# Patient Record
Sex: FEMALE | Race: BLACK OR AFRICAN AMERICAN | NOT HISPANIC OR LATINO | Employment: FULL TIME | URBAN - METROPOLITAN AREA
[De-identification: names, ages, dates, MRNs, and addresses within clinical notes are randomized per-mention and may not be internally consistent; named-entity substitution may affect disease eponyms.]

---

## 2017-01-12 RX ORDER — VALSARTAN 160 MG/1
TABLET ORAL
Qty: 30 TABLET | Refills: 2 | Status: SHIPPED | OUTPATIENT
Start: 2017-01-12 | End: 2017-04-18 | Stop reason: SDUPTHER

## 2017-01-24 RX ORDER — POTASSIUM CHLORIDE 750 MG/1
TABLET, EXTENDED RELEASE ORAL
Qty: 30 TABLET | Refills: 5 | Status: SHIPPED | OUTPATIENT
Start: 2017-01-24 | End: 2017-10-14 | Stop reason: SDUPTHER

## 2017-04-18 RX ORDER — VALSARTAN 160 MG/1
TABLET ORAL
Qty: 30 TABLET | Refills: 1 | Status: SHIPPED | OUTPATIENT
Start: 2017-04-18 | End: 2017-11-17 | Stop reason: SDUPTHER

## 2017-05-11 RX ORDER — DAPAGLIFLOZIN 5 MG/1
TABLET, FILM COATED ORAL
Qty: 30 TABLET | Refills: 0 | Status: SHIPPED | OUTPATIENT
Start: 2017-05-11 | End: 2017-06-12 | Stop reason: SDUPTHER

## 2017-05-18 ENCOUNTER — OFFICE VISIT (OUTPATIENT)
Dept: INTERNAL MEDICINE | Facility: CLINIC | Age: 67
End: 2017-05-18

## 2017-05-18 VITALS
HEART RATE: 92 BPM | WEIGHT: 198 LBS | BODY MASS INDEX: 30.11 KG/M2 | DIASTOLIC BLOOD PRESSURE: 78 MMHG | SYSTOLIC BLOOD PRESSURE: 128 MMHG | TEMPERATURE: 97.7 F | RESPIRATION RATE: 14 BRPM

## 2017-05-18 DIAGNOSIS — I10 ESSENTIAL HYPERTENSION: Primary | ICD-10-CM

## 2017-05-18 DIAGNOSIS — E11.9 TYPE 2 DIABETES MELLITUS WITHOUT COMPLICATION, WITHOUT LONG-TERM CURRENT USE OF INSULIN (HCC): Primary | ICD-10-CM

## 2017-05-18 DIAGNOSIS — Z11.59 NEED FOR HEPATITIS C SCREENING TEST: ICD-10-CM

## 2017-05-18 DIAGNOSIS — I10 ESSENTIAL HYPERTENSION: ICD-10-CM

## 2017-05-18 DIAGNOSIS — M54.50 CHRONIC MIDLINE LOW BACK PAIN WITHOUT SCIATICA: ICD-10-CM

## 2017-05-18 DIAGNOSIS — Z23 NEED FOR PNEUMOCOCCAL VACCINE: ICD-10-CM

## 2017-05-18 DIAGNOSIS — E11.9 TYPE 2 DIABETES MELLITUS WITHOUT COMPLICATION, WITHOUT LONG-TERM CURRENT USE OF INSULIN (HCC): ICD-10-CM

## 2017-05-18 DIAGNOSIS — M25.562 CHRONIC PAIN OF BOTH KNEES: ICD-10-CM

## 2017-05-18 DIAGNOSIS — M25.561 CHRONIC PAIN OF BOTH KNEES: ICD-10-CM

## 2017-05-18 DIAGNOSIS — G89.29 CHRONIC PAIN OF BOTH KNEES: ICD-10-CM

## 2017-05-18 DIAGNOSIS — G89.29 CHRONIC MIDLINE LOW BACK PAIN WITHOUT SCIATICA: ICD-10-CM

## 2017-05-18 DIAGNOSIS — Z13.220 LIPID SCREENING: ICD-10-CM

## 2017-05-18 LAB
A/C: NORMAL
ALBUMIN SERPL-MCNC: 4.3 G/DL (ref 3.2–4.8)
ALBUMIN/GLOB SERPL: 1.6 G/DL (ref 1.5–2.5)
ALP SERPL-CCNC: 98 U/L (ref 25–100)
ALT SERPL W P-5'-P-CCNC: 26 U/L (ref 7–40)
ANION GAP SERPL CALCULATED.3IONS-SCNC: 8 MMOL/L (ref 3–11)
ARTICHOKE IGE QN: 144 MG/DL (ref 0–130)
AST SERPL-CCNC: 18 U/L (ref 0–33)
BILIRUB SERPL-MCNC: 0.6 MG/DL (ref 0.3–1.2)
BUN BLD-MCNC: 16 MG/DL (ref 9–23)
BUN/CREAT SERPL: 22.9 (ref 7–25)
CALCIUM SPEC-SCNC: 10 MG/DL (ref 8.7–10.4)
CHLORIDE SERPL-SCNC: 109 MMOL/L (ref 99–109)
CHOLEST SERPL-MCNC: 202 MG/DL (ref 0–200)
CO2 SERPL-SCNC: 26 MMOL/L (ref 20–31)
CREAT BLD-MCNC: 0.7 MG/DL (ref 0.6–1.3)
DEPRECATED RDW RBC AUTO: 46.4 FL (ref 37–54)
ERYTHROCYTE [DISTWIDTH] IN BLOOD BY AUTOMATED COUNT: 14.2 % (ref 11.3–14.5)
EXPIRATION DATE: NORMAL
EXPIRATION DATE: NORMAL
GFR SERPL CREATININE-BSD FRML MDRD: 102 ML/MIN/1.73
GLOBULIN UR ELPH-MCNC: 2.7 GM/DL
GLUCOSE BLD-MCNC: 164 MG/DL (ref 70–100)
HBA1C MFR BLD: 8.1 %
HCT VFR BLD AUTO: 40.3 % (ref 34.5–44)
HCV AB SER DONR QL: NORMAL
HDLC SERPL-MCNC: 46 MG/DL (ref 40–60)
HGB BLD-MCNC: 12.6 G/DL (ref 11.5–15.5)
Lab: NORMAL
Lab: NORMAL
MCH RBC QN AUTO: 28.3 PG (ref 27–31)
MCHC RBC AUTO-ENTMCNC: 31.3 G/DL (ref 32–36)
MCV RBC AUTO: 90.4 FL (ref 80–99)
PLATELET # BLD AUTO: 267 10*3/MM3 (ref 150–450)
PMV BLD AUTO: 10.6 FL (ref 6–12)
POC CREATININE URINE: 50
POC MICROALBUMIN URINE: 10
POTASSIUM BLD-SCNC: 4.3 MMOL/L (ref 3.5–5.5)
PROT SERPL-MCNC: 7 G/DL (ref 5.7–8.2)
RBC # BLD AUTO: 4.46 10*6/MM3 (ref 3.89–5.14)
SODIUM BLD-SCNC: 143 MMOL/L (ref 132–146)
T4 FREE SERPL-MCNC: 1.14 NG/DL (ref 0.89–1.76)
TRIGL SERPL-MCNC: 129 MG/DL (ref 0–150)
TSH SERPL DL<=0.05 MIU/L-ACNC: 1.72 MIU/ML (ref 0.35–5.35)
WBC NRBC COR # BLD: 4.96 10*3/MM3 (ref 3.5–10.8)

## 2017-05-18 PROCEDURE — 99214 OFFICE O/P EST MOD 30 MIN: CPT | Performed by: INTERNAL MEDICINE

## 2017-05-18 PROCEDURE — 83036 HEMOGLOBIN GLYCOSYLATED A1C: CPT | Performed by: INTERNAL MEDICINE

## 2017-05-18 PROCEDURE — 84439 ASSAY OF FREE THYROXINE: CPT | Performed by: INTERNAL MEDICINE

## 2017-05-18 PROCEDURE — 36415 COLL VENOUS BLD VENIPUNCTURE: CPT | Performed by: INTERNAL MEDICINE

## 2017-05-18 PROCEDURE — 86803 HEPATITIS C AB TEST: CPT | Performed by: INTERNAL MEDICINE

## 2017-05-18 PROCEDURE — 82044 UR ALBUMIN SEMIQUANTITATIVE: CPT | Performed by: INTERNAL MEDICINE

## 2017-05-18 PROCEDURE — 80053 COMPREHEN METABOLIC PANEL: CPT | Performed by: INTERNAL MEDICINE

## 2017-05-18 PROCEDURE — 85027 COMPLETE CBC AUTOMATED: CPT | Performed by: INTERNAL MEDICINE

## 2017-05-18 PROCEDURE — 84443 ASSAY THYROID STIM HORMONE: CPT | Performed by: INTERNAL MEDICINE

## 2017-05-18 PROCEDURE — 80061 LIPID PANEL: CPT | Performed by: INTERNAL MEDICINE

## 2017-05-18 RX ORDER — EZETIMIBE 10 MG/1
10 TABLET ORAL DAILY
Qty: 30 TABLET | Refills: 5 | Status: SHIPPED | OUTPATIENT
Start: 2017-05-18 | End: 2018-03-05 | Stop reason: SDUPTHER

## 2017-05-18 RX ORDER — TRAMADOL HYDROCHLORIDE 50 MG/1
50 TABLET ORAL EVERY 6 HOURS PRN
COMMUNITY
End: 2019-02-03 | Stop reason: HOSPADM

## 2017-05-18 RX ORDER — CELECOXIB 200 MG/1
200 CAPSULE ORAL 2 TIMES DAILY
Qty: 60 CAPSULE | Refills: 6 | Status: SHIPPED | OUTPATIENT
Start: 2017-05-18 | End: 2017-12-15 | Stop reason: SDUPTHER

## 2017-05-19 ENCOUNTER — TELEPHONE (OUTPATIENT)
Dept: INTERNAL MEDICINE | Facility: CLINIC | Age: 67
End: 2017-05-19

## 2017-05-24 DIAGNOSIS — G89.29 CHRONIC MIDLINE LOW BACK PAIN WITHOUT SCIATICA: Primary | ICD-10-CM

## 2017-05-24 DIAGNOSIS — M54.50 CHRONIC MIDLINE LOW BACK PAIN WITHOUT SCIATICA: Primary | ICD-10-CM

## 2017-06-12 RX ORDER — DAPAGLIFLOZIN 5 MG/1
TABLET, FILM COATED ORAL
Qty: 30 TABLET | Refills: 0 | Status: SHIPPED | OUTPATIENT
Start: 2017-06-12 | End: 2017-07-19 | Stop reason: SDUPTHER

## 2017-07-05 RX ORDER — INSULIN DETEMIR 100 [IU]/ML
INJECTION, SOLUTION SUBCUTANEOUS
Qty: 1 PEN | Refills: 4 | Status: SHIPPED | OUTPATIENT
Start: 2017-07-05 | End: 2018-05-14 | Stop reason: SDUPTHER

## 2017-07-05 RX ORDER — LEVOCETIRIZINE DIHYDROCHLORIDE 5 MG/1
TABLET, FILM COATED ORAL
Qty: 30 TABLET | Refills: 2 | Status: SHIPPED | OUTPATIENT
Start: 2017-07-05 | End: 2017-09-29 | Stop reason: SDUPTHER

## 2017-07-07 ENCOUNTER — TELEPHONE (OUTPATIENT)
Dept: INTERNAL MEDICINE | Facility: CLINIC | Age: 67
End: 2017-07-07

## 2017-07-07 NOTE — TELEPHONE ENCOUNTER
----- Message from Ann Adams sent at 7/7/2017 11:41 AM EDT -----  Dr. Blackwell increased insulin and it won't be filled until 8/6/17 but only has one more dose and needs filled ASAP to [facility].  Patient can be reached at [phone] if needed.

## 2017-07-07 NOTE — TELEPHONE ENCOUNTER
Spoke with Pt, she states she is taking 30 units daily. RX sent to pharmacy . Spoke with Pt and informed them of Rx. Pt verbalized understanding and much appr'c.

## 2017-07-19 RX ORDER — DAPAGLIFLOZIN 5 MG/1
TABLET, FILM COATED ORAL
Qty: 30 TABLET | Refills: 0 | Status: SHIPPED | OUTPATIENT
Start: 2017-07-19 | End: 2017-08-17 | Stop reason: SDUPTHER

## 2017-08-17 RX ORDER — DAPAGLIFLOZIN 5 MG/1
TABLET, FILM COATED ORAL
Qty: 30 TABLET | Refills: 4 | Status: SHIPPED | OUTPATIENT
Start: 2017-08-17 | End: 2018-01-15 | Stop reason: SDUPTHER

## 2017-08-24 ENCOUNTER — OFFICE VISIT (OUTPATIENT)
Dept: INTERNAL MEDICINE | Facility: CLINIC | Age: 67
End: 2017-08-24

## 2017-08-24 VITALS
HEART RATE: 80 BPM | WEIGHT: 200 LBS | RESPIRATION RATE: 20 BRPM | DIASTOLIC BLOOD PRESSURE: 80 MMHG | SYSTOLIC BLOOD PRESSURE: 122 MMHG | TEMPERATURE: 97.1 F | BODY MASS INDEX: 30.41 KG/M2

## 2017-08-24 DIAGNOSIS — E11.9 TYPE 2 DIABETES MELLITUS WITHOUT COMPLICATION, WITHOUT LONG-TERM CURRENT USE OF INSULIN (HCC): ICD-10-CM

## 2017-08-24 DIAGNOSIS — M25.561 CHRONIC PAIN OF BOTH KNEES: ICD-10-CM

## 2017-08-24 DIAGNOSIS — M25.562 CHRONIC PAIN OF BOTH KNEES: ICD-10-CM

## 2017-08-24 DIAGNOSIS — G89.29 CHRONIC PAIN OF BOTH KNEES: ICD-10-CM

## 2017-08-24 DIAGNOSIS — M54.2 NECK PAIN: Primary | ICD-10-CM

## 2017-08-24 LAB
EXPIRATION DATE: NORMAL
HBA1C MFR BLD: 7.7 %
Lab: NORMAL

## 2017-08-24 PROCEDURE — 83036 HEMOGLOBIN GLYCOSYLATED A1C: CPT | Performed by: INTERNAL MEDICINE

## 2017-08-24 PROCEDURE — 99214 OFFICE O/P EST MOD 30 MIN: CPT | Performed by: INTERNAL MEDICINE

## 2017-08-24 NOTE — PROGRESS NOTES
Subjective   Lupe Morillo is a 67 y.o. female.     History of Present Illness   1 neck pain-chronic and mild exacerbation.  Patient says that she's been having continued intermittent neck pain.  Pain is worse with flexion, extension, and looking downward.  She is also noticed that her symptoms tend to become worse especially at her work environment.  He may resource has suggested that she may need to have another chair that is ergonomically designed to help with some of her symptoms.  She also has been going to physical therapy and this has helped tremendously with her increased range of motion and decreased pain in her neck.    2 knee pain- chronic and stable.  Patient continues to have bilateral knee pain.  Patient's knee pain is made worse with prolong standing, flexion, or extension.  She denies any new trauma to the knee, no swelling, no fever, no chills, no other systemic symptoms.  She would also like to continue her physical therapy for both her knees and her shoulders.    3 diabetes- chronic and stable.  Patient denies any hypoglycemic, nausea, vomiting, diarrhea, headache, syncope, or any other systemic symptoms in regards to her diabetes.  She continues on an appropriate ADA diet and also continues on her medications:    Review of Systems   All other systems reviewed and are negative.      Objective   Physical Exam   Constitutional: She is oriented to person, place, and time. She appears well-developed and well-nourished.   HENT:   Head: Normocephalic.   Right Ear: External ear normal.   Left Ear: External ear normal.   Nose: Nose normal.   Mouth/Throat: Oropharynx is clear and moist.   Eyes: Conjunctivae and EOM are normal. Pupils are equal, round, and reactive to light.   Neck: Normal range of motion. Neck supple.   Cardiovascular: Normal rate, regular rhythm and normal heart sounds.    Pulmonary/Chest: Effort normal and breath sounds normal.   Musculoskeletal: Normal range of motion.   Neurological:  She is alert and oriented to person, place, and time.   Skin: Skin is warm.   Nursing note and vitals reviewed.      Assessment/Plan   Lupe was seen today for diabetes, shoulder pain and knee pain.    Diagnoses and all orders for this visit:    Neck pain-chronic and stable.    Chronic pain of both knees-chronic and stable.    Type 2 diabetes mellitus without complication, without long-term current use of insulin  -     POC Glycosylated Hemoglobin (Hb A1C)      For musculoskeletal issues patient will continue to follow-up with physical therapy and anticipate in home exercise program.  Continue on current diabetes medication.       sen

## 2017-09-29 RX ORDER — LEVOCETIRIZINE DIHYDROCHLORIDE 5 MG/1
TABLET, FILM COATED ORAL
Qty: 30 TABLET | Refills: 1 | Status: SHIPPED | OUTPATIENT
Start: 2017-09-29 | End: 2017-11-29 | Stop reason: SDUPTHER

## 2017-10-16 RX ORDER — POTASSIUM CHLORIDE 750 MG/1
TABLET, EXTENDED RELEASE ORAL
Qty: 30 TABLET | Refills: 4 | Status: SHIPPED | OUTPATIENT
Start: 2017-10-16 | End: 2018-03-15 | Stop reason: SDUPTHER

## 2017-11-13 ENCOUNTER — TRANSCRIBE ORDERS (OUTPATIENT)
Dept: ADMINISTRATIVE | Facility: HOSPITAL | Age: 67
End: 2017-11-13

## 2017-11-13 DIAGNOSIS — Z12.31 VISIT FOR SCREENING MAMMOGRAM: Primary | ICD-10-CM

## 2017-11-17 ENCOUNTER — FLU SHOT (OUTPATIENT)
Dept: INTERNAL MEDICINE | Facility: CLINIC | Age: 67
End: 2017-11-17

## 2017-11-17 DIAGNOSIS — Z23 NEED FOR INFLUENZA VACCINATION: Primary | ICD-10-CM

## 2017-11-17 PROCEDURE — 90670 PCV13 VACCINE IM: CPT | Performed by: INTERNAL MEDICINE

## 2017-11-17 PROCEDURE — 90471 IMMUNIZATION ADMIN: CPT | Performed by: INTERNAL MEDICINE

## 2017-11-17 PROCEDURE — 90472 IMMUNIZATION ADMIN EACH ADD: CPT | Performed by: INTERNAL MEDICINE

## 2017-11-17 PROCEDURE — 90662 IIV NO PRSV INCREASED AG IM: CPT | Performed by: INTERNAL MEDICINE

## 2017-11-17 RX ORDER — VALSARTAN 160 MG/1
160 TABLET ORAL DAILY
Qty: 90 TABLET | Refills: 3 | Status: SHIPPED | OUTPATIENT
Start: 2017-11-17 | End: 2018-08-15 | Stop reason: RX

## 2017-11-30 RX ORDER — LEVOCETIRIZINE DIHYDROCHLORIDE 5 MG/1
TABLET, FILM COATED ORAL
Qty: 30 TABLET | Refills: 0 | Status: SHIPPED | OUTPATIENT
Start: 2017-11-30 | End: 2018-01-04 | Stop reason: SDUPTHER

## 2017-12-06 RX ORDER — EZETIMIBE 10 MG/1
TABLET ORAL
Qty: 30 TABLET | Refills: 1 | Status: SHIPPED | OUTPATIENT
Start: 2017-12-06 | End: 2017-12-11 | Stop reason: SDUPTHER

## 2017-12-11 ENCOUNTER — OFFICE VISIT (OUTPATIENT)
Dept: INTERNAL MEDICINE | Facility: CLINIC | Age: 67
End: 2017-12-11

## 2017-12-11 VITALS
BODY MASS INDEX: 30.16 KG/M2 | WEIGHT: 198.38 LBS | RESPIRATION RATE: 18 BRPM | HEART RATE: 80 BPM | DIASTOLIC BLOOD PRESSURE: 70 MMHG | SYSTOLIC BLOOD PRESSURE: 118 MMHG | TEMPERATURE: 97.6 F

## 2017-12-11 DIAGNOSIS — Z79.4 TYPE 2 DIABETES MELLITUS WITHOUT COMPLICATION, WITH LONG-TERM CURRENT USE OF INSULIN (HCC): Primary | ICD-10-CM

## 2017-12-11 DIAGNOSIS — M54.50 CHRONIC MIDLINE LOW BACK PAIN WITHOUT SCIATICA: ICD-10-CM

## 2017-12-11 DIAGNOSIS — G56.00 CARPAL TUNNEL SYNDROME, UNSPECIFIED LATERALITY: ICD-10-CM

## 2017-12-11 DIAGNOSIS — G89.29 CHRONIC MIDLINE LOW BACK PAIN WITHOUT SCIATICA: ICD-10-CM

## 2017-12-11 DIAGNOSIS — E11.9 TYPE 2 DIABETES MELLITUS WITHOUT COMPLICATION, WITH LONG-TERM CURRENT USE OF INSULIN (HCC): Primary | ICD-10-CM

## 2017-12-11 LAB
ALBUMIN SERPL-MCNC: 4.5 G/DL (ref 3.2–4.8)
ALBUMIN/GLOB SERPL: 1.9 G/DL (ref 1.5–2.5)
ALP SERPL-CCNC: 93 U/L (ref 25–100)
ALT SERPL W P-5'-P-CCNC: 23 U/L (ref 7–40)
ANION GAP SERPL CALCULATED.3IONS-SCNC: 14 MMOL/L (ref 3–11)
AST SERPL-CCNC: 16 U/L (ref 0–33)
BILIRUB SERPL-MCNC: 0.6 MG/DL (ref 0.3–1.2)
BUN BLD-MCNC: 20 MG/DL (ref 9–23)
BUN/CREAT SERPL: 22.2 (ref 7–25)
CALCIUM SPEC-SCNC: 9.4 MG/DL (ref 8.7–10.4)
CHLORIDE SERPL-SCNC: 103 MMOL/L (ref 99–109)
CO2 SERPL-SCNC: 23 MMOL/L (ref 20–31)
CREAT BLD-MCNC: 0.9 MG/DL (ref 0.6–1.3)
EXPIRATION DATE: NORMAL
GFR SERPL CREATININE-BSD FRML MDRD: 76 ML/MIN/1.73
GLOBULIN UR ELPH-MCNC: 2.4 GM/DL
GLUCOSE BLD-MCNC: 165 MG/DL (ref 70–100)
HBA1C MFR BLD: 7.5 %
Lab: NORMAL
POTASSIUM BLD-SCNC: 4.3 MMOL/L (ref 3.5–5.5)
PROT SERPL-MCNC: 6.9 G/DL (ref 5.7–8.2)
SODIUM BLD-SCNC: 140 MMOL/L (ref 132–146)

## 2017-12-11 PROCEDURE — 36415 COLL VENOUS BLD VENIPUNCTURE: CPT | Performed by: INTERNAL MEDICINE

## 2017-12-11 PROCEDURE — 83036 HEMOGLOBIN GLYCOSYLATED A1C: CPT | Performed by: INTERNAL MEDICINE

## 2017-12-11 PROCEDURE — 99214 OFFICE O/P EST MOD 30 MIN: CPT | Performed by: INTERNAL MEDICINE

## 2017-12-11 PROCEDURE — 80053 COMPREHEN METABOLIC PANEL: CPT | Performed by: INTERNAL MEDICINE

## 2017-12-11 RX ORDER — PAROXETINE 7.5 MG/1
CAPSULE ORAL DAILY PRN
Status: ON HOLD | COMMUNITY
Start: 2017-12-06 | End: 2019-01-30

## 2017-12-11 RX ORDER — MONTELUKAST SODIUM 10 MG/1
10 TABLET ORAL NIGHTLY
COMMUNITY
Start: 2017-11-30

## 2017-12-11 NOTE — PROGRESS NOTES
Subjective   Lupe Morillo is a 67 y.o. female.     History of Present Illness      1 diabetes- chronic and she says that it is doing fair.  Patient reports no symptoms of any hypoglycemia, no nausea, no vomiting, no diarrhea, no headache, no fatigue, no other systemic symptoms.  Medications: Patient is currently taking and continues on the metformin 1000 mg by mouth once a day.    2 carpal tunnel symptoms-patient says that her carpal tunnel symptoms have returned especially on her right hand which is her dominant hand.  Patient says that the pain is made worse with blocks, extension, or dexterity movement of the hand.  She will follow-up with the hand surgeons in the next few weeks.    3 low back pain-chronic and localized to lower lumbar region.  Patient continues to have lower lumbar back pain but it has improved somewhat with physical therapy and continue with NSAIDs.  She has improved somewhat with her range of motion and ability to transfer.     Review of Systems   All other systems reviewed and are negative.      Objective   Physical Exam   Constitutional: She is oriented to person, place, and time. She appears well-developed and well-nourished.   HENT:   Head: Normocephalic and atraumatic.   Right Ear: External ear normal.   Left Ear: External ear normal.   Nose: Nose normal.   Mouth/Throat: Oropharynx is clear and moist.   Eyes: Conjunctivae and EOM are normal. Pupils are equal, round, and reactive to light.   Neck: Normal range of motion. Neck supple.   Cardiovascular: Normal rate, regular rhythm and normal heart sounds.    Pulmonary/Chest: Effort normal and breath sounds normal.   Musculoskeletal: Normal range of motion.   Neurological: She is alert and oriented to person, place, and time.   Skin: Skin is warm.   Nursing note and vitals reviewed.      Assessment/Plan   Lupe was seen today for diabetes.    Diagnoses and all orders for this visit:    Type 2 diabetes mellitus without complication, with  long-term current use of insulin  -     Comprehensive Metabolic Panel  -     POC Glycosylated Hemoglobin (Hb A1C)  Continue on current diabetes management and medications.    Carpal tunnel syndrome, unspecified laterality-patient will follow-up with hand surgery    Chronic midline low back pain without sciatica-continue on current medication and medical management.

## 2017-12-15 RX ORDER — CELECOXIB 200 MG/1
CAPSULE ORAL
Qty: 60 CAPSULE | Refills: 5 | Status: ON HOLD | OUTPATIENT
Start: 2017-12-15 | End: 2019-01-30

## 2017-12-26 ENCOUNTER — HOSPITAL ENCOUNTER (OUTPATIENT)
Dept: MAMMOGRAPHY | Facility: HOSPITAL | Age: 67
Discharge: HOME | End: 2017-12-26
Admitting: OBSTETRICS & GYNECOLOGY

## 2017-12-26 DIAGNOSIS — Z12.31 VISIT FOR SCREENING MAMMOGRAM: ICD-10-CM

## 2017-12-26 PROCEDURE — 77063 BREAST TOMOSYNTHESIS BI: CPT | Performed by: RADIOLOGY

## 2017-12-26 PROCEDURE — G0202 SCR MAMMO BI INCL CAD: HCPCS | Performed by: RADIOLOGY

## 2017-12-26 PROCEDURE — 77063 BREAST TOMOSYNTHESIS BI: CPT

## 2017-12-26 PROCEDURE — G0202 SCR MAMMO BI INCL CAD: HCPCS

## 2017-12-27 ENCOUNTER — APPOINTMENT (OUTPATIENT)
Dept: MAMMOGRAPHY | Facility: HOSPITAL | Age: 67
End: 2017-12-27

## 2018-01-04 RX ORDER — LEVOCETIRIZINE DIHYDROCHLORIDE 5 MG/1
TABLET, FILM COATED ORAL
Qty: 30 TABLET | Refills: 0 | Status: SHIPPED | OUTPATIENT
Start: 2018-01-04 | End: 2018-04-21 | Stop reason: SDUPTHER

## 2018-01-15 RX ORDER — DAPAGLIFLOZIN 5 MG/1
TABLET, FILM COATED ORAL
Qty: 30 TABLET | Refills: 3 | Status: SHIPPED | OUTPATIENT
Start: 2018-01-15 | End: 2018-05-15 | Stop reason: SDUPTHER

## 2018-01-21 ENCOUNTER — HOSPITAL ENCOUNTER (EMERGENCY)
Facility: HOSPITAL | Age: 68
Discharge: HOME | End: 2018-01-22
Attending: EMERGENCY MEDICINE | Admitting: EMERGENCY MEDICINE

## 2018-01-21 VITALS
OXYGEN SATURATION: 97 % | DIASTOLIC BLOOD PRESSURE: 68 MMHG | BODY MASS INDEX: 31.08 KG/M2 | HEART RATE: 118 BPM | HEIGHT: 67 IN | SYSTOLIC BLOOD PRESSURE: 160 MMHG | WEIGHT: 198 LBS | TEMPERATURE: 99.4 F | RESPIRATION RATE: 18 BRPM

## 2018-01-21 DIAGNOSIS — J02.9 VIRAL PHARYNGITIS: Primary | ICD-10-CM

## 2018-01-21 PROCEDURE — 99283 EMERGENCY DEPT VISIT LOW MDM: CPT

## 2018-01-22 LAB
FLUAV AG NPH QL: NEGATIVE
FLUBV AG NPH QL IA: NEGATIVE
S PYO AG THROAT QL: NEGATIVE

## 2018-01-22 PROCEDURE — 87804 INFLUENZA ASSAY W/OPTIC: CPT | Performed by: EMERGENCY MEDICINE

## 2018-01-22 PROCEDURE — 25010000002 DEXAMETHASONE PER 1 MG: Performed by: EMERGENCY MEDICINE

## 2018-01-22 PROCEDURE — 87880 STREP A ASSAY W/OPTIC: CPT | Performed by: EMERGENCY MEDICINE

## 2018-01-22 PROCEDURE — 87081 CULTURE SCREEN ONLY: CPT | Performed by: EMERGENCY MEDICINE

## 2018-01-22 RX ADMIN — DEXAMETHASONE SODIUM PHOSPHATE 10 MG: 10 INJECTION INTRAMUSCULAR; INTRAVENOUS at 02:10

## 2018-01-22 NOTE — DISCHARGE INSTRUCTIONS
Pharyngitis  Pharyngitis is redness, pain, and swelling (inflammation) of your pharynx.  What are the causes?  Pharyngitis is usually caused by infection. Most of the time, these infections are from viruses (viral) and are part of a cold. However, sometimes pharyngitis is caused by bacteria (bacterial). Pharyngitis can also be caused by allergies. Viral pharyngitis may be spread from person to person by coughing, sneezing, and personal items or utensils (cups, forks, spoons, toothbrushes). Bacterial pharyngitis may be spread from person to person by more intimate contact, such as kissing.  What are the signs or symptoms?  Symptoms of pharyngitis include:  · Sore throat.  · Tiredness (fatigue).  · Low-grade fever.  · Headache.  · Joint pain and muscle aches.  · Skin rashes.  · Swollen lymph nodes.  · Plaque-like film on throat or tonsils (often seen with bacterial pharyngitis).  How is this diagnosed?  Your health care provider will ask you questions about your illness and your symptoms. Your medical history, along with a physical exam, is often all that is needed to diagnose pharyngitis. Sometimes, a rapid strep test is done. Other lab tests may also be done, depending on the suspected cause.  How is this treated?  Viral pharyngitis will usually get better in 3-4 days without the use of medicine. Bacterial pharyngitis is treated with medicines that kill germs (antibiotics).  Follow these instructions at home:  · Drink enough water and fluids to keep your urine clear or pale yellow.  · Only take over-the-counter or prescription medicines as directed by your health care provider:  ¨ If you are prescribed antibiotics, make sure you finish them even if you start to feel better.  ¨ Do not take aspirin.  · Get lots of rest.  · Gargle with 8 oz of salt water (½ tsp of salt per 1 qt of water) as often as every 1-2 hours to soothe your throat.  · Throat lozenges (if you are not at risk for choking) or sprays may be used to  soothe your throat.  Contact a health care provider if:  · You have large, tender lumps in your neck.  · You have a rash.  · You cough up green, yellow-brown, or bloody spit.  Get help right away if:  · Your neck becomes stiff.  · You drool or are unable to swallow liquids.  · You vomit or are unable to keep medicines or liquids down.  · You have severe pain that does not go away with the use of recommended medicines.  · You have trouble breathing (not caused by a stuffy nose).  This information is not intended to replace advice given to you by your health care provider. Make sure you discuss any questions you have with your health care provider.  Document Released: 12/18/2006 Document Revised: 05/25/2017 Document Reviewed: 08/25/2014  [employer] Interactive Patient Education © 2017 [employer]      Sore Throat  A sore throat is pain, burning, irritation, or scratchiness in the throat. When you have a sore throat, you may feel pain or tenderness in your throat when you swallow or talk.  Many things can cause a sore throat, including:  · An infection.  · Seasonal allergies.  · Dryness in the air.  · Irritants, such as smoke or pollution.  · Gastroesophageal reflux disease (GERD).  · A tumor.  A sore throat is often the first sign of another sickness. It may happen with other symptoms, such as coughing, sneezing, fever, and swollen neck glands. Most sore throats go away without medical treatment.  Follow these instructions at home:  · Take over-the-counter medicines only as told by your health care provider.  · Drink enough fluids to keep your urine clear or pale yellow.  · Rest as needed.  · To help with pain, try:  ¨ Sipping warm liquids, such as broth, herbal tea, or warm water.  ¨ Eating or drinking cold or frozen liquids, such as frozen ice pops.  ¨ Gargling with a salt-water mixture 3-4 times a day or as needed. To make a salt-water mixture, completely dissolve ½-1 tsp of salt in 1 cup of warm water.  ¨ Sucking  on hard candy or throat lozenges.  ¨ Putting a cool-mist humidifier in your bedroom at night to moisten the air.  ¨ Sitting in the bathroom with the door closed for 5-10 minutes while you run hot water in the shower.  · Do not use any tobacco products, such as cigarettes, chewing tobacco, and e-cigarettes. If you need help quitting, ask your health care provider.  Contact a health care provider if:  · You have a fever for more than 2-3 days.  · You have symptoms that last (are persistent) for more than 2-3 days.  · Your throat does not get better within 7 days.  · You have a fever and your symptoms suddenly get worse.  Get help right away if:  · You have difficulty breathing.  · You cannot swallow fluids, soft foods, or your saliva.  · You have increased swelling in your throat or neck.  · You have persistent nausea and vomiting.  This information is not intended to replace advice given to you by your health care provider. Make sure you discuss any questions you have with your health care provider.  Document Released: 01/25/2006 Document Revised: 08/13/2017 Document Reviewed: 10/07/2016  [employer] Interactive Patient Education © 2017 [employer]  Please review the medications you are supposed to be taking according to prior physician recommendations. I have not changed your home medications during this visit. If your discharge instructions indicate that I have changed your home medications, this is not the case, and you should disregard. If you have any questions about the medication you should be taking at home, please call your physician.

## 2018-01-22 NOTE — ED PROVIDER NOTES
Subjective   History of Present Illness  This 67-year-old female presents the emergency department complaints of sore throat cough and congestion which began yesterday.  Patient complains pain with swallowing.  She denies other illness.  She's had no fever or chills.  She's had no vomiting or diarrhea.  She states that she is painful swallowing but is able to swallow.    Past medical history is significant for history of asthma, seasonal allergies, hyperlipidemia, hypertension and type 2 diabetes    Current medications as noted on the chart    Social history she does not smoke drink or utilize drugs she is  and accompanied by her   Review of Systems   Constitutional: Negative for chills and fever.   HENT: Positive for congestion, sneezing and sore throat.    Respiratory: Positive for cough. Negative for shortness of breath.    Cardiovascular: Negative for chest pain and palpitations.   Gastrointestinal: Negative for abdominal pain, diarrhea, nausea and vomiting.   Genitourinary: Negative for dysuria, frequency and urgency.   All other systems reviewed and are negative.      Past Medical History:   Diagnosis Date   • Acute bronchitis    • Arthritis    • Asthma    • History of chronic bronchitis    • History of colonoscopy 03/20/2012   • History of hypercholesterolemia    • Hypertension    • Polyp of sigmoid colon        Allergies   Allergen Reactions   • Statins      Leg cramps       Past Surgical History:   Procedure Laterality Date   • HYSTERECTOMY     • OOPHORECTOMY     • TUBAL ABDOMINAL LIGATION         Family History   Problem Relation Age of Onset   • Diabetes Mother    • Ovarian cancer Mother      LATE 40'S-EARLY 50'S   • Hyperlipidemia Father    • Hypertension Maternal Grandmother    • Cancer Other    • Breast cancer Maternal Aunt      AGE UNKNOWN       Social History     Social History   • Marital status:      Spouse name: N/A   • Number of children: N/A   • Years of education: N/A      Social History Main Topics   • Smoking status: Never Smoker   • Smokeless tobacco: Never Used   • Alcohol use No   • Drug use: None   • Sexual activity: Not Asked     Other Topics Concern   • None     Social History Narrative   • None           Objective   Physical Exam   Constitutional: She is oriented to person, place, and time. She appears well-developed and well-nourished. No distress.   HENT:   Head: Normocephalic and atraumatic.   Right Ear: External ear normal.   Left Ear: External ear normal.   Eyes: Pupils are equal, round, and reactive to light. No scleral icterus.   Neck: Neck supple.   Cardiovascular: Regular rhythm and normal heart sounds.    Pulmonary/Chest: Effort normal and breath sounds normal. No respiratory distress.   Abdominal: Soft. Bowel sounds are normal. She exhibits no distension. There is no tenderness.   Musculoskeletal: She exhibits no edema.   Lymphadenopathy:     She has no cervical adenopathy.   Neurological: She is alert and oriented to person, place, and time. No cranial nerve deficit. Coordination normal.   Skin: Skin is warm and dry. No rash noted. She is not diaphoretic.   Psychiatric: She has a normal mood and affect. Her behavior is normal.   Nursing note and vitals reviewed.    Patient's voice is slightly hoarse however there is no stridor.  She is controlling his secretions quite well.  Oropharynx shows moderate degree of erythema but no exudates.  There is postnasal drip noted as well.  There is no respiratory distress and chest is clear.    The patient's strep screen as well as flu swabs are negative.    Assessment viral pharyngitis    Plan I spoke with the patient about treatment options and she has elected to accept some single dose of steroid.  She is aware that this will cause her glucose is to be somewhat poorly controlled over a couple of days.  I have indicated that she should not be surprised she become somewhat hyperglycemic and she should not drastically  changed any of her medications as a result of such as she will gradually normalize.  She indicates understanding.    .Procedures         ED Course  ED Course                  MDM    Final diagnoses:   Viral pharyngitis            Carlos Madsen MD  01/22/18 [id number]

## 2018-01-24 LAB — BACTERIA SPEC AEROBE CULT: NORMAL

## 2018-02-07 ENCOUNTER — HOSPITAL ENCOUNTER (OUTPATIENT)
Dept: MAMMOGRAPHY | Facility: HOSPITAL | Age: 68
Discharge: HOME | End: 2018-02-07
Admitting: OBSTETRICS & GYNECOLOGY

## 2018-02-07 DIAGNOSIS — R92.8 ABNORMAL MAMMOGRAM: ICD-10-CM

## 2018-02-07 PROCEDURE — 77061 BREAST TOMOSYNTHESIS UNI: CPT | Performed by: RADIOLOGY

## 2018-02-07 PROCEDURE — 77065 DX MAMMO INCL CAD UNI: CPT | Performed by: RADIOLOGY

## 2018-02-07 PROCEDURE — 77065 DX MAMMO INCL CAD UNI: CPT

## 2018-02-07 PROCEDURE — G0279 TOMOSYNTHESIS, MAMMO: HCPCS

## 2018-03-05 RX ORDER — EZETIMIBE 10 MG/1
TABLET ORAL
Qty: 30 TABLET | Refills: 5 | Status: SHIPPED | OUTPATIENT
Start: 2018-03-05 | End: 2018-05-14 | Stop reason: SDUPTHER

## 2018-03-15 RX ORDER — POTASSIUM CHLORIDE 750 MG/1
TABLET, FILM COATED, EXTENDED RELEASE ORAL
Qty: 30 TABLET | Refills: 5 | Status: SHIPPED | OUTPATIENT
Start: 2018-03-15 | End: 2018-05-14 | Stop reason: SDUPTHER

## 2018-03-23 ENCOUNTER — OFFICE VISIT (OUTPATIENT)
Dept: INTERNAL MEDICINE | Facility: CLINIC | Age: 68
End: 2018-03-23

## 2018-03-23 VITALS
RESPIRATION RATE: 18 BRPM | WEIGHT: 201 LBS | TEMPERATURE: 97.1 F | BODY MASS INDEX: 31.48 KG/M2 | HEART RATE: 82 BPM | DIASTOLIC BLOOD PRESSURE: 78 MMHG | SYSTOLIC BLOOD PRESSURE: 112 MMHG

## 2018-03-23 DIAGNOSIS — Z79.4 TYPE 2 DIABETES MELLITUS WITHOUT COMPLICATION, WITH LONG-TERM CURRENT USE OF INSULIN (HCC): Primary | ICD-10-CM

## 2018-03-23 DIAGNOSIS — I10 ESSENTIAL HYPERTENSION: ICD-10-CM

## 2018-03-23 DIAGNOSIS — E11.9 TYPE 2 DIABETES MELLITUS WITHOUT COMPLICATION, WITH LONG-TERM CURRENT USE OF INSULIN (HCC): Primary | ICD-10-CM

## 2018-03-23 LAB
EXPIRATION DATE: NORMAL
HBA1C MFR BLD: 7.6 %
Lab: NORMAL

## 2018-03-23 PROCEDURE — 99214 OFFICE O/P EST MOD 30 MIN: CPT | Performed by: INTERNAL MEDICINE

## 2018-03-23 PROCEDURE — 83036 HEMOGLOBIN GLYCOSYLATED A1C: CPT | Performed by: INTERNAL MEDICINE

## 2018-03-23 NOTE — PROGRESS NOTES
Subjective   Lupe Morillo is a 67 y.o. female.     History of Present Illness     1 diabetes- chronic and controlled.  Patient says that she's been maintaining an appropriate ADA diet as well as taking her medications as scheduled.  Patient denies any polyuria, polydipsia, nausea, vomiting, anorexia, weight loss, or any other systemic symptoms.  Medications: Patient is currently taking and continues on the metformin 1000 g by mouth    2 HTN- chronic and controlled.  Patient's blood pressures been well-controlled she's had no other side effects with the medications.  Patient denies any dizziness, lightheadedness, shortness breath, chest pain, or any other systemic symptoms.  Medications: Patient is currently taking and continues on the losartan 160 mg by mouth once a day    Review of Systems   All other systems reviewed and are negative.      Objective   Physical Exam   Constitutional: She appears well-developed and well-nourished.   HENT:   Head: Normocephalic.   Right Ear: External ear normal.   Left Ear: External ear normal.   Nose: Nose normal.   Mouth/Throat: Oropharynx is clear and moist.   Eyes: Conjunctivae and EOM are normal. Pupils are equal, round, and reactive to light.   Neck: Normal range of motion. Neck supple.   Cardiovascular: Normal rate, regular rhythm, normal heart sounds and intact distal pulses.    Pulmonary/Chest: Effort normal and breath sounds normal.   Abdominal: Soft. Bowel sounds are normal.   Musculoskeletal: Normal range of motion.   Neurological: She is alert.   Skin: Skin is warm.   Nursing note and vitals reviewed.      Assessment/Plan   Lupe was seen today for follow-up, diabetes and hypertension.    Diagnoses and all orders for this visit:    Type 2 diabetes mellitus without complication, with long-term current use of insulin  -     POC Glycosylated Hemoglobin (Hb A1C)    Essential hypertension    Continue on current medications for diabetes as well as current diet.  Continue  on current medications for blood pressure/hypertension.

## 2018-04-23 RX ORDER — LEVOCETIRIZINE DIHYDROCHLORIDE 5 MG/1
TABLET, FILM COATED ORAL
Qty: 30 TABLET | Refills: 5 | Status: SHIPPED | OUTPATIENT
Start: 2018-04-23 | End: 2018-05-14 | Stop reason: SDUPTHER

## 2018-05-08 RX ORDER — INSULIN DETEMIR 100 [IU]/ML
INJECTION, SOLUTION SUBCUTANEOUS
Qty: 3 ML | Refills: 4 | Status: ON HOLD | OUTPATIENT
Start: 2018-05-08 | End: 2019-01-30 | Stop reason: SDUPTHER

## 2018-05-14 ENCOUNTER — TELEPHONE (OUTPATIENT)
Dept: INTERNAL MEDICINE | Facility: CLINIC | Age: 68
End: 2018-05-14

## 2018-05-14 RX ORDER — EZETIMIBE 10 MG/1
10 TABLET ORAL DAILY
Qty: 30 TABLET | Refills: 5 | Status: SHIPPED | OUTPATIENT
Start: 2018-05-14 | End: 2019-09-27 | Stop reason: SDUPTHER

## 2018-05-14 RX ORDER — POTASSIUM CHLORIDE 750 MG/1
10 TABLET, FILM COATED, EXTENDED RELEASE ORAL DAILY
Qty: 30 TABLET | Refills: 5 | Status: SHIPPED | OUTPATIENT
Start: 2018-05-14 | End: 2018-05-29 | Stop reason: SDUPTHER

## 2018-05-14 RX ORDER — LEVOCETIRIZINE DIHYDROCHLORIDE 5 MG/1
5 TABLET, FILM COATED ORAL DAILY
Qty: 30 TABLET | Refills: 5 | Status: SHIPPED | OUTPATIENT
Start: 2018-05-14 | End: 2018-07-27 | Stop reason: ALTCHOICE

## 2018-05-15 RX ORDER — DAPAGLIFLOZIN 5 MG/1
TABLET, FILM COATED ORAL
Qty: 90 TABLET | Refills: 2 | Status: SHIPPED | OUTPATIENT
Start: 2018-05-15 | End: 2019-03-31 | Stop reason: SDUPTHER

## 2018-05-23 ENCOUNTER — TELEPHONE (OUTPATIENT)
Dept: INTERNAL MEDICINE | Facility: CLINIC | Age: 68
End: 2018-05-23

## 2018-05-29 RX ORDER — POTASSIUM CHLORIDE 750 MG/1
10 TABLET, FILM COATED, EXTENDED RELEASE ORAL DAILY
Qty: 90 TABLET | Refills: 1 | Status: SHIPPED | OUTPATIENT
Start: 2018-05-29 | End: 2019-07-23

## 2018-05-29 NOTE — TELEPHONE ENCOUNTER
Pharmacy requested 90 day supply versus 30 day supply of potassium 10 MEQ tablets. Refill sent to [facility] pharmacy.

## 2018-06-22 ENCOUNTER — OFFICE VISIT (OUTPATIENT)
Dept: INTERNAL MEDICINE | Facility: CLINIC | Age: 68
End: 2018-06-22

## 2018-06-22 VITALS
HEIGHT: 67 IN | WEIGHT: 204.4 LBS | DIASTOLIC BLOOD PRESSURE: 72 MMHG | HEART RATE: 83 BPM | TEMPERATURE: 98.3 F | BODY MASS INDEX: 32.08 KG/M2 | RESPIRATION RATE: 19 BRPM | SYSTOLIC BLOOD PRESSURE: 115 MMHG

## 2018-06-22 DIAGNOSIS — Z79.4 TYPE 2 DIABETES MELLITUS WITHOUT COMPLICATION, WITH LONG-TERM CURRENT USE OF INSULIN (HCC): Primary | ICD-10-CM

## 2018-06-22 DIAGNOSIS — E11.9 TYPE 2 DIABETES MELLITUS WITHOUT COMPLICATION, WITH LONG-TERM CURRENT USE OF INSULIN (HCC): Primary | ICD-10-CM

## 2018-06-22 DIAGNOSIS — Z23 NEED FOR PROPHYLACTIC VACCINATION AGAINST STREPTOCOCCUS PNEUMONIAE (PNEUMOCOCCUS): ICD-10-CM

## 2018-06-22 DIAGNOSIS — J30.2 CHRONIC SEASONAL ALLERGIC RHINITIS, UNSPECIFIED TRIGGER: ICD-10-CM

## 2018-06-22 LAB
EXPIRATION DATE: NORMAL
HBA1C MFR BLD: 8.4 %
Lab: NORMAL

## 2018-06-22 PROCEDURE — 83036 HEMOGLOBIN GLYCOSYLATED A1C: CPT | Performed by: INTERNAL MEDICINE

## 2018-06-22 PROCEDURE — 90732 PPSV23 VACC 2 YRS+ SUBQ/IM: CPT | Performed by: INTERNAL MEDICINE

## 2018-06-22 PROCEDURE — 99214 OFFICE O/P EST MOD 30 MIN: CPT | Performed by: INTERNAL MEDICINE

## 2018-06-22 PROCEDURE — 90471 IMMUNIZATION ADMIN: CPT | Performed by: INTERNAL MEDICINE

## 2018-06-22 NOTE — PROGRESS NOTES
Subjective   Lupe Morillo is a 67 y.o. female.     History of Present Illness     1 seasonal allergies- chronic.  Patient has been having intermittent episodes of flares with her seasonal allergies.  Congestion, runny nose, sneezing, watery eyes.  She says that the Xyzal brand-name works better than the generic and she would like to continue on the brand-name.    2 Diabetes- chronic toe.  Patient's blood sugars been well-controlled and she has had no symptoms of any hypoglycemia, no nausea, no vomiting, no sick become a no other systemic symptoms.  Medications: Patient is currently taking and continues on the Levamir 30 units subcutaneous daily at bedtime and metformin 1000 g daily.  Patient also has been watching her diet very closely and abiding by an appropriate ADA diet.    Review of Systems   All other systems reviewed and are negative.      Objective   Physical Exam   Constitutional: She is oriented to person, place, and time. She appears well-developed and well-nourished.   HENT:   Head: Normocephalic.   Right Ear: External ear normal.   Left Ear: External ear normal.   Nose: Nose normal.   Mouth/Throat: Oropharynx is clear and moist.   Eyes: Conjunctivae and EOM are normal. Pupils are equal, round, and reactive to light.   Neck: Normal range of motion. Neck supple.   Cardiovascular: Normal rate, regular rhythm, normal heart sounds and intact distal pulses.    Pulmonary/Chest: Effort normal and breath sounds normal.   Musculoskeletal: Normal range of motion.   Neurological: She is alert and oriented to person, place, and time.   Skin: Skin is warm. Capillary refill takes less than 2 seconds.   Psychiatric: She has a normal mood and affect.   Nursing note and vitals reviewed.        Assessment/Plan   Lupe was seen today for diabetes and allergies.    Diagnoses and all orders for this visit:    Type 2 diabetes mellitus without complication, with long-term current use of insulin  -     POC Glycosylated  Hemoglobin (Hb A1C)    Chronic seasonal allergic rhinitis, unspecified trigger-continue on the Xyzal    Need for prophylactic vaccination against Streptococcus pneumoniae (pneumococcus)  -     Pneumococcal Polysaccharide Vaccine 23-Valent Greater Than or Equal To 1yo Subcutaneous / IM

## 2018-07-25 ENCOUNTER — APPOINTMENT (OUTPATIENT)
Dept: CT IMAGING | Facility: HOSPITAL | Age: 68
End: 2018-07-25

## 2018-07-25 ENCOUNTER — TELEPHONE (OUTPATIENT)
Dept: INTERNAL MEDICINE | Facility: CLINIC | Age: 68
End: 2018-07-25

## 2018-07-25 ENCOUNTER — HOSPITAL ENCOUNTER (EMERGENCY)
Facility: HOSPITAL | Age: 68
Discharge: HOME | End: 2018-07-25
Attending: EMERGENCY MEDICINE | Admitting: EMERGENCY MEDICINE

## 2018-07-25 VITALS
OXYGEN SATURATION: 98 % | HEIGHT: 67 IN | HEART RATE: 79 BPM | TEMPERATURE: 98.7 F | BODY MASS INDEX: 31.39 KG/M2 | WEIGHT: 200 LBS | SYSTOLIC BLOOD PRESSURE: 142 MMHG | DIASTOLIC BLOOD PRESSURE: 69 MMHG | RESPIRATION RATE: 16 BRPM

## 2018-07-25 DIAGNOSIS — G43.809 OTHER MIGRAINE WITHOUT STATUS MIGRAINOSUS, NOT INTRACTABLE: Primary | ICD-10-CM

## 2018-07-25 LAB — GLUCOSE BLDC GLUCOMTR-MCNC: 106 MG/DL (ref 70–130)

## 2018-07-25 PROCEDURE — 96361 HYDRATE IV INFUSION ADD-ON: CPT

## 2018-07-25 PROCEDURE — 99284 EMERGENCY DEPT VISIT MOD MDM: CPT

## 2018-07-25 PROCEDURE — 96375 TX/PRO/DX INJ NEW DRUG ADDON: CPT

## 2018-07-25 PROCEDURE — 25010000002 METOCLOPRAMIDE PER 10 MG: Performed by: PHYSICIAN ASSISTANT

## 2018-07-25 PROCEDURE — 82962 GLUCOSE BLOOD TEST: CPT

## 2018-07-25 PROCEDURE — 25010000002 KETOROLAC TROMETHAMINE PER 15 MG: Performed by: PHYSICIAN ASSISTANT

## 2018-07-25 PROCEDURE — 70450 CT HEAD/BRAIN W/O DYE: CPT

## 2018-07-25 PROCEDURE — 96374 THER/PROPH/DIAG INJ IV PUSH: CPT

## 2018-07-25 PROCEDURE — 25010000002 DIPHENHYDRAMINE PER 50 MG: Performed by: PHYSICIAN ASSISTANT

## 2018-07-25 RX ORDER — DIPHENHYDRAMINE HYDROCHLORIDE 50 MG/ML
25 INJECTION INTRAMUSCULAR; INTRAVENOUS ONCE
Status: COMPLETED | OUTPATIENT
Start: 2018-07-25 | End: 2018-07-25

## 2018-07-25 RX ORDER — KETOROLAC TROMETHAMINE 30 MG/ML
15 INJECTION, SOLUTION INTRAMUSCULAR; INTRAVENOUS ONCE
Status: COMPLETED | OUTPATIENT
Start: 2018-07-25 | End: 2018-07-25

## 2018-07-25 RX ORDER — METOCLOPRAMIDE HYDROCHLORIDE 5 MG/ML
10 INJECTION INTRAMUSCULAR; INTRAVENOUS ONCE
Status: COMPLETED | OUTPATIENT
Start: 2018-07-25 | End: 2018-07-25

## 2018-07-25 RX ADMIN — SODIUM CHLORIDE 1000 ML: 9 INJECTION, SOLUTION INTRAVENOUS at 15:08

## 2018-07-25 RX ADMIN — KETOROLAC TROMETHAMINE 15 MG: 30 INJECTION, SOLUTION INTRAMUSCULAR at 15:13

## 2018-07-25 RX ADMIN — DIPHENHYDRAMINE HYDROCHLORIDE 25 MG: 50 INJECTION INTRAMUSCULAR; INTRAVENOUS at 15:16

## 2018-07-25 RX ADMIN — METOCLOPRAMIDE 10 MG: 5 INJECTION, SOLUTION INTRAMUSCULAR; INTRAVENOUS at 15:11

## 2018-07-25 NOTE — TELEPHONE ENCOUNTER
Spoke with patient. sancho has been having severe headaches, started yesterday and this morning. Pain is mainly located in the forehead down into the eye. Does not typically get headaches per patient. Informed she needed to be seen/evaluated, patient stated her headaches are so severe she is just going to go onto [facility] ER.

## 2018-07-25 NOTE — TELEPHONE ENCOUNTER
----- Message from Ann Adams sent at 7/25/2018 10:39 AM EDT -----   called stating patient currently has a severe headache and not sure if related to medication or not.  Wants to know if Dr. Blackwell wants to see her today or should they proceed to ER?  Can be reached at [phone] if disconnected.  Call transferred to [place].

## 2018-07-27 ENCOUNTER — OFFICE VISIT (OUTPATIENT)
Dept: INTERNAL MEDICINE | Facility: CLINIC | Age: 68
End: 2018-07-27

## 2018-07-27 VITALS
TEMPERATURE: 97 F | SYSTOLIC BLOOD PRESSURE: 138 MMHG | BODY MASS INDEX: 32.11 KG/M2 | RESPIRATION RATE: 18 BRPM | HEART RATE: 80 BPM | DIASTOLIC BLOOD PRESSURE: 86 MMHG | WEIGHT: 205 LBS

## 2018-07-27 DIAGNOSIS — J30.2 CHRONIC SEASONAL ALLERGIC RHINITIS, UNSPECIFIED TRIGGER: ICD-10-CM

## 2018-07-27 DIAGNOSIS — G43.501 PERSISTENT MIGRAINE AURA WITHOUT CEREBRAL INFARCTION AND WITH STATUS MIGRAINOSUS, NOT INTRACTABLE: Primary | ICD-10-CM

## 2018-07-27 DIAGNOSIS — I10 ESSENTIAL HYPERTENSION: ICD-10-CM

## 2018-07-27 PROCEDURE — 99214 OFFICE O/P EST MOD 30 MIN: CPT | Performed by: INTERNAL MEDICINE

## 2018-07-27 RX ORDER — SUMATRIPTAN 50 MG/1
TABLET, FILM COATED ORAL
Qty: 6 TABLET | Refills: 3 | Status: SHIPPED | OUTPATIENT
Start: 2018-07-27 | End: 2019-03-04

## 2018-07-27 NOTE — PROGRESS NOTES
Subjective   Lupe Morillo is a 67 y.o. female.     History of Present Illness     ER follow up:  Migraine headache- Patient says that she had a migraine headache for the past 2 days. Patient says that it may have been due stress or fatigue because of secondary of lack of sleep.  Patient says that she normally gets photophobia, phonophobia associated with her migraines and minimal relief with NSAIDs.  Patient would like a medication that will help with her migraine headaches.    2 seasonal allergies- chronic and recurrent patient says that normally her allergies have been controlled but lately within the last summer she's been having frequent episodes of sneezing, coughing, watery eyes, congestion, allergy symptoms for the past 1 week.  She recently started taking a nasal spray to help with her allergy symptoms along with antihistamine and please tell.    3 HTN- chronic and controlled.  Patient had questions concerns in regards to the recent recall of the valsartan and and was wanting to know what the next best step is.    Review of Systems   All other systems reviewed and are negative.      Objective   Physical Exam   Constitutional: She appears well-developed and well-nourished.   HENT:   Head: Normocephalic.   Right Ear: External ear normal.   Left Ear: External ear normal.   Nose: Nose normal.   Mouth/Throat: Oropharynx is clear and moist.   Eyes: Pupils are equal, round, and reactive to light. Conjunctivae and EOM are normal.   Neck: Normal range of motion. Neck supple.   Cardiovascular: Normal rate, regular rhythm, normal heart sounds and intact distal pulses.    Pulmonary/Chest: Effort normal and breath sounds normal.   Neurological: She is alert.   Skin: Skin is warm.   Nursing note and vitals reviewed.        Assessment/Plan   Lupe was seen today for follow-up, migraine, hypertension and allergies.    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 May repeat dose one time in 2 hours if headache not relieved.    Side effects and precautions of the new medication(s) have been discussed with patient  I have answered patients questions thoroughly to their understanding.  If patient should experience any side effects from the medication, a follow up appointment should be made.    Chronic seasonal allergic rhinitis, unspecified trigger-continue on antihistamine medications.    Essential hypertension-continue on blood pressure medications.  I have instructed patient to go to the pharmacy and have the lot number checked on the valsartan medication to see if it is one of the actual allotments that are being recalled.  Patient agree with plan.

## 2018-07-31 RX ORDER — PEN NEEDLE, DIABETIC 32GX 5/32"
NEEDLE, DISPOSABLE MISCELLANEOUS
Qty: 10 EACH | Refills: 2 | Status: SHIPPED | OUTPATIENT
Start: 2018-07-31 | End: 2019-05-16 | Stop reason: SDUPTHER

## 2018-08-15 ENCOUNTER — TELEPHONE (OUTPATIENT)
Dept: INTERNAL MEDICINE | Facility: CLINIC | Age: 68
End: 2018-08-15

## 2018-08-15 RX ORDER — LOSARTAN POTASSIUM 100 MG/1
100 TABLET ORAL DAILY
Qty: 30 TABLET | Refills: 3 | Status: SHIPPED | OUTPATIENT
Start: 2018-08-15 | End: 2018-10-01 | Stop reason: SDUPTHER

## 2018-08-15 NOTE — TELEPHONE ENCOUNTER
Please tell patient that I have discontinued the valsartan    I have called her in a new medication called losartan 100 mg 1 tablet by mouth once a day-this will replace the valsartan    Please let me know how her blood pressures are doing

## 2018-08-15 NOTE — TELEPHONE ENCOUNTER
----- Message from Bhavna Hubbard MA sent at 8/15/2018 10:22 AM EDT -----  Contact: Pt  Pt calling to get another medication sent in to replace the Valsartan. Pt uses [facility] in [place]. Contact pt at [phone]

## 2018-08-16 NOTE — TELEPHONE ENCOUNTER
Patient informed, verb good understanding. States she has already picked up the new rx. Will monitor blood pressure.

## 2018-10-01 RX ORDER — LOSARTAN POTASSIUM 100 MG/1
100 TABLET ORAL DAILY
Qty: 90 TABLET | Refills: 1 | Status: SHIPPED | OUTPATIENT
Start: 2018-10-01 | End: 2019-08-01 | Stop reason: SDUPTHER

## 2018-10-29 NOTE — TELEPHONE ENCOUNTER
metFORMIN (GLUCOPHAGE) 1000 MG tablet  Take 1 tablet by mouth Daily. with food.   Normal, Disp-90 tablet, R-0    Sent to [facility]

## 2018-12-03 ENCOUNTER — OFFICE VISIT (OUTPATIENT)
Dept: INTERNAL MEDICINE | Facility: CLINIC | Age: 68
End: 2018-12-03

## 2018-12-03 VITALS
RESPIRATION RATE: 18 BRPM | HEART RATE: 82 BPM | TEMPERATURE: 98 F | BODY MASS INDEX: 31.23 KG/M2 | WEIGHT: 199.38 LBS | SYSTOLIC BLOOD PRESSURE: 142 MMHG | DIASTOLIC BLOOD PRESSURE: 78 MMHG

## 2018-12-03 DIAGNOSIS — E11.9 TYPE 2 DIABETES MELLITUS WITHOUT COMPLICATION, WITHOUT LONG-TERM CURRENT USE OF INSULIN (HCC): Primary | ICD-10-CM

## 2018-12-03 DIAGNOSIS — G89.29 CHRONIC MIDLINE LOW BACK PAIN WITHOUT SCIATICA: ICD-10-CM

## 2018-12-03 DIAGNOSIS — M54.50 CHRONIC MIDLINE LOW BACK PAIN WITHOUT SCIATICA: ICD-10-CM

## 2018-12-03 PROCEDURE — 90662 IIV NO PRSV INCREASED AG IM: CPT | Performed by: INTERNAL MEDICINE

## 2018-12-03 PROCEDURE — 90471 IMMUNIZATION ADMIN: CPT | Performed by: INTERNAL MEDICINE

## 2018-12-03 PROCEDURE — 99214 OFFICE O/P EST MOD 30 MIN: CPT | Performed by: INTERNAL MEDICINE

## 2018-12-03 RX ORDER — DIPHENHYDRAMINE HYDROCHLORIDE 25 MG/1
TABLET ORAL
COMMUNITY
End: 2019-02-03 | Stop reason: HOSPADM

## 2018-12-03 RX ORDER — BUDESONIDE AND FORMOTEROL FUMARATE DIHYDRATE 160; 4.5 UG/1; UG/1
2 AEROSOL RESPIRATORY (INHALATION)
COMMUNITY
Start: 2018-11-23 | End: 2022-11-04

## 2018-12-03 NOTE — PROGRESS NOTES
Subjective   Lupe Morillo is a 68 y.o. female.     History of Present Illness     1 Low back pain- chronic   Patient is scheduled for January 30th for back surgery.   She needs refill on the compound cream from [facility] .  This particular medication has been doing very well with controlling her lower back pain as well as Tylenol arthritis.    2 diabetes- chronic  and controlled.  Patient says that she has been doing the best that she can with her diet and exercise.  Patient denies any hyperglycemic, nausea, no vomiting, diarrhea, no headache, no fatigue, no other systemic signs.        Review of Systems   All other systems reviewed and are negative.      Objective   Physical Exam   Constitutional: She appears well-developed and well-nourished.   HENT:   Head: Normocephalic.   Right Ear: External ear normal.   Left Ear: External ear normal.   Nose: Nose normal.   Mouth/Throat: Oropharynx is clear and moist.   Eyes: Conjunctivae and EOM are normal. Pupils are equal, round, and reactive to light.   Neck: Normal range of motion. Neck supple.   Nursing note and vitals reviewed.        Assessment/Plan   Lupe was seen today for diabetes and back pain.    Diagnoses and all orders for this visit:    Type 2 diabetes mellitus without complication, without long-term current use of insulin (CMS/[facility])-continue with current medical management.    Chronic midline low back pain without sciatica-will refill the compound cream.    Patient will be undergoing preop assessment at the orthopedics department prior to surgery.    From a medical standpoint, patient is medically cleared for surgical procedure.

## 2018-12-20 ENCOUNTER — TELEPHONE (OUTPATIENT)
Dept: INTERNAL MEDICINE | Facility: CLINIC | Age: 68
End: 2018-12-20

## 2018-12-20 NOTE — TELEPHONE ENCOUNTER
----- Message from Ann Adams sent at 12/20/2018  9:17 AM EST -----  Patient called states Dr. Blackwell was supposed to be sending clearance information to Dr. Sims but his office has not received it yet. She needs this taken care of ASA. Patient can be reached at [phone] to let her know this has been taken care of. Dr. Blackwell last office note states Dr. Sims office was supposed to let us know what they needed from us.

## 2018-12-20 NOTE — TELEPHONE ENCOUNTER
Spoke with Angelina at Dr. Sims's office. She stated that they do not have a certain pre-op clearance paperwork that needs to be filled out. All they need from you is a full metabolic work up and the most recent office note that states that you believe she is clear for surgery. She doesn't need an EKG as they will do that at the the pre-admit appointment. When office note and copy of the labs are ready they can be faxed to [phone], Attention: Angelina.

## 2018-12-20 NOTE — TELEPHONE ENCOUNTER
I have yet to receive that information from Dr. Sims office can we called their office and see if they can forward me the necessary information

## 2018-12-21 NOTE — TELEPHONE ENCOUNTER
Previous office note stating that she was clear for surgery was faxed to the office number provided below. Success message was received.

## 2018-12-21 NOTE — TELEPHONE ENCOUNTER
Please fax previous note to orthopedics for medical clearance.  Patient will undergo further surgical clearance by the orthopedic Department.

## 2018-12-28 ENCOUNTER — TELEPHONE (OUTPATIENT)
Dept: INTERNAL MEDICINE | Facility: CLINIC | Age: 68
End: 2018-12-28

## 2019-01-03 ENCOUNTER — TRANSCRIBE ORDERS (OUTPATIENT)
Dept: ADMINISTRATIVE | Facility: HOSPITAL | Age: 69
End: 2019-01-03

## 2019-01-03 DIAGNOSIS — Z12.31 VISIT FOR SCREENING MAMMOGRAM: Primary | ICD-10-CM

## 2019-01-04 ENCOUNTER — HOSPITAL ENCOUNTER (OUTPATIENT)
Dept: MAMMOGRAPHY | Facility: HOSPITAL | Age: 69
Discharge: HOME | End: 2019-01-04
Admitting: OBSTETRICS & GYNECOLOGY

## 2019-01-04 DIAGNOSIS — Z12.31 VISIT FOR SCREENING MAMMOGRAM: ICD-10-CM

## 2019-01-04 PROCEDURE — 77063 BREAST TOMOSYNTHESIS BI: CPT | Performed by: RADIOLOGY

## 2019-01-04 PROCEDURE — 77067 SCR MAMMO BI INCL CAD: CPT | Performed by: RADIOLOGY

## 2019-01-04 PROCEDURE — 77067 SCR MAMMO BI INCL CAD: CPT

## 2019-01-04 PROCEDURE — 77063 BREAST TOMOSYNTHESIS BI: CPT

## 2019-01-08 NOTE — TELEPHONE ENCOUNTER
????  I have already faxed the note from 12/3/2018 indicating patient was cleared for procedure along with the form    If they do not have the form then they will have to re-fax me another one but my note clearly states that the patient is cleared for procedure (which was faxed to them)

## 2019-01-09 NOTE — TELEPHONE ENCOUNTER
Just have patient call the pharmacy during operating hours and they can verify if her losartan (based on Lot number) is one of the ones that have been recalled from a certain distributor

## 2019-01-09 NOTE — TELEPHONE ENCOUNTER
Spoke with patient's pharmacy and they said that the manufactures of Losartan sent out a letter to every patient who had filled this medication between the dates of October 2016-October 2018. They provided me with a number that the patient could call to see if the lot number of their medication was affected by the recall.     Called patient and informed her of this and provided the number. According to the patient's pharmacy her medication was not part of that recall. Number to call is ([phone]). Patient stated that she would call the number and just double check.

## 2019-01-09 NOTE — TELEPHONE ENCOUNTER
Spoke with Angelina at her Surgeons office and she verified that she does have the patient's pre-op paper work. Spoke to patient and let her know that it was taken care of from our end.     Patient also mentioned that she received a letter from her pharmacy stating that her Losartan had been recalled and needed to stop taking it so she has. She wanted to know if you would call in another medication from her of if you knew anything about this recall. Patient's pharmacy is not open at this time to verify. Please advise.

## 2019-01-23 ENCOUNTER — APPOINTMENT (OUTPATIENT)
Dept: PREADMISSION TESTING | Facility: HOSPITAL | Age: 69
End: 2019-01-23

## 2019-01-23 VITALS — BODY MASS INDEX: 29.93 KG/M2 | HEIGHT: 68 IN | WEIGHT: 197.5 LBS

## 2019-01-23 LAB
DEPRECATED RDW RBC AUTO: 45.4 FL (ref 37–54)
ERYTHROCYTE [DISTWIDTH] IN BLOOD BY AUTOMATED COUNT: 13.7 % (ref 11.3–14.5)
HBA1C MFR BLD: 7.9 % (ref 4.8–5.6)
HCT VFR BLD AUTO: 42.5 % (ref 34.5–44)
HGB BLD-MCNC: 13.4 G/DL (ref 11.5–15.5)
MCH RBC QN AUTO: 28.6 PG (ref 27–31)
MCHC RBC AUTO-ENTMCNC: 31.5 G/DL (ref 32–36)
MCV RBC AUTO: 90.6 FL (ref 80–99)
PLATELET # BLD AUTO: 285 10*3/MM3 (ref 150–450)
PMV BLD AUTO: 10.4 FL (ref 6–12)
POTASSIUM BLD-SCNC: 4.3 MMOL/L (ref 3.5–5.5)
RBC # BLD AUTO: 4.69 10*6/MM3 (ref 3.89–5.14)
WBC NRBC COR # BLD: 6.08 10*3/MM3 (ref 3.5–10.8)

## 2019-01-23 PROCEDURE — 84132 ASSAY OF SERUM POTASSIUM: CPT | Performed by: ORTHOPAEDIC SURGERY

## 2019-01-23 PROCEDURE — 93010 ELECTROCARDIOGRAM REPORT: CPT | Performed by: INTERNAL MEDICINE

## 2019-01-23 PROCEDURE — 83036 HEMOGLOBIN GLYCOSYLATED A1C: CPT | Performed by: ORTHOPAEDIC SURGERY

## 2019-01-23 PROCEDURE — 93005 ELECTROCARDIOGRAM TRACING: CPT

## 2019-01-23 PROCEDURE — 36415 COLL VENOUS BLD VENIPUNCTURE: CPT

## 2019-01-23 PROCEDURE — 85027 COMPLETE CBC AUTOMATED: CPT | Performed by: ORTHOPAEDIC SURGERY

## 2019-01-23 RX ORDER — LANOLIN ALCOHOL/MO/W.PET/CERES
1000 CREAM (GRAM) TOPICAL DAILY
COMMUNITY
End: 2019-02-03 | Stop reason: HOSPADM

## 2019-01-23 RX ORDER — BIOTIN 2500 MCG
CAPSULE ORAL DAILY
Status: ON HOLD | COMMUNITY
End: 2021-11-22

## 2019-01-23 RX ORDER — NORTRIPTYLINE HYDROCHLORIDE 25 MG/1
25 CAPSULE ORAL NIGHTLY PRN
Status: ON HOLD | COMMUNITY
End: 2019-01-30

## 2019-01-23 RX ORDER — KETOTIFEN FUMARATE 0.35 MG/ML
1 SOLUTION/ DROPS OPHTHALMIC AS NEEDED
Status: ON HOLD | COMMUNITY
End: 2019-01-30

## 2019-01-23 RX ORDER — ALLOPURINOL 300 MG/1
300 TABLET ORAL DAILY
Status: ON HOLD | COMMUNITY
End: 2019-01-30

## 2019-01-23 RX ORDER — ASCORBIC ACID 500 MG
500 TABLET ORAL DAILY
COMMUNITY
End: 2023-03-13

## 2019-01-23 NOTE — PAT
Patient to apply to surgical area (as instructed) the night before procedure and the AM of procedure.    Patient instructed to drink 20 ounces (or until full) of Gatorade or 20 ounces of G2 (if diabetic) and complete 3 hours before your surgery start time. (NO RED Gatorade or G2)    Patient verbalized understanding.

## 2019-01-30 ENCOUNTER — ANESTHESIA (OUTPATIENT)
Dept: PERIOP | Facility: HOSPITAL | Age: 69
End: 2019-01-30

## 2019-01-30 ENCOUNTER — ANESTHESIA EVENT (OUTPATIENT)
Dept: PERIOP | Facility: HOSPITAL | Age: 69
End: 2019-01-30

## 2019-01-30 ENCOUNTER — APPOINTMENT (OUTPATIENT)
Dept: GENERAL RADIOLOGY | Facility: HOSPITAL | Age: 69
End: 2019-01-30

## 2019-01-30 ENCOUNTER — HOSPITAL ENCOUNTER (INPATIENT)
Facility: HOSPITAL | Age: 69
LOS: 4 days | Discharge: HOME HEALTH | End: 2019-02-03
Attending: ORTHOPAEDIC SURGERY | Admitting: ORTHOPAEDIC SURGERY

## 2019-01-30 DIAGNOSIS — Z74.09 IMPAIRED MOBILITY AND ADLS: Primary | ICD-10-CM

## 2019-01-30 DIAGNOSIS — Z78.9 IMPAIRED MOBILITY AND ADLS: Primary | ICD-10-CM

## 2019-01-30 DIAGNOSIS — Z98.1 S/P LUMBAR FUSION: ICD-10-CM

## 2019-01-30 DIAGNOSIS — Z74.09 IMPAIRED FUNCTIONAL MOBILITY, BALANCE, GAIT, AND ENDURANCE: ICD-10-CM

## 2019-01-30 PROBLEM — M54.9 BACK PAIN: Status: ACTIVE | Noted: 2019-01-30

## 2019-01-30 PROBLEM — J45.909 ASTHMA: Status: ACTIVE | Noted: 2019-01-30

## 2019-01-30 PROBLEM — E78.5 HLD (HYPERLIPIDEMIA): Status: ACTIVE | Noted: 2019-01-30

## 2019-01-30 LAB
GLUCOSE BLDC GLUCOMTR-MCNC: 127 MG/DL (ref 70–130)
GLUCOSE BLDC GLUCOMTR-MCNC: 133 MG/DL (ref 70–130)
GLUCOSE BLDC GLUCOMTR-MCNC: 150 MG/DL (ref 70–130)
GLUCOSE BLDC GLUCOMTR-MCNC: 161 MG/DL (ref 70–130)
GLUCOSE BLDC GLUCOMTR-MCNC: 185 MG/DL (ref 70–130)
POTASSIUM BLDA-SCNC: 3.97 MMOL/L (ref 3.5–5.3)

## 2019-01-30 PROCEDURE — P9047 ALBUMIN (HUMAN), 25%, 50ML: HCPCS | Performed by: NURSE ANESTHETIST, CERTIFIED REGISTERED

## 2019-01-30 PROCEDURE — 25010000002 ONDANSETRON PER 1 MG: Performed by: ORTHOPAEDIC SURGERY

## 2019-01-30 PROCEDURE — 25010000002 HYDROMORPHONE PER 4 MG: Performed by: NURSE ANESTHETIST, CERTIFIED REGISTERED

## 2019-01-30 PROCEDURE — C1713 ANCHOR/SCREW BN/BN,TIS/BN: HCPCS | Performed by: ORTHOPAEDIC SURGERY

## 2019-01-30 PROCEDURE — 94640 AIRWAY INHALATION TREATMENT: CPT

## 2019-01-30 PROCEDURE — 25010000002 MORPHINE PER 10 MG: Performed by: ORTHOPAEDIC SURGERY

## 2019-01-30 PROCEDURE — 63710000001 INSULIN LISPRO (HUMAN) PER 5 UNITS

## 2019-01-30 PROCEDURE — 07DR3ZZ EXTRACTION OF ILIAC BONE MARROW, PERCUTANEOUS APPROACH: ICD-10-PCS | Performed by: ORTHOPAEDIC SURGERY

## 2019-01-30 PROCEDURE — 84132 ASSAY OF SERUM POTASSIUM: CPT | Performed by: ANESTHESIOLOGY

## 2019-01-30 PROCEDURE — 25010000002 NEOSTIGMINE 10 MG/10ML SOLUTION: Performed by: NURSE ANESTHETIST, CERTIFIED REGISTERED

## 2019-01-30 PROCEDURE — 25010000002 ONDANSETRON PER 1 MG: Performed by: NURSE ANESTHETIST, CERTIFIED REGISTERED

## 2019-01-30 PROCEDURE — 25810000003 SODIUM CHLORIDE 0.9 % WITH KCL 20 MEQ 20-0.9 MEQ/L-% SOLUTION: Performed by: ORTHOPAEDIC SURGERY

## 2019-01-30 PROCEDURE — 25010000003 CEFAZOLIN IN DEXTROSE 2-4 GM/100ML-% SOLUTION: Performed by: ORTHOPAEDIC SURGERY

## 2019-01-30 PROCEDURE — 25010000002 FENTANYL CITRATE (PF) 100 MCG/2ML SOLUTION: Performed by: NURSE ANESTHETIST, CERTIFIED REGISTERED

## 2019-01-30 PROCEDURE — 25010000002 PROPOFOL 10 MG/ML EMULSION: Performed by: NURSE ANESTHETIST, CERTIFIED REGISTERED

## 2019-01-30 PROCEDURE — 25010000002 DEXAMETHASONE PER 1 MG: Performed by: NURSE ANESTHETIST, CERTIFIED REGISTERED

## 2019-01-30 PROCEDURE — 4A11X4G MONITORING OF PERIPHERAL NERVOUS ELECTRICAL ACTIVITY, INTRAOPERATIVE, EXTERNAL APPROACH: ICD-10-PCS | Performed by: ORTHOPAEDIC SURGERY

## 2019-01-30 PROCEDURE — 25010000002 ALBUMIN HUMAN 25% PER 50 ML: Performed by: NURSE ANESTHETIST, CERTIFIED REGISTERED

## 2019-01-30 PROCEDURE — 0SG0071 FUSION OF LUMBAR VERTEBRAL JOINT WITH AUTOLOGOUS TISSUE SUBSTITUTE, POSTERIOR APPROACH, POSTERIOR COLUMN, OPEN APPROACH: ICD-10-PCS | Performed by: ORTHOPAEDIC SURGERY

## 2019-01-30 PROCEDURE — 0SG00AJ FUSION OF LUMBAR VERTEBRAL JOINT WITH INTERBODY FUSION DEVICE, POSTERIOR APPROACH, ANTERIOR COLUMN, OPEN APPROACH: ICD-10-PCS | Performed by: ORTHOPAEDIC SURGERY

## 2019-01-30 PROCEDURE — 0ST20ZZ RESECTION OF LUMBAR VERTEBRAL DISC, OPEN APPROACH: ICD-10-PCS | Performed by: ORTHOPAEDIC SURGERY

## 2019-01-30 PROCEDURE — 63710000001 INSULIN DETEMIR PER 5 UNITS: Performed by: NURSE PRACTITIONER

## 2019-01-30 PROCEDURE — 94799 UNLISTED PULMONARY SVC/PX: CPT

## 2019-01-30 PROCEDURE — 63710000001 INSULIN LISPRO (HUMAN) PER 5 UNITS: Performed by: NURSE PRACTITIONER

## 2019-01-30 PROCEDURE — 82962 GLUCOSE BLOOD TEST: CPT

## 2019-01-30 PROCEDURE — 76000 FLUOROSCOPY <1 HR PHYS/QHP: CPT

## 2019-01-30 DEVICE — SPACR OPAL 10X12X28MM: Type: IMPLANTABLE DEVICE | Site: SPINE LUMBAR | Status: FUNCTIONAL

## 2019-01-30 DEVICE — ROD PREBNT SPINE EXPEDIUM TI 5.5X45MM: Type: IMPLANTABLE DEVICE | Site: SPINE LUMBAR | Status: FUNCTIONAL

## 2019-01-30 DEVICE — SCRW VIPER INNR ST: Type: IMPLANTABLE DEVICE | Site: SPINE LUMBAR | Status: FUNCTIONAL

## 2019-01-30 DEVICE — ALLOGRFT BONE VIVIGEN CELLUAR MATRX FORMABLE 5CC: Type: IMPLANTABLE DEVICE | Site: SPINE LUMBAR | Status: FUNCTIONAL

## 2019-01-30 DEVICE — SCRW CORT VIPER PLS5.5 TI FIX 6X45MM: Type: IMPLANTABLE DEVICE | Site: SPINE LUMBAR | Status: FUNCTIONAL

## 2019-01-30 RX ORDER — GLYCOPYRROLATE 0.2 MG/ML
INJECTION INTRAMUSCULAR; INTRAVENOUS AS NEEDED
Status: DISCONTINUED | OUTPATIENT
Start: 2019-01-30 | End: 2019-01-30 | Stop reason: SURG

## 2019-01-30 RX ORDER — PROMETHAZINE HYDROCHLORIDE 25 MG/ML
6.25 INJECTION, SOLUTION INTRAMUSCULAR; INTRAVENOUS ONCE AS NEEDED
Status: DISCONTINUED | OUTPATIENT
Start: 2019-01-30 | End: 2019-01-30

## 2019-01-30 RX ORDER — OXYCODONE HCL 10 MG/1
10 TABLET, FILM COATED, EXTENDED RELEASE ORAL ONCE
Status: COMPLETED | OUTPATIENT
Start: 2019-01-30 | End: 2019-01-30

## 2019-01-30 RX ORDER — ALBUTEROL SULFATE 2.5 MG/3ML
2.5 SOLUTION RESPIRATORY (INHALATION) EVERY 6 HOURS PRN
Status: DISCONTINUED | OUTPATIENT
Start: 2019-01-30 | End: 2019-02-03 | Stop reason: HOSPADM

## 2019-01-30 RX ORDER — ALBUMIN (HUMAN) 12.5 G/50ML
SOLUTION INTRAVENOUS CONTINUOUS PRN
Status: DISCONTINUED | OUTPATIENT
Start: 2019-01-30 | End: 2019-01-30 | Stop reason: SURG

## 2019-01-30 RX ORDER — LABETALOL HYDROCHLORIDE 5 MG/ML
10 INJECTION, SOLUTION INTRAVENOUS EVERY 4 HOURS PRN
Status: DISCONTINUED | OUTPATIENT
Start: 2019-01-30 | End: 2019-02-03 | Stop reason: HOSPADM

## 2019-01-30 RX ORDER — LOSARTAN POTASSIUM 25 MG/1
100 TABLET ORAL DAILY
Status: DISCONTINUED | OUTPATIENT
Start: 2019-01-30 | End: 2019-02-03 | Stop reason: HOSPADM

## 2019-01-30 RX ORDER — NALOXONE HCL 0.4 MG/ML
0.4 VIAL (ML) INJECTION
Status: DISCONTINUED | OUTPATIENT
Start: 2019-01-30 | End: 2019-02-03 | Stop reason: HOSPADM

## 2019-01-30 RX ORDER — BISACODYL 5 MG/1
5 TABLET, DELAYED RELEASE ORAL DAILY PRN
Status: DISCONTINUED | OUTPATIENT
Start: 2019-01-30 | End: 2019-02-03 | Stop reason: HOSPADM

## 2019-01-30 RX ORDER — PROMETHAZINE HYDROCHLORIDE 25 MG/1
25 TABLET ORAL ONCE AS NEEDED
Status: DISCONTINUED | OUTPATIENT
Start: 2019-01-30 | End: 2019-01-30

## 2019-01-30 RX ORDER — NICOTINE POLACRILEX 4 MG
15 LOZENGE BUCCAL
Status: DISCONTINUED | OUTPATIENT
Start: 2019-01-30 | End: 2019-01-30

## 2019-01-30 RX ORDER — HYDROMORPHONE HYDROCHLORIDE 1 MG/ML
0.5 INJECTION, SOLUTION INTRAMUSCULAR; INTRAVENOUS; SUBCUTANEOUS
Status: DISCONTINUED | OUTPATIENT
Start: 2019-01-30 | End: 2019-01-30

## 2019-01-30 RX ORDER — CEFAZOLIN SODIUM 2 G/100ML
2 INJECTION, SOLUTION INTRAVENOUS EVERY 8 HOURS
Status: COMPLETED | OUTPATIENT
Start: 2019-01-30 | End: 2019-01-30

## 2019-01-30 RX ORDER — OXYBUTYNIN CHLORIDE 5 MG/1
5 TABLET, EXTENDED RELEASE ORAL DAILY
Status: DISCONTINUED | OUTPATIENT
Start: 2019-01-30 | End: 2019-02-03 | Stop reason: HOSPADM

## 2019-01-30 RX ORDER — NEOSTIGMINE METHYLSULFATE 1 MG/ML
INJECTION, SOLUTION INTRAVENOUS AS NEEDED
Status: DISCONTINUED | OUTPATIENT
Start: 2019-01-30 | End: 2019-01-30 | Stop reason: SURG

## 2019-01-30 RX ORDER — BISACODYL 10 MG
10 SUPPOSITORY, RECTAL RECTAL DAILY PRN
Status: DISCONTINUED | OUTPATIENT
Start: 2019-01-30 | End: 2019-02-03 | Stop reason: HOSPADM

## 2019-01-30 RX ORDER — MORPHINE SULFATE 4 MG/ML
1 INJECTION, SOLUTION INTRAMUSCULAR; INTRAVENOUS EVERY 4 HOURS PRN
Status: DISCONTINUED | OUTPATIENT
Start: 2019-01-30 | End: 2019-02-03 | Stop reason: HOSPADM

## 2019-01-30 RX ORDER — OXYCODONE AND ACETAMINOPHEN 10; 325 MG/1; MG/1
1 TABLET ORAL EVERY 4 HOURS PRN
Status: DISCONTINUED | OUTPATIENT
Start: 2019-01-30 | End: 2019-02-03 | Stop reason: HOSPADM

## 2019-01-30 RX ORDER — ATRACURIUM BESYLATE 10 MG/ML
INJECTION, SOLUTION INTRAVENOUS AS NEEDED
Status: DISCONTINUED | OUTPATIENT
Start: 2019-01-30 | End: 2019-01-30 | Stop reason: SURG

## 2019-01-30 RX ORDER — SODIUM CHLORIDE 0.9 % (FLUSH) 0.9 %
3-10 SYRINGE (ML) INJECTION AS NEEDED
Status: DISCONTINUED | OUTPATIENT
Start: 2019-01-30 | End: 2019-02-03 | Stop reason: HOSPADM

## 2019-01-30 RX ORDER — ONDANSETRON 2 MG/ML
INJECTION INTRAMUSCULAR; INTRAVENOUS AS NEEDED
Status: DISCONTINUED | OUTPATIENT
Start: 2019-01-30 | End: 2019-01-30 | Stop reason: SURG

## 2019-01-30 RX ORDER — DEXTROSE MONOHYDRATE 25 G/50ML
25 INJECTION, SOLUTION INTRAVENOUS
Status: DISCONTINUED | OUTPATIENT
Start: 2019-01-30 | End: 2019-01-30

## 2019-01-30 RX ORDER — MEPERIDINE HYDROCHLORIDE 25 MG/ML
12.5 INJECTION INTRAMUSCULAR; INTRAVENOUS; SUBCUTANEOUS
Status: DISCONTINUED | OUTPATIENT
Start: 2019-01-30 | End: 2019-01-30

## 2019-01-30 RX ORDER — SODIUM CHLORIDE 0.9 % (FLUSH) 0.9 %
3-10 SYRINGE (ML) INJECTION AS NEEDED
Status: CANCELLED | OUTPATIENT
Start: 2019-01-30

## 2019-01-30 RX ORDER — FAMOTIDINE 20 MG/1
20 TABLET, FILM COATED ORAL ONCE
Status: COMPLETED | OUTPATIENT
Start: 2019-01-30 | End: 2019-01-30

## 2019-01-30 RX ORDER — SODIUM CHLORIDE, SODIUM LACTATE, POTASSIUM CHLORIDE, CALCIUM CHLORIDE 600; 310; 30; 20 MG/100ML; MG/100ML; MG/100ML; MG/100ML
9 INJECTION, SOLUTION INTRAVENOUS CONTINUOUS
Status: DISCONTINUED | OUTPATIENT
Start: 2019-01-30 | End: 2019-02-03 | Stop reason: HOSPADM

## 2019-01-30 RX ORDER — SODIUM CHLORIDE 0.9 % (FLUSH) 0.9 %
3 SYRINGE (ML) INJECTION EVERY 12 HOURS SCHEDULED
Status: CANCELLED | OUTPATIENT
Start: 2019-01-30

## 2019-01-30 RX ORDER — BUPIVACAINE HYDROCHLORIDE AND EPINEPHRINE 2.5; 5 MG/ML; UG/ML
INJECTION, SOLUTION INFILTRATION; PERINEURAL AS NEEDED
Status: DISCONTINUED | OUTPATIENT
Start: 2019-01-30 | End: 2019-01-30 | Stop reason: HOSPADM

## 2019-01-30 RX ORDER — FAMOTIDINE 20 MG/1
20 TABLET, FILM COATED ORAL EVERY 12 HOURS SCHEDULED
Status: DISCONTINUED | OUTPATIENT
Start: 2019-01-30 | End: 2019-02-03 | Stop reason: HOSPADM

## 2019-01-30 RX ORDER — ONDANSETRON 2 MG/ML
4 INJECTION INTRAMUSCULAR; INTRAVENOUS EVERY 6 HOURS PRN
Status: DISCONTINUED | OUTPATIENT
Start: 2019-01-30 | End: 2019-02-03 | Stop reason: HOSPADM

## 2019-01-30 RX ORDER — FENTANYL CITRATE 50 UG/ML
50 INJECTION, SOLUTION INTRAMUSCULAR; INTRAVENOUS
Status: DISCONTINUED | OUTPATIENT
Start: 2019-01-30 | End: 2019-01-30

## 2019-01-30 RX ORDER — DEXAMETHASONE SODIUM PHOSPHATE 4 MG/ML
INJECTION, SOLUTION INTRA-ARTICULAR; INTRALESIONAL; INTRAMUSCULAR; INTRAVENOUS; SOFT TISSUE AS NEEDED
Status: DISCONTINUED | OUTPATIENT
Start: 2019-01-30 | End: 2019-01-30 | Stop reason: SURG

## 2019-01-30 RX ORDER — ATRACURIUM BESYLATE 10 MG/ML
INJECTION, SOLUTION INTRAVENOUS AS NEEDED
Status: DISCONTINUED | OUTPATIENT
Start: 2019-01-30 | End: 2019-01-30

## 2019-01-30 RX ORDER — PROMETHAZINE HYDROCHLORIDE 25 MG/1
25 SUPPOSITORY RECTAL ONCE AS NEEDED
Status: DISCONTINUED | OUTPATIENT
Start: 2019-01-30 | End: 2019-01-30

## 2019-01-30 RX ORDER — MAGNESIUM HYDROXIDE 1200 MG/15ML
LIQUID ORAL AS NEEDED
Status: DISCONTINUED | OUTPATIENT
Start: 2019-01-30 | End: 2019-01-30 | Stop reason: HOSPADM

## 2019-01-30 RX ORDER — SODIUM CHLORIDE 0.9 % (FLUSH) 0.9 %
3 SYRINGE (ML) INJECTION EVERY 12 HOURS SCHEDULED
Status: DISCONTINUED | OUTPATIENT
Start: 2019-01-30 | End: 2019-02-03 | Stop reason: HOSPADM

## 2019-01-30 RX ORDER — ONDANSETRON 2 MG/ML
4 INJECTION INTRAMUSCULAR; INTRAVENOUS ONCE AS NEEDED
Status: DISCONTINUED | OUTPATIENT
Start: 2019-01-30 | End: 2019-01-30

## 2019-01-30 RX ORDER — PROCHLORPERAZINE 25 MG
25 SUPPOSITORY, RECTAL RECTAL EVERY 12 HOURS PRN
Status: DISCONTINUED | OUTPATIENT
Start: 2019-01-30 | End: 2019-02-03 | Stop reason: HOSPADM

## 2019-01-30 RX ORDER — HYDROCODONE BITARTRATE AND ACETAMINOPHEN 7.5; 325 MG/1; MG/1
1 TABLET ORAL EVERY 4 HOURS PRN
Status: DISCONTINUED | OUTPATIENT
Start: 2019-01-30 | End: 2019-01-30

## 2019-01-30 RX ORDER — LIDOCAINE HYDROCHLORIDE 10 MG/ML
INJECTION, SOLUTION EPIDURAL; INFILTRATION; INTRACAUDAL; PERINEURAL AS NEEDED
Status: DISCONTINUED | OUTPATIENT
Start: 2019-01-30 | End: 2019-01-30 | Stop reason: SURG

## 2019-01-30 RX ORDER — FAMOTIDINE 10 MG/ML
20 INJECTION, SOLUTION INTRAVENOUS ONCE
Status: CANCELLED | OUTPATIENT
Start: 2019-01-30 | End: 2019-01-30

## 2019-01-30 RX ORDER — DEXTROSE MONOHYDRATE 25 G/50ML
25 INJECTION, SOLUTION INTRAVENOUS
Status: DISCONTINUED | OUTPATIENT
Start: 2019-01-30 | End: 2019-02-03 | Stop reason: HOSPADM

## 2019-01-30 RX ORDER — ACETAMINOPHEN 325 MG/1
650 TABLET ORAL EVERY 4 HOURS PRN
Status: DISCONTINUED | OUTPATIENT
Start: 2019-01-30 | End: 2019-02-03 | Stop reason: HOSPADM

## 2019-01-30 RX ORDER — ACETAMINOPHEN 500 MG
1000 TABLET ORAL ONCE
Status: COMPLETED | OUTPATIENT
Start: 2019-01-30 | End: 2019-01-30

## 2019-01-30 RX ORDER — MONTELUKAST SODIUM 10 MG/1
10 TABLET ORAL NIGHTLY
Status: DISCONTINUED | OUTPATIENT
Start: 2019-01-30 | End: 2019-02-03 | Stop reason: HOSPADM

## 2019-01-30 RX ORDER — PROPOFOL 10 MG/ML
VIAL (ML) INTRAVENOUS AS NEEDED
Status: DISCONTINUED | OUTPATIENT
Start: 2019-01-30 | End: 2019-01-30 | Stop reason: SURG

## 2019-01-30 RX ORDER — SODIUM CHLORIDE AND POTASSIUM CHLORIDE 150; 900 MG/100ML; MG/100ML
100 INJECTION, SOLUTION INTRAVENOUS CONTINUOUS
Status: DISCONTINUED | OUTPATIENT
Start: 2019-01-30 | End: 2019-02-02

## 2019-01-30 RX ORDER — DOCUSATE SODIUM 100 MG/1
100 CAPSULE, LIQUID FILLED ORAL 2 TIMES DAILY
Status: DISCONTINUED | OUTPATIENT
Start: 2019-01-30 | End: 2019-02-03 | Stop reason: HOSPADM

## 2019-01-30 RX ORDER — NICOTINE POLACRILEX 4 MG
15 LOZENGE BUCCAL
Status: DISCONTINUED | OUTPATIENT
Start: 2019-01-30 | End: 2019-02-03 | Stop reason: HOSPADM

## 2019-01-30 RX ORDER — PROCHLORPERAZINE MALEATE 5 MG/1
5 TABLET ORAL EVERY 6 HOURS PRN
Status: DISCONTINUED | OUTPATIENT
Start: 2019-01-30 | End: 2019-02-03 | Stop reason: HOSPADM

## 2019-01-30 RX ORDER — LIDOCAINE HYDROCHLORIDE 10 MG/ML
0.5 INJECTION, SOLUTION EPIDURAL; INFILTRATION; INTRACAUDAL; PERINEURAL ONCE AS NEEDED
Status: COMPLETED | OUTPATIENT
Start: 2019-01-30 | End: 2019-01-30

## 2019-01-30 RX ORDER — CEFAZOLIN SODIUM 2 G/100ML
2 INJECTION, SOLUTION INTRAVENOUS ONCE
Status: COMPLETED | OUTPATIENT
Start: 2019-01-30 | End: 2019-01-30

## 2019-01-30 RX ORDER — PREGABALIN 75 MG/1
75 CAPSULE ORAL ONCE
Status: COMPLETED | OUTPATIENT
Start: 2019-01-30 | End: 2019-01-30

## 2019-01-30 RX ORDER — ZOLPIDEM TARTRATE 5 MG/1
5 TABLET ORAL NIGHTLY PRN
Status: DISCONTINUED | OUTPATIENT
Start: 2019-01-30 | End: 2019-02-03 | Stop reason: HOSPADM

## 2019-01-30 RX ORDER — OXYCODONE HYDROCHLORIDE AND ACETAMINOPHEN 5; 325 MG/1; MG/1
1 TABLET ORAL EVERY 4 HOURS PRN
Status: DISCONTINUED | OUTPATIENT
Start: 2019-01-30 | End: 2019-02-03 | Stop reason: HOSPADM

## 2019-01-30 RX ORDER — BUDESONIDE AND FORMOTEROL FUMARATE DIHYDRATE 160; 4.5 UG/1; UG/1
2 AEROSOL RESPIRATORY (INHALATION)
Status: DISCONTINUED | OUTPATIENT
Start: 2019-01-30 | End: 2019-02-03 | Stop reason: HOSPADM

## 2019-01-30 RX ADMIN — ONDANSETRON 4 MG: 2 INJECTION INTRAMUSCULAR; INTRAVENOUS at 15:51

## 2019-01-30 RX ADMIN — POLYETHYLENE GLYCOL 3350 17 G: 17 POWDER, FOR SOLUTION ORAL at 16:52

## 2019-01-30 RX ADMIN — INSULIN LISPRO 2 UNITS: 100 INJECTION, SOLUTION INTRAVENOUS; SUBCUTANEOUS at 20:24

## 2019-01-30 RX ADMIN — SODIUM CHLORIDE, PRESERVATIVE FREE 3 ML: 5 INJECTION INTRAVENOUS at 14:18

## 2019-01-30 RX ADMIN — ATRACURIUM BESYLATE 20 MG: 10 INJECTION, SOLUTION INTRAVENOUS at 08:40

## 2019-01-30 RX ADMIN — FENTANYL CITRATE 50 MCG: 50 INJECTION, SOLUTION INTRAMUSCULAR; INTRAVENOUS at 10:10

## 2019-01-30 RX ADMIN — GLYCOPYRROLATE 0.4 MG: 0.2 INJECTION, SOLUTION INTRAMUSCULAR; INTRAVENOUS at 11:15

## 2019-01-30 RX ADMIN — LIDOCAINE HYDROCHLORIDE 0.2 ML: 10 INJECTION, SOLUTION EPIDURAL; INFILTRATION; INTRACAUDAL; PERINEURAL at 07:10

## 2019-01-30 RX ADMIN — LIDOCAINE HYDROCHLORIDE 50 MG: 10 INJECTION, SOLUTION EPIDURAL; INFILTRATION; INTRACAUDAL; PERINEURAL at 08:14

## 2019-01-30 RX ADMIN — CEFAZOLIN SODIUM 2 G: 2 INJECTION, SOLUTION INTRAVENOUS at 16:53

## 2019-01-30 RX ADMIN — FENTANYL CITRATE 50 MCG: 50 INJECTION, SOLUTION INTRAMUSCULAR; INTRAVENOUS at 10:26

## 2019-01-30 RX ADMIN — NEOSTIGMINE METHYLSULFATE 3.5 MG: 1 INJECTION, SOLUTION INTRAVENOUS at 11:15

## 2019-01-30 RX ADMIN — OXYCODONE HYDROCHLORIDE 10 MG: 10 TABLET, FILM COATED, EXTENDED RELEASE ORAL at 07:35

## 2019-01-30 RX ADMIN — CEFAZOLIN SODIUM 2 G: 2 INJECTION, SOLUTION INTRAVENOUS at 08:08

## 2019-01-30 RX ADMIN — HYDROMORPHONE HYDROCHLORIDE 0.5 MG: 1 INJECTION, SOLUTION INTRAMUSCULAR; INTRAVENOUS; SUBCUTANEOUS at 12:03

## 2019-01-30 RX ADMIN — DOCUSATE SODIUM 100 MG: 100 CAPSULE, LIQUID FILLED ORAL at 19:58

## 2019-01-30 RX ADMIN — OXYCODONE HYDROCHLORIDE AND ACETAMINOPHEN 1 TABLET: 10; 325 TABLET ORAL at 17:34

## 2019-01-30 RX ADMIN — OXYCODONE HYDROCHLORIDE AND ACETAMINOPHEN 1 TABLET: 10; 325 TABLET ORAL at 23:25

## 2019-01-30 RX ADMIN — BUDESONIDE AND FORMOTEROL FUMARATE DIHYDRATE 2 PUFF: 160; 4.5 AEROSOL RESPIRATORY (INHALATION) at 20:11

## 2019-01-30 RX ADMIN — FAMOTIDINE 20 MG: 20 TABLET, FILM COATED ORAL at 07:36

## 2019-01-30 RX ADMIN — CEFAZOLIN SODIUM 2 G: 2 INJECTION, SOLUTION INTRAVENOUS at 23:00

## 2019-01-30 RX ADMIN — ONDANSETRON 4 MG: 2 INJECTION INTRAMUSCULAR; INTRAVENOUS at 11:15

## 2019-01-30 RX ADMIN — ATRACURIUM BESYLATE 50 MG: 10 INJECTION, SOLUTION INTRAVENOUS at 08:12

## 2019-01-30 RX ADMIN — PREGABALIN 75 MG: 75 CAPSULE ORAL at 07:35

## 2019-01-30 RX ADMIN — LOSARTAN POTASSIUM 100 MG: 25 TABLET, FILM COATED ORAL at 16:52

## 2019-01-30 RX ADMIN — ALBUMIN HUMAN: 0.25 SOLUTION INTRAVENOUS at 11:00

## 2019-01-30 RX ADMIN — SODIUM CHLORIDE, POTASSIUM CHLORIDE, SODIUM LACTATE AND CALCIUM CHLORIDE: 600; 310; 30; 20 INJECTION, SOLUTION INTRAVENOUS at 07:30

## 2019-01-30 RX ADMIN — SODIUM CHLORIDE, PRESERVATIVE FREE 3 ML: 5 INJECTION INTRAVENOUS at 19:58

## 2019-01-30 RX ADMIN — POTASSIUM CHLORIDE AND SODIUM CHLORIDE 100 ML/HR: 900; 150 INJECTION, SOLUTION INTRAVENOUS at 14:03

## 2019-01-30 RX ADMIN — MONTELUKAST SODIUM 10 MG: 10 TABLET, COATED ORAL at 19:58

## 2019-01-30 RX ADMIN — SODIUM CHLORIDE, POTASSIUM CHLORIDE, SODIUM LACTATE AND CALCIUM CHLORIDE: 600; 310; 30; 20 INJECTION, SOLUTION INTRAVENOUS at 11:15

## 2019-01-30 RX ADMIN — INSULIN DETEMIR 30 UNITS: 100 INJECTION, SOLUTION SUBCUTANEOUS at 19:57

## 2019-01-30 RX ADMIN — ACETAMINOPHEN 1000 MG: 500 TABLET ORAL at 07:35

## 2019-01-30 RX ADMIN — PROPOFOL 200 MG: 10 INJECTION, EMULSION INTRAVENOUS at 08:14

## 2019-01-30 RX ADMIN — SODIUM CHLORIDE, POTASSIUM CHLORIDE, SODIUM LACTATE AND CALCIUM CHLORIDE 9 ML/HR: 600; 310; 30; 20 INJECTION, SOLUTION INTRAVENOUS at 07:10

## 2019-01-30 RX ADMIN — OXYBUTYNIN CHLORIDE 5 MG: 5 TABLET, EXTENDED RELEASE ORAL at 16:52

## 2019-01-30 RX ADMIN — MORPHINE SULFATE 1 MG: 4 INJECTION INTRAVENOUS at 14:14

## 2019-01-30 RX ADMIN — DEXAMETHASONE SODIUM PHOSPHATE 8 MG: 4 INJECTION, SOLUTION INTRAMUSCULAR; INTRAVENOUS at 08:21

## 2019-01-30 RX ADMIN — FENTANYL CITRATE 100 MCG: 50 INJECTION, SOLUTION INTRAMUSCULAR; INTRAVENOUS at 08:20

## 2019-01-30 RX ADMIN — MUPIROCIN 10 APPLICATION: 20 OINTMENT TOPICAL at 07:41

## 2019-01-30 RX ADMIN — FAMOTIDINE 20 MG: 20 TABLET, FILM COATED ORAL at 19:58

## 2019-01-30 RX ADMIN — FENTANYL CITRATE 50 MCG: 50 INJECTION, SOLUTION INTRAMUSCULAR; INTRAVENOUS at 08:14

## 2019-01-30 NOTE — ANESTHESIA PREPROCEDURE EVALUATION
Anesthesia Evaluation     Patient summary reviewed and Nursing notes reviewed                Airway   Mallampati: II  TM distance: >3 FB  Neck ROM: full  No difficulty expected  Dental - normal exam     Pulmonary - normal exam   (+) asthma, sleep apnea on CPAP,   Cardiovascular - normal exam    (+) hypertension, hyperlipidemia,     ROS comment: Negative stress/echo 3 yrs ago    Neuro/Psych- negative ROS  GI/Hepatic/Renal/Endo    (+)  GERD,  diabetes mellitus using insulin,     Musculoskeletal     Abdominal  - normal exam    Bowel sounds: normal.   Substance History - negative use     OB/GYN negative ob/gyn ROS         Other   (+) arthritis                   Anesthesia Plan    ASA 3     general     intravenous induction   Anesthetic plan, all risks, benefits, and alternatives have been provided, discussed and informed consent has been obtained with: patient.    Plan discussed with CRNA.

## 2019-01-30 NOTE — ANESTHESIA POSTPROCEDURE EVALUATION
Patient: Lupe Morillo    Procedure Summary     Date:  01/30/19 Room / Location:   [facility] [address] [place] [address] [facility]    Anesthesia Start:  0808 Anesthesia Stop:      Procedure:  LUMBAR LAMINECTOMY POSTERIOR WITH FUSION INSTRUMENTATION L4-5 (N/A Spine Lumbar) Diagnosis:      Surgeon:  Naveed Sims MD Provider:  Tato Quintana MD    Anesthesia Type:  general ASA Status:  3          Anesthesia Type: general  Last vitals  BP   132/53 (01/30/19 1150)   Temp   97 °F (36.1 °C) (01/30/19 1150)   Pulse   92 (01/30/19 1150)   Resp   16 (01/30/19 1150)     SpO2   98 % (01/30/19 1150)     Post Anesthesia Care and Evaluation    Patient location during evaluation: PACU  Patient participation: complete - patient participated  Level of consciousness: awake and alert  Pain score: 0  Pain management: adequate  Airway patency: patent  Anesthetic complications: No anesthetic complications  PONV Status: none  Cardiovascular status: hemodynamically stable and acceptable  Respiratory status: nonlabored ventilation, acceptable and nasal cannula  Hydration status: acceptable

## 2019-01-30 NOTE — ANESTHESIA PROCEDURE NOTES
Airway  Urgency: elective    Airway not difficult    General Information and Staff    Patient location during procedure: OR  CRNA: Piter Kapoor CRNA    Indications and Patient Condition  Indications for airway management: airway protection    Preoxygenated: yes  MILS not maintained throughout  Mask difficulty assessment: 1 - vent by mask    Final Airway Details  Final airway type: endotracheal airway      Successful airway: ETT  Cuffed: yes   Successful intubation technique: direct laryngoscopy  Facilitating devices/methods: Bougie and anterior pressure/BURP  Endotracheal tube insertion site: oral  Blade: Logan  Blade size: 3  ETT size (mm): 7.0  Cormack-Lehane Classification: grade IIb - view of arytenoids or posterior of glottis only  Placement verified by: chest auscultation and capnometry   Measured from: lips  ETT to lips (cm): 20  Number of attempts at approach: 2    Additional Comments  Negative epigastric sounds, Breath sound equal bilaterally with symmetric chest rise and fall. Anterior glottis, grade 2b on first DL. eschmann x1 easy atraumatic. Teeth, lips, gums per preop

## 2019-01-31 LAB
ANION GAP SERPL CALCULATED.3IONS-SCNC: 6 MMOL/L (ref 3–11)
BUN BLD-MCNC: 14 MG/DL (ref 9–23)
BUN/CREAT SERPL: 17.7 (ref 7–25)
CALCIUM SPEC-SCNC: 8.8 MG/DL (ref 8.7–10.4)
CHLORIDE SERPL-SCNC: 106 MMOL/L (ref 99–109)
CO2 SERPL-SCNC: 23 MMOL/L (ref 20–31)
CREAT BLD-MCNC: 0.79 MG/DL (ref 0.6–1.3)
DEPRECATED RDW RBC AUTO: 47.9 FL (ref 37–54)
ERYTHROCYTE [DISTWIDTH] IN BLOOD BY AUTOMATED COUNT: 14.3 % (ref 11.3–14.5)
GFR SERPL CREATININE-BSD FRML MDRD: 88 ML/MIN/1.73
GLUCOSE BLD-MCNC: 110 MG/DL (ref 70–100)
GLUCOSE BLDC GLUCOMTR-MCNC: 178 MG/DL (ref 70–130)
GLUCOSE BLDC GLUCOMTR-MCNC: 200 MG/DL (ref 70–130)
GLUCOSE BLDC GLUCOMTR-MCNC: 226 MG/DL (ref 70–130)
GLUCOSE BLDC GLUCOMTR-MCNC: 90 MG/DL (ref 70–130)
HCT VFR BLD AUTO: 33.2 % (ref 34.5–44)
HGB BLD-MCNC: 10.4 G/DL (ref 11.5–15.5)
MCH RBC QN AUTO: 28.7 PG (ref 27–31)
MCHC RBC AUTO-ENTMCNC: 31.3 G/DL (ref 32–36)
MCV RBC AUTO: 91.7 FL (ref 80–99)
PLATELET # BLD AUTO: 246 10*3/MM3 (ref 150–450)
PMV BLD AUTO: 10.3 FL (ref 6–12)
POTASSIUM BLD-SCNC: 3.8 MMOL/L (ref 3.5–5.5)
RBC # BLD AUTO: 3.62 10*6/MM3 (ref 3.89–5.14)
SODIUM BLD-SCNC: 135 MMOL/L (ref 132–146)
WBC NRBC COR # BLD: 10.25 10*3/MM3 (ref 3.5–10.8)

## 2019-01-31 PROCEDURE — 63710000001 INSULIN DETEMIR PER 5 UNITS: Performed by: NURSE PRACTITIONER

## 2019-01-31 PROCEDURE — 94640 AIRWAY INHALATION TREATMENT: CPT

## 2019-01-31 PROCEDURE — 94799 UNLISTED PULMONARY SVC/PX: CPT

## 2019-01-31 PROCEDURE — 94660 CPAP INITIATION&MGMT: CPT

## 2019-01-31 PROCEDURE — 82962 GLUCOSE BLOOD TEST: CPT

## 2019-01-31 PROCEDURE — 97161 PT EVAL LOW COMPLEX 20 MIN: CPT

## 2019-01-31 PROCEDURE — 85027 COMPLETE CBC AUTOMATED: CPT | Performed by: NURSE PRACTITIONER

## 2019-01-31 PROCEDURE — 80048 BASIC METABOLIC PNL TOTAL CA: CPT | Performed by: NURSE PRACTITIONER

## 2019-01-31 PROCEDURE — 97166 OT EVAL MOD COMPLEX 45 MIN: CPT

## 2019-01-31 PROCEDURE — 97116 GAIT TRAINING THERAPY: CPT

## 2019-01-31 RX ADMIN — OXYCODONE HYDROCHLORIDE AND ACETAMINOPHEN 1 TABLET: 10; 325 TABLET ORAL at 15:42

## 2019-01-31 RX ADMIN — INSULIN DETEMIR 30 UNITS: 100 INJECTION, SOLUTION SUBCUTANEOUS at 20:06

## 2019-01-31 RX ADMIN — POLYETHYLENE GLYCOL 3350 17 G: 17 POWDER, FOR SOLUTION ORAL at 08:54

## 2019-01-31 RX ADMIN — ALBUTEROL SULFATE 2.5 MG: 2.5 SOLUTION RESPIRATORY (INHALATION) at 08:37

## 2019-01-31 RX ADMIN — BUDESONIDE AND FORMOTEROL FUMARATE DIHYDRATE 2 PUFF: 160; 4.5 AEROSOL RESPIRATORY (INHALATION) at 08:38

## 2019-01-31 RX ADMIN — OXYCODONE HYDROCHLORIDE AND ACETAMINOPHEN 1 TABLET: 10; 325 TABLET ORAL at 08:52

## 2019-01-31 RX ADMIN — SODIUM CHLORIDE, PRESERVATIVE FREE 3 ML: 5 INJECTION INTRAVENOUS at 08:54

## 2019-01-31 RX ADMIN — FAMOTIDINE 20 MG: 20 TABLET, FILM COATED ORAL at 20:06

## 2019-01-31 RX ADMIN — DOCUSATE SODIUM 100 MG: 100 CAPSULE, LIQUID FILLED ORAL at 20:06

## 2019-01-31 RX ADMIN — LOSARTAN POTASSIUM 100 MG: 25 TABLET, FILM COATED ORAL at 08:51

## 2019-01-31 RX ADMIN — OXYCODONE AND ACETAMINOPHEN 1 TABLET: 5; 325 TABLET ORAL at 04:22

## 2019-01-31 RX ADMIN — INSULIN LISPRO 4 UNITS: 100 INJECTION, SOLUTION INTRAVENOUS; SUBCUTANEOUS at 17:23

## 2019-01-31 RX ADMIN — DOCUSATE SODIUM 100 MG: 100 CAPSULE, LIQUID FILLED ORAL at 08:52

## 2019-01-31 RX ADMIN — SODIUM CHLORIDE, PRESERVATIVE FREE 3 ML: 5 INJECTION INTRAVENOUS at 20:07

## 2019-01-31 RX ADMIN — BUDESONIDE AND FORMOTEROL FUMARATE DIHYDRATE 2 PUFF: 160; 4.5 AEROSOL RESPIRATORY (INHALATION) at 20:26

## 2019-01-31 RX ADMIN — OXYBUTYNIN CHLORIDE 5 MG: 5 TABLET, EXTENDED RELEASE ORAL at 08:52

## 2019-01-31 RX ADMIN — MONTELUKAST SODIUM 10 MG: 10 TABLET, COATED ORAL at 20:06

## 2019-01-31 RX ADMIN — INSULIN LISPRO 2 UNITS: 100 INJECTION, SOLUTION INTRAVENOUS; SUBCUTANEOUS at 20:12

## 2019-01-31 RX ADMIN — FAMOTIDINE 20 MG: 20 TABLET, FILM COATED ORAL at 08:52

## 2019-01-31 RX ADMIN — ZOLPIDEM TARTRATE 5 MG: 5 TABLET ORAL at 21:33

## 2019-01-31 RX ADMIN — OXYCODONE HYDROCHLORIDE AND ACETAMINOPHEN 1 TABLET: 10; 325 TABLET ORAL at 21:33

## 2019-02-01 PROBLEM — G89.18 ACUTE POSTOPERATIVE PAIN: Status: ACTIVE | Noted: 2019-02-01

## 2019-02-01 PROBLEM — D62 ACUTE BLOOD LOSS ANEMIA: Status: ACTIVE | Noted: 2019-02-01

## 2019-02-01 LAB
GLUCOSE BLDC GLUCOMTR-MCNC: 121 MG/DL (ref 70–130)
GLUCOSE BLDC GLUCOMTR-MCNC: 127 MG/DL (ref 70–130)
GLUCOSE BLDC GLUCOMTR-MCNC: 143 MG/DL (ref 70–130)
GLUCOSE BLDC GLUCOMTR-MCNC: 166 MG/DL (ref 70–130)
HCT VFR BLD AUTO: 33.6 % (ref 34.5–44)
HGB BLD-MCNC: 10.7 G/DL (ref 11.5–15.5)

## 2019-02-01 PROCEDURE — 85014 HEMATOCRIT: CPT | Performed by: NURSE PRACTITIONER

## 2019-02-01 PROCEDURE — 97110 THERAPEUTIC EXERCISES: CPT

## 2019-02-01 PROCEDURE — 97535 SELF CARE MNGMENT TRAINING: CPT

## 2019-02-01 PROCEDURE — 97116 GAIT TRAINING THERAPY: CPT

## 2019-02-01 PROCEDURE — 63710000001 INSULIN DETEMIR PER 5 UNITS: Performed by: NURSE PRACTITIONER

## 2019-02-01 PROCEDURE — 94799 UNLISTED PULMONARY SVC/PX: CPT

## 2019-02-01 PROCEDURE — 85018 HEMOGLOBIN: CPT | Performed by: NURSE PRACTITIONER

## 2019-02-01 PROCEDURE — 82962 GLUCOSE BLOOD TEST: CPT

## 2019-02-01 PROCEDURE — 94660 CPAP INITIATION&MGMT: CPT

## 2019-02-01 RX ORDER — SIMETHICONE 80 MG
80 TABLET,CHEWABLE ORAL 4 TIMES DAILY PRN
Status: DISCONTINUED | OUTPATIENT
Start: 2019-02-01 | End: 2019-02-03 | Stop reason: HOSPADM

## 2019-02-01 RX ORDER — METAXALONE 800 MG/1
800 TABLET ORAL 3 TIMES DAILY PRN
Status: DISCONTINUED | OUTPATIENT
Start: 2019-02-01 | End: 2019-02-03 | Stop reason: HOSPADM

## 2019-02-01 RX ADMIN — BUDESONIDE AND FORMOTEROL FUMARATE DIHYDRATE 2 PUFF: 160; 4.5 AEROSOL RESPIRATORY (INHALATION) at 09:08

## 2019-02-01 RX ADMIN — INSULIN LISPRO 2 UNITS: 100 INJECTION, SOLUTION INTRAVENOUS; SUBCUTANEOUS at 16:56

## 2019-02-01 RX ADMIN — BUDESONIDE AND FORMOTEROL FUMARATE DIHYDRATE 2 PUFF: 160; 4.5 AEROSOL RESPIRATORY (INHALATION) at 20:39

## 2019-02-01 RX ADMIN — INSULIN DETEMIR 30 UNITS: 100 INJECTION, SOLUTION SUBCUTANEOUS at 20:35

## 2019-02-01 RX ADMIN — SIMETHICONE 80 MG: 80 TABLET, CHEWABLE ORAL at 18:54

## 2019-02-01 RX ADMIN — OXYCODONE HYDROCHLORIDE AND ACETAMINOPHEN 1 TABLET: 10; 325 TABLET ORAL at 02:29

## 2019-02-01 RX ADMIN — SODIUM CHLORIDE, PRESERVATIVE FREE 3 ML: 5 INJECTION INTRAVENOUS at 19:49

## 2019-02-01 RX ADMIN — OXYCODONE HYDROCHLORIDE AND ACETAMINOPHEN 1 TABLET: 10; 325 TABLET ORAL at 19:48

## 2019-02-01 RX ADMIN — BISACODYL 5 MG: 5 TABLET, COATED ORAL at 16:22

## 2019-02-01 RX ADMIN — OXYCODONE HYDROCHLORIDE AND ACETAMINOPHEN 1 TABLET: 10; 325 TABLET ORAL at 07:22

## 2019-02-01 RX ADMIN — FAMOTIDINE 20 MG: 20 TABLET, FILM COATED ORAL at 08:47

## 2019-02-01 RX ADMIN — MONTELUKAST SODIUM 10 MG: 10 TABLET, COATED ORAL at 19:47

## 2019-02-01 RX ADMIN — LOSARTAN POTASSIUM 100 MG: 25 TABLET, FILM COATED ORAL at 08:47

## 2019-02-01 RX ADMIN — POLYETHYLENE GLYCOL 3350 17 G: 17 POWDER, FOR SOLUTION ORAL at 08:47

## 2019-02-01 RX ADMIN — DOCUSATE SODIUM 100 MG: 100 CAPSULE, LIQUID FILLED ORAL at 19:47

## 2019-02-01 RX ADMIN — SODIUM CHLORIDE, PRESERVATIVE FREE 3 ML: 5 INJECTION INTRAVENOUS at 08:47

## 2019-02-01 RX ADMIN — FAMOTIDINE 20 MG: 20 TABLET, FILM COATED ORAL at 19:47

## 2019-02-01 RX ADMIN — BISACODYL 10 MG: 10 SUPPOSITORY RECTAL at 16:56

## 2019-02-01 RX ADMIN — SODIUM CHLORIDE 1000 ML: 9 INJECTION, SOLUTION INTRAVENOUS at 10:55

## 2019-02-01 RX ADMIN — OXYCODONE HYDROCHLORIDE AND ACETAMINOPHEN 1 TABLET: 10; 325 TABLET ORAL at 12:10

## 2019-02-01 RX ADMIN — OXYBUTYNIN CHLORIDE 5 MG: 5 TABLET, EXTENDED RELEASE ORAL at 08:47

## 2019-02-01 RX ADMIN — DOCUSATE SODIUM 100 MG: 100 CAPSULE, LIQUID FILLED ORAL at 08:47

## 2019-02-01 RX ADMIN — METAXALONE 800 MG: 800 TABLET ORAL at 16:22

## 2019-02-01 RX ADMIN — MAGNESIUM HYDROXIDE 10 ML: 2400 SUSPENSION ORAL at 18:55

## 2019-02-01 NOTE — PLAN OF CARE
Problem: Patient Care Overview  Goal: Plan of Care Review  Outcome: Ongoing (interventions implemented as appropriate)   02/01/19 8773   Coping/Psychosocial   Plan of Care Reviewed With patient   Plan of Care Review   Progress no change   OTHER   Outcome Summary Pt remains A&O. VSS throughout shift. Pt has c/o severe pain during shift, usually after ambulation. Hemovac remains intact, and has had no output during shift. Dressing to back remains c/d/i. Tried to encourage patient to ambulate during shift, but pt is hesitant d/t pain. Pt has only ambulated short distances to [facility], and continues to void well. Will continue to monitor.        Problem: Laminectomy/Foraminotomy/Discectomy (Adult)  Goal: Signs and Symptoms of Listed Potential Problems Will be Absent, Minimized or Managed (Laminectomy/Foraminotomy/Discectomy)  Outcome: Ongoing (interventions implemented as appropriate)      Problem: Fall Risk (Adult)  Goal: Identify Related Risk Factors and Signs and Symptoms  Outcome: Ongoing (interventions implemented as appropriate)    Goal: Absence of Fall  Outcome: Ongoing (interventions implemented as appropriate)

## 2019-02-01 NOTE — THERAPY TREATMENT NOTE
Acute Care - Occupational Therapy Treatment Note  [facility]     Patient Name: Lupe Morillo  : 1950  MRN: [id number]  Today's Date: 2019  Onset of Illness/Injury or Date of Surgery: 19  Date of Referral to OT: 19  Referring Physician: MD Levi    Admit Date: 2019       ICD-10-CM ICD-9-CM   1. Impaired mobility and ADLs Z74.09 799.89   2. Impaired functional mobility, balance, gait, and endurance Z74.09 V49.89     Patient Active Problem List   Diagnosis   • Sleep apnea   • Hypertension   • Heel spur   • Low back pain   • Allergic rhinitis   • Type 2 diabetes mellitus (CMS/HCC)   • Muscle cramping   • Back pain   • S/P lumbar fusion   • HLD (hyperlipidemia)   • Asthma   • Acute blood loss anemia, mild, asymptomatic   • Acute postoperative pain     Past Medical History:   Diagnosis Date   • Acute bronchitis    • Arthritis    • Asthma    • Back pain    • Cataract    • Diabetes mellitus (CMS/HCC)     type 2 dm, 10 years, check blood sugar once or twice a week   • GERD (gastroesophageal reflux disease)    • History of chronic bronchitis    • History of diverticulitis    • History of hypercholesterolemia    • Hyperlipidemia    • Hypertension    • Sleep apnea with use of continuous positive airway pressure (CPAP)    • Wears glasses      Past Surgical History:   Procedure Laterality Date   • COLON RESECTION      4-5 years ago   • COLONOSCOPY      2018   • HYSTERECTOMY     • KNEE ARTHROSCOPY Right    • LUMBAR DISCECTOMY FUSION INSTRUMENTATION N/A 2019    Procedure: LUMBAR LAMINECTOMY POSTERIOR WITH FUSION INSTRUMENTATION L4-5;  Surgeon: Naveed Sims MD;  Location: [facility];  Service: Orthopedic Spine   • OOPHORECTOMY Bilateral    • TUBAL ABDOMINAL LIGATION         Therapy Treatment    Rehabilitation Treatment Summary     Row Name 19 1110             Treatment Time/Intention    Discipline  occupational therapist  -TB      Document Type  therapy note (daily note)  -TB       Subjective Information  complains of;pain;weakness;fatigue  -TB      Mode of Treatment  individual therapy  -TB      Patient/Family Observations  No family present; pt supine, IV, hemovac, SCDs; exit alarms  -TB      Patient Effort  adequate  -TB      Existing Precautions/Restrictions  fall;spinal hemovac  -TB      Treatment Considerations/Comments  Acute dizziness  -TB      Patient Response to Treatment  Pt declined OOB, up with nursing students prior and c/o dizziness  -TB      Recorded by [TB] Mary Anne Castaneda, OT 02/01/19 1147      Row Name 02/01/19 1110             Vital Signs    Pre Systolic BP Rehab  -- RN cleared OT, BP stable per tele  -TB      Pre Patient Position  Supine  -TB      Intra Patient Position  Supine  -TB      Post Patient Position  Supine  -TB      Recorded by [TB] Mary Anne Castaneda, OT 02/01/19 1147      Row Name 02/01/19 1110             Cognitive Assessment/Intervention- PT/OT    Affect/Mental Status (Cognitive)  WFL  -TB      Orientation Status (Cognition)  oriented x 4  -TB      Follows Commands (Cognition)  WFL  -TB      Safety Deficit (Cognitive)  mild deficit;safety precautions awareness;safety precautions follow-through/compliance  -TB      Personal Safety Interventions  fall prevention program maintained  -TB      Recorded by [TB] Mary Anne Castaneda OT 02/01/19 1147      Row Name 02/01/19 1110             Safety Issues, Functional Mobility    Safety Issues Affecting Function (Mobility)  safety precaution awareness;safety precautions follow-through/compliance  -TB      Impairments Affecting Function (Mobility)  pain;strength;balance  -TB      Recorded by [TB] Mary Anne Castaneda OT 02/01/19 1147      Row Name 02/01/19 1110             Bed Mobility Assessment/Treatment    Comment (Bed Mobility)  Education reinforced for spinal precautions and logroll sequencing  -TB      Recorded by [TB] Mary Anne Castaneda OT 02/01/19 1147      Row Name 02/01/19 1110              Functional Mobility    Functional Mobility- Comment  Defer to PT  -TB      Recorded by [TB] Mary Anne Castaneda, OT 02/01/19 1147      Row Name 02/01/19 1110             Transfer Assessment/Treatment    Transfer Assessment/Treatment  shower transfer  -TB      Comment (Transfers)  Pt declined  -TB      Recorded by [TB] Mary Anne Castaneda, OT 02/01/19 1147      Row Name 02/01/19 1110             Shower Transfer    Type (Shower Transfer)  -- Education/demonstration for safe walk-in shower transfer seq  -TB      Recorded by [TB] Mary Anne Castaneda, OT 02/01/19 1147      Row Name 02/01/19 1110             ADL Assessment/Intervention    97628 - OT Self Care/Mgmt Minutes  15  -TB      BADL Assessment/Intervention  bathing;lower body dressing;toileting  -TB      Recorded by [TB] Mary Anne Castaneda, OT 02/01/19 1147      Row Name 02/01/19 1110             Bathing Assessment/Intervention    Assistive Devices (Bathing)  long-handled sponge  -TB      Comment (Bathing)  Education reinforced for spinal precautions and use of AE to maintain precautions/maximize independence  -TB      Recorded by [TB] Mary Anne Castaneda, OT 02/01/19 1147      Row Name 02/01/19 1110             Lower Body Dressing Assessment/Training    Assistive Devices (Lower Body Dressing)  long-handled shoe horn;reacher;sock-aid  -TB      Comment (Lower Body Dressing)  Education reinforced for spinal precautions and use of AE to maintain precautions/maximize independence  -TB      Recorded by [TB] Mary Anne Castaneda, OT 02/01/19 1147      Row Name 02/01/19 1110             Toileting Assessment/Training    Assistive Devices (Toileting)  toilet paper aid  -TB      Comment (Toileting)  Education reinforced for spinal precautions and use of AE to maintain precautions/maximize independence  -TB      Recorded by [TB] Mary Anne Castaneda, OT 02/01/19 1147      Row Name 02/01/19 1110             BADL Safety/Performance    Impairments,  BADL Safety/Performance  balance;pain;strength  -TB      Recorded by [TB] Mary Anne Castaneda, OT 02/01/19 1147      Row Name 02/01/19 1110             Motor Skills Assessment/Interventions    Additional Documentation  Therapeutic Exercise (Group)  -TB      Recorded by [TB] Mary Anne Castaneda, OT 02/01/19 1147      Row Name 02/01/19 1110             Therapeutic Exercise    Therapeutic Exercise  supine, upper extremities  -TB      Additional Documentation  Therapeutic Exercise (Row)  -TB      88452 - OT Therapeutic Exercise Minutes  8  -TB      Recorded by [TB] Mary Anne Castaneda, OT 02/01/19 1147      Row Name 02/01/19 1110             Upper Extremity Supine Therapeutic Exercise    Performed, Supine Upper Extremity (Therapeutic Exercise)  shoulder flexion/extension;shoulder horizontal abduction/adduction;elbow flexion/extension  -TB      Exercise Type, Supine Upper Extremity (Therapeutic Exercise)  AROM (active range of motion)  -TB      Restrictions, Supine Upper Extremity (Therapeutic Exercise)  pain  -TB      Sets/Reps Detail, Supine Upper Extremity (Therapeutic Exercise)  10  -TB      Comment, Supine Upper Extremity (Therapeutic Exercise)  Education reinforced for surgical precautions with mobility and self-care, use of AE, recommended DME and home safety/fall prevention  -TB2      Recorded by [TB] Mary Anne Castaneda, OT 02/01/19 1147  [TB2] Mary Anne Castaneda, OT 02/01/19 1150      Row Name 02/01/19 1110             Pain Scale: Numbers Pre/Post-Treatment    Pain Scale: Numbers, Pretreatment  6/10  -TB      Pain Scale: Numbers, Post-Treatment  6/10  -TB      Pain Location - Orientation  lower  -TB      Pain Location  back  -TB      Pre/Post Treatment Pain Comment  following hallway ambulation   -TB      Pain Intervention(s)  Repositioned  -TB      Recorded by [TB] Mary Anne Castaneda, OT 02/01/19 1147      Row Name                Wound 01/30/19 0900 Other (See comments) back incision     Wound - Properties Group Date first assessed: 01/30/19 [KS] Time first assessed: 0900 [KS] Side: Other (See comments) [KS] Location: back [KS] Type: incision [KS] Recorded by:  [KS] Larissa Rivera RN 01/30/19 0900    Row Name 02/01/19 1110             Coping    Observed Emotional State  calm;cooperative  -TB      Verbalized Emotional State  acceptance  -TB      Recorded by [TB] Mary Anne Castaneda, OT 02/01/19 1147      Row Name 02/01/19 1110             Plan of Care Review    Plan of Care Reviewed With  patient  -TB      Recorded by [TB] Mary Anne Castaneda, OT 02/01/19 1148      Row Name 02/01/19 1110             Outcome Summary/Treatment Plan (OT)    Daily Summary of Progress (OT)  progress toward functional goals as expected  -TB      Anticipated Equipment Needs at Discharge (OT)  bedside commode  -TB      Anticipated Discharge Disposition (OT)  home with assist;home with home health  -TB      Patient/Family Concerns, Anticipated Discharge Disposition (OT)  d/c postponed until tomorrow d/t acute dizziness  -TB      Recorded by [TB] Mary Anne Castaneda, OT 02/01/19 1148        User Key  (r) = Recorded By, (t) = Taken By, (c) = Cosigned By    Initials Name Effective Dates Discipline    TB Mary Anne Castaneda, OT 06/08/18 -  OT    KS Larissa Rivera RN 06/16/16 -  Nurse        Wound 01/30/19 0900 Other (See comments) back incision (Active)   Dressing Appearance dry;intact;no drainage 2/1/2019  8:30 AM   Closure THANG 2/1/2019  8:30 AM   Drainage Amount none 2/1/2019  8:30 AM       Occupational Therapy Education     Title: PT OT SLP Therapies (In Progress)     Topic: Occupational Therapy (In Progress)     Point: ADL training (Done)     Description: Instruct learner(s) on proper safety adaptation and remediation techniques during self care or transfers.   Instruct in proper use of assistive devices.    Learning Progress Summary           Patient Acceptance, E,D, VU,NR by TB at 2/1/2019 11:50 AM     Comment:  Education reinforced for surgical precautions with mobility and self-care, use of AE, recommended DME and home safety/fall prevention    Acceptance, E, VU,NR by HK at 1/31/2019  9:02 AM    Comment:  Pt and spouse educated on ADL retraining with use of AE for completion of LBB, LBD, and toileting. Educated on safety precautions and appropraite body mechanics to maintain spinal precautions.   Significant Other Acceptance, E, VU,NR by HK at 1/31/2019  9:02 AM    Comment:  Pt and spouse educated on ADL retraining with use of AE for completion of LBB, LBD, and toileting. Educated on safety precautions and appropraite body mechanics to maintain spinal precautions.                   Point: Precautions (Done)     Description: Instruct learner(s) on prescribed precautions during self-care and functional transfers.    Learning Progress Summary           Patient Acceptance, E,D, VU,NR by  at 2/1/2019 11:50 AM    Comment:  Education reinforced for surgical precautions with mobility and self-care, use of AE, recommended DME and home safety/fall prevention    Acceptance, E, VU,NR by HK at 1/31/2019  9:02 AM    Comment:  Pt and spouse educated on ADL retraining with use of AE for completion of LBB, LBD, and toileting. Educated on safety precautions and appropraite body mechanics to maintain spinal precautions.   Significant Other Acceptance, E, VU,NR by HK at 1/31/2019  9:02 AM    Comment:  Pt and spouse educated on ADL retraining with use of AE for completion of LBB, LBD, and toileting. Educated on safety precautions and appropraite body mechanics to maintain spinal precautions.                   Point: Body mechanics (Done)     Description: Instruct learner(s) on proper positioning and spine alignment during self-care, functional mobility activities and/or exercises.    Learning Progress Summary           Patient Acceptance, E, VU,NR by HK at 1/31/2019  9:02 AM    Comment:  Pt and spouse educated on ADL retraining  with use of AE for completion of LBB, LBD, and toileting. Educated on safety precautions and appropraite body mechanics to maintain spinal precautions.   Significant Other Acceptance, E, VU,NR by  at 1/31/2019  9:02 AM    Comment:  Pt and spouse educated on ADL retraining with use of AE for completion of LBB, LBD, and toileting. Educated on safety precautions and appropraite body mechanics to maintain spinal precautions.                               User Key     Initials Effective Dates Name Provider Type Discipline     06/08/18 -  Mary Anne Castaneda, OT Occupational Therapist OT     03/07/18 -  Danna Davidson OT Occupational Therapist OT                OT Recommendation and Plan  Outcome Summary/Treatment Plan (OT)  Daily Summary of Progress (OT): progress toward functional goals as expected  Anticipated Equipment Needs at Discharge (OT): bedside commode  Anticipated Discharge Disposition (OT): home with assist, home with home health  Patient/Family Concerns, Anticipated Discharge Disposition (OT): d/c postponed until tomorrow d/t acute dizziness  Daily Summary of Progress (OT): progress toward functional goals as expected  Plan of Care Review  Plan of Care Reviewed With: patient  Plan of Care Reviewed With: patient  Outcome Summary: Pt up in hallway with nursing students prior to OT session; declined OOB acitivity d/t acute dizziness. OT reinforced education for surgical precautions, use of AE, home safety/fall prevention. HEP completed bed level. Pt requesting BSC, CM notified. OT to follow. D/C plan is home with spouse and HH to follow.   Outcome Measures     Row Name 02/01/19 1110 01/31/19 1101 01/31/19 0902       How much help from another person do you currently need...    Turning from your back to your side while in flat bed without using bedrails?  --  3  -MJ  --    Moving from lying on back to sitting on the side of a flat bed without bedrails?  --  3  -MJ  --    Moving to and from a bed to a  chair (including a wheelchair)?  --  3  -MJ  --    Standing up from a chair using your arms (e.g., wheelchair, bedside chair)?  --  3  -MJ  --    Climbing 3-5 steps with a railing?  --  3  -MJ  --    To walk in hospital room?  --  3  -MJ  --    AM-PAC 6 Clicks Score  --  18  -MJ  --       How much help from another is currently needed...    Putting on and taking off regular lower body clothing?  3  -TB  --  3  -HK    Bathing (including washing, rinsing, and drying)  3  -TB  --  3  -HK    Toileting (which includes using toilet bed pan or urinal)  2  -TB  --  2  -HK    Putting on and taking off regular upper body clothing  3  -TB  --  3  -HK    Taking care of personal grooming (such as brushing teeth)  3  -TB  --  3  -HK    Eating meals  3  -TB  --  3  -HK    Score  17  -TB  --  17  -HK       Functional Assessment    Outcome Measure Options  --  AM-PAC 6 Clicks Basic Mobility (PT)  -MJ  AM-PAC 6 Clicks Daily Activity (OT)  -HK      User Key  (r) = Recorded By, (t) = Taken By, (c) = Cosigned By    Initials Name Provider Type    TB aMry Anne Castaneda, OT Occupational Therapist    MJ Kwadwo Garcia, PT Physical Therapist    HK Danna Davidson, OT Occupational Therapist           Time Calculation:   Time Calculation- OT     Row Name 02/01/19 1152 02/01/19 1110          Time Calculation- OT    OT Start Time  1110  -TB  --     OT Received On  02/01/19  -TB  --     OT Goal Re-Cert Due Date  02/10/19  -TB  --        Timed Charges    30883 - OT Therapeutic Exercise Minutes  --  8  -TB     53573 - OT Self Care/Mgmt Minutes  --  15  -TB       User Key  (r) = Recorded By, (t) = Taken By, (c) = Cosigned By    Initials Name Provider Type    Mary Anne Reddy, OT Occupational Therapist           Therapy Suggested Charges     Code   Minutes Charges    53618 (CPT®) Hc Ot Neuromusc Re Education Ea 15 Min      22249 (CPT®) Hc Ot Ther Proc Ea 15 Min 8 1    08519 (CPT®) Hc Ot Therapeutic Act Ea 15 Min      29048 (CPT®) Hc Ot  Manual Therapy Ea 15 Min      76166 (CPT®) Hc Ot Iontophoresis Ea 15 Min      76331 (CPT®) Hc Ot Elec Stim Ea-Per 15 Min      28011 (CPT®) Hc Ot Ultrasound Ea 15 Min      59751 (CPT®) Hc Ot Self Care/Mgmt/Train Ea 15 Min 15 1    Total  23 2        Therapy Charges for Today     Code Description Service Date Service Provider Modifiers Qty    31026864432 HC OT THER PROC EA 15 MIN 2/1/2019 Mary Anne Castaneda OT GO 1    20307750030 HC OT SELF CARE/MGMT/TRAIN EA 15 MIN 2/1/2019 Mary Anne Castaneda OT GO 1               Mary Anne Castaneda OT  2/1/2019

## 2019-02-01 NOTE — PROGRESS NOTES
Continued Stay Note  [facility]     Patient Name: Lupe Morillo  MRN: [id number]  Today's Date: 2/1/2019    Admit Date: 1/30/2019    Discharge Plan     Row Name 02/01/19 1123       Plan    Plan  Home with 's assistance and [facility]    Patient/Family in Agreement with Plan  yes    Plan Comments  Ms. Morillo was referred to [facility] for home health.  She has had a rolling walker delivered to the hospital room yesterday.  A bedside commode was requested today and [employer] is delivering to the hospital room.   Ms. Morillo's  will be transporting her home by personal vehicle. at discharge.    CM will continue to follow.        Discharge Codes    No documentation.       Expected Discharge Date and Time     Expected Discharge Date Expected Discharge Time    Feb 1, 2019             Carlita Casillas

## 2019-02-01 NOTE — THERAPY TREATMENT NOTE
Acute Care - Physical Therapy Treatment Note  [facility]     Patient Name: Lupe Morillo  : 1950  MRN: [id number]  Today's Date: 2019  Onset of Illness/Injury or Date of Surgery: 19  Date of Referral to PT: 19  Referring Physician: MD Levi    Admit Date: 2019    Visit Dx:    ICD-10-CM ICD-9-CM   1. Impaired mobility and ADLs Z74.09 799.89   2. Impaired functional mobility, balance, gait, and endurance Z74.09 V49.89   3. S/P lumbar fusion Z98.1 V45.4     Patient Active Problem List   Diagnosis   • Sleep apnea   • Hypertension   • Heel spur   • Low back pain   • Allergic rhinitis   • Type 2 diabetes mellitus (CMS/HCC)   • Muscle cramping   • Back pain   • S/P lumbar fusion   • HLD (hyperlipidemia)   • Asthma   • Acute blood loss anemia, mild, asymptomatic   • Acute postoperative pain       Therapy Treatment    Rehabilitation Treatment Summary     Row Name 19 1346 19 1110          Treatment Time/Intention    Discipline  physical therapist  -MJ  occupational therapist  -TB     Document Type  therapy note (daily note)  -  therapy note (daily note)  -TB     Subjective Information  complains of;pain  -MJ  complains of;pain;weakness;fatigue  -TB     Mode of Treatment  physical therapy  -MJ  individual therapy  -TB     Patient/Family Observations  Pt sitting UIC  -MJ  No family present; pt supine, IV, hemovac, SCDs; exit alarms  -TB     Care Plan Review  patient/other agree to care plan  -  --     Patient Effort  good  -MJ  adequate  -TB     Existing Precautions/Restrictions  fall;spinal;other (see comments) hemovac  -  fall;spinal hemovac  -TB     Treatment Considerations/Comments  --  Acute dizziness  -TB     Patient Response to Treatment  --  Pt declined OOB, up with nursing students prior and c/o dizziness  -TB     Recorded by [MJ] Kwadwo Garcia, PT 19 1426 [TB] Mary Anne Castaneda, OT 19 1147     Row Name 19 1110             Vital Signs     Pre Systolic BP Rehab  -- RN cleared OT, BP stable per tele  -TB      Pre Patient Position  Supine  -TB      Intra Patient Position  Supine  -TB      Post Patient Position  Supine  -TB      Recorded by [TB] Mary Anne Castaneda, OT 02/01/19 1147      Row Name 02/01/19 1346 02/01/19 1110          Cognitive Assessment/Intervention- PT/OT    Affect/Mental Status (Cognitive)  WFL  -MJ  WFL  -TB     Orientation Status (Cognition)  oriented x 4  -MJ  oriented x 4  -TB     Follows Commands (Cognition)  WFL  -MJ  WFL  -TB     Safety Deficit (Cognitive)  --  mild deficit;safety precautions awareness;safety precautions follow-through/compliance  -TB     Personal Safety Interventions  --  fall prevention program maintained  -TB     Recorded by [MJ] Kwadwo Garcia, PT 02/01/19 1426 [TB] Mary Anne Castaneda, OT 02/01/19 1147     Row Name 02/01/19 1346 02/01/19 1110          Safety Issues, Functional Mobility    Safety Issues Affecting Function (Mobility)  --  safety precaution awareness;safety precautions follow-through/compliance  -TB     Impairments Affecting Function (Mobility)  pain;strength  -  pain;strength;balance  -TB     Recorded by [MJ] Kwadwo Garcia, PT 02/01/19 1426 [TB] Mary Anne Castaneda, OT 02/01/19 1147     Row Name 02/01/19 1346 02/01/19 1110          Bed Mobility Assessment/Treatment    Comment (Bed Mobility)  NT- pt [facility]  -  Education reinforced for spinal precautions and logroll sequencing  -TB     Recorded by [MJ] Kwadwo Garcia, PT 02/01/19 1426 [TB] Mary Anne Castaneda, OT 02/01/19 1147     Row Name 02/01/19 1110             Functional Mobility    Functional Mobility- Comment  Defer to PT  -TB      Recorded by [TB] Mary Anne Castaneda, OT 02/01/19 1147      Row Name 02/01/19 1346 02/01/19 1110          Transfer Assessment/Treatment    Transfer Assessment/Treatment  sit-stand transfer;stand-sit transfer  -  shower transfer  -TB     Comment (Transfers)  Verbal cues for correct hand placement    -MJ  Pt declined  -TB     Recorded by [MJ] Kwadwo Garcia, PT 02/01/19 1426 [TB] Mary Anne Castaneda, OT 02/01/19 1147     Row Name 02/01/19 1346             Sit-Stand Transfer    Sit-Stand [place]edar (Transfers)  minimum assist (75% patient effort);verbal cues  -MJ      Assistive Device (Sit-Stand Transfers)  walker, front-wheeled  -MJ      Recorded by [MJ] Kwadwo Garcia, PT 02/01/19 1426      Row Name 02/01/19 1346             Stand-Sit Transfer    Stand-Sit [place]edar (Transfers)  contact guard;verbal cues  -MJ      Assistive Device (Stand-Sit Transfers)  walker, front-wheeled  -MJ      Recorded by [MJ] Kwadwo Garcia, PT 02/01/19 1426      Row Name 02/01/19 1110             Shower Transfer    Type (Shower Transfer)  -- Education/demonstration for safe walk-in shower transfer seq  -TB      Recorded by [TB] Mary Anne Castaneda, OT 02/01/19 1147      Row Name 02/01/19 1346             Gait/Stairs Assessment/Training    05949 - Gait Training Minutes   14  -MJ      [place]edar Level (Gait)  contact guard;verbal cues  -MJ      Assistive Device (Gait)  walker, front-wheeled  -MJ      Distance in Feet (Gait)  110  -MJ      Pattern (Gait)  step-through  -MJ      Deviations/Abnormal Patterns (Gait)  bilateral deviations;antalgic;gladys decreased;stride length decreased  -MJ      Bilateral Gait Deviations  forward flexed posture;heel strike decreased;weight shift ability decreased  -MJ      Negotiation (Stairs)  stairs independence;handrail location;number of steps;ascending technique;descending technique  -MJ      [place]edar Level (Stairs)  contact guard;verbal cues  -MJ      Handrail Location (Stairs)  both sides  -MJ      Number of Steps (Stairs)  5  -MJ      Ascending Technique (Stairs)  step-to-step  -MJ      Descending Technique (Stairs)  step-to-step  -MJ      Comment (Gait/Stairs)  Pt demo step through gait pattern at slow pace. Verbal cues for upright posture and to increase LE weight bearing, mild  improvement. Chair follow for safety. Gait limited by pain. Pt performed stairs non-reciprocally. Cues to ascend with stronger leg and to descend with weaker leg  -MJ      Recorded by [MJ] Kwadwo Garcia, PT 02/01/19 1426      Row Name 02/01/19 1110             ADL Assessment/Intervention    19100 - OT Self Care/Mgmt Minutes  15  -TB      BADL Assessment/Intervention  bathing;lower body dressing;toileting  -TB      Recorded by [TB] Mary Anne Castaneda, OT 02/01/19 1147      Row Name 02/01/19 1110             Bathing Assessment/Intervention    Assistive Devices (Bathing)  long-handled sponge  -TB      Comment (Bathing)  Education reinforced for spinal precautions and use of AE to maintain precautions/maximize independence  -TB      Recorded by [TB] Mary Anne Castaneda, OT 02/01/19 1147      Row Name 02/01/19 1110             Lower Body Dressing Assessment/Training    Assistive Devices (Lower Body Dressing)  long-handled shoe horn;reacher;sock-aid  -TB      Comment (Lower Body Dressing)  Education reinforced for spinal precautions and use of AE to maintain precautions/maximize independence  -TB      Recorded by [TB] Mary Anne Castaneda, OT 02/01/19 1147      Row Name 02/01/19 1110             Toileting Assessment/Training    Assistive Devices (Toileting)  toilet paper aid  -TB      Comment (Toileting)  Education reinforced for spinal precautions and use of AE to maintain precautions/maximize independence  -TB      Recorded by [TB] Mary Anne Castaneda, OT 02/01/19 1147      Row Name 02/01/19 1110             BADL Safety/Performance    Impairments, BADL Safety/Performance  balance;pain;strength  -TB      Recorded by [TB] Mary Anne Castaneda, OT 02/01/19 1147      Row Name 02/01/19 1110             Motor Skills Assessment/Interventions    Additional Documentation  Therapeutic Exercise (Group)  -TB      Recorded by [TB] Mary Anne Castaneda, OT 02/01/19 1147      Row Name 02/01/19 1346 02/01/19 1110           Therapeutic Exercise    Therapeutic Exercise  --  supine, upper extremities  -TB     Additional Documentation  --  Therapeutic Exercise (Row)  -TB     49673 - PT Therapeutic Exercise Minutes  10  -MJ  --     69817 - OT Therapeutic Exercise Minutes  --  8  -TB     Recorded by [MJ] Kwadwo Garcia, PT 02/01/19 1426 [TB] Mary Anne Castaneda, OT 02/01/19 1147     Row Name 02/01/19 1110             Upper Extremity Supine Therapeutic Exercise    Performed, Supine Upper Extremity (Therapeutic Exercise)  shoulder flexion/extension;shoulder horizontal abduction/adduction;elbow flexion/extension  -TB      Exercise Type, Supine Upper Extremity (Therapeutic Exercise)  AROM (active range of motion)  -TB      Restrictions, Supine Upper Extremity (Therapeutic Exercise)  pain  -TB      Sets/Reps Detail, Supine Upper Extremity (Therapeutic Exercise)  10  -TB      Comment, Supine Upper Extremity (Therapeutic Exercise)  Education reinforced for surgical precautions with mobility and self-care, use of AE, recommended DME and home safety/fall prevention  -TB2      Recorded by [TB] Mary Anne Castaneda, OT 02/01/19 1147  [TB2] Mary Anne Castaneda, OT 02/01/19 1150      Row Name 02/01/19 1346             Therapeutic Exercise    Lower Extremity (Therapeutic Exercise)  gluteal sets;heel slides, bilateral;quad sets, bilateral  -MJ      Lower Extremity Range of Motion (Therapeutic Exercise)  hip internal/external rotation, bilateral;ankle dorsiflexion/plantar flexion, bilateral  -MJ      Core Strength (Therapeutic Exercise)  abdominal bracing arms overhead  -MJ      Exercise Type (Therapeutic Exercise)  AROM (active range of motion)  -MJ      Position (Therapeutic Exercise)  seated  -MJ      Sets/Reps (Therapeutic Exercise)  10x each  -MJ      Comment (Therapeutic Exercise)  Cues to stay awake and on task  -MJ      Recorded by [MJ] Kwadwo Garcia, PT 02/01/19 1426      Row Name 02/01/19 1346             Positioning and Restraints     Pre-Treatment Position  sitting in chair/recliner  -MJ      Post Treatment Position  chair  -MJ      In Chair  notified nsg;reclined;call light within reach;encouraged to call for assist;exit alarm on  -MJ      Recorded by [MJ] Kwadwo Garcia, PT 02/01/19 1426      Row Name 02/01/19 1346 02/01/19 1110          Pain Scale: Numbers Pre/Post-Treatment    Pain Scale: Numbers, Pretreatment  5/10  -MJ  6/10  -TB     Pain Scale: Numbers, Post-Treatment  5/10 8 during mobility- headache  -MJ  6/10  -TB     Pain Location - Orientation  lower  -MJ  lower  -TB     Pain Location  back  -MJ  back  -TB     Pre/Post Treatment Pain Comment  --  following hallway ambulation   -TB     Pain Intervention(s)  Repositioned;Ambulation/increased activity  -MJ  Repositioned  -TB     Recorded by [MJ] Kwadwo Garcia, PT 02/01/19 1426 [TB] Mary Anne Castaneda, OT 02/01/19 1147     Row Name                Wound 01/30/19 0900 Other (See comments) back incision    Wound - Properties Group Date first assessed: 01/30/19 [KS] Time first assessed: 0900 [KS] Side: Other (See comments) [KS] Location: back [KS] Type: incision [KS] Recorded by:  [KS] Larissa Rivera RN 01/30/19 0900    Row Name 02/01/19 1110             Coping    Observed Emotional State  calm;cooperative  -TB      Verbalized Emotional State  acceptance  -TB      Recorded by [TB] Mary Anne Castaneda, OT 02/01/19 1147      Row Name 02/01/19 1346 02/01/19 1110          Plan of Care Review    Plan of Care Reviewed With  patient  -MJ  patient  -TB     Recorded by [MJ] Kwadwo Garcia, PT 02/01/19 1426 [TB] Mary Anne Castaneda, OT 02/01/19 1148     Row Name 02/01/19 1110             Outcome Summary/Treatment Plan (OT)    Daily Summary of Progress (OT)  progress toward functional goals as expected  -TB      Anticipated Equipment Needs at Discharge (OT)  bedside commode  -TB      Anticipated Discharge Disposition (OT)  home with assist;home with home health  -TB      Patient/Family Concerns,  Anticipated Discharge Disposition (OT)  d/c postponed until tomorrow d/t acute dizziness  -TB      Recorded by [TB] Mary Anne Castaneda, OT 02/01/19 1148      Row Name 02/01/19 1346             Outcome Summary/Treatment Plan (PT)    Daily Summary of Progress (PT)  progress toward functional goals is good  -MJ      Recorded by [RUBÉN] Kwadwo Garcia, PT 02/01/19 1426        User Key  (r) = Recorded By, (t) = Taken By, (c) = Cosigned By    Initials Name Effective Dates Discipline    TB Mary Anne Castaneda, OT 06/08/18 -  OT    Larissa Barnes RN 06/16/16 -  Nurse    Kwadwo Granados, PT 04/03/18 -  PT          Wound 01/30/19 0900 Other (See comments) back incision (Active)   Dressing Appearance dry;intact;no drainage 2/1/2019  1:30 PM   Closure THANG 2/1/2019  8:30 AM   Drainage Amount none 2/1/2019  1:30 PM           Physical Therapy Education     Title: PT OT SLP Therapies (In Progress)     Topic: Physical Therapy (Done)     Point: Mobility training (Done)     Learning Progress Summary           Patient Acceptance, E,D, VU,NR by  at 2/1/2019  1:46 PM    Comment:  Reviewed back precautions, HEP, gait mechanics, stairs sequencing, benefits of mobility    Acceptance, E,D,H, VU,NR by  at 1/31/2019 11:01 AM    Comment:  Edu re: HEP, back precautions, log roll, gait mechanics, benefits of mobility. Issued HEP handout                   Point: Home exercise program (Done)     Learning Progress Summary           Patient Acceptance, E,D, VU,NR by  at 2/1/2019  1:46 PM    Comment:  Reviewed back precautions, HEP, gait mechanics, stairs sequencing, benefits of mobility    Acceptance, E,D,H, VU,NR by  at 1/31/2019 11:01 AM    Comment:  Edu re: HEP, back precautions, log roll, gait mechanics, benefits of mobility. Issued HEP handout                   Point: Body mechanics (Done)     Learning Progress Summary           Patient Acceptance, E,D, VU,NR by  at 2/1/2019  1:46 PM    Comment:  Reviewed back precautions, HEP,  gait mechanics, stairs sequencing, benefits of mobility    Acceptance, E,D,H, VU,NR by  at 1/31/2019 11:01 AM    Comment:  Edu re: HEP, back precautions, log roll, gait mechanics, benefits of mobility. Issued HEP handout                   Point: Precautions (Done)     Learning Progress Summary           Patient Acceptance, E,D, VU,NR by  at 2/1/2019  1:46 PM    Comment:  Reviewed back precautions, HEP, gait mechanics, stairs sequencing, benefits of mobility    Acceptance, E,D,H, VU,NR by  at 1/31/2019 11:01 AM    Comment:  Edu re: HEP, back precautions, log roll, gait mechanics, benefits of mobility. Issued HEP handout                               User Key     Initials Effective Dates Name Provider Type Discipline     04/03/18 -  Kwadwo Garcia, PT Physical Therapist PT                PT Recommendation and Plan  Anticipated Discharge Disposition (PT): home with assist  Planned Therapy Interventions (PT Eval): balance training, bed mobility training, gait training, home exercise program, patient/family education, stair training, strengthening, transfer training  Therapy Frequency (PT Clinical Impression): daily  Outcome Summary/Treatment Plan (PT)  Daily Summary of Progress (PT): progress toward functional goals is good  Anticipated Equipment Needs at Discharge (PT): front wheeled walker  Anticipated Discharge Disposition (PT): home with assist  Plan of Care Reviewed With: patient  Progress: improving  Outcome Summary: Pt increased gait distance to 110 feet with CGA and RW, limited by pain. Pt progressed to stair training x5 w/ 2 HR and progresseed ther ex this date. Pt anticipates discharge home tomorrow, attempt stairs w/ 1 HR and cane prior to discharge  Outcome Measures     Row Name 02/01/19 1346 02/01/19 1110 01/31/19 1101       How much help from another person do you currently need...    Turning from your back to your side while in flat bed without using bedrails?  3  -MJ  --  3  -MJ    Moving from  lying on back to sitting on the side of a flat bed without bedrails?  3  -MJ  --  3  -MJ    Moving to and from a bed to a chair (including a wheelchair)?  3  -MJ  --  3  -MJ    Standing up from a chair using your arms (e.g., wheelchair, bedside chair)?  3  -MJ  --  3  -MJ    Climbing 3-5 steps with a railing?  3  -MJ  --  3  -MJ    To walk in hospital room?  3  -MJ  --  3  -MJ    AM-PAC 6 Clicks Score  18  -MJ  --  18  -MJ       How much help from another is currently needed...    Putting on and taking off regular lower body clothing?  --  3  -TB  --    Bathing (including washing, rinsing, and drying)  --  3  -TB  --    Toileting (which includes using toilet bed pan or urinal)  --  2  -TB  --    Putting on and taking off regular upper body clothing  --  3  -TB  --    Taking care of personal grooming (such as brushing teeth)  --  3  -TB  --    Eating meals  --  3  -TB  --    Score  --  17  -TB  --       Functional Assessment    Outcome Measure Options  AM-PAC 6 Clicks Basic Mobility (PT)  -MJ  --  AM-PAC 6 Clicks Basic Mobility (PT)  -MJ    Row Name 01/31/19 0902             How much help from another is currently needed...    Putting on and taking off regular lower body clothing?  3  -HK      Bathing (including washing, rinsing, and drying)  3  -HK      Toileting (which includes using toilet bed pan or urinal)  2  -HK      Putting on and taking off regular upper body clothing  3  -HK      Taking care of personal grooming (such as brushing teeth)  3  -HK      Eating meals  3  -HK      Score  17  -HK         Functional Assessment    Outcome Measure Options  AM-PAC 6 Clicks Daily Activity (OT)  -HK        User Key  (r) = Recorded By, (t) = Taken By, (c) = Cosigned By    Initials Name Provider Type    TB Mary Anne Castaneda, OT Occupational Therapist    Kwadwo Granados, PT Physical Therapist    HK Danna Davidson, OT Occupational Therapist         Time Calculation:   PT Charges     Row Name 02/01/19 3958              Time Calculation    Start Time  1346  -MJ      PT Received On  02/01/19  -MJ      PT Goal Re-Cert Due Date  02/10/19  -MJ         Time Calculation- PT    Total Timed Code Minutes- PT  24 minute(s)  -MJ         Timed Charges    71080 - PT Therapeutic Exercise Minutes  10  -MJ      00813 - Gait Training Minutes   14  -MJ        User Key  (r) = Recorded By, (t) = Taken By, (c) = Cosigned By    Initials Name Provider Type    MJ Kwadwo Garcia, PT Physical Therapist        Therapy Suggested Charges     Code   Minutes Charges    19558 (CPT®) Hc Pt Neuromusc Re Education Ea 15 Min      38439 (CPT®) Hc Pt Ther Proc Ea 15 Min 10 1    90097 (CPT®) Hc Gait Training Ea 15 Min 14 1    42472 (CPT®) Hc Pt Therapeutic Act Ea 15 Min      59427 (CPT®) Hc Pt Manual Therapy Ea 15 Min      45002 (CPT®) Hc Pt Iontophoresis Ea 15 Min      39892 (CPT®) Hc Pt Elec Stim Ea-Per 15 Min      22793 (CPT®) Hc Pt Ultrasound Ea 15 Min      27750 (CPT®) Hc Pt Self Care/Mgmt/Train Ea 15 Min      77831 (CPT®) Hc Pt Prosthetic (S) Train Initial Encounter, Each 15 Min      22183 (CPT®) Hc Pt Orthotic(S)/Prosthetic(S) Encounter, Each 15 Min      37888 (CPT®) Hc Orthotic(S) Mgmt/Train Initial Encounter, Each 15min      Total  24 2        Therapy Charges for Today     Code Description Service Date Service Provider Modifiers Qty    71630989844 HC PT EVAL LOW COMPLEXITY 3 1/31/2019 Kwadwo Garcia, PT GP 1    78430367237 HC GAIT TRAINING EA 15 MIN 1/31/2019 Kwadwo Garcia, PT GP 1    67984695235 HC PT THER PROC EA 15 MIN 2/1/2019 Kwadwo Garcia, PT GP 1    61029436120 HC GAIT TRAINING EA 15 MIN 2/1/2019 Kwadwo Garcia, PT GP 1    15684579836 HC PT THER SUPP EA 15 MIN 2/1/2019 Kwadwo Garcia, PT GP 2          PT G-Codes  Outcome Measure Options: AM-PAC 6 Clicks Basic Mobility (PT)  AM-PAC 6 Clicks Score: 18  Score: 17    Kwadwo Garcia PT  2/1/2019

## 2019-02-01 NOTE — PLAN OF CARE
Problem: Patient Care Overview  Goal: Plan of Care Review  Outcome: Ongoing (interventions implemented as appropriate)   02/01/19 1110   Coping/Psychosocial   Plan of Care Reviewed With patient   OTHER   Outcome Summary Pt up in hallway with nursing students prior to OT session; declined OOB acitivity d/t acute dizziness. OT reinforced education for surgical precautions, use of AE, home safety/fall prevention. HEP completed bed level. Pt requesting BSC, CM notified. OT to follow. D/C plan is home with spouse and HH to follow.

## 2019-02-01 NOTE — PLAN OF CARE
Problem: Patient Care Overview  Goal: Plan of Care Review  Outcome: Ongoing (interventions implemented as appropriate)   02/01/19 1346   Coping/Psychosocial   Plan of Care Reviewed With patient   Plan of Care Review   Progress improving   OTHER   Outcome Summary Pt increased gait distance to 110 feet with CGA and RW, limited by pain. Pt progressed to stair training x5 w/ 2 HR and progresseed ther ex this date. Pt anticipates discharge home tomorrow, attempt stairs w/ 1 HR and cane prior to discharge       Problem: Laminectomy/Foraminotomy/Discectomy (Adult)  Goal: Signs and Symptoms of Listed Potential Problems Will be Absent, Minimized or Managed (Laminectomy/Foraminotomy/Discectomy)  Outcome: Ongoing (interventions implemented as appropriate)   02/01/19 1346   Goal/Outcome Evaluation   Problems Assessed (Laminectomy/Laminotomy/Discectomy) functional decline/self-care deficit;pain   Problems Present (Laminectomy/otomy) functional decline/self-care deficit;pain

## 2019-02-01 NOTE — PROGRESS NOTES
"Ortho Spine Progress Note     LOS: 2 days   Patient Care Team:  Michael Blackwell MD as PCP - General    Subjective: Complains of incisional back pain.  Otherwise no unusual complaints.    Objective:   Vital Signs:  /61 (BP Location: Right arm)   Pulse 90   Temp 97.6 °F (36.4 °C) (Tympanic)   Resp 18   Ht 172.7 cm (67.99\")   Wt 89.5 kg (197 lb 5 oz)   SpO2 96%   Breastfeeding? No   BMI 30.01 kg/m²     Labs:  Lab Results (last 24 hours)     Procedure Component Value Units Date/Time    POC Glucose Once [[id number]]  (Normal) Collected:  02/01/19 0708    Specimen:  Blood Updated:  02/01/19 0714     Glucose 121 mg/dL     POC Glucose Once [[id number]]  (Abnormal) Collected:  01/31/19 2012    Specimen:  Blood Updated:  01/31/19 2014     Glucose 178 mg/dL     POC Glucose Once [[id number]]  (Abnormal) Collected:  01/31/19 1640    Specimen:  Blood Updated:  01/31/19 1706     Glucose 226 mg/dL     POC Glucose Once [[id number]]  (Abnormal) Collected:  01/31/19 1124    Specimen:  Blood Updated:  01/31/19 1135     Glucose 200 mg/dL           Awake, alert, oriented.  Motor intact in lower extremities.    Assessment/Plan: She is starting to walk in hallways but needs 1 more day of physical therapy.  She requests physical therapy at home.  Anticipate home Saturday.  I have discussed follow-up care and restrictions with her.  I have left a prescription for Percocet 7.5, #50, for home use.  I will see her in follow-up in 3-4 weeks' time.  I will order home physical therapy.    Naveed Sims MD  02/01/19  10:57 AM      "

## 2019-02-02 ENCOUNTER — APPOINTMENT (OUTPATIENT)
Dept: CT IMAGING | Facility: HOSPITAL | Age: 69
End: 2019-02-02

## 2019-02-02 LAB
ANION GAP SERPL CALCULATED.3IONS-SCNC: 6 MMOL/L (ref 3–11)
BASOPHILS # BLD AUTO: 0.01 10*3/MM3 (ref 0–0.2)
BASOPHILS NFR BLD AUTO: 0.1 % (ref 0–1)
BUN BLD-MCNC: 12 MG/DL (ref 9–23)
BUN/CREAT SERPL: 18.2 (ref 7–25)
CALCIUM SPEC-SCNC: 9.1 MG/DL (ref 8.7–10.4)
CHLORIDE SERPL-SCNC: 101 MMOL/L (ref 99–109)
CO2 SERPL-SCNC: 27 MMOL/L (ref 20–31)
CREAT BLD-MCNC: 0.66 MG/DL (ref 0.6–1.3)
D-LACTATE SERPL-SCNC: 0.7 MMOL/L (ref 0.5–2)
DEPRECATED RDW RBC AUTO: 47.1 FL (ref 37–54)
EOSINOPHIL # BLD AUTO: 0.08 10*3/MM3 (ref 0–0.3)
EOSINOPHIL NFR BLD AUTO: 0.7 % (ref 0–3)
ERYTHROCYTE [DISTWIDTH] IN BLOOD BY AUTOMATED COUNT: 14.3 % (ref 11.3–14.5)
GFR SERPL CREATININE-BSD FRML MDRD: 108 ML/MIN/1.73
GLUCOSE BLD-MCNC: 160 MG/DL (ref 70–100)
GLUCOSE BLDC GLUCOMTR-MCNC: 115 MG/DL (ref 70–130)
GLUCOSE BLDC GLUCOMTR-MCNC: 119 MG/DL (ref 70–130)
GLUCOSE BLDC GLUCOMTR-MCNC: 122 MG/DL (ref 70–130)
GLUCOSE BLDC GLUCOMTR-MCNC: 131 MG/DL (ref 70–130)
HCT VFR BLD AUTO: 32 % (ref 34.5–44)
HGB BLD-MCNC: 10.1 G/DL (ref 11.5–15.5)
IMM GRANULOCYTES # BLD AUTO: 0.03 10*3/MM3 (ref 0–0.03)
IMM GRANULOCYTES NFR BLD AUTO: 0.3 % (ref 0–0.6)
LYMPHOCYTES # BLD AUTO: 1.53 10*3/MM3 (ref 0.6–4.8)
LYMPHOCYTES NFR BLD AUTO: 14.2 % (ref 24–44)
MCH RBC QN AUTO: 28.9 PG (ref 27–31)
MCHC RBC AUTO-ENTMCNC: 31.6 G/DL (ref 32–36)
MCV RBC AUTO: 91.4 FL (ref 80–99)
MONOCYTES # BLD AUTO: 0.8 10*3/MM3 (ref 0–1)
MONOCYTES NFR BLD AUTO: 7.4 % (ref 0–12)
NEUTROPHILS # BLD AUTO: 8.32 10*3/MM3 (ref 1.5–8.3)
NEUTROPHILS NFR BLD AUTO: 77.6 % (ref 41–71)
PLATELET # BLD AUTO: 255 10*3/MM3 (ref 150–450)
PMV BLD AUTO: 10.2 FL (ref 6–12)
POTASSIUM BLD-SCNC: 3.7 MMOL/L (ref 3.5–5.5)
RBC # BLD AUTO: 3.5 10*6/MM3 (ref 3.89–5.14)
SODIUM BLD-SCNC: 134 MMOL/L (ref 132–146)
WBC NRBC COR # BLD: 10.74 10*3/MM3 (ref 3.5–10.8)

## 2019-02-02 PROCEDURE — 0 DIATRIZOATE MEGLUMINE & SODIUM PER 1 ML

## 2019-02-02 PROCEDURE — 25010000002 ONDANSETRON PER 1 MG: Performed by: NURSE PRACTITIONER

## 2019-02-02 PROCEDURE — 97116 GAIT TRAINING THERAPY: CPT

## 2019-02-02 PROCEDURE — 74160 CT ABDOMEN W/CONTRAST: CPT

## 2019-02-02 PROCEDURE — 83605 ASSAY OF LACTIC ACID: CPT | Performed by: INTERNAL MEDICINE

## 2019-02-02 PROCEDURE — 80048 BASIC METABOLIC PNL TOTAL CA: CPT | Performed by: INTERNAL MEDICINE

## 2019-02-02 PROCEDURE — 94660 CPAP INITIATION&MGMT: CPT

## 2019-02-02 PROCEDURE — 94799 UNLISTED PULMONARY SVC/PX: CPT

## 2019-02-02 PROCEDURE — 85025 COMPLETE CBC W/AUTO DIFF WBC: CPT | Performed by: INTERNAL MEDICINE

## 2019-02-02 PROCEDURE — 25010000002 IOPAMIDOL 61 % SOLUTION: Performed by: ORTHOPAEDIC SURGERY

## 2019-02-02 PROCEDURE — 97110 THERAPEUTIC EXERCISES: CPT

## 2019-02-02 PROCEDURE — 63710000001 INSULIN DETEMIR PER 5 UNITS: Performed by: NURSE PRACTITIONER

## 2019-02-02 PROCEDURE — 82962 GLUCOSE BLOOD TEST: CPT

## 2019-02-02 RX ORDER — MAGNESIUM CARB/ALUMINUM HYDROX 105-160MG
296 TABLET,CHEWABLE ORAL ONCE
Status: COMPLETED | OUTPATIENT
Start: 2019-02-02 | End: 2019-02-02

## 2019-02-02 RX ADMIN — BUDESONIDE AND FORMOTEROL FUMARATE DIHYDRATE 2 PUFF: 160; 4.5 AEROSOL RESPIRATORY (INHALATION) at 08:42

## 2019-02-02 RX ADMIN — OXYCODONE HYDROCHLORIDE AND ACETAMINOPHEN 1 TABLET: 10; 325 TABLET ORAL at 04:01

## 2019-02-02 RX ADMIN — ACETAMINOPHEN 650 MG: 325 TABLET ORAL at 10:34

## 2019-02-02 RX ADMIN — FAMOTIDINE 20 MG: 20 TABLET, FILM COATED ORAL at 08:08

## 2019-02-02 RX ADMIN — DIATRIZOATE MEGLUMINE AND DIATRIZOATE SODIUM 120 ML: 660; 100 LIQUID ORAL; RECTAL at 10:00

## 2019-02-02 RX ADMIN — Medication 296 ML: at 15:40

## 2019-02-02 RX ADMIN — SODIUM CHLORIDE, PRESERVATIVE FREE 3 ML: 5 INJECTION INTRAVENOUS at 19:24

## 2019-02-02 RX ADMIN — DOCUSATE SODIUM 100 MG: 100 CAPSULE, LIQUID FILLED ORAL at 19:23

## 2019-02-02 RX ADMIN — SIMETHICONE 80 MG: 80 TABLET, CHEWABLE ORAL at 19:29

## 2019-02-02 RX ADMIN — OXYCODONE HYDROCHLORIDE AND ACETAMINOPHEN 1 TABLET: 10; 325 TABLET ORAL at 08:56

## 2019-02-02 RX ADMIN — POLYETHYLENE GLYCOL 3350 17 G: 17 POWDER, FOR SOLUTION ORAL at 08:08

## 2019-02-02 RX ADMIN — FAMOTIDINE 20 MG: 20 TABLET, FILM COATED ORAL at 19:23

## 2019-02-02 RX ADMIN — DOCUSATE SODIUM 100 MG: 100 CAPSULE, LIQUID FILLED ORAL at 08:08

## 2019-02-02 RX ADMIN — OXYBUTYNIN CHLORIDE 5 MG: 5 TABLET, EXTENDED RELEASE ORAL at 08:08

## 2019-02-02 RX ADMIN — MONTELUKAST SODIUM 10 MG: 10 TABLET, COATED ORAL at 19:23

## 2019-02-02 RX ADMIN — INSULIN DETEMIR 30 UNITS: 100 INJECTION, SOLUTION SUBCUTANEOUS at 20:38

## 2019-02-02 RX ADMIN — ONDANSETRON 4 MG: 2 INJECTION INTRAMUSCULAR; INTRAVENOUS at 17:02

## 2019-02-02 RX ADMIN — METAXALONE 800 MG: 800 TABLET ORAL at 07:55

## 2019-02-02 RX ADMIN — SODIUM CHLORIDE, PRESERVATIVE FREE 3 ML: 5 INJECTION INTRAVENOUS at 08:08

## 2019-02-02 RX ADMIN — LOSARTAN POTASSIUM 100 MG: 25 TABLET, FILM COATED ORAL at 08:08

## 2019-02-02 RX ADMIN — BUDESONIDE AND FORMOTEROL FUMARATE DIHYDRATE 2 PUFF: 160; 4.5 AEROSOL RESPIRATORY (INHALATION) at 19:57

## 2019-02-02 RX ADMIN — Medication: at 11:29

## 2019-02-02 RX ADMIN — IOPAMIDOL 85 ML: 612 INJECTION, SOLUTION INTRAVENOUS at 12:30

## 2019-02-02 NOTE — PLAN OF CARE
Problem: Patient Care Overview  Goal: Plan of Care Review  PT is A&Ox4, mood/affect appropriate. Pain to the lower back has been well controlled with oral analgesic. Border dressing to the incision site CDI. PT ambulates to the bathroom with stand by assist. Gait steady and stable.   02/02/19 0252   Coping/Psychosocial   Plan of Care Reviewed With patient   Plan of Care Review   Progress improving    Voids spontaneously without diffiulty. VS WNL. Will cont to mx. Call light in reach.

## 2019-02-02 NOTE — THERAPY TREATMENT NOTE
Acute Care - Physical Therapy Treatment Note  [facility]     Patient Name: Lupe Morillo  : 1950  MRN: [id number]  Today's Date: 2019  Onset of Illness/Injury or Date of Surgery: 19  Date of Referral to PT: 19  Referring Physician: MD Levi    Admit Date: 2019    Visit Dx:    ICD-10-CM ICD-9-CM   1. Impaired mobility and ADLs Z74.09 799.89   2. Impaired functional mobility, balance, gait, and endurance Z74.09 V49.89   3. S/P lumbar fusion Z98.1 V45.4     Patient Active Problem List   Diagnosis   • Sleep apnea   • Hypertension   • Heel spur   • Low back pain   • Allergic rhinitis   • Type 2 diabetes mellitus (CMS/HCC)   • Muscle cramping   • Back pain   • S/P lumbar fusion   • HLD (hyperlipidemia)   • Asthma   • Acute blood loss anemia, mild, asymptomatic   • Acute postoperative pain       Therapy Treatment    Rehabilitation Treatment Summary     Row Name 19 1350             Treatment Time/Intention    Discipline  physical therapy assistant  -AS      Document Type  therapy note (daily note)  -AS      Subjective Information  complains of;pain;weakness patient with extended abdomen and having bowel issues.  -AS      Mode of Treatment  physical therapy  -AS      Patient/Family Observations  no family at bedside, patient sitting up in chair and agreed to therapy.  -AS      Patient Effort  good  -AS      Existing Precautions/Restrictions  fall;spinal  -AS      Recorded by [AS] Fabby Hayes, MIKE 19 1436      Row Name 19 7439             Cognitive Assessment/Intervention- PT/OT    Affect/Mental Status (Cognitive)  WFL  -AS      Orientation Status (Cognition)  oriented x 4  -AS      Follows Commands (Cognition)  follows one step commands;over 90% accuracy  -AS      Safety Deficit (Cognitive)  mild deficit  -AS      Personal Safety Interventions  fall prevention program maintained;gait belt;nonskid shoes/slippers when out of bed;other (see comments) exit alarm   -AS      Recorded by [AS] Fabby Hayes, PTA 02/02/19 1436      Row Name 02/02/19 1350             Bed Mobility Assessment/Treatment    Comment (Bed Mobility)  not tested, patient sitting UIC  -AS      Recorded by [AS] Fabby Hayes, [facility] 02/02/19 1436      Row Name 02/02/19 1350             Sit-Stand Transfer    Sit-Stand [place] (Transfers)  verbal cues;contact guard  -AS      Assistive Device (Sit-Stand Transfers)  walker, front-wheeled  -AS      Recorded by [AS] Fabby Hayes, [facility] 02/02/19 1436      Row Name 02/02/19 1350             Stand-Sit Transfer    Stand-Sit [place] (Transfers)  verbal cues;contact guard  -AS      Assistive Device (Stand-Sit Transfers)  walker, front-wheeled  -AS      Recorded by [AS] Fabby Hayes, [facility] 02/02/19 1436      Row Name 02/02/19 1350             Gait/Stairs Assessment/Training    70205 - Gait Training Minutes   15  -AS      Gait/Stairs Assessment/Training  gait/ambulation assistive device  -AS      [place] Level (Gait)  verbal cues;contact guard;2 person assist  -AS      Assistive Device (Gait)  walker, front-wheeled  -AS      Distance in Feet (Gait)  250  -AS      Pattern (Gait)  step-through  -AS      Deviations/Abnormal Patterns (Gait)  base of support, narrow;gladys decreased;gait speed decreased;stride length decreased  -AS      Comment (Gait/Stairs)  verbal cues for improved posture and staying inside walker  -AS      Recorded by [AS] Fabby Hayes, [facility] 02/02/19 1436      Row Name 02/02/19 1350             Motor Skills Assessment/Interventions    Additional Documentation  Therapeutic Exercise (Group)  -AS      Recorded by [AS] Fabby Hayes, PTA 02/02/19 1436      Row Name 02/02/19 1350             Therapeutic Exercise    40215 - PT Therapeutic Exercise Minutes  8  -AS      Recorded by [AS] Fabby Hayes, [facility] 02/02/19 1436      Row Name 02/02/19 1350             Therapeutic Exercise    Lower Extremity (Therapeutic  Exercise)  gluteal sets;heel slides, bilateral;quad sets, bilateral  -AS      Lower Extremity Range of Motion (Therapeutic Exercise)  hip abduction/adduction, bilateral;hip internal/external rotation, bilateral;ankle dorsiflexion/plantar flexion, bilateral  -AS      Exercise Type (Therapeutic Exercise)  AROM (active range of motion)  -AS      Position (Therapeutic Exercise)  other (see comments);seated reclined  -AS      Sets/Reps (Therapeutic Exercise)  1/10  -AS      Recorded by [AS] Fabby Hayes PTA 02/02/19 1436      Row Name 02/02/19 1350             Positioning and Restraints    Pre-Treatment Position  sitting in chair/recliner  -AS      Post Treatment Position  chair  -AS      In Chair  reclined;call light within reach;encouraged to call for assist;exit alarm on  -AS      Recorded by [AS] Fabby Hayes PTA 02/02/19 1436      Row Name 02/02/19 1350             Pain Scale: Numbers Pre/Post-Treatment    Pain Scale: Numbers, Pretreatment  5/10  -AS      Pain Scale: Numbers, Post-Treatment  5/10  -AS      Pain Location - Orientation  lower  -AS      Pain Location  back  -AS      Pain Intervention(s)  Repositioned;Ambulation/increased activity  -AS      Recorded by [AS] Fabby Hayes PTA 02/02/19 1436      Row Name                Wound 01/30/19 0900 Other (See comments) back incision    Wound - Properties Group Date first assessed: 01/30/19 [KS] Time first assessed: 0900 [KS] Side: Other (See comments) [KS] Location: back [KS] Type: incision [KS] Recorded by:  [KS] Larissa Rivera RN 01/30/19 0900      User Key  (r) = Recorded By, (t) = Taken By, (c) = Cosigned By    Initials Name Effective Dates Discipline    AS Fabby Hayes PTA 06/22/15 -  PT    KS Larissa Rivera RN 06/16/16 -  Nurse          Wound 01/30/19 0900 Other (See comments) back incision (Active)   Dressing Appearance dry;intact;no drainage 2/2/2019  8:00 AM   Closure Adhesive bandage 2/2/2019  6:02 AM   Base dry 2/1/2019   4:30 PM   Periwound Temperature warm 2/1/2019  4:30 PM   Drainage Amount none 2/2/2019  8:00 AM   Dressing Care, Wound border dressing 2/2/2019  6:02 AM           Physical Therapy Education     Title: PT OT SLP Therapies (In Progress)     Topic: Physical Therapy (In Progress)     Point: Mobility training (In Progress)     Learning Progress Summary           Patient Acceptance, E, NR by AS at 2/2/2019  2:36 PM    Acceptance, E,D, VU,NR by  at 2/1/2019  1:46 PM    Comment:  Reviewed back precautions, HEP, gait mechanics, stairs sequencing, benefits of mobility    Acceptance, E,D,H, VU,NR by  at 1/31/2019 11:01 AM    Comment:  Edu re: HEP, back precautions, log roll, gait mechanics, benefits of mobility. Issued HEP handout                   Point: Home exercise program (In Progress)     Learning Progress Summary           Patient Acceptance, E, NR by AS at 2/2/2019  2:36 PM    Acceptance, E,D, VU,NR by  at 2/1/2019  1:46 PM    Comment:  Reviewed back precautions, HEP, gait mechanics, stairs sequencing, benefits of mobility    Acceptance, E,D,H, VU,NR by RUBÉN at 1/31/2019 11:01 AM    Comment:  Edu re: HEP, back precautions, log roll, gait mechanics, benefits of mobility. Issued HEP handout                   Point: Body mechanics (In Progress)     Learning Progress Summary           Patient Acceptance, E, NR by AS at 2/2/2019  2:36 PM    Acceptance, E,D, VU,NR by  at 2/1/2019  1:46 PM    Comment:  Reviewed back precautions, HEP, gait mechanics, stairs sequencing, benefits of mobility    Acceptance, E,D,H, VU,NR by  at 1/31/2019 11:01 AM    Comment:  Edu re: HEP, back precautions, log roll, gait mechanics, benefits of mobility. Issued HEP handout                   Point: Precautions (In Progress)     Learning Progress Summary           Patient Acceptance, E, NR by AS at 2/2/2019  2:36 PM    Acceptance, E,D, VU,NR by  at 2/1/2019  1:46 PM    Comment:  Reviewed back precautions, HEP, gait mechanics, stairs  sequencing, benefits of mobility    Acceptance, E,D,H, MELODIE,NR by  at 1/31/2019 11:01 AM    Comment:  Edu re: HEP, back precautions, log roll, gait mechanics, benefits of mobility. Issued HEP handout                               User Key     Initials Effective Dates Name Provider Type Discipline    AS 06/22/15 -  Fabby Hayes, PTA Physical Therapy Assistant PT     04/03/18 -  Kwadwo Garcia, DARIELA Physical Therapist PT                PT Recommendation and Plan     Plan of Care Reviewed With: patient  Progress: improving  Outcome Summary: patient ambulated 250 feet with use of rolling walker for support, patient with complaints of adbominal and low back pain during gait. HEP completed in reclined position.  Outcome Measures     Row Name 02/02/19 1324 02/01/19 1346 02/01/19 1110       How much help from another person do you currently need...    Turning from your back to your side while in flat bed without using bedrails?  3  -AS  3  -MJ  --    Moving from lying on back to sitting on the side of a flat bed without bedrails?  3  -AS  3  -MJ  --    Moving to and from a bed to a chair (including a wheelchair)?  3  -AS  3  -MJ  --    Standing up from a chair using your arms (e.g., wheelchair, bedside chair)?  3  -AS  3  -MJ  --    Climbing 3-5 steps with a railing?  3  -AS  3  -MJ  --    To walk in hospital room?  3  -AS  3  -MJ  --    AM-PAC 6 Clicks Score  18  -AS  18  -MJ  --       How much help from another is currently needed...    Putting on and taking off regular lower body clothing?  --  --  3  -TB    Bathing (including washing, rinsing, and drying)  --  --  3  -TB    Toileting (which includes using toilet bed pan or urinal)  --  --  2  -TB    Putting on and taking off regular upper body clothing  --  --  3  -TB    Taking care of personal grooming (such as brushing teeth)  --  --  3  -TB    Eating meals  --  --  3  -TB    Score  --  --  17  -TB       Functional Assessment    Outcome Measure Options  AM-PAC 6  Clicks Basic Mobility (PT)  -AS  AM-PAC 6 Clicks Basic Mobility (PT)  -MJ  --    Row Name 01/31/19 1101 01/31/19 0902          How much help from another person do you currently need...    Turning from your back to your side while in flat bed without using bedrails?  3  -MJ  --     Moving from lying on back to sitting on the side of a flat bed without bedrails?  3  -MJ  --     Moving to and from a bed to a chair (including a wheelchair)?  3  -MJ  --     Standing up from a chair using your arms (e.g., wheelchair, bedside chair)?  3  -MJ  --     Climbing 3-5 steps with a railing?  3  -MJ  --     To walk in hospital room?  3  -MJ  --     AM-PAC 6 Clicks Score  18  -MJ  --        How much help from another is currently needed...    Putting on and taking off regular lower body clothing?  --  3  -HK     Bathing (including washing, rinsing, and drying)  --  3  -HK     Toileting (which includes using toilet bed pan or urinal)  --  2  -HK     Putting on and taking off regular upper body clothing  --  3  -HK     Taking care of personal grooming (such as brushing teeth)  --  3  -HK     Eating meals  --  3  -HK     Score  --  17  -HK        Functional Assessment    Outcome Measure Options  AM-PAC 6 Clicks Basic Mobility (PT)  -MJ  AM-PAC 6 Clicks Daily Activity (OT)  -HK       User Key  (r) = Recorded By, (t) = Taken By, (c) = Cosigned By    Initials Name Provider Type     Mary Anne Castaneda, OT Occupational Therapist    AS Fabby Hayes, PTA Physical Therapy Assistant    MJ Kwadwo Garcia, PT Physical Therapist    HK Danna Davidson, OT Occupational Therapist         Time Calculation:   PT Charges     Row Name 02/02/19 1350             Time Calculation    Start Time  1350  -AS      PT Received On  02/02/19  -AS      PT Goal Re-Cert Due Date  02/10/19  -AS         Timed Charges    66188 - PT Therapeutic Exercise Minutes  8  -AS      82155 - Gait Training Minutes   15  -AS        User Key  (r) = Recorded By, (t) = Taken  By, (c) = Cosigned By    Initials Name Provider Type    AS Fabby Hayes PTA Physical Therapy Assistant        Therapy Suggested Charges     Code   Minutes Charges    44023 (CPT®) Hc Pt Neuromusc Re Education Ea 15 Min      07123 (CPT®) Hc Pt Ther Proc Ea 15 Min 8 1    99028 (CPT®) Hc Gait Training Ea 15 Min 15 1    64656 (CPT®) Hc Pt Therapeutic Act Ea 15 Min      34802 (CPT®) Hc Pt Manual Therapy Ea 15 Min      44833 (CPT®) Hc Pt Iontophoresis Ea 15 Min      31347 (CPT®) Hc Pt Elec Stim Ea-Per 15 Min      89163 (CPT®) Hc Pt Ultrasound Ea 15 Min      39796 (CPT®) Hc Pt Self Care/Mgmt/Train Ea 15 Min      14508 (CPT®) Hc Pt Prosthetic (S) Train Initial Encounter, Each 15 Min      44713 (CPT®) Hc Pt Orthotic(S)/Prosthetic(S) Encounter, Each 15 Min      49929 (CPT®) Hc Orthotic(S) Mgmt/Train Initial Encounter, Each 15min      Total  23 2        Therapy Charges for Today     Code Description Service Date Service Provider Modifiers Qty    31144354063 HC GAIT TRAINING EA 15 MIN 2/2/2019 Fabby Hayes, PTA GP 1    98858524542 HC PT THER PROC EA 15 MIN 2/2/2019 Fabby Hayes, PTA GP 1    13166615501 HC PT THER SUPP EA 15 MIN 2/2/2019 Fabby Hayes, MIKE GP 2          PT G-Codes  Outcome Measure Options: AM-PAC 6 Clicks Basic Mobility (PT)  AM-PAC 6 Clicks Score: 18  Score: 17    Fabby Hayes PTA  2/2/2019

## 2019-02-02 NOTE — PLAN OF CARE
Problem: Patient Care Overview  Goal: Plan of Care Review  Outcome: Ongoing (interventions implemented as appropriate)   02/02/19 6612   Coping/Psychosocial   Plan of Care Reviewed With patient   Plan of Care Review   Progress improving   OTHER   Outcome Summary patient ambulated 250 feet with use of rolling walker for support, patient with complaints of adbominal and low back pain during gait. HEP completed in reclined position.

## 2019-02-02 NOTE — PLAN OF CARE
Problem: Patient Care Overview  Goal: Plan of Care Review  Outcome: Ongoing (interventions implemented as appropriate)   02/02/19 1800   Coping/Psychosocial   Plan of Care Reviewed With patient   Plan of Care Review   Progress no change   OTHER   Outcome Summary Patient has had complaints of abdominal distention and discomfort, CT of abdomen ordered, patient has since recieved a tap water enema and a bottle of mag citrate. Waiting for results. Patient had nausea and vomiting after drinking mag citrate, then stated that helped relieve some pressure off stomach. Patient up with assist x1 with gait belt and walker. Dressing to back c/d/i.       Problem: Laminectomy/Foraminotomy/Discectomy (Adult)  Goal: Signs and Symptoms of Listed Potential Problems Will be Absent, Minimized or Managed (Laminectomy/Foraminotomy/Discectomy)  Outcome: Ongoing (interventions implemented as appropriate)      Problem: Fall Risk (Adult)  Goal: Identify Related Risk Factors and Signs and Symptoms  Outcome: Ongoing (interventions implemented as appropriate)    Goal: Absence of Fall  Outcome: Ongoing (interventions implemented as appropriate)

## 2019-02-02 NOTE — PROGRESS NOTES
"IM progress note      Lupe Morillo  [id number]  1950     LOS: 3 days     Attending: Naveed iSms MD    Primary Care Provider: Michael Blackwell MD      Chief Complaint/Reason for visit:  No chief complaint on file.      Subjective   Back pain improved. Progressed with therapy. But had abdominal distention, and despite Miralax, MOM, Dulcolax, enema, she still has abdominal complaints, bloating, had small BMs x3.    Objective     Vital Signs  Visit Vitals  /66 (BP Location: Right arm, Patient Position: Lying)   Pulse 87   Temp 98.4 °F (36.9 °C) (Oral)   Resp 16   Ht 172.7 cm (67.99\")   Wt 89.5 kg (197 lb 5 oz)   SpO2 95%   Breastfeeding? No   BMI 30.01 kg/m²     Temp (24hrs), Av.4 °F (36.9 °C), Min:97.4 °F (36.3 °C), Max:99 °F (37.2 °C)         Physical Exam:     General Appearance:    Alert, cooperative, in no acute distress   Head:    Normocephalic, without obvious abnormality, atraumatic    Lungs:     Normal effort, symmetric chest rise, no crepitus, clear to      auscultation bilaterally         Back: dressing/ HV(likely out today)            Heart:    Regular rhythm and normal rate, normal S1 and S2    Abdomen:    Distended. No focal tenderness or RT. No rigidity.    Extremities:   No clubbing, cyanosis or edema.  No deformities.    Pulses:   Pulses palpable and equal bilaterally   Skin:   No bleeding, bruising or rash   Neurologic:   No gross deficit. Flexion and dorsiflexion intact bilateral feet.       Results Review:     I reviewed the patient's new clinical results.   Results from last 7 days   Lab Units 19  0949 19  1101 19  0543   WBC 10*3/mm3 10.74  --  10.25   HEMOGLOBIN g/dL 10.1* 10.7* 10.4*   HEMATOCRIT % 32.0* 33.6* 33.2*   PLATELETS 10*3/mm3 255  --  246   Results for LUPE MORILLO (MRN [id number]) as of 2019 11:54   Ref. Range 2019 09:49   Lactate Latest Ref Range: 0.5 - 2.0 mmol/L 0.7       Results from last 7 days   Lab Units 19  0949 " 01/31/19  0543   SODIUM mmol/L 134 135   POTASSIUM mmol/L 3.7 3.8   CHLORIDE mmol/L 101 106   CO2 mmol/L 27.0 23.0   BUN mg/dL 12 14   CREATININE mg/dL 0.66 0.79   CALCIUM mg/dL 9.1 8.8   GLUCOSE mg/dL 160* 110*   Results for SANDRA DREW (MRN [id number]) as of 2/2/2019 11:54   Ref. Range 2/1/2019 16:41 2/1/2019 20:21 2/2/2019 07:36 2/2/2019 09:49   Glucose Latest Ref Range: 70 - 100 mg/dL 166 (H) 127 115 160 (H)          All medications reviewed.     budesonide-formoterol 2 puff Inhalation BID - RT   docusate sodium 100 mg Oral BID   famotidine 20 mg Intravenous Q12H   Or      famotidine 20 mg Oral Q12H   insulin detemir 30 Units Subcutaneous Nightly   insulin lispro 0-9 Units Subcutaneous 4x Daily With Meals & Nightly   [COMPLETED] iopamidol 85 mL Intravenous Once in imaging   losartan 100 mg Oral Daily   montelukast 10 mg Oral Nightly   oxybutynin XL 5 mg Oral Daily   polyethylene glycol 17 g Oral Daily   sodium chloride 3 mL Intravenous Q12H       acetaminophen 650 mg Q4H PRN   albuterol 2.5 mg Q6H PRN   bisacodyl 5 mg Daily PRN   bisacodyl 10 mg Daily PRN   dextrose 25 g Q15 Min PRN   dextrose 15 g Q15 Min PRN   glucagon (human recombinant) 1 mg PRN   HYDROmorphone 2 mg Q4H PRN   And     naloxone 0.4 mg Q5 Min PRN   labetalol 10 mg Q4H PRN   magnesium hydroxide 10 mL Daily PRN   metaxalone 800 mg TID PRN   Morphine 1 mg Q4H PRN   And     naloxone 0.4 mg Q5 Min PRN   ondansetron 4 mg Q6H PRN   ondansetron 4 mg Q6H PRN   oxyCODONE-acetaminophen 1 tablet Q4H PRN   oxyCODONE-acetaminophen 1 tablet Q4H PRN   prochlorperazine 5 mg Q6H PRN   Or     prochlorperazine 5 mg Q6H PRN   Or     prochlorperazine 25 mg Q12H PRN   simethicone 80 mg 4x Daily PRN   sodium chloride 500 mL TID PRN   sodium chloride 3-10 mL PRN   zolpidem 5 mg Nightly PRN       Assessment/Plan       S/P lumbar fusion    Sleep apnea    Hypertension    Type 2 diabetes mellitus (CMS/HCC)    Back pain    HLD (hyperlipidemia)    Asthma    Acute  blood loss anemia, mild, asymptomatic    Acute postoperative pain    Abdominal distention.       Hx of colon resection partial , due to diverticular disease.    Constipation.    Plan    1. PT/OT- ambulate  2. Pain control-prns   3. IS-encouraged  4. DVT proph-Mech.  5. Bowel regimen  6. Monitor post-op labs  7. DC planning for home. Would try for tomorrow if abdominal complaints are improved.       Along with bowel regimen, will check CT of the A/P along with CBC, BMP, Lactate , above.     HTN, HLD  - Continue home cozaar  - Monitor BP   - Holding parameters for BP meds  - Labetalol PRN for SBP>170     DM  - hgb A1c on 1/23/19 7.9  - Continue home Levemir  - Hold OHA while inpt  - Accuchecks ACHS with moderate dose SSI        Asthma, ZACH  - CPAP at night  - Continue home inhalers and singulair  - Monitor O2 sats    Discussed with pt, RN, and with .    Chantal Jade MD  02/02/19  11:52 AM'

## 2019-02-02 NOTE — PROGRESS NOTES
"Ortho Spine Progress Note     LOS: 3 days   Patient Care Team:  Michael Blackwell MD as PCP - General    Subjective Pt up in chair. Stomach is a little \"bloated\" which is an long term problem, but been worse since surgery.    Objective     Vital Signs:  /66 (BP Location: Right arm, Patient Position: Lying)   Pulse 87   Temp 98.4 °F (36.9 °C) (Oral)   Resp 16   Ht 172.7 cm (67.99\")   Wt 89.5 kg (197 lb 5 oz)   SpO2 95%   Breastfeeding? No   BMI 30.01 kg/m²          Labs:  Lab Results (last 24 hours)    Procedure Component Value Units Date/Time   CBC & Differential [[id number]] Collected:  02/02/19 0949   Specimen:  Blood Updated:  02/02/19 1007   Narrative:      The following orders were created for panel order CBC & Differential.  Procedure                               Abnormality         Status                     ---------                               -----------         ------                     CBC Auto Differential[[id number]]        Abnormal            Final result                 Please view results for these tests on the individual orders.   CBC Auto Differential [[id number]]  (Abnormal) Collected:  02/02/19 0949   Specimen:  Blood Updated:  02/02/19 1007    WBC 10.74 10*3/mm3     RBC 3.50 10*6/mm3     Hemoglobin 10.1 g/dL     Hematocrit 32.0 %     MCV 91.4 fL     MCH 28.9 pg     MCHC 31.6 g/dL     RDW 14.3 %     RDW-SD 47.1 fl     MPV 10.2 fL     Platelets 255 10*3/mm3     Neutrophil % 77.6 %     Lymphocyte % 14.2 %     Monocyte % 7.4 %     Eosinophil % 0.7 %     Basophil % 0.1 %     Immature Grans % 0.3 %     Neutrophils, Absolute 8.32 10*3/mm3     Lymphocytes, Absolute 1.53 10*3/mm3     Monocytes, Absolute 0.80 10*3/mm3     Eosinophils, Absolute 0.08 10*3/mm3     Basophils, Absolute 0.01 10*3/mm3     Immature Grans, Absolute 0.03 10*3/mm3    Basic Metabolic Panel [[id number]] Collected:  02/02/19 0949   Specimen:  Blood Updated:  02/02/19 1002   POC Glucose Once [[id number]]  (Normal) Collected:  " 02/02/19 0736   Specimen:  Blood Updated:  02/02/19 0738    Glucose 115 mg/dL    POC Glucose Once [[id number]]  (Normal) Collected:  02/01/19 2021   Specimen:  Blood Updated:  02/01/19 2024    Glucose 127 mg/dL    POC Glucose Once [[id number]]  (Abnormal) Collected:  02/01/19 1641   Specimen:  Blood Updated:  02/01/19 1703    Glucose 166 mg/dL    POC Glucose Once [[id number]]  (Abnormal) Collected:  02/01/19 1147   Specimen:  Blood Updated:  02/01/19 1154    Glucose 143 mg/dL    Hemoglobin & Hematocrit, Blood [[id number]]  (Abnormal) Collected:  02/01/19 1101   Specimen:  Blood Updated:  02/01/19 1110    Hemoglobin 10.7 g/dL     Hematocrit 33.6 %         Physical Exam:  Neurovascular intact.  Calves soft, non-tender.     Assessment/Plan   Doing well from Ortho Spine stand point. Post-op Ileus, discussed with Dr. HUSTON. He is going to schedule CT Abd with PO contrast today. Will keep in hospital today, hopefully be able to go home tomorrow. Discussed with patient, needs to continue to walk every chance she gets.    Gallito Fajardo MD  02/02/19  10:14 AM up in chair

## 2019-02-02 NOTE — PLAN OF CARE
Problem: Patient Care Overview  Goal: Plan of Care Review  Outcome: Ongoing (interventions implemented as appropriate)   02/01/19 1830   Coping/Psychosocial   Plan of Care Reviewed With patient   Plan of Care Review   Progress improving   OTHER   Outcome Summary Pt increased mobility, lots of stiffness, percocet helps with pain but does not last long, Skelaxin ordered and very effective with pain and stiffness. Pt started complaining of abdominal pain and distention this evening, suppository given, as well as oral dulcolax, Dr HUSTON notified and order for MOM and simethicone given, pt ambulated, did have a small BM after supp and having some relief, cont to monitor. Also 1L of NS given for cont dizziness which has been effective, VSS. Hemovac taken out and coverderm applied to incision, steristrips dry and intact. Home with HHPT on Saturday, possibly, if medically ready.        Problem: Laminectomy/Foraminotomy/Discectomy (Adult)  Goal: Signs and Symptoms of Listed Potential Problems Will be Absent, Minimized or Managed (Laminectomy/Foraminotomy/Discectomy)  Outcome: Ongoing (interventions implemented as appropriate)   02/01/19 2038   Goal/Outcome Evaluation   Problems Assessed (Laminectomy/Laminotomy/Discectomy) all   Problems Present (Laminectomy/otomy) functional decline/self-care deficit;pain       Problem: Fall Risk (Adult)  Goal: Identify Related Risk Factors and Signs and Symptoms  Outcome: Ongoing (interventions implemented as appropriate)   02/01/19 2038   Fall Risk (Adult)   Related Risk Factors (Fall Risk) gait/mobility problems;fatigue/slow reaction;environment unfamiliar;sleep pattern alteration   Signs and Symptoms (Fall Risk) presence of risk factors

## 2019-02-03 VITALS
BODY MASS INDEX: 29.9 KG/M2 | DIASTOLIC BLOOD PRESSURE: 60 MMHG | OXYGEN SATURATION: 98 % | WEIGHT: 197.31 LBS | RESPIRATION RATE: 18 BRPM | SYSTOLIC BLOOD PRESSURE: 118 MMHG | TEMPERATURE: 98.4 F | HEART RATE: 95 BPM | HEIGHT: 68 IN

## 2019-02-03 LAB
GLUCOSE BLDC GLUCOMTR-MCNC: 131 MG/DL (ref 70–130)
GLUCOSE BLDC GLUCOMTR-MCNC: 138 MG/DL (ref 70–130)
GLUCOSE BLDC GLUCOMTR-MCNC: 77 MG/DL (ref 70–130)
GLUCOSE BLDC GLUCOMTR-MCNC: 90 MG/DL (ref 70–130)

## 2019-02-03 PROCEDURE — 94799 UNLISTED PULMONARY SVC/PX: CPT

## 2019-02-03 PROCEDURE — 97110 THERAPEUTIC EXERCISES: CPT

## 2019-02-03 PROCEDURE — 82962 GLUCOSE BLOOD TEST: CPT

## 2019-02-03 PROCEDURE — 97116 GAIT TRAINING THERAPY: CPT

## 2019-02-03 PROCEDURE — 94660 CPAP INITIATION&MGMT: CPT

## 2019-02-03 RX ORDER — OXYCODONE AND ACETAMINOPHEN 7.5; 325 MG/1; MG/1
1 TABLET ORAL EVERY 4 HOURS PRN
Start: 2019-02-03 | End: 2019-07-23

## 2019-02-03 RX ORDER — PSEUDOEPHEDRINE HCL 30 MG
100 TABLET ORAL 2 TIMES DAILY
Start: 2019-02-03 | End: 2019-03-04

## 2019-02-03 RX ADMIN — OXYCODONE AND ACETAMINOPHEN 1 TABLET: 5; 325 TABLET ORAL at 03:27

## 2019-02-03 RX ADMIN — FAMOTIDINE 20 MG: 20 TABLET, FILM COATED ORAL at 08:37

## 2019-02-03 RX ADMIN — SODIUM CHLORIDE, PRESERVATIVE FREE 3 ML: 5 INJECTION INTRAVENOUS at 08:37

## 2019-02-03 RX ADMIN — POLYETHYLENE GLYCOL 3350 17 G: 17 POWDER, FOR SOLUTION ORAL at 08:37

## 2019-02-03 RX ADMIN — OXYBUTYNIN CHLORIDE 5 MG: 5 TABLET, EXTENDED RELEASE ORAL at 08:37

## 2019-02-03 RX ADMIN — BUDESONIDE AND FORMOTEROL FUMARATE DIHYDRATE 2 PUFF: 160; 4.5 AEROSOL RESPIRATORY (INHALATION) at 08:40

## 2019-02-03 RX ADMIN — DOCUSATE SODIUM 100 MG: 100 CAPSULE, LIQUID FILLED ORAL at 08:37

## 2019-02-03 RX ADMIN — BISACODYL 10 MG: 10 SUPPOSITORY RECTAL at 08:36

## 2019-02-03 RX ADMIN — BISACODYL 5 MG: 5 TABLET, COATED ORAL at 08:37

## 2019-02-03 RX ADMIN — MAGNESIUM HYDROXIDE 20 ML: 2400 SUSPENSION ORAL at 08:36

## 2019-02-03 RX ADMIN — LOSARTAN POTASSIUM 100 MG: 25 TABLET, FILM COATED ORAL at 08:36

## 2019-02-03 NOTE — THERAPY TREATMENT NOTE
Acute Care - Physical Therapy Treatment Note  [facility]     Patient Name: Lupe Morillo  : 1950  MRN: [id number]  Today's Date: 2/3/2019  Onset of Illness/Injury or Date of Surgery: 19  Date of Referral to PT: 19  Referring Physician: MD Levi    Admit Date: 2019    Visit Dx:    ICD-10-CM ICD-9-CM   1. Impaired mobility and ADLs Z74.09 799.89   2. Impaired functional mobility, balance, gait, and endurance Z74.09 V49.89   3. S/P lumbar fusion Z98.1 V45.4     Patient Active Problem List   Diagnosis   • Sleep apnea   • Hypertension   • Heel spur   • Low back pain   • Allergic rhinitis   • Type 2 diabetes mellitus (CMS/HCC)   • Muscle cramping   • Back pain   • S/P lumbar fusion   • HLD (hyperlipidemia)   • Asthma   • Acute blood loss anemia, mild, asymptomatic   • Acute postoperative pain       Therapy Treatment    Rehabilitation Treatment Summary     Row Name 19 0750             Treatment Time/Intention    Discipline  physical therapy assistant  -AS      Document Type  therapy note (daily note)  -AS      Subjective Information  complains of;pain abdominal pain, has not had BM.  -AS      Mode of Treatment  physical therapy  -AS      Patient/Family Observations  no family at bedside, patient supine and agreed to therapy.  -AS      Patient Effort  good  -AS      Existing Precautions/Restrictions  fall;spinal  -AS      Recorded by [AS] Fabby Hayes PTA 19 4079      Row Name 19 0750             Cognitive Assessment/Intervention- PT/OT    Affect/Mental Status (Cognitive)  WFL  -AS      Orientation Status (Cognition)  oriented x 4  -AS      Follows Commands (Cognition)  WFL  -AS      Safety Deficit (Cognitive)  mild deficit  -AS      Personal Safety Interventions  fall prevention program maintained;gait belt;nonskid shoes/slippers when out of bed;other (see comments) exit alarm  -AS      Recorded by [AS] Fabby Hayes PTA 19 0811      Row Name  02/03/19 0750             Bed Mobility Assessment/Treatment    Sidelying-Sit [place] (Bed Mobility)  supervision  -AS      Assistive Device (Bed Mobility)  bed rails;head of bed elevated  -AS      Comment (Bed Mobility)  patient demonstrated safe technique  -AS      Recorded by [AS] Fabby Hayes, PTA 02/03/19 0835      Row Name 02/03/19 0750             Sit-Stand Transfer    Sit-Stand [place] (Transfers)  stand by assist  -AS      Assistive Device (Sit-Stand Transfers)  walker, front-wheeled  -AS      Recorded by [AS] Fabby Hayes, PTA 02/03/19 0835      Row Name 02/03/19 0750             Stand-Sit Transfer    Stand-Sit [place] (Transfers)  stand by assist  -AS      Assistive Device (Stand-Sit Transfers)  walker, front-wheeled  -AS      Recorded by [AS] Fabby Hayes, PTA 02/03/19 0835      Row Name 02/03/19 0750             Toilet Transfer    Type (Toilet Transfer)  stand-sit;sit-stand  -AS      [place] Level (Toilet Transfer)  verbal cues;contact guard  -AS      Assistive Device (Toilet Transfer)  commode;grab bars/safety frame;walker, front-wheeled  -AS      Recorded by [AS] Fabby Hayes, PTA 02/03/19 0835      Row Name 02/03/19 0750             Gait/Stairs Assessment/Training    37756 - Gait Training Minutes   15  -AS      Gait/Stairs Assessment/Training  gait/ambulation assistive device  -AS      [place] Level (Gait)  verbal cues;contact guard  -AS      Assistive Device (Gait)  walker, front-wheeled  -AS      Distance in Feet (Gait)  300  -AS      Pattern (Gait)  step-through  -AS      Deviations/Abnormal Patterns (Gait)  gladys decreased;gait speed decreased  -AS      Bilateral Gait Deviations  forward flexed posture  -AS      Comment (Gait/Stairs)  verbal cues to stay closer to walker and to improve posture  -AS      Recorded by [AS] Fabby Hayes, PTA 02/03/19 0835      Row Name 02/03/19 0750             Motor Skills Assessment/Interventions     Additional Documentation  Therapeutic Exercise (Group)  -AS      Recorded by [AS] Freddy Fabbymya Cardozo, PTA 02/03/19 0835      Row Name 02/03/19 0750             Therapeutic Exercise    12416 - PT Therapeutic Exercise Minutes  8  -AS      Recorded by [AS] Fabby Hayes, [facility] 02/03/19 0835      Row Name 02/03/19 0750             Therapeutic Exercise    Lower Extremity (Therapeutic Exercise)  gluteal sets;quad sets, bilateral  -AS      Lower Extremity Range of Motion (Therapeutic Exercise)  hip abduction/adduction, bilateral;hip internal/external rotation, bilateral;ankle dorsiflexion/plantar flexion, bilateral  -AS      Exercise Type (Therapeutic Exercise)  AROM (active range of motion)  -AS      Position (Therapeutic Exercise)  other (see comments) reclined  -AS      Sets/Reps (Therapeutic Exercise)  1/10  -AS      Recorded by [AS] Fabby Hayes, [facility] 02/03/19 0835      Row Name 02/03/19 0750             Positioning and Restraints    Pre-Treatment Position  in bed  -AS      Post Treatment Position  chair  -AS      In Chair  reclined;call light within reach;encouraged to call for assist;notified nsg;exit alarm on;waffle cushion  -AS      Recorded by [AS] Fabby Hayes, [facility] 02/03/19 0835      Row Name 02/03/19 0750             Pain Scale: Numbers Pre/Post-Treatment    Pain Scale: Numbers, Pretreatment  5/10  -AS      Pain Scale: Numbers, Post-Treatment  5/10  -AS      Pain Location  abdomen  -AS      Pain Intervention(s)  Medication (See MAR);Repositioned;Ambulation/increased activity  -AS      Recorded by [AS] Fabby Hayes, [facility] 02/03/19 0835      Row Name                Wound 01/30/19 0900 Other (See comments) back incision    Wound - Properties Group Date first assessed: 01/30/19 [KS] Time first assessed: 0900 [KS] Side: Other (See comments) [KS] Location: back [KS] Type: incision [KS] Recorded by:  [KS] Larissa Rivera RN 01/30/19 0900      User Key  (r) = Recorded By, (t) = Taken By, (c) =  Cosigned By    Initials Name Effective Dates Discipline    AS Markmimi Fabby Juliane, PTA 06/22/15 -  PT    KS Larissa Rivera RN 06/16/16 -  Nurse          Wound 01/30/19 0900 Other (See comments) back incision (Active)   Dressing Appearance dry;intact;no drainage 2/3/2019  5:56 AM   Closure Adhesive bandage 2/3/2019  5:56 AM   Drainage Amount none 2/3/2019  5:56 AM   Dressing Care, Wound border dressing 2/3/2019  5:56 AM           Physical Therapy Education     Title: PT OT SLP Therapies (In Progress)     Topic: Physical Therapy (In Progress)     Point: Mobility training (In Progress)     Learning Progress Summary           Patient Acceptance, E, NR by AS at 2/3/2019  8:35 AM    Acceptance, E, NR by AS at 2/2/2019  2:36 PM    Acceptance, E,D, VU,NR by  at 2/1/2019  1:46 PM    Comment:  Reviewed back precautions, HEP, gait mechanics, stairs sequencing, benefits of mobility    Acceptance, E,D,H, VU,NR by  at 1/31/2019 11:01 AM    Comment:  Edu re: HEP, back precautions, log roll, gait mechanics, benefits of mobility. Issued HEP handout                   Point: Home exercise program (In Progress)     Learning Progress Summary           Patient Acceptance, E, NR by AS at 2/3/2019  8:35 AM    Acceptance, E, NR by AS at 2/2/2019  2:36 PM    Acceptance, E,D, VU,NR by MJ at 2/1/2019  1:46 PM    Comment:  Reviewed back precautions, HEP, gait mechanics, stairs sequencing, benefits of mobility    Acceptance, E,D,H, VU,NR by  at 1/31/2019 11:01 AM    Comment:  Edu re: HEP, back precautions, log roll, gait mechanics, benefits of mobility. Issued HEP handout                   Point: Body mechanics (In Progress)     Learning Progress Summary           Patient Acceptance, E, NR by AS at 2/3/2019  8:35 AM    Acceptance, E, NR by AS at 2/2/2019  2:36 PM    Acceptance, E,D, VU,NR by  at 2/1/2019  1:46 PM    Comment:  Reviewed back precautions, HEP, gait mechanics, stairs sequencing, benefits of mobility    Acceptance, E,D,H,  VU,NR by  at 1/31/2019 11:01 AM    Comment:  Edu re: HEP, back precautions, log roll, gait mechanics, benefits of mobility. Issued HEP handout                   Point: Precautions (In Progress)     Learning Progress Summary           Patient Acceptance, E, NR by AS at 2/3/2019  8:35 AM    Acceptance, E, NR by AS at 2/2/2019  2:36 PM    Acceptance, E,D, VU,NR by  at 2/1/2019  1:46 PM    Comment:  Reviewed back precautions, HEP, gait mechanics, stairs sequencing, benefits of mobility    Acceptance, E,D,H, VU,NR by  at 1/31/2019 11:01 AM    Comment:  Edu re: HEP, back precautions, log roll, gait mechanics, benefits of mobility. Issued HEP handout                               User Key     Initials Effective Dates Name Provider Type Discipline    AS 06/22/15 -  Fabby Hayes, PTA Physical Therapy Assistant PT     04/03/18 -  Kwadwo Garcia, PT Physical Therapist PT                PT Recommendation and Plan     Plan of Care Reviewed With: patient  Progress: improving  Outcome Summary: patient ambulated 300 feet with rolling walker for support, continues to be limited due to abdominal distention and disomfort. HEP completed reclined in chair.  Outcome Measures     Row Name 02/03/19 0750 02/02/19 1324 02/01/19 1346       How much help from another person do you currently need...    Turning from your back to your side while in flat bed without using bedrails?  4  -AS  3  -AS  3  -MJ    Moving from lying on back to sitting on the side of a flat bed without bedrails?  4  -AS  3  -AS  3  -MJ    Moving to and from a bed to a chair (including a wheelchair)?  3  -AS  3  -AS  3  -MJ    Standing up from a chair using your arms (e.g., wheelchair, bedside chair)?  3  -AS  3  -AS  3  -MJ    Climbing 3-5 steps with a railing?  3  -AS  3  -AS  3  -MJ    To walk in hospital room?  3  -AS  3  -AS  3  -MJ    AM-PAC 6 Clicks Score  20  -AS  18  -AS  18  -MJ       Functional Assessment    Outcome Measure Options  AM-PAC 6 Clicks  Basic Mobility (PT)  -AS  AM-PAC 6 Clicks Basic Mobility (PT)  -AS  AM-PAC 6 Clicks Basic Mobility (PT)  -    Row Name 02/01/19 1110 01/31/19 1101 01/31/19 0902       How much help from another person do you currently need...    Turning from your back to your side while in flat bed without using bedrails?  --  3  -MJ  --    Moving from lying on back to sitting on the side of a flat bed without bedrails?  --  3  -MJ  --    Moving to and from a bed to a chair (including a wheelchair)?  --  3  -MJ  --    Standing up from a chair using your arms (e.g., wheelchair, bedside chair)?  --  3  -MJ  --    Climbing 3-5 steps with a railing?  --  3  -MJ  --    To walk in hospital room?  --  3  -MJ  --    AM-PAC 6 Clicks Score  --  18  -MJ  --       How much help from another is currently needed...    Putting on and taking off regular lower body clothing?  3  -TB  --  3  -HK    Bathing (including washing, rinsing, and drying)  3  -TB  --  3  -HK    Toileting (which includes using toilet bed pan or urinal)  2  -TB  --  2  -HK    Putting on and taking off regular upper body clothing  3  -TB  --  3  -HK    Taking care of personal grooming (such as brushing teeth)  3  -TB  --  3  -HK    Eating meals  3  -TB  --  3  -HK    Score  17  -TB  --  17  -HK       Functional Assessment    Outcome Measure Options  --  AM-PAC 6 Clicks Basic Mobility (PT)  -  AM-[facility] 6 Clicks Daily Activity (OT)  -HK      User Key  (r) = Recorded By, (t) = Taken By, (c) = Cosigned By    Initials Name Provider Type    TB Mary Anne Castaneda, OT Occupational Therapist    AS Fabby Hayes, PTA Physical Therapy Assistant    MJ Kwadwo Garcia, PT Physical Therapist    HK Danna Davidson, OT Occupational Therapist         Time Calculation:   PT Charges     Row Name 02/03/19 0750             Time Calculation    Start Time  0750  -AS      PT Received On  02/03/19  -AS      PT Goal Re-Cert Due Date  02/10/19  -AS         Timed Charges    68519 - PT Therapeutic  Exercise Minutes  8  -AS      11734 - Gait Training Minutes   15  -AS        User Key  (r) = Recorded By, (t) = Taken By, (c) = Cosigned By    Initials Name Provider Type    AS Fabby Hayes PTA Physical Therapy Assistant        Therapy Suggested Charges     Code   Minutes Charges    85374 (CPT®) Hc Pt Neuromusc Re Education Ea 15 Min      93749 (CPT®) Hc Pt Ther Proc Ea 15 Min 8 1    06645 (CPT®) Hc Gait Training Ea 15 Min 15 1    88333 (CPT®) Hc Pt Therapeutic Act Ea 15 Min      28885 (CPT®) Hc Pt Manual Therapy Ea 15 Min      59747 (CPT®) Hc Pt Iontophoresis Ea 15 Min      29373 (CPT®) Hc Pt Elec Stim Ea-Per 15 Min      50773 (CPT®) Hc Pt Ultrasound Ea 15 Min      94692 (CPT®) Hc Pt Self Care/Mgmt/Train Ea 15 Min      35427 (CPT®) Hc Pt Prosthetic (S) Train Initial Encounter, Each 15 Min      67943 (CPT®) Hc Pt Orthotic(S)/Prosthetic(S) Encounter, Each 15 Min      81441 (CPT®) Hc Orthotic(S) Mgmt/Train Initial Encounter, Each 15min      Total  23 2        Therapy Charges for Today     Code Description Service Date Service Provider Modifiers Qty    27554774712 HC GAIT TRAINING EA 15 MIN 2/2/2019 Fabby Hayes, PTA GP 1    63497268865 HC PT THER PROC EA 15 MIN 2/2/2019 Fabby Hayes, PTA GP 1    00236538583 HC PT THER SUPP EA 15 MIN 2/2/2019 Fabby Hayes, PTA GP 2    56487386145 HC PT THER PROC EA 15 MIN 2/3/2019 Fabby Hayes, PTA GP 1    18390795764 HC GAIT TRAINING EA 15 MIN 2/3/2019 Fabby Hayes, PTA GP 1          PT G-Codes  Outcome Measure Options: AM-PAC 6 Clicks Basic Mobility (PT)  AM-PAC 6 Clicks Score: 20  Score: 17    Fabby Hayes PTA  2/3/2019

## 2019-02-03 NOTE — PLAN OF CARE
Problem: Patient Care Overview  Goal: Plan of Care Review  PT is A&Ox4, mood/affect appropriate/pleasant, Ambulates to the bathroom with stand by assist. Voids spontaneously without difficulty. Border dressing to the lower back CDI. Bowel sounds positive/high pitched at all four qdts. VS WNL. Will cont to mx. Call light in reach.   02/03/19 0129   Coping/Psychosocial   Plan of Care Reviewed With patient   Plan of Care Review   Progress improving

## 2019-02-03 NOTE — PROGRESS NOTES
"Ortho Spine Progress Note     LOS: 4 days   Patient Care Team:  Michael Blackwell MD as PCP - General    Subjective Pt sitting up in bed eating breakfast. No C/O with back. Still discomfort in abd.    Objective     Vital Signs:  /64 (BP Location: Right arm, Patient Position: Lying)   Pulse 97   Temp 98 °F (36.7 °C) (Oral)   Resp 14   Ht 172.7 cm (67.99\")   Wt 89.5 kg (197 lb 5 oz)   SpO2 97%   Breastfeeding? No   BMI 30.01 kg/m²          Labs:  Lab Results (last 24 hours)     Procedure Component Value Units Date/Time    POC Glucose Once [[id number]]  (Abnormal) Collected:  02/02/19 2030    Specimen:  Blood Updated:  02/02/19 2032     Glucose 131 mg/dL     POC Glucose Once [[id number]]  (Normal) Collected:  02/02/19 1630    Specimen:  Blood Updated:  02/02/19 1632     Glucose 122 mg/dL     POC Glucose Once [[id number]]  (Normal) Collected:  02/02/19 1209    Specimen:  Blood Updated:  02/02/19 1210     Glucose 119 mg/dL     Lactic Acid, Plasma [[id number]]  (Normal) Collected:  02/02/19 0949    Specimen:  Blood Updated:  02/02/19 1100     Lactate 0.7 mmol/L      Comment: Falsely depressed results may occur on samples drawn from patients receiving N-Acetylcysteine (NAC) or Metamizole.       Basic Metabolic Panel [[id number]]  (Abnormal) Collected:  02/02/19 0949    Specimen:  Blood Updated:  02/02/19 1037     Glucose 160 mg/dL      BUN 12 mg/dL      Creatinine 0.66 mg/dL      Sodium 134 mmol/L      Potassium 3.7 mmol/L      Chloride 101 mmol/L      CO2 27.0 mmol/L      Calcium 9.1 mg/dL      eGFR  African Amer 108 mL/min/1.73      BUN/Creatinine Ratio 18.2     Anion Gap 6.0 mmol/L     Narrative:       [employer] Guidelines    Stage     Description        GFR  1         Normal or High     90+  2         Mild decrease      60-89  3         Moderate decrease  30-59  4         Severe decrease    15-29  5         Kidney failure     <15    The MDRD GFR formula is only valid for adults with stable renal " function between ages 18 and 70.    CBC & Differential [[id number]] Collected:  02/02/19 0949    Specimen:  Blood Updated:  02/02/19 1007    Narrative:       The following orders were created for panel order CBC & Differential.  Procedure                               Abnormality         Status                     ---------                               -----------         ------                     CBC Auto Differential[[id number]]        Abnormal            Final result                 Please view results for these tests on the individual orders.    CBC Auto Differential [[id number]]  (Abnormal) Collected:  02/02/19 0949    Specimen:  Blood Updated:  02/02/19 1007     WBC 10.74 10*3/mm3      RBC 3.50 10*6/mm3      Hemoglobin 10.1 g/dL      Hematocrit 32.0 %      MCV 91.4 fL      MCH 28.9 pg      MCHC 31.6 g/dL      RDW 14.3 %      RDW-SD 47.1 fl      MPV 10.2 fL      Platelets 255 10*3/mm3      Neutrophil % 77.6 %      Lymphocyte % 14.2 %      Monocyte % 7.4 %      Eosinophil % 0.7 %      Basophil % 0.1 %      Immature Grans % 0.3 %      Neutrophils, Absolute 8.32 10*3/mm3      Lymphocytes, Absolute 1.53 10*3/mm3      Monocytes, Absolute 0.80 10*3/mm3      Eosinophils, Absolute 0.08 10*3/mm3      Basophils, Absolute 0.01 10*3/mm3      Immature Grans, Absolute 0.03 10*3/mm3     POC Glucose Once [[id number]]  (Normal) Collected:  02/02/19 0736    Specimen:  Blood Updated:  02/02/19 0738     Glucose 115 mg/dL           Physical Exam:  Neurovascular intact.  Calves soft, non-tender.     Assessment/Plan   Ortho Stable. CT scan of abd showed large amount of stool, no other concerns. Defer decision about D/C to Dr HUSTON. If goes home, OK to start showering. F/U Dr. Sims 1month. Wound closed with vicryl. Percocet 7.5 script on chart.    Gallito Fajardo MD  02/03/19  7:34 AM

## 2019-02-03 NOTE — PLAN OF CARE
Problem: Patient Care Overview  Goal: Plan of Care Review  Outcome: Ongoing (interventions implemented as appropriate)   02/03/19 0875   Coping/Psychosocial   Plan of Care Reviewed With patient   Plan of Care Review   Progress improving   OTHER   Outcome Summary patient ambulated 300 feet with rolling walker for support, continues to be limited due to abdominal distention and disomfort. HEP completed reclined in chair.

## 2019-02-03 NOTE — DISCHARGE SUMMARY
Patient Name: Lupe Morillo  MRN: [id number]  : 1950  DOS: 2/3/2019    Attending: Naveed Sims MD    Primary Care Provider: Michael Blackwell MD    Date of Admission:.2019  6:43 AM    Date of Discharge:  2/3/2019    Discharge Diagnosis:   S/P lumbar fusion    Sleep apnea    Hypertension    Type 2 diabetes mellitus (CMS/HCC)    Back pain    HLD (hyperlipidemia)    Asthma    Acute blood loss anemia, mild, asymptomatic    Acute postoperative pain    Constipation , treated.     Hospital Course  Patient is a 68 y.o. female presented for lumbar fusion by Dr. Sims.     She underwent surgery under [facility]. She tolerated surgery well and was admitted for further medical management. Her back has been painful for a long time, but significantly worse over the past few months. She denies use of assistive device for ambulation or saddle anesthesia.    Patient was provided pain medications as needed for pain control.    Adjustments were made to pain medications to optimize postop pain management. Risks and benefits of opiate medications discussed with patient.    She was seen by PT and has progressed well over her stay.  She used an IS for atelectasis prophylaxis and mechanicals for DVT prophylaxis.  Home medications were resumed as appropriate, and labs were monitored and remained fairly stable.     She did have complaints of abd pain, bloating and distention. CT abd found constipation. She was given an aggressive bowel regimen and had a few BMs prior to discharge.    With the progress she has made, she is ready for DC home today.    Discussed with patient regarding plan and she shows understanding and agreement.    Patient will have HHPT following discharge.      Procedures Performed  PREOPERATIVE DIAGNOSES:   1.  Lumbar spinal stenosis L4-L5.  2.  L4-L5 spondylolisthesis.      POSTOPERATIVE DIAGNOSES:  1.  Lumbar spinal stenosis L4-L5.  2.  L4-L5 spondylolisthesis.      PROCEDURES PERFORMED:   1.  Decompressive  laminectomy L4-L5.  2.  Segmental instrumentation L4-L5 with DePuy transpedicular fixation.  3.  Lumbar interbody fusion L4-L5 with DePuy interbody fusion cage and bone graft.  4.  Posterolateral fusion L4-L5 with bone graft.  5.  Aspiration of right posterior iliac crest for bone marrow aspirate.  6.  Harvesting bone graft locally from spinous processes and lamina.      SURGEON: Naveed Sims MD      Pertinent Test Results:    I reviewed the patient's new clinical results.   Results from last 7 days   Lab Units 02/02/19  0949 02/01/19  1101 01/31/19  0543   WBC 10*3/mm3 10.74  --  10.25   HEMOGLOBIN g/dL 10.1* 10.7* 10.4*   HEMATOCRIT % 32.0* 33.6* 33.2*   PLATELETS 10*3/mm3 255  --  246     Results from last 7 days   Lab Units 02/02/19  0949 01/31/19  0543   SODIUM mmol/L 134 135   POTASSIUM mmol/L 3.7 3.8   CHLORIDE mmol/L 101 106   CO2 mmol/L 27.0 23.0   BUN mg/dL 12 14   CREATININE mg/dL 0.66 0.79   CALCIUM mg/dL 9.1 8.8   GLUCOSE mg/dL 160* 110*     Results for SANDRA DREW (MRN [id number]) as of 2/4/2019 10:06   Ref. Range 2/3/2019 07:45 2/3/2019 07:56 2/3/2019 11:22 2/3/2019 15:58   Glucose Latest Ref Range: 70 - 130 mg/dL 77 90 131 (H) 138 (H)     Study Result     EXAMINATION: CT ABDOMEN W/CONTRAST - 2/2/2019      INDICATION: Z74.09-Other reduced mobility; Z98.1-Arthrodesis status.     TECHNIQUE:  Axial CT data of the abdomen were acquired helically  following IV contrast administration. Multiplanar reformatted images  were generated and reviewed. The radiation dose reduction device was  turned on for each scan per the ALARA (As Low as Reasonably Achievable)  protocol     COMPARISONS:  None.     FINDINGS:  Liver is normal in appearance. There is no free fluid. The  gallbladder is unremarkable in CT appearance. The kidneys, adrenal  glands, pancreas and spleen are also unremarkable. There is marked  ascending colonic stool burden. There is gas in the mildly-dilated  transverse colon. There is no  "dilated small bowel in the image field of  view. Recent postoperative findings are noted in the lumbar spine. Basal  hypoventilatory change is noted.     IMPRESSION:  1. No evidence of ascites.  2. Large stool burden in the adjacent colon. Gas in the mildly dilated  transverse colon.     DICTATED:   2019  EDITED/ls :   2019        I reviewed the patient's new imaging including images and reports.      Physical therapy: patient ambulated 300 feet with rolling walker for support, continues to be limited due to abdominal distention and disomfort. HEP completed reclined in chair.      Discharge Assessment:    Vital Signs  Visit Vitals  /70 (BP Location: Right arm, Patient Position: Sitting)   Pulse 97   Temp 98 °F (36.7 °C) (Oral)   Resp 18   Ht 172.7 cm (67.99\")   Wt 89.5 kg (197 lb 5 oz)   SpO2 97%   Breastfeeding? No   BMI 30.01 kg/m²     Temp (24hrs), Av.3 °F (36.8 °C), Min:97.8 °F (36.6 °C), Max:98.8 °F (37.1 °C)      General Appearance:    Alert, cooperative, in no acute distress   Lungs:     Clear to auscultation,respirations regular, even and                   unlabored    Heart:    Regular rhythm and normal rate, normal S1 and S2    Abdomen:     Normal bowel sounds, distention improved. No focal tenderness or RT. No rigidity   Extremities:   Moves all extremities well, no edema, no cyanosis, no              redness   Pulses:   Pulses palpable and equal bilaterally   Skin:   No bleeding, bruising or rash. Back dressing CDI.    Neurologic:   Cranial nerves 2 - 12 grossly intact, sensation intact, DTR        present and equal bilaterally. Flexion and dorsiflexion intact bilateral feet.       Discharge Disposition: Home    Discharge Medications     Discharge Medications      New Medications      Instructions Start Date   docusate sodium 100 MG capsule   100 mg, Oral, 2 Times Daily, OTC      oxyCODONE-acetaminophen 7.5-325 MG per tablet  Commonly known as:  PERCOCET   1 tablet, Oral, Every 4 Hours " "PRN      polyethylene glycol pack packet  Commonly known as:  MIRALAX   17 g, Oral, Daily, OTC         Changes to Medications      Instructions Start Date   Biotin 2500 MCG capsule  What changed:  Another medication with the same name was removed. Continue taking this medication, and follow the directions you see here.   Oral, Daily      metFORMIN 1000 MG tablet  Commonly known as:  GLUCOPHAGE  What changed:    · when to take this  · additional instructions   1,000 mg, Oral, Daily, with food.         Continue These Medications      Instructions Start Date   B12-ACTIVE 1 MG chewable tablet  Generic drug:  Methylcobalamin   Oral      BD PEN NEEDLE ANGELITO U/F 32G X 4 MM misc  Generic drug:  Insulin Pen Needle   USE TO INJECT INSULIN ONCE DAILY      COMFORT ASSIST INSULIN SYRINGE 31G X 5/16\" 0.3 ML misc  Generic drug:  Insulin Syringe-Needle U-100   No dose, route, or frequency recorded.      Insulin Syringe-Needle U-100 31G X 15/64\" 0.3 ML misc   Use twice daily to inject insulin      ezetimibe 10 MG tablet  Commonly known as:  ZETIA   10 mg, Oral, Daily      FARXIGA 5 MG tablet tablet  Generic drug:  dapagliflozin   TAKE ONE TABLET BY MOUTH DAILY      glucose blood test strip   Use as instructed Test BID      insulin detemir 100 UNIT/ML injection  Commonly known as:  LEVEMIR FLEXTOUCH   30 Units, Subcutaneous, Daily      losartan 100 MG tablet  Commonly known as:  COZAAR   100 mg, Oral, Daily      MAGNESIUM PO   250 mg, Oral, Daily      montelukast 10 MG tablet  Commonly known as:  SINGULAIR   10 mg, Oral, Nightly      MUCINEX FAST-MAX CONGEST COLD PO   Oral      MULTIVITAMIN ADULTS PO   Oral, Daily      MYRBETRIQ 25 MG tablet sustained-release 24 hour 24 hr tablet  Generic drug:  Mirabegron ER   25 mg, Daily      NON FORMULARY   No dose, route, or frequency recorded.      potassium chloride 10 MEQ CR tablet  Commonly known as:  K-DUR   10 mEq, Oral, Daily      PROVENTIL  (90 Base) MCG/ACT inhaler  Generic drug:  " albuterol sulfate HFA   Proventil  (90 Base) MCG/ACT Inhalation Aerosol Solution; Patient Sig: Proventil  (90 Base) MCG/ACT Inhalation Aerosol Solution ; 0; 24-May-2013; Active      SUMAtriptan 50 MG tablet  Commonly known as:  IMITREX   Take one tablet at onset of headache. May repeat dose one time in 2 hours if headache not relieved.      SYMBICORT 160-4.5 MCG/ACT inhaler  Generic drug:  budesonide-formoterol   2 puffs, Inhalation, 2 Times Daily - RT      vitamin B-6 100 MG tablet  Commonly known as:  PYRIDOXINE   100 mg, Oral, Daily      vitamin C 500 MG tablet  Commonly known as:  ASCORBIC ACID   500 mg, Oral, Daily      Vitamin C 100 MG chewable tablet   Oral      VITAMIN D-1000 MAX ST 1000 units tablet  Generic drug:  Cholecalciferol   Oral      vitamin E 400 UNIT capsule   Oral         Stop These Medications    traMADol 50 MG tablet  Commonly known as:  ULTRAM     vitamin B-12 1000 MCG tablet  Commonly known as:  CYANOCOBALAMIN            Discharge Diet: Consistent carb diet    Activity at Discharge: ambulate    Follow-up Appointments  Dr. Sims per his orders    Discharge took over 30 min  Discussed with pt and RN.    Chantal Jade MD  02/03/19  8:01 AM

## 2019-02-04 ENCOUNTER — READMISSION MANAGEMENT (OUTPATIENT)
Dept: CALL CENTER | Facility: HOSPITAL | Age: 69
End: 2019-02-04

## 2019-02-05 ENCOUNTER — TELEPHONE (OUTPATIENT)
Dept: INTERNAL MEDICINE | Facility: CLINIC | Age: 69
End: 2019-02-05

## 2019-02-05 NOTE — TELEPHONE ENCOUNTER
----- Message from Aminata Swanson sent at 2/5/2019  3:36 PM EST -----  FENG WITH [facility] STATES SHE IS ADMITTING THE PATIENT TODAY FOR HOME HEALTH SERVICES AND  NEEDS DR. BENEDICT'S  OK THAT HE WILL SIGN THE ORDERS. SHE CAN BE REACHED [phone]

## 2019-02-05 NOTE — OUTREACH NOTE
Prep Survey      Responses   Facility patient discharged from?  [place]   Is patient eligible?  Yes   Discharge diagnosis  S/P lumbar fusion   Does the patient have one of the following disease processes/diagnoses(primary or secondary)?  General Surgery   Does the patient have Home health ordered?  Yes   What is the Home health agency?   [facility]   Is there a DME ordered?  Yes   What DME was ordered?  rolling walker   Prep survey completed?  Yes          Hanane Mills RN

## 2019-02-06 ENCOUNTER — READMISSION MANAGEMENT (OUTPATIENT)
Dept: CALL CENTER | Facility: HOSPITAL | Age: 69
End: 2019-02-06

## 2019-02-06 NOTE — OUTREACH NOTE
General Surgery Week 1 Survey      Responses   Facility patient discharged from?  [place]   Does the patient have one of the following disease processes/diagnoses(primary or secondary)?  General Surgery   Is there a successful TCM telephone encounter documented?  No   Week 1 attempt successful?  Yes   Call start time  1450   Call end time  1504   Discharge diagnosis  S/P lumbar fusion   Is patient permission given to speak with other caregiver?  No   Meds reviewed with patient/caregiver?  Yes   Is the patient having any side effects they believe may be caused by any medication additions or changes?  Yes   Side effects comments   constipation   Does the patient have all medications related to this admission filled (includes all antibiotics, pain medications, etc.)  Yes   Is the patient taking all medications as directed (includes completed medication regime)?  Yes   Medication comments  Patient using stool softener and miralax with small results. Patient to start an OTC stimulant laxative for the constipation.    Does the patient have a follow up appointment scheduled with their surgeon?  Yes   Has the patient kept scheduled appointments due by today?  Yes   Comments  Follow Up with Michael Blackwell MD, Monday Mar 4, 2019 9:30 AM    What is the Home health agency?   [facility]   Has home health visited the patient within 72 hours of discharge?  Yes   What DME was ordered?  rolling walker   Has all DME been delivered?  Yes   Psychosocial issues?  No   Did the patient receive a copy of their discharge instructions?  Yes   Nursing interventions  Reviewed instructions with patient   What is the patient's perception of their health status since discharge?  Improving   Nursing interventions  Nurse provided patient education   Is the patient /caregiver able to teach back basic post-op care?  Do not remove steri-strips, Keep incision areas clean,dry and protected, No tub bath, swimming, or hot tub until instructed by  MD, Take showers only when approved by MD-sponge bathmatt until then, Drive as instructed by MD in discharge instructions, Practice 'cough and deep breath', Continue use of incentive spirometry at least 1 week post discharge, Lifting as instructed by MD in discharge instructions   Is the patient/caregiver able to teach back signs and symptoms of incisional infection?  Increased redness, swelling or pain at the incisonal site, Increased drainage or bleeding, Incisional warmth, Pus or odor from incision, Fever   Is the patient/caregiver able to teach back steps to recovery at home?  Set small, achievable goals for return to baseline health, Rest and rebuild strength, gradually increase activity, Eat a well-balance diet   Is the patient/caregiver able to teach back the hierarchy of who to call/visit for symptoms/problems? PCP, Specialist, Home health nurse, Urgent Care, ED, 911  Yes   Week 1 call completed?  Yes          Bhumi Krishnamurthy RN

## 2019-02-06 NOTE — TELEPHONE ENCOUNTER
S/LISSY Enamorado and relayed message from Dr. Blackwell. Requested for her to fax orders over to be signed. Kelsea states she will do this.

## 2019-02-08 ENCOUNTER — TELEPHONE (OUTPATIENT)
Dept: INTERNAL MEDICINE | Facility: CLINIC | Age: 69
End: 2019-02-08

## 2019-02-08 NOTE — TELEPHONE ENCOUNTER
----- Message from Aminata Swanson sent at 2/8/2019  1:19 PM EST -----  PATIENT STATES SHE NEEDS A REFILL ON FREESTYLE LITE TEST STRIPS SENT TO [facility]. SHE IS TESTING BID. SHE CAN BE REACHED [phone].

## 2019-02-11 ENCOUNTER — TELEPHONE (OUTPATIENT)
Dept: INTERNAL MEDICINE | Facility: CLINIC | Age: 69
End: 2019-02-11

## 2019-02-11 NOTE — TELEPHONE ENCOUNTER
----- Message from Shauna Guy sent at 2/11/2019 10:46 AM EST -----  SALBADOR FROM  HOME HEALTH CALLING FOR SANDRA DREW. SHE DID A HOME CARLOS WITH HER ON Friday AND IS NEEDING APPROVAL FOR PLAN OF CARE 1 WEEK 1, 2 WEEK1, 2 WEEK 2. SALBADOR CAN BE REACHED [phone]

## 2019-02-12 ENCOUNTER — TELEPHONE (OUTPATIENT)
Dept: INTERNAL MEDICINE | Facility: CLINIC | Age: 69
End: 2019-02-12

## 2019-02-12 NOTE — TELEPHONE ENCOUNTER
----- Message from Aminata Swanson sent at 2/12/2019 12:04 PM EST -----  FENG WITH [facility] CALLED AND STATES PATIENT HAS A STOMACH VIRUS. SO SHE HAD PATIENT STOP ALL STOOL SOFTNERS AND TAKE OTC ZANTAC. SHE CAB BE REACHED [phone].

## 2019-02-13 ENCOUNTER — READMISSION MANAGEMENT (OUTPATIENT)
Dept: CALL CENTER | Facility: HOSPITAL | Age: 69
End: 2019-02-13

## 2019-02-13 NOTE — OUTREACH NOTE
General Surgery Week 2 Survey      Responses   Facility patient discharged from?  [place]   Does the patient have one of the following disease processes/diagnoses(primary or secondary)?  General Surgery   Week 2 attempt successful?  Yes   Call start time  1450   Call end time  1500   Discharge diagnosis  S/P lumbar fusion   Meds reviewed with patient/caregiver?  Yes   Is the patient having any side effects they believe may be caused by any medication additions or changes?  Yes   Side effects comments   now having cramping and loose stools, discussed probiotics and yogurt   Does the patient have all medications related to this admission filled (includes all antibiotics, pain medications, etc.)  Yes   Is the patient taking all medications as directed (includes completed medication regime)?  No   What is preventing the patient from taking all medications as directed?  Side effects   Does the patient have a follow up appointment scheduled with their surgeon?  Yes   What is the Home health agency?   [facility]   Psychosocial issues?  No   Did the patient receive a copy of their discharge instructions?  Yes   Nursing interventions  Educated on MyChart   What is the patient's perception of their health status since discharge?  New symptoms unrelated to diagnosis   Is the patient/caregiver able to teach back steps to recovery at home?  Make a list of questions for surgeon's appointment   Additional teach back comments  HH checking incision, encouraged on shower    Week 2 call completed?  Yes          Bhumi Howard RN

## 2019-02-20 ENCOUNTER — READMISSION MANAGEMENT (OUTPATIENT)
Dept: CALL CENTER | Facility: HOSPITAL | Age: 69
End: 2019-02-20

## 2019-02-20 NOTE — OUTREACH NOTE
General Surgery Week 3 Survey      Responses   Facility patient discharged from?  [place]   Does the patient have one of the following disease processes/diagnoses(primary or secondary)?  General Surgery   Week 3 attempt successful?  Yes   Call start time  1527   Call end time  1528   Meds reviewed with patient/caregiver?  Yes   Is the patient taking all medications as directed (includes completed medication regime)?  Yes   Has the patient kept scheduled appointments due by today?  Yes   What is the patient's perception of their health status since discharge?  Improving   Week 3 call completed?  Yes          Linda Dong, RN

## 2019-02-27 ENCOUNTER — READMISSION MANAGEMENT (OUTPATIENT)
Dept: CALL CENTER | Facility: HOSPITAL | Age: 69
End: 2019-02-27

## 2019-02-27 NOTE — OUTREACH NOTE
General Surgery Week 4 Survey      Responses   Facility patient discharged from?  [place]   Does the patient have one of the following disease processes/diagnoses(primary or secondary)?  General Surgery   Week 4 attempt successful?  Yes   Call start time  1328   Call end time  1331   Discharge diagnosis  S/P lumbar fusion   Is the patient taking all medications as directed (includes completed medication regime)?  Yes   Medication comments  States she needs meds off and on for pain and has some constipation while taking these meds   Has the patient kept scheduled appointments due by today?  Yes   What is the patient's perception of their health status since discharge?  Improving   Nursing interventions  Nurse provided patient education   Is the patient/caregiver able to teach back steps to recovery at home?  Set small, achievable goals for return to baseline health   Is the patient/caregiver able to teach back the hierarchy of who to call/visit for symptoms/problems? PCP, Specialist, Home health nurse, Urgent Care, ED, 911  Yes   Week 4 call completed?  Yes   Would the patient like one additional call?  No   Graduated  Yes   Did the patient feel the follow up calls were helpful during their recovery period?  Yes   Was the number of calls appropriate?  Yes          Yadi Washburn RN

## 2019-03-04 ENCOUNTER — OFFICE VISIT (OUTPATIENT)
Dept: INTERNAL MEDICINE | Facility: CLINIC | Age: 69
End: 2019-03-04

## 2019-03-04 VITALS
WEIGHT: 187.25 LBS | RESPIRATION RATE: 20 BRPM | DIASTOLIC BLOOD PRESSURE: 70 MMHG | HEART RATE: 80 BPM | SYSTOLIC BLOOD PRESSURE: 122 MMHG | BODY MASS INDEX: 28.48 KG/M2 | TEMPERATURE: 97 F

## 2019-03-04 DIAGNOSIS — E11.9 TYPE 2 DIABETES MELLITUS WITHOUT COMPLICATION, WITHOUT LONG-TERM CURRENT USE OF INSULIN (HCC): Primary | ICD-10-CM

## 2019-03-04 DIAGNOSIS — I10 ESSENTIAL HYPERTENSION: ICD-10-CM

## 2019-03-04 LAB
EXPIRATION DATE: NORMAL
HBA1C MFR BLD: 6.9 %
Lab: NORMAL

## 2019-03-04 PROCEDURE — 99214 OFFICE O/P EST MOD 30 MIN: CPT | Performed by: INTERNAL MEDICINE

## 2019-03-04 PROCEDURE — 83036 HEMOGLOBIN GLYCOSYLATED A1C: CPT | Performed by: INTERNAL MEDICINE

## 2019-03-04 NOTE — PROGRESS NOTES
Subjective   Lupe Morillo is a 68 y.o. female.     History of Present Illness     1 diabetes- chronic and controlled.  Patient says that she has lost approximately 15 pounds she feels better.  Patient is feeling confident that her hemoglobin A1c is better today as well as her sugars have also been better.  Patient denies any polyuria, polydipsia, polyphagia, dizziness, headache, fatigue, or any other systemic signs.    2 HTN- chronic pain control.  Patient says that her blood pressures been well controlled she said no side effects of medication.  Patient continues on losartan 100 mg by mouth once a day.    Review of Systems   All other systems reviewed and are negative.      Objective   Physical Exam   Constitutional: She appears well-developed and well-nourished.   HENT:   Head: Normocephalic.   Right Ear: External ear normal.   Left Ear: External ear normal.   Nose: Nose normal.   Mouth/Throat: Oropharynx is clear and moist.   Eyes: Conjunctivae and EOM are normal. Pupils are equal, round, and reactive to light.   Neck: Normal range of motion. Neck supple.   Cardiovascular: Normal rate, regular rhythm and normal heart sounds.   Pulmonary/Chest: Effort normal and breath sounds normal.   Musculoskeletal: Normal range of motion.   Neurological: She is alert.   Nursing note and vitals reviewed.        Assessment/Plan   Lupe was seen today for diabetes and hypertension.    Diagnoses and all orders for this visit:    Type 2 diabetes mellitus without complication, without long-term current use of insulin (CMS/[facility])  -     POC Glycosylated Hemoglobin (Hb A1C)  Continue on current medication dosage for insulin regards to diabetes.    Essential hypertension-continue on current blood pressure medications.

## 2019-03-07 ENCOUNTER — TELEPHONE (OUTPATIENT)
Dept: INTERNAL MEDICINE | Facility: CLINIC | Age: 69
End: 2019-03-07

## 2019-03-07 NOTE — TELEPHONE ENCOUNTER
----- Message from Alayna Ponce sent at 3/7/2019  9:00 AM EST -----  Contact: Patient  Patient called and wanted to know what her A1C levels were from lab drawn on 3/4. Please call and advise @[phone]. Thank you.

## 2019-04-01 RX ORDER — DAPAGLIFLOZIN 5 MG/1
TABLET, FILM COATED ORAL
Qty: 90 TABLET | Refills: 1 | Status: SHIPPED | OUTPATIENT
Start: 2019-04-01 | End: 2019-11-16 | Stop reason: SDUPTHER

## 2019-05-17 RX ORDER — PEN NEEDLE, DIABETIC 32GX 5/32"
NEEDLE, DISPOSABLE MISCELLANEOUS
Qty: 100 EACH | Refills: 1 | Status: SHIPPED | OUTPATIENT
Start: 2019-05-17 | End: 2020-01-09

## 2019-05-20 RX ORDER — POTASSIUM CHLORIDE 750 MG/1
TABLET, FILM COATED, EXTENDED RELEASE ORAL
Qty: 30 TABLET | Refills: 4 | Status: SHIPPED | OUTPATIENT
Start: 2019-05-20 | End: 2019-10-15 | Stop reason: SDUPTHER

## 2019-07-05 RX ORDER — INSULIN DETEMIR 100 [IU]/ML
INJECTION, SOLUTION SUBCUTANEOUS
Qty: 1 PEN | Refills: 7 | Status: SHIPPED | OUTPATIENT
Start: 2019-07-05 | End: 2020-08-28

## 2019-07-23 ENCOUNTER — OFFICE VISIT (OUTPATIENT)
Dept: INTERNAL MEDICINE | Facility: CLINIC | Age: 69
End: 2019-07-23

## 2019-07-23 VITALS
DIASTOLIC BLOOD PRESSURE: 82 MMHG | BODY MASS INDEX: 30.42 KG/M2 | TEMPERATURE: 96.2 F | RESPIRATION RATE: 16 BRPM | HEART RATE: 80 BPM | SYSTOLIC BLOOD PRESSURE: 138 MMHG | WEIGHT: 200 LBS

## 2019-07-23 DIAGNOSIS — M19.90 ARTHRITIS: ICD-10-CM

## 2019-07-23 DIAGNOSIS — E11.9 TYPE 2 DIABETES MELLITUS WITHOUT COMPLICATION, WITHOUT LONG-TERM CURRENT USE OF INSULIN (HCC): Primary | ICD-10-CM

## 2019-07-23 LAB
EXPIRATION DATE: NORMAL
HBA1C MFR BLD: 7.9 %
Lab: NORMAL

## 2019-07-23 PROCEDURE — 99213 OFFICE O/P EST LOW 20 MIN: CPT | Performed by: INTERNAL MEDICINE

## 2019-07-23 PROCEDURE — 83036 HEMOGLOBIN GLYCOSYLATED A1C: CPT | Performed by: INTERNAL MEDICINE

## 2019-07-23 RX ORDER — ALBUTEROL SULFATE 90 UG/1
AEROSOL, METERED RESPIRATORY (INHALATION)
COMMUNITY
End: 2020-03-02

## 2019-07-23 NOTE — PROGRESS NOTES
Subjective   Lupe Morillo is a 68 y.o. female.     History of Present Illness     The following portions of the patient's history were reviewed and updated as appropriate: allergies, current medications, past family history, past medical history, past social history, past surgical history and problem list.    1 diabetes- chronic and doing well.  Patient denies any hypoglycemia, hyperglycemia, no polyuria, no polydipsia, polyphagia, no other systemic symptoms.  She maintains an appropriate ADA diet and continues with her current medications.    2 arthritis make an localized to bilateral wrist, knees, and occasional hip.  Patient says NSAIDs have provided partial relief      Review of Systems   All other systems reviewed and are negative.      Objective   Physical Exam   Constitutional: She is oriented to person, place, and time. She appears well-developed and well-nourished.   HENT:   Head: Normocephalic.   Right Ear: External ear normal.   Left Ear: External ear normal.   Nose: Nose normal.   Mouth/Throat: Oropharynx is clear and moist.   Eyes: Conjunctivae and EOM are normal. Pupils are equal, round, and reactive to light.   Neck: Normal range of motion. Neck supple.   Cardiovascular: Normal rate, regular rhythm and normal heart sounds.   Pulmonary/Chest: Effort normal and breath sounds normal.   Musculoskeletal: Normal range of motion.   Neurological: She is alert and oriented to person, place, and time.   Nursing note and vitals reviewed.        Assessment/Plan   Lupe was seen today for diabetes.    Diagnoses and all orders for this visit:    Type 2 diabetes mellitus without complication, without long-term current use of insulin (CMS/[facility])  -     POC Glycosylated Hemoglobin (Hb A1C)    Arthritis continue with current medical management and NSAIDs.

## 2019-08-02 RX ORDER — LOSARTAN POTASSIUM 100 MG/1
TABLET ORAL
Qty: 90 TABLET | Refills: 0 | Status: SHIPPED | OUTPATIENT
Start: 2019-08-02 | End: 2020-02-24

## 2019-08-08 ENCOUNTER — TELEPHONE (OUTPATIENT)
Dept: INTERNAL MEDICINE | Facility: CLINIC | Age: 69
End: 2019-08-08

## 2019-08-08 NOTE — TELEPHONE ENCOUNTER
----- Message from Alayna Ponce sent at 8/8/2019 11:14 AM EDT -----  Contact: Patient  Patient called and stated that Dr. Blackwell was supposed to call in medication Celebrax to [facility] pharmacy in [place]. Please call and discuss at [phone]

## 2019-08-09 RX ORDER — CELECOXIB 200 MG/1
200 CAPSULE ORAL 2 TIMES DAILY
Qty: 60 CAPSULE | Refills: 6 | Status: SHIPPED | OUTPATIENT
Start: 2019-08-09 | End: 2020-03-17

## 2019-09-30 RX ORDER — EZETIMIBE 10 MG/1
TABLET ORAL
Qty: 30 TABLET | Refills: 4 | Status: SHIPPED | OUTPATIENT
Start: 2019-09-30 | End: 2020-06-24

## 2019-10-15 RX ORDER — POTASSIUM CHLORIDE 750 MG/1
TABLET, FILM COATED, EXTENDED RELEASE ORAL
Qty: 30 TABLET | Refills: 3 | Status: SHIPPED | OUTPATIENT
Start: 2019-10-15 | End: 2020-02-13

## 2019-11-15 ENCOUNTER — OFFICE VISIT (OUTPATIENT)
Dept: INTERNAL MEDICINE | Facility: CLINIC | Age: 69
End: 2019-11-15

## 2019-11-15 VITALS
RESPIRATION RATE: 20 BRPM | HEART RATE: 78 BPM | WEIGHT: 198.25 LBS | SYSTOLIC BLOOD PRESSURE: 140 MMHG | DIASTOLIC BLOOD PRESSURE: 82 MMHG | BODY MASS INDEX: 30.15 KG/M2 | TEMPERATURE: 97.1 F | OXYGEN SATURATION: 97 %

## 2019-11-15 DIAGNOSIS — J01.10 ACUTE NON-RECURRENT FRONTAL SINUSITIS: ICD-10-CM

## 2019-11-15 DIAGNOSIS — Z23 NEED FOR IMMUNIZATION AGAINST INFLUENZA: Primary | ICD-10-CM

## 2019-11-15 PROCEDURE — 99213 OFFICE O/P EST LOW 20 MIN: CPT | Performed by: INTERNAL MEDICINE

## 2019-11-15 PROCEDURE — 90653 IIV ADJUVANT VACCINE IM: CPT | Performed by: INTERNAL MEDICINE

## 2019-11-15 PROCEDURE — 90471 IMMUNIZATION ADMIN: CPT | Performed by: INTERNAL MEDICINE

## 2019-11-15 RX ORDER — DOXYCYCLINE 100 MG/1
TABLET ORAL
COMMUNITY
Start: 2019-11-13 | End: 2020-06-08

## 2019-11-15 NOTE — PROGRESS NOTES
Subjective   Lupe Morillo is a 69 y.o. female.     History of Present Illness     The following portions of the patient's history were reviewed and updated as appropriate: allergies, current medications, past family history, past medical history, past social history, past surgical history and problem list.    Sinus congestion  Duration 1 week  Sx Patient had these symptoms for the past 1 week.  Patient also had runny nose, congestion, sinus headache associated with it.    Review of Systems   All other systems reviewed and are negative.      Objective   Physical Exam   Constitutional: She appears well-developed and well-nourished.   HENT:   Head: Normocephalic.   Right Ear: External ear normal.   Left Ear: External ear normal.   Nose: Nose normal.   Mouth/Throat: Oropharynx is clear and moist.   Eyes: Conjunctivae and EOM are normal. Pupils are equal, round, and reactive to light.   Neck: Normal range of motion. Neck supple.   Cardiovascular: Normal rate, regular rhythm and normal heart sounds.   Pulmonary/Chest: Effort normal and breath sounds normal.   Abdominal: Soft. Bowel sounds are normal.   Musculoskeletal: Normal range of motion.   Neurological: She is alert.   Skin: Skin is warm.   Nursing note and vitals reviewed.        Assessment/Plan   Lupe was seen today for uri.    Diagnoses and all orders for this visit:    Need for immunization against influenza  -     Fluad Tri 65yr+    Acute non-recurrent frontal sinusitis  -     amoxicillin (AMOXIL) 875 MG tablet; Take 1 tablet by mouth 2 (Two) Times a Day.

## 2019-11-17 RX ORDER — AMOXICILLIN 875 MG/1
875 TABLET, COATED ORAL 2 TIMES DAILY
Qty: 20 TABLET | Refills: 0 | Status: SHIPPED | OUTPATIENT
Start: 2019-11-17 | End: 2019-12-05

## 2019-11-18 RX ORDER — DAPAGLIFLOZIN 5 MG/1
TABLET, FILM COATED ORAL
Qty: 90 TABLET | Refills: 0 | Status: SHIPPED | OUTPATIENT
Start: 2019-11-18 | End: 2020-05-05

## 2019-12-05 ENCOUNTER — OFFICE VISIT (OUTPATIENT)
Dept: INTERNAL MEDICINE | Facility: CLINIC | Age: 69
End: 2019-12-05

## 2019-12-05 VITALS
RESPIRATION RATE: 20 BRPM | BODY MASS INDEX: 30.28 KG/M2 | OXYGEN SATURATION: 96 % | WEIGHT: 199.13 LBS | SYSTOLIC BLOOD PRESSURE: 130 MMHG | DIASTOLIC BLOOD PRESSURE: 60 MMHG | TEMPERATURE: 97.6 F | HEART RATE: 86 BPM

## 2019-12-05 DIAGNOSIS — E11.9 TYPE 2 DIABETES MELLITUS WITHOUT COMPLICATION, WITH LONG-TERM CURRENT USE OF INSULIN (HCC): Primary | ICD-10-CM

## 2019-12-05 DIAGNOSIS — Z79.4 TYPE 2 DIABETES MELLITUS WITHOUT COMPLICATION, WITH LONG-TERM CURRENT USE OF INSULIN (HCC): Primary | ICD-10-CM

## 2019-12-05 PROBLEM — K21.9 GASTROESOPHAGEAL REFLUX DISEASE: Status: ACTIVE | Noted: 2019-12-05

## 2019-12-05 PROBLEM — M79.673 HEEL PAIN: Status: ACTIVE | Noted: 2019-12-05

## 2019-12-05 PROBLEM — M19.90 ARTHRITIS: Status: ACTIVE | Noted: 2018-04-20

## 2019-12-05 PROBLEM — K21.00 GASTROESOPHAGEAL REFLUX DISEASE WITH ESOPHAGITIS: Status: ACTIVE | Noted: 2019-12-05

## 2019-12-05 PROBLEM — J30.2 SEASONAL ALLERGIC RHINITIS: Status: ACTIVE | Noted: 2019-12-05

## 2019-12-05 PROBLEM — L68.0 HIRSUTISM: Status: ACTIVE | Noted: 2019-12-05

## 2019-12-05 PROBLEM — R07.89 ATYPICAL CHEST PAIN: Status: ACTIVE | Noted: 2019-12-05

## 2019-12-05 PROBLEM — E78.5 HYPERLIPIDEMIA: Status: ACTIVE | Noted: 2019-12-05

## 2019-12-05 PROBLEM — G62.9 PERIPHERAL NEUROPATHY: Status: ACTIVE | Noted: 2019-12-05

## 2019-12-05 PROBLEM — J20.9 ACUTE BRONCHITIS: Status: ACTIVE | Noted: 2019-12-05

## 2019-12-05 PROBLEM — R19.7 DIARRHEA: Status: ACTIVE | Noted: 2019-12-05

## 2019-12-05 PROBLEM — A05.9 FOOD POISONING: Status: ACTIVE | Noted: 2019-12-05

## 2019-12-05 LAB
EXPIRATION DATE: NORMAL
HBA1C MFR BLD: 9.1 %
Lab: NORMAL

## 2019-12-05 PROCEDURE — 83036 HEMOGLOBIN GLYCOSYLATED A1C: CPT | Performed by: INTERNAL MEDICINE

## 2019-12-05 PROCEDURE — 99214 OFFICE O/P EST MOD 30 MIN: CPT | Performed by: INTERNAL MEDICINE

## 2019-12-05 NOTE — PROGRESS NOTES
Subjective   Lupe Morillo is a 69 y.o. female.     History of Present Illness     The following portions of the patient's history were reviewed and updated as appropriate: allergies, current medications, past family history, past medical history, past social history, past surgical history and problem list.    1 Diabetes- chronic and uncontrolled.  She says that she has been trying to do better on her diet but it has been hard during the holidays.  Patient denies any polyuria, polydipsia, polyphagia, or any other systems she continues to maintain appropriate compliance with medications.        Review of Systems   All other systems reviewed and are negative.      Objective   Physical Exam   Constitutional: She is oriented to person, place, and time. She appears well-developed and well-nourished.   HENT:   Head: Normocephalic.   Right Ear: External ear normal.   Left Ear: External ear normal.   Nose: Nose normal.   Mouth/Throat: Oropharynx is clear and moist.   Eyes: Pupils are equal, round, and reactive to light. Conjunctivae and EOM are normal.   Neck: Normal range of motion. Neck supple.   Cardiovascular: Normal rate, regular rhythm and normal heart sounds.   Pulmonary/Chest: Effort normal and breath sounds normal.   Musculoskeletal: Normal range of motion.   Neurological: She is alert and oriented to person, place, and time.   Skin: Skin is warm.   Nursing note and vitals reviewed.        Assessment/Plan   Lupe was seen today for diabetes.    Diagnoses and all orders for this visit:    Type 2 diabetes mellitus without complication, with long-term current use of insulin (CMS/[facility])  -     POC Glycosylated Hemoglobin (Hb A1C)    Increase the Levemir to 35 units subcu nightly     Monitor for any side effects with dosage change.    Repeat hemoglobin A1c in 3 months

## 2020-01-09 RX ORDER — PEN NEEDLE, DIABETIC 32GX 5/32"
NEEDLE, DISPOSABLE MISCELLANEOUS
Qty: 100 EACH | Refills: 0 | Status: SHIPPED | OUTPATIENT
Start: 2020-01-09 | End: 2020-05-20

## 2020-02-13 RX ORDER — POTASSIUM CHLORIDE 750 MG/1
TABLET, FILM COATED, EXTENDED RELEASE ORAL
Qty: 30 TABLET | Refills: 2 | Status: SHIPPED | OUTPATIENT
Start: 2020-02-13 | End: 2020-05-13

## 2020-02-24 RX ORDER — LOSARTAN POTASSIUM 100 MG/1
TABLET ORAL
Qty: 90 TABLET | Refills: 1 | Status: SHIPPED | OUTPATIENT
Start: 2020-02-24 | End: 2021-01-20 | Stop reason: SDUPTHER

## 2020-03-02 ENCOUNTER — OFFICE VISIT (OUTPATIENT)
Dept: INTERNAL MEDICINE | Facility: CLINIC | Age: 70
End: 2020-03-02

## 2020-03-02 VITALS
SYSTOLIC BLOOD PRESSURE: 130 MMHG | TEMPERATURE: 97.8 F | RESPIRATION RATE: 20 BRPM | BODY MASS INDEX: 30.65 KG/M2 | DIASTOLIC BLOOD PRESSURE: 72 MMHG | WEIGHT: 201.5 LBS | OXYGEN SATURATION: 98 % | HEART RATE: 82 BPM

## 2020-03-02 DIAGNOSIS — I10 ESSENTIAL HYPERTENSION: Primary | ICD-10-CM

## 2020-03-02 DIAGNOSIS — E11.9 TYPE 2 DIABETES MELLITUS WITHOUT COMPLICATION, WITH LONG-TERM CURRENT USE OF INSULIN (HCC): ICD-10-CM

## 2020-03-02 DIAGNOSIS — Z79.4 TYPE 2 DIABETES MELLITUS WITHOUT COMPLICATION, WITH LONG-TERM CURRENT USE OF INSULIN (HCC): ICD-10-CM

## 2020-03-02 DIAGNOSIS — J45.20 MILD INTERMITTENT ASTHMA, UNSPECIFIED WHETHER COMPLICATED: ICD-10-CM

## 2020-03-02 PROCEDURE — 99214 OFFICE O/P EST MOD 30 MIN: CPT | Performed by: INTERNAL MEDICINE

## 2020-03-02 NOTE — PROGRESS NOTES
"Subjective   Lupe Morillo is a 69 y.o. female.     History of Present Illness     The following portions of the patient's history were reviewed and updated as appropriate: allergies, current medications, past family history, past medical history, past social history, past surgical history and problem list.    1 HTN- chronic and doing well.  Blood pressures been doing well and she had no side effects on medication.  Patient denies shortness breath, chest pain, nausea, vomiting, headache, fatigue, or any other systemic signs.  Medication: Patient is currently taken to continues on the losartan 100 mg by mouth once a day    2 Diabetes- chronic.  Blood sugars have been somewhat better but patient says that her sugars continue to be \"somewhat elevated \".  She says that she is trying to do better in regards to her diet and a course with physical activity.  Medications, patient is currently taken to continues on the metformin 1000 mg by mouth twice a day.    3 Asthma-can control.  Patient continues on current inhalers and has not had any recent exacerbations.  Patient denies any coughing, wheezing, dyspnea on exertion, nighttime cough, seasonal allergies, or any other systemic symptoms.      Review of Systems   All other systems reviewed and are negative.      Objective   Physical Exam   Constitutional: She is oriented to person, place, and time. She appears well-developed and well-nourished.   HENT:   Head: Normocephalic.   Right Ear: External ear normal.   Left Ear: External ear normal.   Nose: Nose normal.   Mouth/Throat: Oropharynx is clear and moist.   Eyes: Pupils are equal, round, and reactive to light. Conjunctivae and EOM are normal.   Neck: Normal range of motion. Neck supple.   Cardiovascular: Normal rate, regular rhythm, normal heart sounds and intact distal pulses.   Pulmonary/Chest: Effort normal and breath sounds normal.   Abdominal: Soft. Bowel sounds are normal.   Musculoskeletal: Normal range of " motion.   Neurological: She is alert and oriented to person, place, and time.   Skin: Skin is warm.   Psychiatric: She has a normal mood and affect. Her behavior is normal. Judgment and thought content normal.   Nursing note and vitals reviewed.        Assessment/Plan   Lupe was seen today for follow-up.    Diagnoses and all orders for this visit:    Essential hypertension-continue on current blood pressure medications.    Type 2 diabetes mellitus without complication, with long-term current use of insulin (CMS/[facility])  -     POC Glycosylated Hemoglobin (Hb A1C); Future    -Continue on current diabetes medications.    Mild intermittent asthma, unspecified whether complicated-continue on current inhaler therapy.

## 2020-03-04 ENCOUNTER — TRANSCRIBE ORDERS (OUTPATIENT)
Dept: ADMINISTRATIVE | Facility: HOSPITAL | Age: 70
End: 2020-03-04

## 2020-03-04 DIAGNOSIS — Z12.31 VISIT FOR SCREENING MAMMOGRAM: Primary | ICD-10-CM

## 2020-03-17 RX ORDER — CELECOXIB 200 MG/1
CAPSULE ORAL
Qty: 60 CAPSULE | Refills: 5 | Status: SHIPPED | OUTPATIENT
Start: 2020-03-17 | End: 2020-09-11

## 2020-04-10 ENCOUNTER — APPOINTMENT (OUTPATIENT)
Dept: MAMMOGRAPHY | Facility: HOSPITAL | Age: 70
End: 2020-04-10

## 2020-04-29 ENCOUNTER — TELEPHONE (OUTPATIENT)
Dept: INTERNAL MEDICINE | Facility: CLINIC | Age: 70
End: 2020-04-29

## 2020-05-01 NOTE — TELEPHONE ENCOUNTER
Spoke to patient she stated that she is getting a new supervisor and has to check with her for the dates. Patient said that she will call us back with that information later next week.

## 2020-05-05 RX ORDER — DAPAGLIFLOZIN 5 MG/1
TABLET, FILM COATED ORAL
Qty: 90 TABLET | Refills: 0 | Status: SHIPPED | OUTPATIENT
Start: 2020-05-05 | End: 2020-08-03

## 2020-05-13 RX ORDER — POTASSIUM CHLORIDE 750 MG/1
TABLET, EXTENDED RELEASE ORAL
Qty: 30 TABLET | Refills: 1 | Status: SHIPPED | OUTPATIENT
Start: 2020-05-13 | End: 2020-07-13

## 2020-05-14 NOTE — TELEPHONE ENCOUNTER
PATIENT IS UNSURE OF THE BEGINNING TIMELINE OR END BUT STATES SOME DEPARTMENTS ARE EXPECTED TO GO BACK ON MAY 18TH BUT PATIENT IS REQUESTING A CALL BACK FROM DR BENEDICT.    PLEASE CALL PATIENT -[phone]

## 2020-05-14 NOTE — TELEPHONE ENCOUNTER
Ok, I really need the details because it will need to be specific for her employment-in regards to a starting date and ending date i.e. range so that it can be documented for her own protection

## 2020-05-15 NOTE — TELEPHONE ENCOUNTER
Spoke to patient she stated that they had a zoom meeting today but is still unsure of the date range. Patient will call back with more details.

## 2020-05-18 ENCOUNTER — TELEPHONE (OUTPATIENT)
Dept: INTERNAL MEDICINE | Facility: CLINIC | Age: 70
End: 2020-05-18

## 2020-05-18 NOTE — TELEPHONE ENCOUNTER
----- Message from Michael Blackwell MD sent at 5/18/2020  7:28 AM EDT -----  Regarding: Request for accommodations form  Please ask patient specifically what accommodations that she needs?    What are the physical inabilities or concerns that she has with her workplace?    What diagnosis?

## 2020-05-18 NOTE — TELEPHONE ENCOUNTER
Pt states she is going to drop off the form, that is needed. She will include all info that is needed.  CY

## 2020-05-20 RX ORDER — PEN NEEDLE, DIABETIC 32GX 5/32"
NEEDLE, DISPOSABLE MISCELLANEOUS
Qty: 100 EACH | Refills: 0 | Status: SHIPPED | OUTPATIENT
Start: 2020-05-20 | End: 2020-12-29

## 2020-06-08 ENCOUNTER — OFFICE VISIT (OUTPATIENT)
Dept: INTERNAL MEDICINE | Facility: CLINIC | Age: 70
End: 2020-06-08

## 2020-06-08 VITALS
DIASTOLIC BLOOD PRESSURE: 82 MMHG | TEMPERATURE: 97.7 F | RESPIRATION RATE: 20 BRPM | SYSTOLIC BLOOD PRESSURE: 136 MMHG | WEIGHT: 195.38 LBS | OXYGEN SATURATION: 99 % | HEART RATE: 85 BPM | BODY MASS INDEX: 29.71 KG/M2

## 2020-06-08 DIAGNOSIS — Z79.4 TYPE 2 DIABETES MELLITUS WITHOUT COMPLICATION, WITH LONG-TERM CURRENT USE OF INSULIN (HCC): Primary | ICD-10-CM

## 2020-06-08 DIAGNOSIS — E11.9 TYPE 2 DIABETES MELLITUS WITHOUT COMPLICATION, WITH LONG-TERM CURRENT USE OF INSULIN (HCC): Primary | ICD-10-CM

## 2020-06-08 LAB
EXPIRATION DATE: NORMAL
HBA1C MFR BLD: 8 %
Lab: NORMAL

## 2020-06-08 PROCEDURE — 99213 OFFICE O/P EST LOW 20 MIN: CPT | Performed by: INTERNAL MEDICINE

## 2020-06-08 PROCEDURE — 83036 HEMOGLOBIN GLYCOSYLATED A1C: CPT | Performed by: INTERNAL MEDICINE

## 2020-06-08 NOTE — PROGRESS NOTES
Subjective   Lupe Morillo is a 69 y.o. female.     History of Present Illness     The following portions of the patient's history were reviewed and updated as appropriate: allergies, current medications, past family history, past medical history, past social history, past surgical history and problem list.    1 diabetes- chronic and doing well.  Patient denies any polyuria, polydipsia, polyphagia, or any other systemic symptoms.  She maintains compliance with her diet as well as her medications.    2 weight watchers meal-patient is also lost 5 pounds since starting her new diet and extremely motivated    3 right shoulder pain-acute with chronic.  Patient says over the past 1 to 2 weeks she has been having increasing pain in her right shoulder made worse with flexion, extension, rotational movement.  No recurrent injury present.      Review of Systems   All other systems reviewed and are negative.      Objective   Physical Exam   Constitutional: She is oriented to person, place, and time. She appears well-developed and well-nourished.   HENT:   Head: Normocephalic.   Nose: Nose normal.   Mouth/Throat: Oropharynx is clear and moist.   Eyes: Pupils are equal, round, and reactive to light. Conjunctivae and EOM are normal.   Neck: Normal range of motion. Neck supple.   Pulmonary/Chest: Effort normal and breath sounds normal.   Musculoskeletal: Normal range of motion.   Neurological: She is alert and oriented to person, place, and time.   Skin: Skin is warm.   Nursing note and vitals reviewed.        Assessment/Plan   Lupe was seen today for follow-up, diabetes and shoulder pain.    Diagnoses and all orders for this visit:    Type 2 diabetes mellitus without complication, with long-term current use of insulin (CMS/[facility])  -     POC Glycosylated Hemoglobin (Hb A1C)  Continue with current diabetes medication.    Anticipatory guidance:  Recommend physical therapy for right shoulder-patient says that she will consider  it if symptoms do not improve.

## 2020-06-10 ENCOUNTER — TELEPHONE (OUTPATIENT)
Dept: INTERNAL MEDICINE | Facility: CLINIC | Age: 70
End: 2020-06-10

## 2020-06-12 ENCOUNTER — TELEPHONE (OUTPATIENT)
Dept: INTERNAL MEDICINE | Facility: CLINIC | Age: 70
End: 2020-06-12

## 2020-06-12 NOTE — TELEPHONE ENCOUNTER
PATIENT DROPPED OFF [employer] FORMS THE OTHER DAY FOR DR. BENEDICT TO FILL OUT, SHE SAID SHE LEFT SAMPLE WRITING WITH THEM AND SHE WOULD LIKE HIM TO FILL THEM OUT IN HIS OWN WORDS SIMILAR TO HOW THE SAMPLE WRITING SAYS BUT NOT ACTUALLY THE SAME. SHE SAID HE KNOWS HOW TO FILL THEM OUT BUT SHE WANTED HIM TO KNOW THIS, SHE WILL LIKE TO  WITH READY AT THE OFFICE     ANY QUESTIONS CALL HER PH: [phone]

## 2020-06-22 NOTE — TELEPHONE ENCOUNTER
Patient has been notified that forms are ready. Patient wanted them faxed to [phone]. Forms have been faxed and a copy was mailed to patient.

## 2020-06-24 RX ORDER — EZETIMIBE 10 MG/1
TABLET ORAL
Qty: 30 TABLET | Refills: 3 | Status: SHIPPED | OUTPATIENT
Start: 2020-06-24 | End: 2020-12-29

## 2020-07-06 ENCOUNTER — TRANSCRIBE ORDERS (OUTPATIENT)
Dept: ADMINISTRATIVE | Facility: HOSPITAL | Age: 70
End: 2020-07-06

## 2020-07-06 DIAGNOSIS — Z12.31 VISIT FOR SCREENING MAMMOGRAM: Primary | ICD-10-CM

## 2020-07-13 RX ORDER — POTASSIUM CHLORIDE 750 MG/1
TABLET, EXTENDED RELEASE ORAL
Qty: 30 TABLET | Refills: 0 | Status: SHIPPED | OUTPATIENT
Start: 2020-07-13 | End: 2020-08-11

## 2020-08-03 RX ORDER — DAPAGLIFLOZIN 5 MG/1
TABLET, FILM COATED ORAL
Qty: 90 TABLET | Refills: 0 | Status: SHIPPED | OUTPATIENT
Start: 2020-08-03 | End: 2020-11-09

## 2020-08-11 RX ORDER — POTASSIUM CHLORIDE 750 MG/1
TABLET, EXTENDED RELEASE ORAL
Qty: 30 TABLET | Refills: 0 | Status: SHIPPED | OUTPATIENT
Start: 2020-08-11 | End: 2020-09-09

## 2020-08-28 RX ORDER — INSULIN DETEMIR 100 [IU]/ML
INJECTION, SOLUTION SUBCUTANEOUS
Qty: 15 PEN | Refills: 6 | Status: SHIPPED | OUTPATIENT
Start: 2020-08-28 | End: 2021-10-25

## 2020-08-31 ENCOUNTER — OFFICE VISIT (OUTPATIENT)
Dept: INTERNAL MEDICINE | Facility: CLINIC | Age: 70
End: 2020-08-31

## 2020-08-31 VITALS
BODY MASS INDEX: 29.73 KG/M2 | WEIGHT: 195.5 LBS | OXYGEN SATURATION: 99 % | RESPIRATION RATE: 20 BRPM | TEMPERATURE: 97.5 F | HEART RATE: 87 BPM | DIASTOLIC BLOOD PRESSURE: 80 MMHG | SYSTOLIC BLOOD PRESSURE: 140 MMHG

## 2020-08-31 DIAGNOSIS — I10 ESSENTIAL HYPERTENSION: Primary | ICD-10-CM

## 2020-08-31 DIAGNOSIS — Z79.4 TYPE 2 DIABETES MELLITUS WITHOUT COMPLICATION, WITH LONG-TERM CURRENT USE OF INSULIN (HCC): ICD-10-CM

## 2020-08-31 DIAGNOSIS — E11.9 TYPE 2 DIABETES MELLITUS WITHOUT COMPLICATION, WITH LONG-TERM CURRENT USE OF INSULIN (HCC): ICD-10-CM

## 2020-08-31 LAB
A/C: NORMAL
DEPRECATED RDW RBC AUTO: 42.4 FL (ref 37–54)
ERYTHROCYTE [DISTWIDTH] IN BLOOD BY AUTOMATED COUNT: 13.1 % (ref 12.3–15.4)
EXPIRATION DATE: NORMAL
EXPIRATION DATE: NORMAL
HBA1C MFR BLD: 8.6 %
HCT VFR BLD AUTO: 43.1 % (ref 34–46.6)
HGB BLD-MCNC: 14.2 G/DL (ref 12–15.9)
Lab: 4027
Lab: NORMAL
MCH RBC QN AUTO: 29.3 PG (ref 26.6–33)
MCHC RBC AUTO-ENTMCNC: 32.9 G/DL (ref 31.5–35.7)
MCV RBC AUTO: 89 FL (ref 79–97)
PLATELET # BLD AUTO: 246 10*3/MM3 (ref 140–450)
PMV BLD AUTO: 11.5 FL (ref 6–12)
POC CREATININE URINE: 50
POC MICROALBUMIN URINE: 30
RBC # BLD AUTO: 4.84 10*6/MM3 (ref 3.77–5.28)
WBC # BLD AUTO: 5.07 10*3/MM3 (ref 3.4–10.8)

## 2020-08-31 PROCEDURE — 80053 COMPREHEN METABOLIC PANEL: CPT | Performed by: INTERNAL MEDICINE

## 2020-08-31 PROCEDURE — 82044 UR ALBUMIN SEMIQUANTITATIVE: CPT | Performed by: INTERNAL MEDICINE

## 2020-08-31 PROCEDURE — 85027 COMPLETE CBC AUTOMATED: CPT | Performed by: INTERNAL MEDICINE

## 2020-08-31 PROCEDURE — 80061 LIPID PANEL: CPT | Performed by: INTERNAL MEDICINE

## 2020-08-31 PROCEDURE — 99213 OFFICE O/P EST LOW 20 MIN: CPT | Performed by: INTERNAL MEDICINE

## 2020-08-31 PROCEDURE — 83036 HEMOGLOBIN GLYCOSYLATED A1C: CPT | Performed by: INTERNAL MEDICINE

## 2020-08-31 PROCEDURE — 84443 ASSAY THYROID STIM HORMONE: CPT | Performed by: INTERNAL MEDICINE

## 2020-08-31 PROCEDURE — 84439 ASSAY OF FREE THYROXINE: CPT | Performed by: INTERNAL MEDICINE

## 2020-09-01 LAB
ALBUMIN SERPL-MCNC: 4.5 G/DL (ref 3.5–5.2)
ALBUMIN/GLOB SERPL: 1.6 G/DL
ALP SERPL-CCNC: 85 U/L (ref 39–117)
ALT SERPL W P-5'-P-CCNC: 17 U/L (ref 1–33)
ANION GAP SERPL CALCULATED.3IONS-SCNC: 10.3 MMOL/L (ref 5–15)
AST SERPL-CCNC: 15 U/L (ref 1–32)
BILIRUB SERPL-MCNC: 0.4 MG/DL (ref 0–1.2)
BUN SERPL-MCNC: 17 MG/DL (ref 8–23)
BUN/CREAT SERPL: 20.7 (ref 7–25)
CALCIUM SPEC-SCNC: 9.7 MG/DL (ref 8.6–10.5)
CHLORIDE SERPL-SCNC: 107 MMOL/L (ref 98–107)
CHOLEST SERPL-MCNC: 190 MG/DL (ref 0–200)
CO2 SERPL-SCNC: 22.7 MMOL/L (ref 22–29)
CREAT SERPL-MCNC: 0.82 MG/DL (ref 0.57–1)
GFR SERPL CREATININE-BSD FRML MDRD: 84 ML/MIN/1.73
GLOBULIN UR ELPH-MCNC: 2.9 GM/DL
GLUCOSE SERPL-MCNC: 177 MG/DL (ref 65–99)
HDLC SERPL-MCNC: 48 MG/DL (ref 40–60)
LDLC SERPL CALC-MCNC: 122 MG/DL (ref 0–100)
LDLC/HDLC SERPL: 2.54 {RATIO}
POTASSIUM SERPL-SCNC: 4.4 MMOL/L (ref 3.5–5.2)
PROT SERPL-MCNC: 7.4 G/DL (ref 6–8.5)
SODIUM SERPL-SCNC: 140 MMOL/L (ref 136–145)
T4 FREE SERPL-MCNC: 1.23 NG/DL (ref 0.93–1.7)
TRIGL SERPL-MCNC: 101 MG/DL (ref 0–150)
TSH SERPL DL<=0.05 MIU/L-ACNC: 1.34 UIU/ML (ref 0.27–4.2)
VLDLC SERPL-MCNC: 20.2 MG/DL (ref 5–40)

## 2020-09-04 ENCOUNTER — APPOINTMENT (OUTPATIENT)
Dept: PREADMISSION TESTING | Facility: HOSPITAL | Age: 70
End: 2020-09-04

## 2020-09-04 PROCEDURE — U0004 COV-19 TEST NON-CDC HGH THRU: HCPCS

## 2020-09-04 PROCEDURE — C9803 HOPD COVID-19 SPEC COLLECT: HCPCS

## 2020-09-05 LAB — SARS-COV-2 RNA NOSE QL NAA+PROBE: NOT DETECTED

## 2020-09-09 RX ORDER — POTASSIUM CHLORIDE 750 MG/1
TABLET, EXTENDED RELEASE ORAL
Qty: 30 TABLET | Refills: 0 | Status: SHIPPED | OUTPATIENT
Start: 2020-09-09 | End: 2020-10-06

## 2020-09-11 RX ORDER — CELECOXIB 200 MG/1
CAPSULE ORAL
Qty: 60 CAPSULE | Refills: 4 | Status: SHIPPED | OUTPATIENT
Start: 2020-09-11 | End: 2021-02-17

## 2020-09-23 ENCOUNTER — HOSPITAL ENCOUNTER (OUTPATIENT)
Dept: MAMMOGRAPHY | Facility: HOSPITAL | Age: 70
Discharge: HOME | End: 2020-09-23
Admitting: OBSTETRICS & GYNECOLOGY

## 2020-09-23 DIAGNOSIS — Z12.31 VISIT FOR SCREENING MAMMOGRAM: ICD-10-CM

## 2020-09-23 PROCEDURE — 77063 BREAST TOMOSYNTHESIS BI: CPT

## 2020-09-23 PROCEDURE — 77063 BREAST TOMOSYNTHESIS BI: CPT | Performed by: RADIOLOGY

## 2020-09-23 PROCEDURE — 77067 SCR MAMMO BI INCL CAD: CPT | Performed by: RADIOLOGY

## 2020-09-23 PROCEDURE — 77067 SCR MAMMO BI INCL CAD: CPT

## 2020-10-06 RX ORDER — POTASSIUM CHLORIDE 750 MG/1
TABLET, EXTENDED RELEASE ORAL
Qty: 90 TABLET | Refills: 2 | Status: SHIPPED | OUTPATIENT
Start: 2020-10-06 | End: 2021-07-23

## 2020-10-11 ENCOUNTER — APPOINTMENT (OUTPATIENT)
Dept: PREADMISSION TESTING | Facility: HOSPITAL | Age: 70
End: 2020-10-11

## 2020-10-11 LAB — SARS-COV-2 RNA RESP QL NAA+PROBE: NOT DETECTED

## 2020-10-11 PROCEDURE — C9803 HOPD COVID-19 SPEC COLLECT: HCPCS

## 2020-10-11 PROCEDURE — U0004 COV-19 TEST NON-CDC HGH THRU: HCPCS | Performed by: INTERNAL MEDICINE

## 2020-10-13 RX ORDER — LEVOCETIRIZINE DIHYDROCHLORIDE 5 MG/1
TABLET, FILM COATED ORAL
Qty: 90 TABLET | Refills: 2 | Status: SHIPPED | OUTPATIENT
Start: 2020-10-13 | End: 2021-10-20

## 2020-10-16 ENCOUNTER — APPOINTMENT (OUTPATIENT)
Dept: CT IMAGING | Facility: HOSPITAL | Age: 70
End: 2020-10-16

## 2020-10-16 ENCOUNTER — HOSPITAL ENCOUNTER (EMERGENCY)
Facility: HOSPITAL | Age: 70
Discharge: HOME | End: 2020-10-16
Attending: EMERGENCY MEDICINE | Admitting: EMERGENCY MEDICINE

## 2020-10-16 VITALS
BODY MASS INDEX: 29.4 KG/M2 | TEMPERATURE: 99 F | DIASTOLIC BLOOD PRESSURE: 96 MMHG | HEART RATE: 90 BPM | SYSTOLIC BLOOD PRESSURE: 144 MMHG | RESPIRATION RATE: 14 BRPM | WEIGHT: 194 LBS | HEIGHT: 68 IN | OXYGEN SATURATION: 98 %

## 2020-10-16 DIAGNOSIS — S09.90XA CLOSED HEAD INJURY, INITIAL ENCOUNTER: Primary | ICD-10-CM

## 2020-10-16 DIAGNOSIS — S00.81XA ABRASION OF FACE, INITIAL ENCOUNTER: ICD-10-CM

## 2020-10-16 PROCEDURE — 90471 IMMUNIZATION ADMIN: CPT | Performed by: EMERGENCY MEDICINE

## 2020-10-16 PROCEDURE — 70450 CT HEAD/BRAIN W/O DYE: CPT

## 2020-10-16 PROCEDURE — 25010000002 TDAP 5-2.5-18.5 LF-MCG/0.5 SUSPENSION: Performed by: EMERGENCY MEDICINE

## 2020-10-16 PROCEDURE — 99283 EMERGENCY DEPT VISIT LOW MDM: CPT

## 2020-10-16 PROCEDURE — 90715 TDAP VACCINE 7 YRS/> IM: CPT | Performed by: EMERGENCY MEDICINE

## 2020-10-16 RX ADMIN — TETANUS TOXOID, REDUCED DIPHTHERIA TOXOID AND ACELLULAR PERTUSSIS VACCINE, ADSORBED 0.5 ML: 5; 2.5; 8; 8; 2.5 SUSPENSION INTRAMUSCULAR at 18:49

## 2020-10-16 NOTE — ED PROVIDER NOTES
Subjective   Pt presents with head injury suffered just PTA.  She tripped over house shoes and fell, striking forehead on brick siding of her home.  No LOC.  Denies headache, nausea, vomiting, vision change, numbness, tingling, weakness or AMS.  She denies pain to the neck, back, arms or legs.  She recently had cataract surgery and is afraid she damaged something related to her eye but she denies photphobia, diplopia or vision change.      History provided by:  Patient      Review of Systems   Eyes: Negative for photophobia, pain, redness and visual disturbance.   Gastrointestinal: Negative for nausea and vomiting.   Musculoskeletal: Negative for neck pain.   Skin: Positive for wound.   Neurological: Negative for weakness, numbness and headaches.   All other systems reviewed and are negative.      Past Medical History:   Diagnosis Date   • Acute bronchitis    • Arthritis    • Asthma    • Back pain    • Cataract    • Diabetes mellitus (CMS/HCC)     type 2 dm, 10 years, check blood sugar once or twice a week   • GERD (gastroesophageal reflux disease)    • History of chronic bronchitis    • History of diverticulitis    • History of hypercholesterolemia    • Hyperlipidemia    • Hypertension    • Sleep apnea with use of continuous positive airway pressure (CPAP)    • Wears glasses        Allergies   Allergen Reactions   • Statins      Leg cramps       Past Surgical History:   Procedure Laterality Date   • COLON RESECTION      4-5 years ago   • COLONOSCOPY      nov 2018   • HYSTERECTOMY      AGE ~ 46   • KNEE ARTHROSCOPY Right    • LUMBAR DISCECTOMY FUSION INSTRUMENTATION N/A 1/30/2019    Procedure: LUMBAR LAMINECTOMY POSTERIOR WITH FUSION INSTRUMENTATION L4-5;  Surgeon: Naveed Sims MD;  Location: [facility];  Service: Orthopedic Spine   • OOPHORECTOMY Bilateral     AGE ~ 46   • TUBAL ABDOMINAL LIGATION         Family History   Problem Relation Age of Onset   • Diabetes Mother    • Ovarian cancer Mother         DX AGE  LATE 40'S-EARLY 50'S   • Hyperlipidemia Father    • Hypertension Maternal Grandmother    • Cancer Other    • Breast cancer Maternal Aunt         DX AGE UNKNOWN       Social History     Socioeconomic History   • Marital status:      Spouse name: Not on file   • Number of children: Not on file   • Years of education: Not on file   • Highest education level: Not on file   Tobacco Use   • Smoking status: Never Smoker   • Smokeless tobacco: Never Used   Substance and Sexual Activity   • Alcohol use: No   • Drug use: No   • Sexual activity: Defer           Objective   Physical Exam  Vitals signs and nursing note reviewed.   Constitutional:       General: She is not in acute distress.     Appearance: Normal appearance. She is not ill-appearing.   HENT:      Head: Normocephalic and atraumatic.      Comments: Small abrasion to upper lip.  Nose nontender, no bleeding or swelling or deformity.  Right frontal scalp contusion with linear abrasion.  No laceration.  Eyes:      General: No scleral icterus.        Right eye: No discharge.         Left eye: No discharge.      Extraocular Movements: Extraocular movements intact.      Conjunctiva/sclera: Conjunctivae normal.      Pupils: Pupils are equal, round, and reactive to light.   Neck:      Musculoskeletal: Normal range of motion and neck supple.   Cardiovascular:      Rate and Rhythm: Normal rate.   Pulmonary:      Effort: Pulmonary effort is normal. No respiratory distress.   Musculoskeletal:         General: No swelling or deformity.   Skin:     General: Skin is dry.      Findings: No rash.   Neurological:      General: No focal deficit present.      Mental Status: She is alert and oriented to person, place, and time. Mental status is at baseline.      Comments: Speech fluent and articulate.  No facial droop.  5/5 strength in arms and legs.  Normal .  Mentation normal.     Psychiatric:         Mood and Affect: Mood normal.         Behavior: Behavior normal.          Thought Content: Thought content normal.         Procedures           ED Course         Tetanus booster ordered.  CT ordered.      CT negative.  Patient stable on serial rechecks.  Discussed findings, concerns, plan of care, expected course, reasons to return and followup.  Provided the opportunity to ask questions.                                    MDM  Number of Diagnoses or Management Options     Amount and/or Complexity of Data Reviewed  Tests in the radiology section of CPT®: reviewed and ordered  Independent visualization of images, tracings, or specimens: yes        Final diagnoses:   Closed head injury, initial encounter   Abrasion of face, initial encounter            Musa Mueller MD  10/17/20 0014

## 2020-11-09 RX ORDER — DAPAGLIFLOZIN 5 MG/1
TABLET, FILM COATED ORAL
Qty: 90 TABLET | Refills: 3 | Status: SHIPPED | OUTPATIENT
Start: 2020-11-09 | End: 2022-03-14 | Stop reason: SDUPTHER

## 2020-11-16 ENCOUNTER — OFFICE VISIT (OUTPATIENT)
Dept: INTERNAL MEDICINE | Facility: CLINIC | Age: 70
End: 2020-11-16

## 2020-11-16 VITALS
OXYGEN SATURATION: 99 % | DIASTOLIC BLOOD PRESSURE: 70 MMHG | WEIGHT: 194.13 LBS | BODY MASS INDEX: 29.42 KG/M2 | HEIGHT: 68 IN | RESPIRATION RATE: 20 BRPM | HEART RATE: 88 BPM | TEMPERATURE: 98.2 F | SYSTOLIC BLOOD PRESSURE: 128 MMHG

## 2020-11-16 DIAGNOSIS — Z79.4 TYPE 2 DIABETES MELLITUS WITHOUT COMPLICATION, WITH LONG-TERM CURRENT USE OF INSULIN (HCC): ICD-10-CM

## 2020-11-16 DIAGNOSIS — G89.29 CHRONIC PAIN OF RIGHT KNEE: Primary | ICD-10-CM

## 2020-11-16 DIAGNOSIS — M25.561 CHRONIC PAIN OF RIGHT KNEE: Primary | ICD-10-CM

## 2020-11-16 DIAGNOSIS — I10 ESSENTIAL HYPERTENSION: ICD-10-CM

## 2020-11-16 DIAGNOSIS — E11.9 TYPE 2 DIABETES MELLITUS WITHOUT COMPLICATION, WITH LONG-TERM CURRENT USE OF INSULIN (HCC): ICD-10-CM

## 2020-11-16 PROCEDURE — 99214 OFFICE O/P EST MOD 30 MIN: CPT | Performed by: INTERNAL MEDICINE

## 2020-11-16 RX ORDER — ORAL SEMAGLUTIDE 3 MG/1
3 TABLET ORAL DAILY
Qty: 30 TABLET | Refills: 2 | Status: SHIPPED | OUTPATIENT
Start: 2020-11-16 | End: 2021-02-15

## 2020-11-16 NOTE — PROGRESS NOTES
"Avelino Morillo is a 70 y.o. female.     History of Present Illness     The following portions of the patient's history were reviewed and updated as appropriate: allergies, current medications, past family history, past medical history, past social history, past surgical history and problem list.    1 fall injury to right knee-patient says that she recently had fallen due to her right knee \"giving out \".  Patient did not sustain any fractures or any loss of consciousness.  She says that her right knee just gave out.    2 diabetes-chronic and stable.  Patient says that her sugars have still been somewhat elevated and her last A1c was around 8.7.  Patient denies any polyuria, polydipsia, polyphagia, or any other systemic signs.    3 HTN- chronic.  Patient denies any intolerance with her blood pressure medications and denies any shortness of breath, chest pain, nausea, vomiting, headache, fatigue, or any other systemic symptoms.    Review of Systems   All other systems reviewed and are negative.      Objective   Physical Exam  Vitals signs and nursing note reviewed.   Constitutional:       Appearance: Normal appearance.   HENT:      Head: Normocephalic and atraumatic.      Right Ear: Tympanic membrane normal.      Left Ear: Tympanic membrane normal.      Mouth/Throat:      Mouth: Mucous membranes are moist.   Neck:      Musculoskeletal: Normal range of motion and neck supple.   Cardiovascular:      Pulses: Normal pulses.   Pulmonary:      Effort: Pulmonary effort is normal.   Neurological:      Mental Status: She is alert.   Psychiatric:         Mood and Affect: Mood normal.         Thought Content: Thought content normal.         Judgment: Judgment normal.           Assessment/Plan   Diagnoses and all orders for this visit:    1. Chronic pain of right knee (Primary)-patient received a cortisone shot recently and will follow up with orthopedics for what appears to be a candidate for a knee " replacement.    2. Type 2 diabetes mellitus without complication, with long-term current use of insulin (CMS/[facility])  -     Semaglutide (Rybelsus) 3 MG tablet; Take 3 mg by mouth Daily.  Dispense: 30 tablet; Refill: 2    Side effects and precautions of the new medication(s) have been discussed with patient  I have answered patients questions thoroughly to their understanding.  If patient should experience any side effects from the medication, a follow up appointment should be made.      Continue with other diabetes medications.    3. Essential hypertension-blood pressure is controlled continue on current blood pressure medications.

## 2020-11-19 ENCOUNTER — TELEPHONE (OUTPATIENT)
Dept: INTERNAL MEDICINE | Facility: CLINIC | Age: 70
End: 2020-11-19

## 2020-11-19 NOTE — TELEPHONE ENCOUNTER
PATIENT CALLED AND STATED SHE THOUGHT SHE HAD MISSED A CALLBACK.    PLEASE CONTACT PATIENT.    CALLBACK:  [phone]

## 2020-11-19 NOTE — TELEPHONE ENCOUNTER
Caller: Lupe Morillo    Relationship: Self    Best call back number: [phone]     What medication are you requesting: ANTIBIOTIC    What are your current symptoms: YELLOW MUCUS WHEN SHE BLOWS HER NOSE    How long have you been experiencing symptoms: 1 DAY    Have you had these symptoms before:    [x] Yes  [] No    Have you been treated for these symptoms before:   [x] Yes  [] No    If a prescription is needed, what is your preferred pharmacy and phone number: [facility] [address] [facility] [phone] [facility] [phone]      Additional notes: PT ASKED IF SHE NEED TO COME IN TO BE SEEN.  PT STATED SHE HAD TWO COVID TEST AND THEY CAME BACK NEGATIVE.

## 2020-11-20 RX ORDER — AMOXICILLIN 875 MG/1
875 TABLET, COATED ORAL 2 TIMES DAILY
Qty: 20 TABLET | Refills: 0 | Status: SHIPPED | OUTPATIENT
Start: 2020-11-20 | End: 2021-01-20

## 2020-12-04 ENCOUNTER — TELEPHONE (OUTPATIENT)
Dept: INTERNAL MEDICINE | Facility: CLINIC | Age: 70
End: 2020-12-04

## 2020-12-18 ENCOUNTER — TELEPHONE (OUTPATIENT)
Dept: INTERNAL MEDICINE | Facility: CLINIC | Age: 70
End: 2020-12-18

## 2020-12-18 NOTE — TELEPHONE ENCOUNTER
Pt spouse called checking on the form for pts approval to work from home. Says every semester authorization is due for working from home she's a .

## 2020-12-28 NOTE — TELEPHONE ENCOUNTER
Last Office Visit: 11/16/2020  Next Office Visit: 2/8/2021    Labs completed in past 6 months? yes  Labs completed in past year? yes    Medication / Last Refill Date / Quantity / Refills  BD PEN NEEDLE ANGELITO: 5/20/2020 / 100 / 0  Ezetimibe: 6/24/2020 / 30 / 3  Glucose blood test strip: 2/8/2019 / 100 / 5    Pharmacy: [facility] [phone] [facility] [phone]

## 2020-12-29 RX ORDER — BLOOD-GLUCOSE METER
KIT MISCELLANEOUS
Qty: 200 EACH | Refills: 4 | Status: SHIPPED | OUTPATIENT
Start: 2020-12-29 | End: 2021-11-19

## 2020-12-29 RX ORDER — PEN NEEDLE, DIABETIC 32GX 5/32"
NEEDLE, DISPOSABLE MISCELLANEOUS
Qty: 100 EACH | Refills: 5 | Status: SHIPPED | OUTPATIENT
Start: 2020-12-29 | End: 2021-11-19

## 2020-12-29 RX ORDER — EZETIMIBE 10 MG/1
TABLET ORAL
Qty: 90 TABLET | Refills: 2 | Status: SHIPPED | OUTPATIENT
Start: 2020-12-29 | End: 2022-02-07 | Stop reason: SDUPTHER

## 2021-01-20 ENCOUNTER — OFFICE VISIT (OUTPATIENT)
Dept: INTERNAL MEDICINE | Facility: CLINIC | Age: 71
End: 2021-01-20

## 2021-01-20 VITALS
HEART RATE: 105 BPM | DIASTOLIC BLOOD PRESSURE: 70 MMHG | WEIGHT: 196.25 LBS | OXYGEN SATURATION: 99 % | SYSTOLIC BLOOD PRESSURE: 142 MMHG | BODY MASS INDEX: 29.84 KG/M2 | TEMPERATURE: 97.7 F | RESPIRATION RATE: 20 BRPM

## 2021-01-20 DIAGNOSIS — G89.29 CHRONIC PAIN OF RIGHT KNEE: Primary | ICD-10-CM

## 2021-01-20 DIAGNOSIS — M25.532 LEFT WRIST PAIN: ICD-10-CM

## 2021-01-20 DIAGNOSIS — H26.9 CATARACT OF BOTH EYES, UNSPECIFIED CATARACT TYPE: ICD-10-CM

## 2021-01-20 DIAGNOSIS — I10 ESSENTIAL HYPERTENSION: ICD-10-CM

## 2021-01-20 DIAGNOSIS — M25.561 CHRONIC PAIN OF RIGHT KNEE: Primary | ICD-10-CM

## 2021-01-20 PROCEDURE — 99214 OFFICE O/P EST MOD 30 MIN: CPT | Performed by: INTERNAL MEDICINE

## 2021-01-20 RX ORDER — LOSARTAN POTASSIUM 100 MG/1
100 TABLET ORAL DAILY
Qty: 90 TABLET | Refills: 1 | Status: SHIPPED | OUTPATIENT
Start: 2021-01-20 | End: 2021-10-25

## 2021-01-20 RX ORDER — MELOXICAM 15 MG/1
15 TABLET ORAL DAILY
Qty: 30 TABLET | Refills: 3 | Status: SHIPPED | OUTPATIENT
Start: 2021-01-20 | End: 2021-11-19

## 2021-02-14 DIAGNOSIS — Z79.4 TYPE 2 DIABETES MELLITUS WITHOUT COMPLICATION, WITH LONG-TERM CURRENT USE OF INSULIN (HCC): ICD-10-CM

## 2021-02-14 DIAGNOSIS — E11.9 TYPE 2 DIABETES MELLITUS WITHOUT COMPLICATION, WITH LONG-TERM CURRENT USE OF INSULIN (HCC): ICD-10-CM

## 2021-02-15 RX ORDER — ORAL SEMAGLUTIDE 3 MG/1
TABLET ORAL
Qty: 30 TABLET | Refills: 1 | Status: SHIPPED | OUTPATIENT
Start: 2021-02-15 | End: 2021-04-26

## 2021-02-17 RX ORDER — CELECOXIB 200 MG/1
CAPSULE ORAL
Qty: 60 CAPSULE | Refills: 3 | Status: SHIPPED | OUTPATIENT
Start: 2021-02-17 | End: 2021-06-26

## 2021-02-17 NOTE — TELEPHONE ENCOUNTER
Last Office Visit: 01/20/2021  Next Office Visit: 03/02/2021    Labs completed in past 6 months? yes  Labs completed in past year? yes    Last Refill Date: 09/11/2020  Quantity: 60  Refills: 4     Pharmacy: [facility] [address] [place] [address] [address] [place] [address] [phone]  - [phone] FX

## 2021-02-18 ENCOUNTER — IMMUNIZATION (OUTPATIENT)
Dept: VACCINE CLINIC | Facility: HOSPITAL | Age: 71
End: 2021-02-18

## 2021-02-18 PROCEDURE — 91301 HC SARSCO02 VAC 100MCG/0.5ML IM: CPT | Performed by: INTERNAL MEDICINE

## 2021-02-18 PROCEDURE — 0011A: CPT | Performed by: INTERNAL MEDICINE

## 2021-03-02 ENCOUNTER — OFFICE VISIT (OUTPATIENT)
Dept: INTERNAL MEDICINE | Facility: CLINIC | Age: 71
End: 2021-03-02

## 2021-03-02 VITALS
SYSTOLIC BLOOD PRESSURE: 122 MMHG | OXYGEN SATURATION: 99 % | HEART RATE: 95 BPM | RESPIRATION RATE: 16 BRPM | WEIGHT: 192 LBS | BODY MASS INDEX: 29.1 KG/M2 | HEIGHT: 68 IN | TEMPERATURE: 97.3 F | DIASTOLIC BLOOD PRESSURE: 74 MMHG

## 2021-03-02 DIAGNOSIS — G89.29 CHRONIC PAIN OF BOTH KNEES: ICD-10-CM

## 2021-03-02 DIAGNOSIS — M25.561 CHRONIC PAIN OF BOTH KNEES: ICD-10-CM

## 2021-03-02 DIAGNOSIS — M25.562 CHRONIC PAIN OF BOTH KNEES: ICD-10-CM

## 2021-03-02 DIAGNOSIS — Z79.4 TYPE 2 DIABETES MELLITUS WITHOUT COMPLICATION, WITH LONG-TERM CURRENT USE OF INSULIN (HCC): ICD-10-CM

## 2021-03-02 DIAGNOSIS — E11.9 TYPE 2 DIABETES MELLITUS WITHOUT COMPLICATION, WITH LONG-TERM CURRENT USE OF INSULIN (HCC): ICD-10-CM

## 2021-03-02 DIAGNOSIS — M25.532 LEFT WRIST PAIN: Primary | ICD-10-CM

## 2021-03-02 LAB
EXPIRATION DATE: NORMAL
HBA1C MFR BLD: 6.7 %
Lab: NORMAL

## 2021-03-02 PROCEDURE — 83036 HEMOGLOBIN GLYCOSYLATED A1C: CPT | Performed by: INTERNAL MEDICINE

## 2021-03-02 PROCEDURE — 99214 OFFICE O/P EST MOD 30 MIN: CPT | Performed by: INTERNAL MEDICINE

## 2021-03-02 RX ORDER — TRAMADOL HYDROCHLORIDE 50 MG/1
50 TABLET ORAL EVERY 6 HOURS PRN
Qty: 50 TABLET | Refills: 3 | Status: SHIPPED | OUTPATIENT
Start: 2021-03-02 | End: 2022-03-08

## 2021-03-02 NOTE — PROGRESS NOTES
Subjective   Lupe Morillo is a 70 y.o. female.     History of Present Illness     The following portions of the patient's history were reviewed and updated as appropriate: allergies, current medications, past family history, past medical history, past social history, past surgical history and problem list.    1 arthritis pain  Localized to the left wrist.  Patient has difficulty with twisting, or grabbing, or dexterity with her wrist.  She has tried Celebrex and meloxicam and neither these medications are helping and she is wanting something stronger.    2 right knee pain - chronic.  Patient also has been having difficulty with her right knee flexion, extension, prolonged standing and both the meloxicam and Celebrex have only provided partial relief.      Review of Systems   All other systems reviewed and are negative.      Objective   Physical Exam  Vitals signs and nursing note reviewed.   Constitutional:       Appearance: Normal appearance. She is normal weight.   HENT:      Head: Normocephalic and atraumatic.      Right Ear: Tympanic membrane normal.      Nose: Nose normal.      Mouth/Throat:      Mouth: Mucous membranes are moist.   Eyes:      Extraocular Movements: Extraocular movements intact.      Pupils: Pupils are equal, round, and reactive to light.   Neck:      Musculoskeletal: Normal range of motion and neck supple.   Cardiovascular:      Rate and Rhythm: Normal rate.      Pulses: Normal pulses.   Pulmonary:      Effort: Pulmonary effort is normal.   Musculoskeletal: Normal range of motion.   Neurological:      Mental Status: She is alert.   Psychiatric:         Mood and Affect: Mood normal.         Behavior: Behavior normal.         Thought Content: Thought content normal.         Judgment: Judgment normal.           Assessment/Plan   Diagnoses and all orders for this visit:    1. Left wrist pain (Primary)  -     traMADol (ULTRAM) 50 MG tablet; Take 1 tablet by mouth Every 6 (Six) Hours As  Needed for Moderate Pain .  Dispense: 50 tablet; Refill: 3    2. Chronic pain of both knees  -     traMADol (ULTRAM) 50 MG tablet; Take 1 tablet by mouth Every 6 (Six) Hours As Needed for Moderate Pain .  Dispense: 50 tablet; Refill: 3    3. Type 2 diabetes mellitus without complication, with long-term current use of insulin (CMS/[facility])  -     POC Glycosylated Hemoglobin (Hb A1C)

## 2021-03-17 ENCOUNTER — OFFICE VISIT (OUTPATIENT)
Dept: INTERNAL MEDICINE | Facility: CLINIC | Age: 71
End: 2021-03-17

## 2021-03-17 VITALS
OXYGEN SATURATION: 99 % | SYSTOLIC BLOOD PRESSURE: 120 MMHG | HEIGHT: 68 IN | TEMPERATURE: 98 F | DIASTOLIC BLOOD PRESSURE: 80 MMHG | WEIGHT: 191 LBS | BODY MASS INDEX: 28.95 KG/M2 | HEART RATE: 86 BPM | RESPIRATION RATE: 18 BRPM

## 2021-03-17 DIAGNOSIS — R51.9 NONINTRACTABLE HEADACHE, UNSPECIFIED CHRONICITY PATTERN, UNSPECIFIED HEADACHE TYPE: ICD-10-CM

## 2021-03-17 DIAGNOSIS — G56.02 CARPAL TUNNEL SYNDROME ON LEFT: Primary | ICD-10-CM

## 2021-03-17 PROBLEM — E78.5 HLD (HYPERLIPIDEMIA): Status: RESOLVED | Noted: 2019-01-30 | Resolved: 2021-03-17

## 2021-03-17 PROBLEM — A05.9 FOOD POISONING: Status: RESOLVED | Noted: 2019-12-05 | Resolved: 2021-03-17

## 2021-03-17 PROBLEM — E11.9 TYPE 2 DIABETES MELLITUS (HCC): Status: RESOLVED | Noted: 2019-12-05 | Resolved: 2021-03-17

## 2021-03-17 PROBLEM — J45.909 ASTHMA: Status: RESOLVED | Noted: 2019-01-30 | Resolved: 2021-03-17

## 2021-03-17 PROBLEM — K21.9 GASTROESOPHAGEAL REFLUX DISEASE: Status: RESOLVED | Noted: 2019-12-05 | Resolved: 2021-03-17

## 2021-03-17 PROBLEM — M54.9 BACK PAIN: Status: RESOLVED | Noted: 2019-01-30 | Resolved: 2021-03-17

## 2021-03-17 PROBLEM — J30.2 SEASONAL ALLERGIC RHINITIS: Status: RESOLVED | Noted: 2019-12-05 | Resolved: 2021-03-17

## 2021-03-17 PROBLEM — M79.673 HEEL PAIN: Status: RESOLVED | Noted: 2019-12-05 | Resolved: 2021-03-17

## 2021-03-17 PROCEDURE — 99213 OFFICE O/P EST LOW 20 MIN: CPT | Performed by: PHYSICIAN ASSISTANT

## 2021-03-17 NOTE — PROGRESS NOTES
Chief Complaint   Patient presents with   • Headache     1 week       Subjective       History of Present Illness     Lupe Morillo is a 70 y.o. female. She presents for follow up of L wrist pain, and new issue of headache. Pt reports L wrist pain for the last 2 months which is worsening. She denies any numbness or tingling. She denies any recent trauma or falls. She has tried Mobic and a wrist brace which she wears day and night without much relief. Has also tried ice and this gives partial temporary relief. Interested in next steps to relieve pain.     Pt also reports 1 week of frontal headache. This is mild but persistent, and not the worst headache of her life. She denies vision change, sinus pain or pressure, N/V. She has tried tylenol which helps some. Of note, she had a fall in Oct 2020 and fell straight on her face/ forehead. She was seen at [facility] ED and had CT head which was unremarkable outside of soft tissue swelling. She has not had more frequent headaches since that time, only this week which is more persistent than any normal HA she has.     The following portions of the patient's history were reviewed and updated as appropriate: allergies, current medications, past medical history, past social history and problem list.    Allergies   Allergen Reactions   • Statins Other (See Comments)     Leg cramps     Social History     Tobacco Use   • Smoking status: Never Smoker   • Smokeless tobacco: Never Used   Substance Use Topics   • Alcohol use: No         Current Outpatient Medications:   •  albuterol (PROVENTIL HFA) 108 (90 BASE) MCG/ACT inhaler, Proventil  (90 Base) MCG/ACT Inhalation Aerosol Solution; Patient Sig: Proventil  (90 Base) MCG/ACT Inhalation Aerosol Solution ; 0; 24-May-2013; Active, Disp: , Rfl:   •  Ascorbic Acid (VITAMIN C) 100 MG chewable tablet, Chew., Disp: , Rfl:   •  BD Pen Needle Joanne U/F 32G X 4 MM misc, USE TO INJECT INSULIN ONCE DAILY, Disp: 100 each, Rfl: 5  •   Biotin 2500 MCG capsule, Take  by mouth Daily., Disp: , Rfl:   •  celecoxib (CeleBREX) 200 MG capsule, TAKE ONE CAPSULE BY MOUTH TWICE A DAY, Disp: 60 capsule, Rfl: 3  •  Cholecalciferol (VITAMIN D-1000 MAX ST) 1000 UNITS tablet, Take  by mouth., Disp: , Rfl:   •  ezetimibe (ZETIA) 10 MG tablet, TAKE ONE TABLET BY MOUTH DAILY, Disp: 90 tablet, Rfl: 2  •  Farxiga 5 MG tablet tablet, TAKE ONE TABLET BY MOUTH DAILY, Disp: 90 tablet, Rfl: 3  •  FREESTYLE LITE test strip, USE ONE STRIP TO TEST TWICE A DAY AS INSTRUCTED, Disp: 200 each, Rfl: 4  •  LEVEMIR FLEXTOUCH 100 UNIT/ML injection, INJECT 30 UNITS UNDER THE SKIN DAILY, Disp: 15 pen, Rfl: 6  •  levocetirizine (XYZAL) 5 MG tablet, TAKE ONE TABLET BY MOUTH DAILY, Disp: 90 tablet, Rfl: 2  •  losartan (COZAAR) 100 MG tablet, Take 1 tablet by mouth Daily., Disp: 90 tablet, Rfl: 1  •  MAGNESIUM PO, Take 250 mg by mouth Daily., Disp: , Rfl:   •  meloxicam (Mobic) 15 MG tablet, Take 1 tablet by mouth Daily., Disp: 30 tablet, Rfl: 3  •  metFORMIN (GLUCOPHAGE) 1000 MG tablet, TAKE ONE TABLET BY MOUTH DAILY WITH FOOD, Disp: 90 tablet, Rfl: 1  •  Methylcobalamin (B12-ACTIVE) 1 MG chewable tablet, Chew., Disp: , Rfl:   •  montelukast (SINGULAIR) 10 MG tablet, Take 10 mg by mouth Every Night., Disp: , Rfl:   •  Multiple Vitamins-Minerals (MULTIVITAMIN ADULTS PO), Take  by mouth Daily., Disp: , Rfl:   •  MYRBETRIQ 25 MG tablet sustained-release 24 hour, 25 mg Daily., Disp: , Rfl:   •  Phenylephrine-DM-GG-APAP (MUCINEX FAST-MAX CONGEST COLD PO), Take  by mouth., Disp: , Rfl:   •  polyethylene glycol (MIRALAX) pack packet, Take 17 g by mouth Daily. OTC, Disp: , Rfl:   •  potassium chloride (K-DUR,KLOR-CON) 10 MEQ CR tablet, TAKE ONE TABLET BY MOUTH DAILY, Disp: 90 tablet, Rfl: 2  •  Rybelsus 3 MG tablet, TAKE ONE TABLET BY MOUTH DAILY, Disp: 30 tablet, Rfl: 1  •  SYMBICORT 160-4.5 MCG/ACT inhaler, Inhale 2 puffs 2 (Two) Times a Day., Disp: , Rfl:   •  traMADol (ULTRAM) 50 MG tablet,  Take 1 tablet by mouth Every 6 (Six) Hours As Needed for Moderate Pain ., Disp: 50 tablet, Rfl: 3  •  vitamin B-6 (PYRIDOXINE) 100 MG tablet, Take 100 mg by mouth Daily., Disp: , Rfl:   •  vitamin C (ASCORBIC ACID) 500 MG tablet, Take 500 mg by mouth Daily., Disp: , Rfl:   •  vitamin E 400 UNIT capsule, Take  by mouth., Disp: , Rfl:     Review of Systems   Constitutional: Negative for chills, fatigue and fever.   HENT: Negative for congestion, ear pain, sinus pressure, sore throat and trouble swallowing.    Eyes: Negative for pain and visual disturbance.   Respiratory: Negative for cough, shortness of breath and wheezing.    Cardiovascular: Negative for chest pain.   Gastrointestinal: Negative for abdominal pain, nausea and vomiting.   Genitourinary: Negative for dysuria.   Musculoskeletal: Positive for arthralgias. Negative for back pain.   Skin: Negative for rash.   Allergic/Immunologic: Negative for immunocompromised state.   Neurological: Positive for weakness (L hand) and headache. Negative for dizziness and syncope.       Objective   Vitals:    03/17/21 1001   BP: 120/80   Pulse: 86   Resp: 18   Temp: 98 °F (36.7 °C)   SpO2: 99%     Physical Exam  Constitutional:       Appearance: Normal appearance. She is well-developed.   HENT:      Head: Normocephalic and atraumatic.      Right Ear: Tympanic membrane, ear canal and external ear normal.      Left Ear: Tympanic membrane, ear canal and external ear normal.      Nose: Nose normal.      Mouth/Throat:      Mouth: Mucous membranes are moist.      Pharynx: Oropharynx is clear.   Eyes:      Extraocular Movements: Extraocular movements intact.      Conjunctiva/sclera: Conjunctivae normal.      Pupils: Pupils are equal, round, and reactive to light.   Neck:      Thyroid: No thyromegaly.   Cardiovascular:      Rate and Rhythm: Normal rate and regular rhythm.      Heart sounds: No murmur heard.     Pulmonary:      Effort: Pulmonary effort is normal.      Breath sounds:  Normal breath sounds. No wheezing or rales.   Musculoskeletal:      Left wrist: Tenderness present. No swelling, deformity or effusion. Normal range of motion.      Left hand: No swelling. Normal range of motion. Decreased strength.      Cervical back: Neck supple.   Lymphadenopathy:      Cervical: No cervical adenopathy.   Neurological:      Mental Status: She is alert.      Comments: +decreased hand  strength of L hand 4/5, as compared to R hand 5/5.    Psychiatric:         Behavior: Behavior normal.               Assessment/Plan   Diagnoses and all orders for this visit:    1. Carpal tunnel syndrome on left (Primary)  -     Ambulatory Referral to Hand Surgery    2. Nonintractable headache, unspecified chronicity pattern, unspecified headache type      Pt states she previously spoke to PCP about referral for her hand/ wrist pain if sx did not improve with Mobic + wrist brace. Will refer today.   Frontal headache possible sinus sx but denies congestion or pressure. Tylenol some relief. At this time, advised excedrin migraine. Could consider steroids but would defer unless necessary given underlying DM. Pt with good understanding and in agreement with plan.            Return for Next scheduled follow up.

## 2021-03-18 ENCOUNTER — IMMUNIZATION (OUTPATIENT)
Dept: VACCINE CLINIC | Facility: HOSPITAL | Age: 71
End: 2021-03-18

## 2021-03-18 PROCEDURE — 0012A: CPT | Performed by: INTERNAL MEDICINE

## 2021-03-18 PROCEDURE — 91301 HC SARSCO02 VAC 100MCG/0.5ML IM: CPT | Performed by: INTERNAL MEDICINE

## 2021-03-30 ENCOUNTER — TREATMENT (OUTPATIENT)
Dept: PHYSICAL THERAPY | Facility: CLINIC | Age: 71
End: 2021-03-30

## 2021-03-30 ENCOUNTER — TRANSCRIBE ORDERS (OUTPATIENT)
Dept: PHYSICAL THERAPY | Facility: CLINIC | Age: 71
End: 2021-03-30

## 2021-03-30 DIAGNOSIS — M65.4 DE QUERVAIN'S TENOSYNOVITIS, LEFT: Primary | ICD-10-CM

## 2021-03-30 DIAGNOSIS — M65.4 RADIAL STYLOID TENOSYNOVITIS (DE QUERVAIN): Primary | ICD-10-CM

## 2021-03-30 PROCEDURE — L3808 WHFO, RIGID W/O JOINTS: HCPCS | Performed by: PHYSICAL THERAPIST

## 2021-04-01 NOTE — PROGRESS NOTES
Lupe Morillo 1950   Diagnosis/ Surgery: L deQuervain's               Date Of Injury: 6 weeks     Date Of Surgery:NA     Hand Dominance: right   History of Present Condition: repetitive keyboard use. Had injection today.   Medical/Vocational History/ Medications: work from home involving typing.     Pain: 8/10    Edema: Mild at base of CMC J   Sensibility: WNL   Wound Status:NA   ROM/ Strength: (+) anne marie     Splinting:  · Patient was measure and fit with a custom fabricated forearm based thumb spica with IPJ free.    · Patient was instructed in wearing schedule, precautions and care of the splint during this visit.   · Patient was instructed in proper donning/doffing of splint.   Assessment:  · Patient was fitted and appropriate splint was fabricated this date.  · Patient reported that splint was comfortable and had no complications with the fit of the splint.  · Patient was instructed and patient verbalized understanding of precautions, wear and care of the splint.   · Patient demonstrated independent donning/doffing of splint during treatment today.  Goals:  · Patient was fitted properly with appropriate splint for diagnosis  · Patient was educated on precautions, wear schedule and care of splint  · Patient demonstrated independence with donning/doffing of the splint.  · Splint was provided to Protect Healing Structures, Restrict Mobility, Improve joint alignment.  Plan:  · No additional treatment is required for this patient at this time. The patient is therefore discharged from therapy.  · Patient advised to contact therapist with any additional questions or concerns regarding the fit and function of the splint.  · Patient will be seen for splint issues as needed   · Wear Instructions: Off for hygiene       PT SIGNATURE: Janice Arias, PT   DATE TREATMENT INITIATED: 4/1/2021    Physician Signature____________________________________ Date____________

## 2021-04-24 DIAGNOSIS — Z79.4 TYPE 2 DIABETES MELLITUS WITHOUT COMPLICATION, WITH LONG-TERM CURRENT USE OF INSULIN (HCC): ICD-10-CM

## 2021-04-24 DIAGNOSIS — E11.9 TYPE 2 DIABETES MELLITUS WITHOUT COMPLICATION, WITH LONG-TERM CURRENT USE OF INSULIN (HCC): ICD-10-CM

## 2021-04-26 RX ORDER — ORAL SEMAGLUTIDE 3 MG/1
TABLET ORAL
Qty: 30 TABLET | Refills: 3 | Status: SHIPPED | OUTPATIENT
Start: 2021-04-26 | End: 2021-08-23

## 2021-06-08 ENCOUNTER — OFFICE VISIT (OUTPATIENT)
Dept: INTERNAL MEDICINE | Facility: CLINIC | Age: 71
End: 2021-06-08

## 2021-06-08 VITALS
WEIGHT: 190.25 LBS | SYSTOLIC BLOOD PRESSURE: 120 MMHG | OXYGEN SATURATION: 99 % | DIASTOLIC BLOOD PRESSURE: 70 MMHG | TEMPERATURE: 96.9 F | HEART RATE: 85 BPM | RESPIRATION RATE: 20 BRPM | BODY MASS INDEX: 28.94 KG/M2

## 2021-06-08 DIAGNOSIS — I10 ESSENTIAL HYPERTENSION: ICD-10-CM

## 2021-06-08 DIAGNOSIS — Z79.4 TYPE 2 DIABETES MELLITUS WITHOUT COMPLICATION, WITH LONG-TERM CURRENT USE OF INSULIN (HCC): ICD-10-CM

## 2021-06-08 DIAGNOSIS — M25.561 CHRONIC PAIN OF RIGHT KNEE: Primary | ICD-10-CM

## 2021-06-08 DIAGNOSIS — E11.9 TYPE 2 DIABETES MELLITUS WITHOUT COMPLICATION, WITH LONG-TERM CURRENT USE OF INSULIN (HCC): ICD-10-CM

## 2021-06-08 DIAGNOSIS — G89.29 CHRONIC PAIN OF RIGHT KNEE: Primary | ICD-10-CM

## 2021-06-08 PROCEDURE — 99214 OFFICE O/P EST MOD 30 MIN: CPT | Performed by: INTERNAL MEDICINE

## 2021-06-08 RX ORDER — HYDROCODONE BITARTRATE AND ACETAMINOPHEN 7.5; 325 MG/1; MG/1
1 TABLET ORAL EVERY 6 HOURS PRN
Qty: 40 TABLET | Refills: 0 | Status: SHIPPED | OUTPATIENT
Start: 2021-06-08 | End: 2021-11-24 | Stop reason: HOSPADM

## 2021-06-08 NOTE — PROGRESS NOTES
Avelino Morillo is a 70 y.o. female.     History of Present Illness     The following portions of the patient's history were reviewed and updated as appropriate: allergies, current medications, past family history, past medical history, past social history, past surgical history and problem list.    1right knee pain-=chronic and worsenig.  Patient says that she is scheduled for a right knee replacement in August 2021 but right now she is having excruciating pain with walking, ambulating, prolonged standing, flexion, extension.    2 diabetes- chronic.  Patient has been doing very well with control of her diabetes, more compliant with her medications as well as her ADA diet.    3 HTN-chronic.  Blood pressures been well controlled and she said no side effects of medication.  Patient denies any shortness of breath, chest pain, nausea, vomit, headache, fatigue, or any other symptoms such.    Review of Systems   All other systems reviewed and are negative.      Objective   Physical Exam  Vitals and nursing note reviewed.   Constitutional:       Appearance: Normal appearance. She is normal weight.   HENT:      Head: Normocephalic.      Nose: Nose normal.      Mouth/Throat:      Mouth: Mucous membranes are moist.   Eyes:      Extraocular Movements: Extraocular movements intact.      Pupils: Pupils are equal, round, and reactive to light.   Pulmonary:      Effort: Pulmonary effort is normal.   Musculoskeletal:         General: Swelling present.   Skin:     General: Skin is warm.      Capillary Refill: Capillary refill takes less than 2 seconds.   Neurological:      General: No focal deficit present.      Mental Status: She is alert.   Psychiatric:         Mood and Affect: Mood normal.         Behavior: Behavior normal.         Thought Content: Thought content normal.         Judgment: Judgment normal.           Assessment/Plan   Diagnoses and all orders for this visit:    1. Chronic pain of right knee  (Primary) (new start)  -     HYDROcodone-acetaminophen (NORCO) 7.5-325 MG per tablet; Take 1 tablet by mouth Every 6 (Six) Hours As Needed for Moderate Pain .  Dispense: 40 tablet; Refill: 0    2. Type 2 diabetes mellitus without complication, with long-term current use of insulin (CMS/[facility]) continue on current diabetes medications.    3. Essential hypertension-continue on current blood pressure medication.

## 2021-06-26 RX ORDER — CELECOXIB 200 MG/1
CAPSULE ORAL
Qty: 60 CAPSULE | Refills: 2 | Status: ON HOLD | OUTPATIENT
Start: 2021-06-26 | End: 2021-11-23 | Stop reason: SDUPTHER

## 2021-07-23 RX ORDER — POTASSIUM CHLORIDE 750 MG/1
TABLET, EXTENDED RELEASE ORAL
Qty: 90 TABLET | Refills: 1 | Status: SHIPPED | OUTPATIENT
Start: 2021-07-23 | End: 2022-01-17

## 2021-07-26 ENCOUNTER — TELEPHONE (OUTPATIENT)
Dept: INTERNAL MEDICINE | Facility: CLINIC | Age: 71
End: 2021-07-26

## 2021-08-02 NOTE — TELEPHONE ENCOUNTER
Spoke to pt, she states all of her pre-operative exams, and labs will be taken care of by ortho. They confirmed pt is scheduled this Thursday 08/05/2021 w/ [facility], and then her pre-op is scheduled from 08/16/2021 w/ Ortho. Advised I will send message to PCP so that he's aware. Apologized for the confusion. Good verbal understanding.     CY

## 2021-08-02 NOTE — TELEPHONE ENCOUNTER
See if we can work her in for this Friday, 8/6/2021.  Also make sure that there is a preclearance form that may need to be signed

## 2021-08-05 ENCOUNTER — PRE-ADMISSION TESTING (OUTPATIENT)
Dept: PREADMISSION TESTING | Facility: HOSPITAL | Age: 71
End: 2021-08-05

## 2021-08-05 ENCOUNTER — HOSPITAL ENCOUNTER (OUTPATIENT)
Dept: GENERAL RADIOLOGY | Facility: HOSPITAL | Age: 71
Discharge: HOME | End: 2021-08-05

## 2021-08-05 VITALS — WEIGHT: 191.36 LBS | BODY MASS INDEX: 29 KG/M2 | HEIGHT: 68 IN

## 2021-08-05 LAB
ABO GROUP BLD: NORMAL
ALBUMIN SERPL-MCNC: 4.7 G/DL (ref 3.5–5.2)
ALBUMIN/GLOB SERPL: 1.7 G/DL
ALP SERPL-CCNC: 103 U/L (ref 39–117)
ALT SERPL W P-5'-P-CCNC: 17 U/L (ref 1–33)
ANION GAP SERPL CALCULATED.3IONS-SCNC: 12 MMOL/L (ref 5–15)
AST SERPL-CCNC: 16 U/L (ref 1–32)
BASOPHILS # BLD AUTO: 0.04 10*3/MM3 (ref 0–0.2)
BASOPHILS NFR BLD AUTO: 0.6 % (ref 0–1.5)
BILIRUB SERPL-MCNC: 0.6 MG/DL (ref 0–1.2)
BUN SERPL-MCNC: 15 MG/DL (ref 8–23)
BUN/CREAT SERPL: 17.6 (ref 7–25)
CALCIUM SPEC-SCNC: 9.9 MG/DL (ref 8.6–10.5)
CHLORIDE SERPL-SCNC: 104 MMOL/L (ref 98–107)
CO2 SERPL-SCNC: 25 MMOL/L (ref 22–29)
CREAT SERPL-MCNC: 0.85 MG/DL (ref 0.57–1)
CRP SERPL-MCNC: <0.3 MG/DL (ref 0–0.5)
DEPRECATED RDW RBC AUTO: 47.3 FL (ref 37–54)
EOSINOPHIL # BLD AUTO: 0.3 10*3/MM3 (ref 0–0.4)
EOSINOPHIL NFR BLD AUTO: 4.8 % (ref 0.3–6.2)
ERYTHROCYTE [DISTWIDTH] IN BLOOD BY AUTOMATED COUNT: 14 % (ref 12.3–15.4)
ERYTHROCYTE [SEDIMENTATION RATE] IN BLOOD: 30 MM/HR (ref 0–30)
GFR SERPL CREATININE-BSD FRML MDRD: 80 ML/MIN/1.73
GLOBULIN UR ELPH-MCNC: 2.7 GM/DL
GLUCOSE SERPL-MCNC: 119 MG/DL (ref 65–99)
HBA1C MFR BLD: 7.2 % (ref 4.8–5.6)
HCT VFR BLD AUTO: 44.7 % (ref 34–46.6)
HGB BLD-MCNC: 14.2 G/DL (ref 12–15.9)
IMM GRANULOCYTES # BLD AUTO: 0.02 10*3/MM3 (ref 0–0.05)
IMM GRANULOCYTES NFR BLD AUTO: 0.3 % (ref 0–0.5)
LYMPHOCYTES # BLD AUTO: 2.62 10*3/MM3 (ref 0.7–3.1)
LYMPHOCYTES NFR BLD AUTO: 42.2 % (ref 19.6–45.3)
MCH RBC QN AUTO: 29.1 PG (ref 26.6–33)
MCHC RBC AUTO-ENTMCNC: 31.8 G/DL (ref 31.5–35.7)
MCV RBC AUTO: 91.6 FL (ref 79–97)
MONOCYTES # BLD AUTO: 0.39 10*3/MM3 (ref 0.1–0.9)
MONOCYTES NFR BLD AUTO: 6.3 % (ref 5–12)
NEUTROPHILS NFR BLD AUTO: 2.84 10*3/MM3 (ref 1.7–7)
NEUTROPHILS NFR BLD AUTO: 45.8 % (ref 42.7–76)
NRBC BLD AUTO-RTO: 0 /100 WBC (ref 0–0.2)
PLATELET # BLD AUTO: 275 10*3/MM3 (ref 140–450)
PMV BLD AUTO: 10.2 FL (ref 6–12)
POTASSIUM SERPL-SCNC: 4.5 MMOL/L (ref 3.5–5.2)
PROT SERPL-MCNC: 7.4 G/DL (ref 6–8.5)
QT INTERVAL: 382 MS
QTC INTERVAL: 451 MS
RBC # BLD AUTO: 4.88 10*6/MM3 (ref 3.77–5.28)
RH BLD: POSITIVE
SODIUM SERPL-SCNC: 141 MMOL/L (ref 136–145)
WBC # BLD AUTO: 6.21 10*3/MM3 (ref 3.4–10.8)

## 2021-08-05 PROCEDURE — 83036 HEMOGLOBIN GLYCOSYLATED A1C: CPT

## 2021-08-05 PROCEDURE — 36415 COLL VENOUS BLD VENIPUNCTURE: CPT

## 2021-08-05 PROCEDURE — 71046 X-RAY EXAM CHEST 2 VIEWS: CPT

## 2021-08-05 PROCEDURE — 86901 BLOOD TYPING SEROLOGIC RH(D): CPT

## 2021-08-05 PROCEDURE — 80053 COMPREHEN METABOLIC PANEL: CPT

## 2021-08-05 PROCEDURE — 85652 RBC SED RATE AUTOMATED: CPT

## 2021-08-05 PROCEDURE — 86900 BLOOD TYPING SEROLOGIC ABO: CPT

## 2021-08-05 PROCEDURE — 93005 ELECTROCARDIOGRAM TRACING: CPT

## 2021-08-05 PROCEDURE — 86140 C-REACTIVE PROTEIN: CPT

## 2021-08-05 PROCEDURE — 93010 ELECTROCARDIOGRAM REPORT: CPT | Performed by: INTERNAL MEDICINE

## 2021-08-05 PROCEDURE — 85025 COMPLETE CBC W/AUTO DIFF WBC: CPT

## 2021-08-05 ASSESSMENT — KOOS JR
KOOS JR SCORE: 54.84
KOOS JR SCORE: 13

## 2021-08-05 NOTE — PAT
Per Anesthesia Request, patient instructed not to take their ACE/ARB medications on the AM of surgery.    Patient instructed to bring CPAP mask and tubing to the hospital for overnight stay.  Explained that it is not necessary to bring their CPAP machine to the hospital instead a CPAP machine will be provided for use by the hospital. If patient knows their CPAP settings, those settings will be implemented.  If not, the CPAP machine will be utilized on the auto setting using their mask and tubing.    Patient verbalized understanding.    Discussed with patient options for receiving total joint replacement education and assessed patient's ability and preference. Joint Replacement Guide given to patient during PAT visit since not received a copy within the last year. Encouraged patient/family to read guide thoroughly and notify PAT staff with any questions or concerns. Handout provided directing patient to links to watch online videos related to joint replacement surgery on the [facility] website. The handout gives detailed instructions for joining an online joint replacement class through Zoom or phone conference offered on Thursdays. Patient agreed to participate by watching videos online. Patient verbalized understanding of instructions and to complete the online learning tool survey. Encouraged to share information with family and/or . An overview of the joint replacement education was provided during the visit including general perioperative instructions that are routine for all surgical patients (PAT PASS, wipes, directions to pre-op, etc.).    Patient instructed to bring CPAP mask and tubing to the hospital for overnight stay.  Explained that it is not necessary to bring their CPAP machine to the hospital instead a CPAP machine will be provided for use by the hospital. If patient knows their CPAP settings, those settings will be implemented.  If not, the CPAP machine will be utilized on the auto setting  using their mask and tubing.    Patient verbalized understanding.    Patient instructed to drink 20 ounces (or until full) of Gatorade and it needs to be completed 1 hour (for Main OR patients) or 2 hours (scheduled  section patients) before given arrival time for procedure (NO RED Gatorade)    Patient verbalized understanding.    COVID TEST SCHEDULED 2021    Patient directed to Radiology Department for CXR after Pre Admission Testing Appointment.     Patient to apply Chlorhexadine wipes  to surgical area (as instructed) the night before procedure and the AM of procedure. Wipes provided.    Too early to draw type and screen in PAT.  Please obtain specimen in pre-op on the day of surgery.

## 2021-08-09 ENCOUNTER — TRANSCRIBE ORDERS (OUTPATIENT)
Dept: ADMINISTRATIVE | Facility: HOSPITAL | Age: 71
End: 2021-08-09

## 2021-08-09 DIAGNOSIS — Z13.820 OSTEOPOROSIS SCREENING: ICD-10-CM

## 2021-08-09 DIAGNOSIS — Z12.31 VISIT FOR SCREENING MAMMOGRAM: Primary | ICD-10-CM

## 2021-08-13 ENCOUNTER — APPOINTMENT (OUTPATIENT)
Dept: PREADMISSION TESTING | Facility: HOSPITAL | Age: 71
End: 2021-08-13

## 2021-08-20 ENCOUNTER — APPOINTMENT (OUTPATIENT)
Dept: PREADMISSION TESTING | Facility: HOSPITAL | Age: 71
End: 2021-08-20

## 2021-08-21 DIAGNOSIS — Z79.4 TYPE 2 DIABETES MELLITUS WITHOUT COMPLICATION, WITH LONG-TERM CURRENT USE OF INSULIN (HCC): ICD-10-CM

## 2021-08-21 DIAGNOSIS — E11.9 TYPE 2 DIABETES MELLITUS WITHOUT COMPLICATION, WITH LONG-TERM CURRENT USE OF INSULIN (HCC): ICD-10-CM

## 2021-08-23 RX ORDER — ORAL SEMAGLUTIDE 3 MG/1
TABLET ORAL
Qty: 30 TABLET | Refills: 3 | Status: SHIPPED | OUTPATIENT
Start: 2021-08-23 | End: 2021-12-22

## 2021-09-07 ENCOUNTER — TELEPHONE (OUTPATIENT)
Dept: INTERNAL MEDICINE | Facility: CLINIC | Age: 71
End: 2021-09-07

## 2021-09-07 RX ORDER — AZITHROMYCIN 250 MG/1
TABLET, FILM COATED ORAL
Qty: 6 TABLET | Refills: 0 | Status: SHIPPED | OUTPATIENT
Start: 2021-09-07 | End: 2021-11-19

## 2021-09-07 NOTE — TELEPHONE ENCOUNTER
PT HAS A SINUS INFECTION AND WANTS TO SEE IF YOU CAN CALL SOMETHING IN TO [facility]   [phone]

## 2021-09-07 NOTE — TELEPHONE ENCOUNTER
Spoke to patient she stated that she doesn't have any symptoms except the yellow greenish mucus/ Patient stated that she has been using the mucinex but is afraid its going to turn into bronchitis.

## 2021-10-08 ENCOUNTER — OFFICE VISIT (OUTPATIENT)
Dept: INTERNAL MEDICINE | Facility: CLINIC | Age: 71
End: 2021-10-08

## 2021-10-08 VITALS
OXYGEN SATURATION: 97 % | RESPIRATION RATE: 20 BRPM | SYSTOLIC BLOOD PRESSURE: 150 MMHG | HEART RATE: 87 BPM | TEMPERATURE: 97.5 F | BODY MASS INDEX: 30.01 KG/M2 | WEIGHT: 197.38 LBS | DIASTOLIC BLOOD PRESSURE: 77 MMHG

## 2021-10-08 DIAGNOSIS — M25.561 CHRONIC PAIN OF RIGHT KNEE: ICD-10-CM

## 2021-10-08 DIAGNOSIS — G89.29 CHRONIC PAIN OF RIGHT KNEE: ICD-10-CM

## 2021-10-08 DIAGNOSIS — I10 ESSENTIAL HYPERTENSION: Primary | ICD-10-CM

## 2021-10-08 DIAGNOSIS — E11.65 TYPE 2 DIABETES MELLITUS WITH HYPERGLYCEMIA, WITHOUT LONG-TERM CURRENT USE OF INSULIN (HCC): ICD-10-CM

## 2021-10-08 PROCEDURE — 99213 OFFICE O/P EST LOW 20 MIN: CPT | Performed by: INTERNAL MEDICINE

## 2021-10-08 NOTE — PROGRESS NOTES
Chief Complaint  Med Refill (4 month follow up)    Subjective    Lupe Morillo is a 71 y.o. female.     Lupe Morillo presents to [facility] for       History of Present Illness    The following portions of the patient's history were reviewed and updated as appropriate: allergies, current medications, past family history, past medical history, past social history, past surgical history and problem list.    1 diabetes-chronic and doing well.  Patient denies any polyuria, polydipsia, polyphagia, or any other sicknesses but continues on appropriate ADA diet    2 HTN- chronic     3 right knee pain - chronic     Review of Systems   All other systems reviewed and are negative.      Objective   Vital Signs:   /77   Pulse 87   Temp 97.5 °F (36.4 °C) (Temporal)   Resp 20   Wt 89.5 kg (197 lb 6 oz)   SpO2 97%   BMI 30.01 kg/m²     Body mass index is 30.01 kg/m².    Physical Exam  Vitals and nursing note reviewed.   Constitutional:       Appearance: Normal appearance. She is normal weight.   HENT:      Head: Normocephalic and atraumatic.      Nose: Nose normal.      Mouth/Throat:      Mouth: Mucous membranes are moist.   Eyes:      Pupils: Pupils are equal, round, and reactive to light.   Cardiovascular:      Rate and Rhythm: Normal rate.      Pulses: Normal pulses.      Heart sounds: Normal heart sounds.   Pulmonary:      Effort: Pulmonary effort is normal.      Breath sounds: Normal breath sounds.   Abdominal:      General: Bowel sounds are normal.      Palpations: Abdomen is soft.   Musculoskeletal:         General: Normal range of motion.      Cervical back: Normal range of motion and neck supple.   Skin:     General: Skin is warm.   Neurological:      Mental Status: She is alert.               Assessment and Plan  Diagnoses and all orders for this visit:      Diagnoses and all orders for this visit:    1. Essential hypertension (Primary) continue on  current blood pressure medications.    2. Type 2 diabetes mellitus with hyperglycemia, without long-term current use of insulin (HCC)-continue on current diabetes medications.    3. Chronic pain of right knee-patient will follow up with orthopedics for surgery scheduled in November

## 2021-10-18 ENCOUNTER — TELEPHONE (OUTPATIENT)
Dept: INTERNAL MEDICINE | Facility: CLINIC | Age: 71
End: 2021-10-18

## 2021-10-18 NOTE — TELEPHONE ENCOUNTER
Caller: IliaLupe    Relationship: Self    Best call back number: [phone]    What medication are you requesting: ANTIBIOTIC     What are your current symptoms: STUFFY NOSE, SCRATCHY THROAT, YELLOW MUCUS      How long have you been experiencing symptoms: 1 DAY    Have you had these symptoms before:    [x]Yes  [] No    Have you been treated for these symptoms before:   [x]Yes  [] No    If a prescription is needed, what is your preferred pharmacy and phone number: [facility] [address] [facility] [phone] [facility] [phone]      Additional notes: PATIENT THINKS SHE HAS A SINUS INFECTION AND NEEDS MEDICATION ASAP

## 2021-10-19 RX ORDER — AZITHROMYCIN 250 MG/1
TABLET, FILM COATED ORAL
Qty: 6 TABLET | Refills: 0 | Status: SHIPPED | OUTPATIENT
Start: 2021-10-19 | End: 2021-11-19

## 2021-10-19 NOTE — TELEPHONE ENCOUNTER
Please tell patient that a Z-Jmaie has been called in for her if she has no improvement after completing antibiotic she needs to make a follow-up appointment

## 2021-10-19 NOTE — TELEPHONE ENCOUNTER
Patient stated that she has tried using mucinex and delsym and getting no relief. Patient is requesting an antibiotic to be called in.

## 2021-10-20 RX ORDER — LEVOCETIRIZINE DIHYDROCHLORIDE 5 MG/1
TABLET, FILM COATED ORAL
Qty: 90 TABLET | Refills: 1 | Status: SHIPPED | OUTPATIENT
Start: 2021-10-20 | End: 2023-02-16 | Stop reason: SDUPTHER

## 2021-10-20 NOTE — TELEPHONE ENCOUNTER
Spoke to patient she stated that the z craig usually does not work for her she said that you called in something stronger and for longer days a year or two ago and that helped out much better but she was unsure what medication it was.

## 2021-10-21 RX ORDER — CEFDINIR 300 MG/1
300 CAPSULE ORAL 2 TIMES DAILY
Qty: 16 CAPSULE | Refills: 0 | Status: SHIPPED | OUTPATIENT
Start: 2021-10-21 | End: 2021-11-19

## 2021-10-25 DIAGNOSIS — I10 ESSENTIAL HYPERTENSION: ICD-10-CM

## 2021-10-25 RX ORDER — LOSARTAN POTASSIUM 100 MG/1
TABLET ORAL
Qty: 90 TABLET | Refills: 1 | Status: SHIPPED | OUTPATIENT
Start: 2021-10-25 | End: 2022-02-07

## 2021-10-25 RX ORDER — INSULIN DETEMIR 100 [IU]/ML
INJECTION, SOLUTION SUBCUTANEOUS
Qty: 15 PEN | Refills: 0 | Status: SHIPPED | OUTPATIENT
Start: 2021-10-25 | End: 2021-11-19

## 2021-11-02 ENCOUNTER — HOSPITAL ENCOUNTER (OUTPATIENT)
Dept: MAMMOGRAPHY | Facility: HOSPITAL | Age: 71
Discharge: HOME | End: 2021-11-02

## 2021-11-02 ENCOUNTER — HOSPITAL ENCOUNTER (OUTPATIENT)
Dept: BONE DENSITY | Facility: HOSPITAL | Age: 71
Discharge: HOME | End: 2021-11-02

## 2021-11-02 ENCOUNTER — APPOINTMENT (OUTPATIENT)
Dept: BONE DENSITY | Facility: HOSPITAL | Age: 71
End: 2021-11-02

## 2021-11-02 ENCOUNTER — TELEPHONE (OUTPATIENT)
Dept: INTERNAL MEDICINE | Facility: CLINIC | Age: 71
End: 2021-11-02

## 2021-11-02 DIAGNOSIS — T78.40XA ALLERGY, INITIAL ENCOUNTER: Primary | ICD-10-CM

## 2021-11-02 DIAGNOSIS — Z13.820 OSTEOPOROSIS SCREENING: ICD-10-CM

## 2021-11-02 DIAGNOSIS — Z12.31 VISIT FOR SCREENING MAMMOGRAM: ICD-10-CM

## 2021-11-02 PROCEDURE — 77063 BREAST TOMOSYNTHESIS BI: CPT

## 2021-11-02 PROCEDURE — 77067 SCR MAMMO BI INCL CAD: CPT | Performed by: RADIOLOGY

## 2021-11-02 PROCEDURE — 77067 SCR MAMMO BI INCL CAD: CPT

## 2021-11-02 PROCEDURE — 77063 BREAST TOMOSYNTHESIS BI: CPT | Performed by: RADIOLOGY

## 2021-11-02 PROCEDURE — 77080 DXA BONE DENSITY AXIAL: CPT

## 2021-11-02 NOTE — TELEPHONE ENCOUNTER
Pt wants  is retiring and wants to be referred to allergist at [facility].  Please contact pt when scheduled

## 2021-11-19 ENCOUNTER — HOSPITAL ENCOUNTER (OUTPATIENT)
Dept: GENERAL RADIOLOGY | Facility: HOSPITAL | Age: 71
Discharge: HOME | End: 2021-11-19

## 2021-11-19 ENCOUNTER — PRE-ADMISSION TESTING (OUTPATIENT)
Dept: PREADMISSION TESTING | Facility: HOSPITAL | Age: 71
End: 2021-11-19

## 2021-11-19 VITALS — WEIGHT: 196.65 LBS | BODY MASS INDEX: 29.8 KG/M2 | HEIGHT: 68 IN

## 2021-11-19 LAB
ABO GROUP BLD: NORMAL
ALBUMIN SERPL-MCNC: 4.9 G/DL (ref 3.5–5.2)
ALBUMIN/GLOB SERPL: 2.2 G/DL
ALP SERPL-CCNC: 107 U/L (ref 39–117)
ALT SERPL W P-5'-P-CCNC: 19 U/L (ref 1–33)
ANION GAP SERPL CALCULATED.3IONS-SCNC: 12 MMOL/L (ref 5–15)
AST SERPL-CCNC: 13 U/L (ref 1–32)
BASOPHILS # BLD AUTO: 0.05 10*3/MM3 (ref 0–0.2)
BASOPHILS NFR BLD AUTO: 0.7 % (ref 0–1.5)
BILIRUB SERPL-MCNC: 0.6 MG/DL (ref 0–1.2)
BLD GP AB SCN SERPL QL: NEGATIVE
BUN SERPL-MCNC: 21 MG/DL (ref 8–23)
BUN/CREAT SERPL: 25.9 (ref 7–25)
CALCIUM SPEC-SCNC: 9.6 MG/DL (ref 8.6–10.5)
CHLORIDE SERPL-SCNC: 104 MMOL/L (ref 98–107)
CO2 SERPL-SCNC: 23 MMOL/L (ref 22–29)
CREAT SERPL-MCNC: 0.81 MG/DL (ref 0.57–1)
CRP SERPL-MCNC: <0.3 MG/DL (ref 0–0.5)
DEPRECATED RDW RBC AUTO: 45.8 FL (ref 37–54)
EOSINOPHIL # BLD AUTO: 0.22 10*3/MM3 (ref 0–0.4)
EOSINOPHIL NFR BLD AUTO: 3 % (ref 0.3–6.2)
ERYTHROCYTE [DISTWIDTH] IN BLOOD BY AUTOMATED COUNT: 13.5 % (ref 12.3–15.4)
ERYTHROCYTE [SEDIMENTATION RATE] IN BLOOD: 21 MM/HR (ref 0–30)
GFR SERPL CREATININE-BSD FRML MDRD: 85 ML/MIN/1.73
GLOBULIN UR ELPH-MCNC: 2.2 GM/DL
GLUCOSE SERPL-MCNC: 119 MG/DL (ref 65–99)
HBA1C MFR BLD: 7.8 % (ref 4.8–5.6)
HCT VFR BLD AUTO: 41.8 % (ref 34–46.6)
HGB BLD-MCNC: 13.4 G/DL (ref 12–15.9)
IMM GRANULOCYTES # BLD AUTO: 0.02 10*3/MM3 (ref 0–0.05)
IMM GRANULOCYTES NFR BLD AUTO: 0.3 % (ref 0–0.5)
LYMPHOCYTES # BLD AUTO: 2.81 10*3/MM3 (ref 0.7–3.1)
LYMPHOCYTES NFR BLD AUTO: 38.2 % (ref 19.6–45.3)
MCH RBC QN AUTO: 29.5 PG (ref 26.6–33)
MCHC RBC AUTO-ENTMCNC: 32.1 G/DL (ref 31.5–35.7)
MCV RBC AUTO: 91.9 FL (ref 79–97)
MONOCYTES # BLD AUTO: 0.41 10*3/MM3 (ref 0.1–0.9)
MONOCYTES NFR BLD AUTO: 5.6 % (ref 5–12)
NEUTROPHILS NFR BLD AUTO: 3.84 10*3/MM3 (ref 1.7–7)
NEUTROPHILS NFR BLD AUTO: 52.2 % (ref 42.7–76)
NRBC BLD AUTO-RTO: 0 /100 WBC (ref 0–0.2)
PLATELET # BLD AUTO: 290 10*3/MM3 (ref 140–450)
PMV BLD AUTO: 10.1 FL (ref 6–12)
POTASSIUM SERPL-SCNC: 4.1 MMOL/L (ref 3.5–5.2)
PROT SERPL-MCNC: 7.1 G/DL (ref 6–8.5)
RBC # BLD AUTO: 4.55 10*6/MM3 (ref 3.77–5.28)
RH BLD: POSITIVE
SARS-COV-2 RNA PNL SPEC NAA+PROBE: NOT DETECTED
SODIUM SERPL-SCNC: 139 MMOL/L (ref 136–145)
T&S EXPIRATION DATE: NORMAL
WBC NRBC COR # BLD: 7.35 10*3/MM3 (ref 3.4–10.8)

## 2021-11-19 PROCEDURE — 86140 C-REACTIVE PROTEIN: CPT

## 2021-11-19 PROCEDURE — 86850 RBC ANTIBODY SCREEN: CPT

## 2021-11-19 PROCEDURE — 86900 BLOOD TYPING SEROLOGIC ABO: CPT

## 2021-11-19 PROCEDURE — 85652 RBC SED RATE AUTOMATED: CPT

## 2021-11-19 PROCEDURE — U0004 COV-19 TEST NON-CDC HGH THRU: HCPCS

## 2021-11-19 PROCEDURE — 71046 X-RAY EXAM CHEST 2 VIEWS: CPT

## 2021-11-19 PROCEDURE — 83036 HEMOGLOBIN GLYCOSYLATED A1C: CPT

## 2021-11-19 PROCEDURE — 36415 COLL VENOUS BLD VENIPUNCTURE: CPT

## 2021-11-19 PROCEDURE — 85025 COMPLETE CBC W/AUTO DIFF WBC: CPT

## 2021-11-19 PROCEDURE — 86901 BLOOD TYPING SEROLOGIC RH(D): CPT

## 2021-11-19 PROCEDURE — 80053 COMPREHEN METABOLIC PANEL: CPT

## 2021-11-19 PROCEDURE — C9803 HOPD COVID-19 SPEC COLLECT: HCPCS

## 2021-11-19 ASSESSMENT — KOOS JR
KOOS JR SCORE: 23
KOOS JR SCORE: 28.251

## 2021-11-20 ENCOUNTER — ANESTHESIA EVENT (OUTPATIENT)
Dept: PERIOP | Facility: HOSPITAL | Age: 71
End: 2021-11-20

## 2021-11-20 RX ORDER — FAMOTIDINE 10 MG/ML
20 INJECTION, SOLUTION INTRAVENOUS ONCE
Status: CANCELLED | OUTPATIENT
Start: 2021-11-20 | End: 2021-11-20

## 2021-11-22 ENCOUNTER — HOSPITAL ENCOUNTER (OUTPATIENT)
Facility: HOSPITAL | Age: 71
Discharge: HOME | End: 2021-11-24
Attending: ORTHOPAEDIC SURGERY | Admitting: ORTHOPAEDIC SURGERY

## 2021-11-22 ENCOUNTER — APPOINTMENT (OUTPATIENT)
Dept: GENERAL RADIOLOGY | Facility: HOSPITAL | Age: 71
End: 2021-11-22

## 2021-11-22 ENCOUNTER — ANESTHESIA (OUTPATIENT)
Dept: PERIOP | Facility: HOSPITAL | Age: 71
End: 2021-11-22

## 2021-11-22 ENCOUNTER — ANESTHESIA EVENT CONVERTED (OUTPATIENT)
Dept: ANESTHESIOLOGY | Facility: HOSPITAL | Age: 71
End: 2021-11-22

## 2021-11-22 DIAGNOSIS — G89.18 ACUTE POSTOPERATIVE PAIN: ICD-10-CM

## 2021-11-22 DIAGNOSIS — M17.11 ARTHRITIS OF RIGHT KNEE: ICD-10-CM

## 2021-11-22 DIAGNOSIS — M19.90 ARTHRITIS: ICD-10-CM

## 2021-11-22 DIAGNOSIS — Z96.651 S/P TOTAL KNEE ARTHROPLASTY, RIGHT: Primary | ICD-10-CM

## 2021-11-22 LAB
GLUCOSE BLDC GLUCOMTR-MCNC: 114 MG/DL (ref 70–130)
GLUCOSE BLDC GLUCOMTR-MCNC: 121 MG/DL (ref 70–130)
GLUCOSE BLDC GLUCOMTR-MCNC: 164 MG/DL (ref 70–130)
GLUCOSE BLDC GLUCOMTR-MCNC: 211 MG/DL (ref 70–130)
GLUCOSE BLDC GLUCOMTR-MCNC: 214 MG/DL (ref 70–130)

## 2021-11-22 PROCEDURE — C1755 CATHETER, INTRASPINAL: HCPCS | Performed by: ORTHOPAEDIC SURGERY

## 2021-11-22 PROCEDURE — 94640 AIRWAY INHALATION TREATMENT: CPT

## 2021-11-22 PROCEDURE — 25010000002 ONDANSETRON PER 1 MG: Performed by: ORTHOPAEDIC SURGERY

## 2021-11-22 PROCEDURE — 25010000002 PROPOFOL 10 MG/ML EMULSION: Performed by: NURSE ANESTHETIST, CERTIFIED REGISTERED

## 2021-11-22 PROCEDURE — 25010000002 DEXAMETHASONE PER 1 MG: Performed by: NURSE ANESTHETIST, CERTIFIED REGISTERED

## 2021-11-22 PROCEDURE — 94799 UNLISTED PULMONARY SVC/PX: CPT

## 2021-11-22 PROCEDURE — 0 CEFAZOLIN IN DEXTROSE 2-4 GM/100ML-% SOLUTION: Performed by: ORTHOPAEDIC SURGERY

## 2021-11-22 PROCEDURE — 97161 PT EVAL LOW COMPLEX 20 MIN: CPT

## 2021-11-22 PROCEDURE — 25010000002 ONDANSETRON PER 1 MG: Performed by: NURSE ANESTHETIST, CERTIFIED REGISTERED

## 2021-11-22 PROCEDURE — 97116 GAIT TRAINING THERAPY: CPT

## 2021-11-22 PROCEDURE — 25010000002 FENTANYL CITRATE (PF) 50 MCG/ML SOLUTION

## 2021-11-22 PROCEDURE — 25010000002 HYDROMORPHONE PER 4 MG: Performed by: ORTHOPAEDIC SURGERY

## 2021-11-22 PROCEDURE — C1889 IMPLANT/INSERT DEVICE, NOC: HCPCS | Performed by: ORTHOPAEDIC SURGERY

## 2021-11-22 PROCEDURE — 25010000002 ROPIVACAINE PER 1 MG: Performed by: ORTHOPAEDIC SURGERY

## 2021-11-22 PROCEDURE — C1776 JOINT DEVICE (IMPLANTABLE): HCPCS | Performed by: ORTHOPAEDIC SURGERY

## 2021-11-22 PROCEDURE — 63710000001 INSULIN DETEMIR PER 5 UNITS: Performed by: INTERNAL MEDICINE

## 2021-11-22 PROCEDURE — 25010000002 ROPIVACAINE PER 1 MG: Performed by: NURSE ANESTHETIST, CERTIFIED REGISTERED

## 2021-11-22 PROCEDURE — 25010000002 EPINEPHRINE PER 0.1 MG: Performed by: ORTHOPAEDIC SURGERY

## 2021-11-22 PROCEDURE — 27447 TOTAL KNEE ARTHROPLASTY: CPT

## 2021-11-22 PROCEDURE — C1713 ANCHOR/SCREW BN/BN,TIS/BN: HCPCS | Performed by: ORTHOPAEDIC SURGERY

## 2021-11-22 PROCEDURE — 63710000001 INSULIN LISPRO (HUMAN) PER 5 UNITS: Performed by: INTERNAL MEDICINE

## 2021-11-22 PROCEDURE — 82962 GLUCOSE BLOOD TEST: CPT

## 2021-11-22 PROCEDURE — 73560 X-RAY EXAM OF KNEE 1 OR 2: CPT

## 2021-11-22 DEVICE — CMT BONE PALACOS R HI/VISC 1X40: Type: IMPLANTABLE DEVICE | Site: KNEE | Status: FUNCTIONAL

## 2021-11-22 DEVICE — GENESIS II NON-POROUS TIBIAL                                    BASEPLATE SIZE 4 RIGHT
Type: IMPLANTABLE DEVICE | Site: KNEE | Status: FUNCTIONAL
Brand: GENESIS II

## 2021-11-22 DEVICE — GENESIS II RESURFACING PATELLAR                                    PROSTHESIS  32MM
Type: IMPLANTABLE DEVICE | Site: KNEE | Status: FUNCTIONAL
Brand: GENESIS II

## 2021-11-22 DEVICE — LEGION CRUCIATE RETAINING OXINIUM                                    FEMORAL SIZE 5 RIGHT
Type: IMPLANTABLE DEVICE | Site: KNEE | Status: FUNCTIONAL
Brand: LEGION

## 2021-11-22 DEVICE — IMPLANTABLE DEVICE: Type: IMPLANTABLE DEVICE | Site: KNEE | Status: FUNCTIONAL

## 2021-11-22 DEVICE — LEGION CRUCIATE RETAINING HIGH                                    FLEX HIGHLY CROSS LINKED                                    POLYETHYLENE SIZE 3-4 9MM
Type: IMPLANTABLE DEVICE | Site: KNEE | Status: FUNCTIONAL
Brand: LEGION

## 2021-11-22 DEVICE — DEV CONTRL TISS STRATAFIX SYMM PDS PLUS VIL CT-1 45CM: Type: IMPLANTABLE DEVICE | Site: KNEE | Status: FUNCTIONAL

## 2021-11-22 RX ORDER — ACETAMINOPHEN 500 MG
1000 TABLET ORAL EVERY 8 HOURS
Status: DISCONTINUED | OUTPATIENT
Start: 2021-11-22 | End: 2021-11-24 | Stop reason: HOSPADM

## 2021-11-22 RX ORDER — ONDANSETRON 4 MG/1
4 TABLET, FILM COATED ORAL EVERY 6 HOURS PRN
Status: DISCONTINUED | OUTPATIENT
Start: 2021-11-22 | End: 2021-11-24 | Stop reason: HOSPADM

## 2021-11-22 RX ORDER — NALOXONE HCL 0.4 MG/ML
0.1 VIAL (ML) INJECTION
Status: DISCONTINUED | OUTPATIENT
Start: 2021-11-22 | End: 2021-11-24 | Stop reason: HOSPADM

## 2021-11-22 RX ORDER — ONDANSETRON 2 MG/ML
INJECTION INTRAMUSCULAR; INTRAVENOUS AS NEEDED
Status: DISCONTINUED | OUTPATIENT
Start: 2021-11-22 | End: 2021-11-22 | Stop reason: SURG

## 2021-11-22 RX ORDER — OXYBUTYNIN CHLORIDE 5 MG/1
5 TABLET, EXTENDED RELEASE ORAL DAILY
Status: DISCONTINUED | OUTPATIENT
Start: 2021-11-22 | End: 2021-11-24 | Stop reason: HOSPADM

## 2021-11-22 RX ORDER — SODIUM CHLORIDE 0.9 % (FLUSH) 0.9 %
10 SYRINGE (ML) INJECTION AS NEEDED
Status: DISCONTINUED | OUTPATIENT
Start: 2021-11-22 | End: 2021-11-22 | Stop reason: HOSPADM

## 2021-11-22 RX ORDER — MIDAZOLAM HYDROCHLORIDE 1 MG/ML
0.5 INJECTION INTRAMUSCULAR; INTRAVENOUS
Status: DISCONTINUED | OUTPATIENT
Start: 2021-11-22 | End: 2021-11-22 | Stop reason: HOSPADM

## 2021-11-22 RX ORDER — SODIUM CHLORIDE 0.9 % (FLUSH) 0.9 %
10 SYRINGE (ML) INJECTION EVERY 12 HOURS SCHEDULED
Status: DISCONTINUED | OUTPATIENT
Start: 2021-11-22 | End: 2021-11-22 | Stop reason: HOSPADM

## 2021-11-22 RX ORDER — BUPIVACAINE HYDROCHLORIDE 2.5 MG/ML
INJECTION, SOLUTION EPIDURAL; INFILTRATION; INTRACAUDAL
Status: COMPLETED | OUTPATIENT
Start: 2021-11-22 | End: 2021-11-22

## 2021-11-22 RX ORDER — HYDROMORPHONE HYDROCHLORIDE 1 MG/ML
0.5 INJECTION, SOLUTION INTRAMUSCULAR; INTRAVENOUS; SUBCUTANEOUS
Status: DISCONTINUED | OUTPATIENT
Start: 2021-11-22 | End: 2021-11-24 | Stop reason: HOSPADM

## 2021-11-22 RX ORDER — BUPIVACAINE HYDROCHLORIDE 5 MG/ML
INJECTION, SOLUTION PERINEURAL
Status: COMPLETED | OUTPATIENT
Start: 2021-11-22 | End: 2021-11-22

## 2021-11-22 RX ORDER — FENTANYL CITRATE 50 UG/ML
INJECTION, SOLUTION INTRAMUSCULAR; INTRAVENOUS
Status: COMPLETED
Start: 2021-11-22 | End: 2021-11-22

## 2021-11-22 RX ORDER — SODIUM CHLORIDE 0.9 % (FLUSH) 0.9 %
3 SYRINGE (ML) INJECTION EVERY 12 HOURS SCHEDULED
Status: DISCONTINUED | OUTPATIENT
Start: 2021-11-22 | End: 2021-11-24 | Stop reason: HOSPADM

## 2021-11-22 RX ORDER — ONDANSETRON 2 MG/ML
4 INJECTION INTRAMUSCULAR; INTRAVENOUS EVERY 6 HOURS PRN
Status: DISCONTINUED | OUTPATIENT
Start: 2021-11-22 | End: 2021-11-24 | Stop reason: HOSPADM

## 2021-11-22 RX ORDER — ASPIRIN 325 MG
325 TABLET ORAL DAILY
Status: DISCONTINUED | OUTPATIENT
Start: 2021-11-23 | End: 2021-11-24 | Stop reason: HOSPADM

## 2021-11-22 RX ORDER — NICOTINE POLACRILEX 4 MG
15 LOZENGE BUCCAL
Status: DISCONTINUED | OUTPATIENT
Start: 2021-11-22 | End: 2021-11-24 | Stop reason: HOSPADM

## 2021-11-22 RX ORDER — POLYETHYLENE GLYCOL 3350 17 G/17G
17 POWDER, FOR SOLUTION ORAL DAILY
Status: DISCONTINUED | OUTPATIENT
Start: 2021-11-22 | End: 2021-11-24 | Stop reason: HOSPADM

## 2021-11-22 RX ORDER — DEXAMETHASONE SODIUM PHOSPHATE 4 MG/ML
INJECTION, SOLUTION INTRA-ARTICULAR; INTRALESIONAL; INTRAMUSCULAR; INTRAVENOUS; SOFT TISSUE AS NEEDED
Status: DISCONTINUED | OUTPATIENT
Start: 2021-11-22 | End: 2021-11-22 | Stop reason: SURG

## 2021-11-22 RX ORDER — LIDOCAINE HYDROCHLORIDE 10 MG/ML
0.5 INJECTION, SOLUTION EPIDURAL; INFILTRATION; INTRACAUDAL; PERINEURAL ONCE AS NEEDED
Status: DISCONTINUED | OUTPATIENT
Start: 2021-11-22 | End: 2021-11-22 | Stop reason: HOSPADM

## 2021-11-22 RX ORDER — FAMOTIDINE 20 MG/1
20 TABLET, FILM COATED ORAL ONCE
Status: COMPLETED | OUTPATIENT
Start: 2021-11-22 | End: 2021-11-22

## 2021-11-22 RX ORDER — HYDROMORPHONE HYDROCHLORIDE 1 MG/ML
0.5 INJECTION, SOLUTION INTRAMUSCULAR; INTRAVENOUS; SUBCUTANEOUS
Status: DISCONTINUED | OUTPATIENT
Start: 2021-11-22 | End: 2021-11-22 | Stop reason: HOSPADM

## 2021-11-22 RX ORDER — SODIUM CHLORIDE 9 MG/ML
150 INJECTION, SOLUTION INTRAVENOUS CONTINUOUS
Status: DISCONTINUED | OUTPATIENT
Start: 2021-11-22 | End: 2021-11-24 | Stop reason: HOSPADM

## 2021-11-22 RX ORDER — PROPOFOL 10 MG/ML
VIAL (ML) INTRAVENOUS AS NEEDED
Status: DISCONTINUED | OUTPATIENT
Start: 2021-11-22 | End: 2021-11-22 | Stop reason: SURG

## 2021-11-22 RX ORDER — SODIUM CHLORIDE, SODIUM LACTATE, POTASSIUM CHLORIDE, CALCIUM CHLORIDE 600; 310; 30; 20 MG/100ML; MG/100ML; MG/100ML; MG/100ML
9 INJECTION, SOLUTION INTRAVENOUS CONTINUOUS
Status: DISCONTINUED | OUTPATIENT
Start: 2021-11-22 | End: 2021-11-24 | Stop reason: HOSPADM

## 2021-11-22 RX ORDER — MONTELUKAST SODIUM 10 MG/1
10 TABLET ORAL NIGHTLY
Status: DISCONTINUED | OUTPATIENT
Start: 2021-11-22 | End: 2021-11-24 | Stop reason: HOSPADM

## 2021-11-22 RX ORDER — DEXTROSE MONOHYDRATE 25 G/50ML
25 INJECTION, SOLUTION INTRAVENOUS
Status: DISCONTINUED | OUTPATIENT
Start: 2021-11-22 | End: 2021-11-24 | Stop reason: HOSPADM

## 2021-11-22 RX ORDER — FENTANYL CITRATE 50 UG/ML
50 INJECTION, SOLUTION INTRAMUSCULAR; INTRAVENOUS
Status: DISCONTINUED | OUTPATIENT
Start: 2021-11-22 | End: 2021-11-22 | Stop reason: HOSPADM

## 2021-11-22 RX ORDER — SODIUM CHLORIDE 0.9 % (FLUSH) 0.9 %
3-10 SYRINGE (ML) INJECTION AS NEEDED
Status: DISCONTINUED | OUTPATIENT
Start: 2021-11-22 | End: 2021-11-24 | Stop reason: HOSPADM

## 2021-11-22 RX ORDER — BUDESONIDE AND FORMOTEROL FUMARATE DIHYDRATE 160; 4.5 UG/1; UG/1
2 AEROSOL RESPIRATORY (INHALATION)
Status: DISCONTINUED | OUTPATIENT
Start: 2021-11-22 | End: 2021-11-24 | Stop reason: HOSPADM

## 2021-11-22 RX ORDER — LABETALOL HYDROCHLORIDE 5 MG/ML
10 INJECTION, SOLUTION INTRAVENOUS EVERY 4 HOURS PRN
Status: DISCONTINUED | OUTPATIENT
Start: 2021-11-22 | End: 2021-11-24 | Stop reason: HOSPADM

## 2021-11-22 RX ORDER — CEFAZOLIN SODIUM 2 G/100ML
2 INJECTION, SOLUTION INTRAVENOUS ONCE
Status: COMPLETED | OUTPATIENT
Start: 2021-11-22 | End: 2021-11-22

## 2021-11-22 RX ORDER — LIDOCAINE HYDROCHLORIDE 10 MG/ML
INJECTION, SOLUTION EPIDURAL; INFILTRATION; INTRACAUDAL; PERINEURAL AS NEEDED
Status: DISCONTINUED | OUTPATIENT
Start: 2021-11-22 | End: 2021-11-22 | Stop reason: SURG

## 2021-11-22 RX ORDER — PREGABALIN 75 MG/1
75 CAPSULE ORAL ONCE
Status: COMPLETED | OUTPATIENT
Start: 2021-11-22 | End: 2021-11-22

## 2021-11-22 RX ORDER — PROMETHAZINE HYDROCHLORIDE 12.5 MG/1
12.5 TABLET ORAL EVERY 6 HOURS PRN
Status: DISCONTINUED | OUTPATIENT
Start: 2021-11-22 | End: 2021-11-24 | Stop reason: HOSPADM

## 2021-11-22 RX ORDER — ALBUTEROL SULFATE 2.5 MG/3ML
2.52 SOLUTION RESPIRATORY (INHALATION) EVERY 6 HOURS PRN
Status: DISCONTINUED | OUTPATIENT
Start: 2021-11-22 | End: 2021-11-24 | Stop reason: HOSPADM

## 2021-11-22 RX ORDER — HYDROCODONE BITARTRATE AND ACETAMINOPHEN 7.5; 325 MG/1; MG/1
1 TABLET ORAL EVERY 4 HOURS PRN
Status: DISCONTINUED | OUTPATIENT
Start: 2021-11-22 | End: 2021-11-24 | Stop reason: HOSPADM

## 2021-11-22 RX ORDER — CEFAZOLIN SODIUM 2 G/100ML
2 INJECTION, SOLUTION INTRAVENOUS EVERY 8 HOURS
Status: COMPLETED | OUTPATIENT
Start: 2021-11-22 | End: 2021-11-23

## 2021-11-22 RX ORDER — OXYCODONE HYDROCHLORIDE 5 MG/1
5 TABLET ORAL EVERY 4 HOURS PRN
Status: DISCONTINUED | OUTPATIENT
Start: 2021-11-22 | End: 2021-11-22

## 2021-11-22 RX ORDER — LOSARTAN POTASSIUM 50 MG/1
100 TABLET ORAL DAILY
Status: DISCONTINUED | OUTPATIENT
Start: 2021-11-22 | End: 2021-11-24 | Stop reason: HOSPADM

## 2021-11-22 RX ORDER — CETIRIZINE HYDROCHLORIDE 10 MG/1
10 TABLET ORAL DAILY
Refills: 1 | Status: DISCONTINUED | OUTPATIENT
Start: 2021-11-22 | End: 2021-11-24 | Stop reason: HOSPADM

## 2021-11-22 RX ORDER — MELOXICAM 7.5 MG/1
15 TABLET ORAL DAILY
Status: DISCONTINUED | OUTPATIENT
Start: 2021-11-22 | End: 2021-11-24 | Stop reason: HOSPADM

## 2021-11-22 RX ADMIN — LOSARTAN POTASSIUM 100 MG: 50 TABLET, FILM COATED ORAL at 12:04

## 2021-11-22 RX ADMIN — FAMOTIDINE 20 MG: 20 TABLET ORAL at 07:17

## 2021-11-22 RX ADMIN — DEXAMETHASONE SODIUM PHOSPHATE 8 MG: 4 INJECTION, SOLUTION INTRA-ARTICULAR; INTRALESIONAL; INTRAMUSCULAR; INTRAVENOUS; SOFT TISSUE at 08:03

## 2021-11-22 RX ADMIN — MONTELUKAST SODIUM 10 MG: 10 TABLET, COATED ORAL at 20:18

## 2021-11-22 RX ADMIN — FENTANYL CITRATE 50 MCG: 50 INJECTION, SOLUTION INTRAMUSCULAR; INTRAVENOUS at 10:03

## 2021-11-22 RX ADMIN — ONDANSETRON 4 MG: 2 INJECTION INTRAMUSCULAR; INTRAVENOUS at 15:12

## 2021-11-22 RX ADMIN — ACETAMINOPHEN 1000 MG: 500 TABLET, FILM COATED ORAL at 20:18

## 2021-11-22 RX ADMIN — BUPIVACAINE HYDROCHLORIDE 2 ML: 5 INJECTION, SOLUTION PERINEURAL at 07:55

## 2021-11-22 RX ADMIN — INSULIN DETEMIR 15 UNITS: 100 INJECTION, SOLUTION SUBCUTANEOUS at 13:56

## 2021-11-22 RX ADMIN — SODIUM CHLORIDE, PRESERVATIVE FREE 3 ML: 5 INJECTION INTRAVENOUS at 11:08

## 2021-11-22 RX ADMIN — CEFAZOLIN SODIUM 2 G: 2 INJECTION, SOLUTION INTRAVENOUS at 15:53

## 2021-11-22 RX ADMIN — PREGABALIN 75 MG: 75 CAPSULE ORAL at 07:17

## 2021-11-22 RX ADMIN — PROPOFOL 88 MCG/KG/MIN: 10 INJECTION, EMULSION INTRAVENOUS at 08:01

## 2021-11-22 RX ADMIN — POLYETHYLENE GLYCOL 3350 17 G: 17 POWDER, FOR SOLUTION ORAL at 11:09

## 2021-11-22 RX ADMIN — INSULIN LISPRO 5 UNITS: 100 INJECTION, SOLUTION INTRAVENOUS; SUBCUTANEOUS at 17:47

## 2021-11-22 RX ADMIN — PROPOFOL 50 MG: 10 INJECTION, EMULSION INTRAVENOUS at 07:52

## 2021-11-22 RX ADMIN — INSULIN LISPRO 5 UNITS: 100 INJECTION, SOLUTION INTRAVENOUS; SUBCUTANEOUS at 12:06

## 2021-11-22 RX ADMIN — INSULIN LISPRO 3 UNITS: 100 INJECTION, SOLUTION INTRAVENOUS; SUBCUTANEOUS at 17:47

## 2021-11-22 RX ADMIN — HYDROMORPHONE HYDROCHLORIDE 0.5 MG: 1 INJECTION, SOLUTION INTRAMUSCULAR; INTRAVENOUS; SUBCUTANEOUS at 14:35

## 2021-11-22 RX ADMIN — BUDESONIDE AND FORMOTEROL FUMARATE DIHYDRATE 2 PUFF: 160; 4.5 AEROSOL RESPIRATORY (INHALATION) at 20:51

## 2021-11-22 RX ADMIN — CETIRIZINE HYDROCHLORIDE 10 MG: 10 TABLET, FILM COATED ORAL at 11:09

## 2021-11-22 RX ADMIN — INSULIN LISPRO 2 UNITS: 100 INJECTION, SOLUTION INTRAVENOUS; SUBCUTANEOUS at 12:04

## 2021-11-22 RX ADMIN — OXYBUTYNIN CHLORIDE 5 MG: 5 TABLET, EXTENDED RELEASE ORAL at 11:09

## 2021-11-22 RX ADMIN — ROPIVACAINE HYDROCHLORIDE 10 ML/HR: 2 INJECTION, SOLUTION EPIDURAL; INFILTRATION at 09:55

## 2021-11-22 RX ADMIN — OXYCODONE 5 MG: 5 TABLET ORAL at 13:56

## 2021-11-22 RX ADMIN — SODIUM CHLORIDE 150 ML/HR: 9 INJECTION, SOLUTION INTRAVENOUS at 11:08

## 2021-11-22 RX ADMIN — MELOXICAM 15 MG: 7.5 TABLET ORAL at 11:08

## 2021-11-22 RX ADMIN — MUPIROCIN 1 APPLICATION: 20 OINTMENT TOPICAL at 07:17

## 2021-11-22 RX ADMIN — BUPIVACAINE HYDROCHLORIDE 30 ML: 2.5 INJECTION, SOLUTION EPIDURAL; INFILTRATION; INTRACAUDAL; PERINEURAL at 09:57

## 2021-11-22 RX ADMIN — SODIUM CHLORIDE 150 ML/HR: 9 INJECTION, SOLUTION INTRAVENOUS at 17:46

## 2021-11-22 RX ADMIN — CEFAZOLIN SODIUM 2 G: 2 INJECTION, SOLUTION INTRAVENOUS at 07:48

## 2021-11-22 RX ADMIN — ONDANSETRON 4 MG: 2 INJECTION INTRAMUSCULAR; INTRAVENOUS at 09:08

## 2021-11-22 RX ADMIN — LIDOCAINE HYDROCHLORIDE 50 MG: 10 INJECTION, SOLUTION EPIDURAL; INFILTRATION; INTRACAUDAL; PERINEURAL at 07:52

## 2021-11-22 RX ADMIN — HYDROCODONE BITARTRATE AND ACETAMINOPHEN 1 TABLET: 7.5; 325 TABLET ORAL at 15:53

## 2021-11-22 RX ADMIN — ACETAMINOPHEN 1000 MG: 500 TABLET, FILM COATED ORAL at 13:56

## 2021-11-22 RX ADMIN — HYDROCODONE BITARTRATE AND ACETAMINOPHEN 1 TABLET: 7.5; 325 TABLET ORAL at 20:19

## 2021-11-22 NOTE — ANESTHESIA POSTPROCEDURE EVALUATION
Patient: Lupe Morillo    Procedure Summary     Date: 11/22/21 [facility] / [facility]:  [facility]    Anesthesia Start: 0748 Anesthesia Stop: 0938    Procedure: TOTAL KNEE ARTHROPLASTY RIGHT (Right Knee) Diagnosis:     Surgeons: Ariel Leyva MD Provider: James Oates MD    Anesthesia Type: spinal ASA Status: 3          Anesthesia Type: spinal    Vitals  Vitals Value Taken Time   /52 11/22/21 0936   Temp 98 °F (36.7 °C) 11/22/21 0936   Pulse 92 11/22/21 0936   Resp 13 11/22/21 0936   SpO2 93 % 11/22/21 0937   Vitals shown include unvalidated device data.        Post Anesthesia Care and Evaluation    Patient location during evaluation: PACU  Patient participation: complete - patient participated  Level of consciousness: awake and alert  Pain score: 0  Pain management: adequate  Airway patency: patent  Anesthetic complications: No anesthetic complications  PONV Status: none  Cardiovascular status: hemodynamically stable and acceptable  Respiratory status: nonlabored ventilation, acceptable and nasal cannula  Hydration status: acceptable

## 2021-11-22 NOTE — ANESTHESIA PROCEDURE NOTES
RIGHT ADDUCTOR CANAL CATHETER      Patient reassessed immediately prior to procedure    Patient location during procedure: post-op  Reason for block: at surgeon's request and post-op pain management  Performed by  CRNA: Maurilio Plaza, CRNA  Assisted by: Geetha Rivera SRNA  Preanesthetic Checklist  Completed: patient identified, IV checked, site marked, risks and benefits discussed, surgical consent, monitors and equipment checked, pre-op evaluation and timeout performed  Prep:  Pt Position: supine  Sterile barriers:cap, gloves, mask and sterile barriers  Prep: ChloraPrep  Patient monitoring: blood pressure monitoring, continuous pulse oximetry and EKG  Procedure  Performed under: spinal  Guidance:ultrasound guided  Images:still images obtained, printed/placed on chart    Laterality:right  Block Type:adductor canal block  Injection Technique:catheter  Needle Type:Tuohy and echogenic  Needle Gauge:18 G  Resistance on Injection: none  Catheter Size:20 G (20g)  Cath Depth at skin: 12 cm    Medications Used: bupivacaine PF (MARCAINE) 0.25 % injection, 30 mL  Med administered at 11/22/2021 9:57 AM      Post Assessment  Injection Assessment: negative aspiration for heme, incremental injection and no paresthesia on injection  Patient Tolerance:comfortable throughout block  Complications:no  Additional Notes  Procedure:             The pt was placed in the Supine position.  The Insertion site was  prepped and Draped in sterile fashion.  The pt was anesthetized with  IV Sedation( see meds).  Skin and cutaneous tissue was infiltrated and anesthetized with 1% Lidocaine 3 mls via a 25g needle.  A BBraun 4 inch 18g echogenic needle was then  inserted approximately midline, mid-thigh and advanced In-plane with Ultrasound guidance.  Normal Saline PSF was utilized for hydrodissection of tissue.  The Vastus medialis and Sartorius muscle where visualized and the needle tip was placed in the adductor canal,  lateral to the femoral  artery.  LA injection spread was visualized, injection was incremental 1-5ml, injection pressure was normal or little, no intraneural injection, no vascular injection.  LA dose was injected thru the needle(see dose above).  A BBraun 20g wire stylet catheter was placed via the needle with ultrasound visualization and confirmation with NS fluid bolus. The catheter insertion site was sealed with exofin tissue adhesive. The labeled catheter was then coiled and secured to skin with benzoin,  steristrips and CHG transparent dressing.  Appropriate labels were applied.  Thank you.

## 2021-11-22 NOTE — ANESTHESIA PREPROCEDURE EVALUATION
Anesthesia Evaluation     Patient summary reviewed and Nursing notes reviewed   history of anesthetic complications: PONV               Airway   Mallampati: II  TM distance: >3 FB  Neck ROM: full  No difficulty expected  Dental - normal exam     Pulmonary - normal exam   (+) asthma,sleep apnea on CPAP,   Cardiovascular - normal exam    (+) hypertension, hyperlipidemia,       Neuro/Psych  (+) numbness,     GI/Hepatic/Renal/Endo    (+)  GERD,  diabetes mellitus,     Musculoskeletal     (+) back pain,   Abdominal  - normal exam    Bowel sounds: normal.   Substance History - negative use     OB/GYN negative ob/gyn ROS         Other   arthritis,                    Anesthesia Plan    ASA 3     spinal   (ADDUCTOR CANAL CATHETER FOR POSTOP PAIN)  intravenous induction     Anesthetic plan, all risks, benefits, and alternatives have been provided, discussed and informed consent has been obtained with: patient.    Plan discussed with CRNA.

## 2021-11-22 NOTE — ANESTHESIA PROCEDURE NOTES
Spinal Block      Patient reassessed immediately prior to procedure    Patient location during procedure: OR  Indication:at surgeon's request  Performed By  CRNA: Sil Brandon CRNA  Preanesthetic Checklist  Completed: patient identified, IV checked, site marked, risks and benefits discussed, surgical consent, monitors and equipment checked, pre-op evaluation and timeout performed  Spinal Block Prep:  Patient Position:sitting  Sterile Tech:cap, gloves, sterile barriers and mask  Prep:Chloraprep  Patient Monitoring:blood pressure monitoring, continuous pulse oximetry and EKG  Spinal Block Procedure  Approach:midline  Guidance:landmark technique and palpation technique  Location:L4-L5  Needle Type:Sprotte  Needle Gauge:25 G  Placement of Spinal needle event:cerebrospinal fluid aspirated  Paresthesia: no  Fluid Appearance:clear  Medications: bupivacaine (MARCAINE) 0.5 % injection, 2 mL  Med Administered at 11/22/2021 7:55 AM   Post Assessment  Patient Tolerance:patient tolerated the procedure well with no apparent complications  Complications no  Additional Notes  Procedure:  Pt assisted to sitting position, with legs in position of comfort over side of bed.  Pt. instructed in optimal spine presentation, the spine was prepped/ Draped and the skin at insertion site was anesthetized with 1% Lidocaine 2 ml.  The spinal needle was then advanced until CSF flow was obtained and LA was injected:

## 2021-11-23 LAB
ANION GAP SERPL CALCULATED.3IONS-SCNC: 12 MMOL/L (ref 5–15)
BASOPHILS # BLD AUTO: 0.04 10*3/MM3 (ref 0–0.2)
BASOPHILS NFR BLD AUTO: 0.4 % (ref 0–1.5)
BUN SERPL-MCNC: 15 MG/DL (ref 8–23)
BUN/CREAT SERPL: 19 (ref 7–25)
CALCIUM SPEC-SCNC: 8.7 MG/DL (ref 8.6–10.5)
CHLORIDE SERPL-SCNC: 109 MMOL/L (ref 98–107)
CO2 SERPL-SCNC: 20 MMOL/L (ref 22–29)
CREAT SERPL-MCNC: 0.79 MG/DL (ref 0.57–1)
DEPRECATED RDW RBC AUTO: 49.1 FL (ref 37–54)
EOSINOPHIL # BLD AUTO: 0.16 10*3/MM3 (ref 0–0.4)
EOSINOPHIL NFR BLD AUTO: 1.7 % (ref 0.3–6.2)
ERYTHROCYTE [DISTWIDTH] IN BLOOD BY AUTOMATED COUNT: 13.7 % (ref 12.3–15.4)
GFR SERPL CREATININE-BSD FRML MDRD: 87 ML/MIN/1.73
GLUCOSE BLDC GLUCOMTR-MCNC: 118 MG/DL (ref 70–130)
GLUCOSE BLDC GLUCOMTR-MCNC: 207 MG/DL (ref 70–130)
GLUCOSE BLDC GLUCOMTR-MCNC: 212 MG/DL (ref 70–130)
GLUCOSE BLDC GLUCOMTR-MCNC: 399 MG/DL (ref 70–130)
GLUCOSE SERPL-MCNC: 108 MG/DL (ref 65–99)
HCT VFR BLD AUTO: 36.7 % (ref 34–46.6)
HGB BLD-MCNC: 11.1 G/DL (ref 12–15.9)
IMM GRANULOCYTES # BLD AUTO: 0.03 10*3/MM3 (ref 0–0.05)
IMM GRANULOCYTES NFR BLD AUTO: 0.3 % (ref 0–0.5)
LYMPHOCYTES # BLD AUTO: 2.74 10*3/MM3 (ref 0.7–3.1)
LYMPHOCYTES NFR BLD AUTO: 29.7 % (ref 19.6–45.3)
MCH RBC QN AUTO: 29.2 PG (ref 26.6–33)
MCHC RBC AUTO-ENTMCNC: 30.2 G/DL (ref 31.5–35.7)
MCV RBC AUTO: 96.6 FL (ref 79–97)
MONOCYTES # BLD AUTO: 0.76 10*3/MM3 (ref 0.1–0.9)
MONOCYTES NFR BLD AUTO: 8.2 % (ref 5–12)
NEUTROPHILS NFR BLD AUTO: 5.51 10*3/MM3 (ref 1.7–7)
NEUTROPHILS NFR BLD AUTO: 59.7 % (ref 42.7–76)
NRBC BLD AUTO-RTO: 0 /100 WBC (ref 0–0.2)
PLATELET # BLD AUTO: 230 10*3/MM3 (ref 140–450)
PMV BLD AUTO: 10.4 FL (ref 6–12)
POTASSIUM SERPL-SCNC: 4 MMOL/L (ref 3.5–5.2)
RBC # BLD AUTO: 3.8 10*6/MM3 (ref 3.77–5.28)
SODIUM SERPL-SCNC: 141 MMOL/L (ref 136–145)
WBC NRBC COR # BLD: 9.24 10*3/MM3 (ref 3.4–10.8)

## 2021-11-23 PROCEDURE — 94799 UNLISTED PULMONARY SVC/PX: CPT

## 2021-11-23 PROCEDURE — 0 CEFAZOLIN IN DEXTROSE 2-4 GM/100ML-% SOLUTION: Performed by: ORTHOPAEDIC SURGERY

## 2021-11-23 PROCEDURE — 85025 COMPLETE CBC W/AUTO DIFF WBC: CPT | Performed by: ORTHOPAEDIC SURGERY

## 2021-11-23 PROCEDURE — 80048 BASIC METABOLIC PNL TOTAL CA: CPT | Performed by: INTERNAL MEDICINE

## 2021-11-23 PROCEDURE — 94640 AIRWAY INHALATION TREATMENT: CPT

## 2021-11-23 PROCEDURE — 63710000001 INSULIN DETEMIR PER 5 UNITS: Performed by: INTERNAL MEDICINE

## 2021-11-23 PROCEDURE — 94660 CPAP INITIATION&MGMT: CPT

## 2021-11-23 PROCEDURE — 82962 GLUCOSE BLOOD TEST: CPT

## 2021-11-23 PROCEDURE — 63710000001 INSULIN LISPRO (HUMAN) PER 5 UNITS: Performed by: INTERNAL MEDICINE

## 2021-11-23 PROCEDURE — 97110 THERAPEUTIC EXERCISES: CPT

## 2021-11-23 PROCEDURE — 97116 GAIT TRAINING THERAPY: CPT

## 2021-11-23 RX ORDER — ECHINACEA PURPUREA EXTRACT 125 MG
1 TABLET ORAL DAILY PRN
COMMUNITY

## 2021-11-23 RX ORDER — CELECOXIB 200 MG/1
200 CAPSULE ORAL 2 TIMES DAILY
Status: ON HOLD
Start: 2021-12-08 | End: 2022-12-15 | Stop reason: SDUPTHER

## 2021-11-23 RX ORDER — MELOXICAM 15 MG/1
15 TABLET ORAL DAILY
Qty: 15 TABLET | Refills: 0 | Status: SHIPPED | OUTPATIENT
Start: 2021-11-23 | End: 2021-12-08

## 2021-11-23 RX ORDER — ONDANSETRON 4 MG/1
4 TABLET, FILM COATED ORAL EVERY 8 HOURS PRN
Qty: 20 TABLET | Refills: 0 | Status: SHIPPED | OUTPATIENT
Start: 2021-11-23 | End: 2022-03-08

## 2021-11-23 RX ORDER — ACETAMINOPHEN 500 MG
500 TABLET ORAL EVERY 8 HOURS
Qty: 21 TABLET | Refills: 0
Start: 2021-11-23 | End: 2021-11-30

## 2021-11-23 RX ORDER — ASPIRIN 325 MG
325 TABLET, DELAYED RELEASE (ENTERIC COATED) ORAL DAILY
Qty: 30 TABLET | Refills: 0 | Status: SHIPPED | OUTPATIENT
Start: 2021-11-23 | End: 2021-12-23

## 2021-11-23 RX ORDER — DOCUSATE SODIUM 100 MG/1
100 CAPSULE, LIQUID FILLED ORAL 2 TIMES DAILY
Qty: 30 CAPSULE | Refills: 0 | Status: SHIPPED | OUTPATIENT
Start: 2021-11-23 | End: 2021-12-08

## 2021-11-23 RX ORDER — HYDROCODONE BITARTRATE AND ACETAMINOPHEN 7.5; 325 MG/1; MG/1
1 TABLET ORAL EVERY 4 HOURS PRN
Qty: 40 TABLET | Refills: 0 | Status: SHIPPED | OUTPATIENT
Start: 2021-11-23 | End: 2021-11-30

## 2021-11-23 RX ORDER — MOMETASONE FUROATE 50 UG/1
SPRAY, METERED NASAL DAILY
COMMUNITY
End: 2022-11-04

## 2021-11-23 RX ORDER — INSULIN DETEMIR 100 [IU]/ML
34 INJECTION, SOLUTION SUBCUTANEOUS DAILY
COMMUNITY
End: 2021-12-20

## 2021-11-23 RX ADMIN — INSULIN DETEMIR 15 UNITS: 100 INJECTION, SOLUTION SUBCUTANEOUS at 08:51

## 2021-11-23 RX ADMIN — HYDROCODONE BITARTRATE AND ACETAMINOPHEN 1 TABLET: 7.5; 325 TABLET ORAL at 08:50

## 2021-11-23 RX ADMIN — ACETAMINOPHEN 1000 MG: 500 TABLET, FILM COATED ORAL at 13:13

## 2021-11-23 RX ADMIN — ACETAMINOPHEN 1000 MG: 500 TABLET, FILM COATED ORAL at 05:07

## 2021-11-23 RX ADMIN — BUDESONIDE AND FORMOTEROL FUMARATE DIHYDRATE 2 PUFF: 160; 4.5 AEROSOL RESPIRATORY (INHALATION) at 19:59

## 2021-11-23 RX ADMIN — BUDESONIDE AND FORMOTEROL FUMARATE DIHYDRATE 2 PUFF: 160; 4.5 AEROSOL RESPIRATORY (INHALATION) at 08:08

## 2021-11-23 RX ADMIN — CEFAZOLIN SODIUM 2 G: 2 INJECTION, SOLUTION INTRAVENOUS at 00:21

## 2021-11-23 RX ADMIN — INSULIN DETEMIR 10 UNITS: 100 INJECTION, SOLUTION SUBCUTANEOUS at 11:34

## 2021-11-23 RX ADMIN — INSULIN LISPRO 5 UNITS: 100 INJECTION, SOLUTION INTRAVENOUS; SUBCUTANEOUS at 08:50

## 2021-11-23 RX ADMIN — ASPIRIN 325 MG ORAL TABLET 325 MG: 325 PILL ORAL at 08:50

## 2021-11-23 RX ADMIN — CETIRIZINE HYDROCHLORIDE 10 MG: 10 TABLET, FILM COATED ORAL at 08:50

## 2021-11-23 RX ADMIN — LOSARTAN POTASSIUM 100 MG: 50 TABLET, FILM COATED ORAL at 08:50

## 2021-11-23 RX ADMIN — INSULIN LISPRO 15 UNITS: 100 INJECTION, SOLUTION INTRAVENOUS; SUBCUTANEOUS at 11:35

## 2021-11-23 RX ADMIN — HYDROCODONE BITARTRATE AND ACETAMINOPHEN 1 TABLET: 7.5; 325 TABLET ORAL at 13:13

## 2021-11-23 RX ADMIN — INSULIN LISPRO 5 UNITS: 100 INJECTION, SOLUTION INTRAVENOUS; SUBCUTANEOUS at 18:28

## 2021-11-23 RX ADMIN — MELOXICAM 15 MG: 7.5 TABLET ORAL at 08:50

## 2021-11-23 RX ADMIN — SODIUM CHLORIDE, PRESERVATIVE FREE 3 ML: 5 INJECTION INTRAVENOUS at 08:50

## 2021-11-23 RX ADMIN — INSULIN LISPRO 3 UNITS: 100 INJECTION, SOLUTION INTRAVENOUS; SUBCUTANEOUS at 18:27

## 2021-11-23 RX ADMIN — HYDROCODONE BITARTRATE AND ACETAMINOPHEN 1 TABLET: 7.5; 325 TABLET ORAL at 20:08

## 2021-11-23 RX ADMIN — ALBUTEROL SULFATE 2.5 MG: 2.5 SOLUTION RESPIRATORY (INHALATION) at 13:34

## 2021-11-23 RX ADMIN — OXYBUTYNIN CHLORIDE 5 MG: 5 TABLET, EXTENDED RELEASE ORAL at 08:50

## 2021-11-23 RX ADMIN — POLYETHYLENE GLYCOL 3350 17 G: 17 POWDER, FOR SOLUTION ORAL at 08:50

## 2021-11-24 VITALS
TEMPERATURE: 98.1 F | BODY MASS INDEX: 29.8 KG/M2 | HEART RATE: 96 BPM | OXYGEN SATURATION: 94 % | SYSTOLIC BLOOD PRESSURE: 149 MMHG | RESPIRATION RATE: 16 BRPM | HEIGHT: 68 IN | DIASTOLIC BLOOD PRESSURE: 98 MMHG | WEIGHT: 196.65 LBS

## 2021-11-24 LAB
GLUCOSE BLDC GLUCOMTR-MCNC: 128 MG/DL (ref 70–130)
GLUCOSE BLDC GLUCOMTR-MCNC: 170 MG/DL (ref 70–130)
GLUCOSE BLDC GLUCOMTR-MCNC: 182 MG/DL (ref 70–130)

## 2021-11-24 PROCEDURE — 82962 GLUCOSE BLOOD TEST: CPT

## 2021-11-24 PROCEDURE — 97535 SELF CARE MNGMENT TRAINING: CPT | Performed by: OCCUPATIONAL THERAPIST

## 2021-11-24 PROCEDURE — 97165 OT EVAL LOW COMPLEX 30 MIN: CPT | Performed by: OCCUPATIONAL THERAPIST

## 2021-11-24 PROCEDURE — 63710000001 INSULIN DETEMIR PER 5 UNITS: Performed by: INTERNAL MEDICINE

## 2021-11-24 PROCEDURE — 94799 UNLISTED PULMONARY SVC/PX: CPT

## 2021-11-24 PROCEDURE — 97116 GAIT TRAINING THERAPY: CPT

## 2021-11-24 PROCEDURE — 63710000001 INSULIN LISPRO (HUMAN) PER 5 UNITS: Performed by: INTERNAL MEDICINE

## 2021-11-24 PROCEDURE — 97110 THERAPEUTIC EXERCISES: CPT

## 2021-11-24 RX ADMIN — OXYBUTYNIN CHLORIDE 5 MG: 5 TABLET, EXTENDED RELEASE ORAL at 09:21

## 2021-11-24 RX ADMIN — HYDROCODONE BITARTRATE AND ACETAMINOPHEN 1 TABLET: 7.5; 325 TABLET ORAL at 12:18

## 2021-11-24 RX ADMIN — INSULIN DETEMIR 15 UNITS: 100 INJECTION, SOLUTION SUBCUTANEOUS at 09:22

## 2021-11-24 RX ADMIN — ASPIRIN 325 MG ORAL TABLET 325 MG: 325 PILL ORAL at 09:21

## 2021-11-24 RX ADMIN — INSULIN LISPRO 5 UNITS: 100 INJECTION, SOLUTION INTRAVENOUS; SUBCUTANEOUS at 12:17

## 2021-11-24 RX ADMIN — ACETAMINOPHEN 1000 MG: 500 TABLET, FILM COATED ORAL at 06:23

## 2021-11-24 RX ADMIN — LOSARTAN POTASSIUM 100 MG: 50 TABLET, FILM COATED ORAL at 09:21

## 2021-11-24 RX ADMIN — POLYETHYLENE GLYCOL 3350 17 G: 17 POWDER, FOR SOLUTION ORAL at 09:21

## 2021-11-24 RX ADMIN — CETIRIZINE HYDROCHLORIDE 10 MG: 10 TABLET, FILM COATED ORAL at 09:21

## 2021-11-24 RX ADMIN — INSULIN LISPRO 5 UNITS: 100 INJECTION, SOLUTION INTRAVENOUS; SUBCUTANEOUS at 09:22

## 2021-11-24 RX ADMIN — INSULIN LISPRO 2 UNITS: 100 INJECTION, SOLUTION INTRAVENOUS; SUBCUTANEOUS at 12:16

## 2021-11-24 RX ADMIN — HYDROCODONE BITARTRATE AND ACETAMINOPHEN 1 TABLET: 7.5; 325 TABLET ORAL at 02:58

## 2021-11-24 RX ADMIN — BUDESONIDE AND FORMOTEROL FUMARATE DIHYDRATE 2 PUFF: 160; 4.5 AEROSOL RESPIRATORY (INHALATION) at 11:45

## 2021-11-24 RX ADMIN — MELOXICAM 15 MG: 7.5 TABLET ORAL at 09:21

## 2021-12-20 RX ORDER — INSULIN DETEMIR 100 [IU]/ML
INJECTION, SOLUTION SUBCUTANEOUS
Qty: 100 ML | Refills: 3 | Status: SHIPPED | OUTPATIENT
Start: 2021-12-20 | End: 2023-01-30

## 2021-12-22 DIAGNOSIS — E11.9 TYPE 2 DIABETES MELLITUS WITHOUT COMPLICATION, WITH LONG-TERM CURRENT USE OF INSULIN (HCC): ICD-10-CM

## 2021-12-22 DIAGNOSIS — Z79.4 TYPE 2 DIABETES MELLITUS WITHOUT COMPLICATION, WITH LONG-TERM CURRENT USE OF INSULIN (HCC): ICD-10-CM

## 2021-12-22 RX ORDER — ORAL SEMAGLUTIDE 3 MG/1
TABLET ORAL
Qty: 30 TABLET | Refills: 3 | Status: SHIPPED | OUTPATIENT
Start: 2021-12-22 | End: 2022-12-27

## 2022-01-17 RX ORDER — POTASSIUM CHLORIDE 750 MG/1
TABLET, EXTENDED RELEASE ORAL
Qty: 30 TABLET | Refills: 5 | Status: SHIPPED | OUTPATIENT
Start: 2022-01-17 | End: 2022-03-08

## 2022-02-07 ENCOUNTER — LAB (OUTPATIENT)
Dept: LAB | Facility: HOSPITAL | Age: 72
End: 2022-02-07

## 2022-02-07 ENCOUNTER — OFFICE VISIT (OUTPATIENT)
Dept: INTERNAL MEDICINE | Facility: CLINIC | Age: 72
End: 2022-02-07

## 2022-02-07 VITALS
BODY MASS INDEX: 28.99 KG/M2 | RESPIRATION RATE: 20 BRPM | OXYGEN SATURATION: 98 % | HEIGHT: 68 IN | WEIGHT: 191.25 LBS | DIASTOLIC BLOOD PRESSURE: 80 MMHG | HEART RATE: 96 BPM | TEMPERATURE: 97.5 F | SYSTOLIC BLOOD PRESSURE: 152 MMHG

## 2022-02-07 DIAGNOSIS — L68.0 FEMALE HIRSUTISM: ICD-10-CM

## 2022-02-07 DIAGNOSIS — Z96.651 STATUS POST RIGHT KNEE REPLACEMENT: ICD-10-CM

## 2022-02-07 DIAGNOSIS — I10 ESSENTIAL HYPERTENSION: ICD-10-CM

## 2022-02-07 DIAGNOSIS — E11.9 TYPE 2 DIABETES MELLITUS WITHOUT COMPLICATION, WITH LONG-TERM CURRENT USE OF INSULIN: ICD-10-CM

## 2022-02-07 DIAGNOSIS — E78.01 FAMILIAL HYPERCHOLESTEROLEMIA: ICD-10-CM

## 2022-02-07 DIAGNOSIS — Z00.00 WELL ADULT EXAM: Primary | ICD-10-CM

## 2022-02-07 DIAGNOSIS — Z79.4 TYPE 2 DIABETES MELLITUS WITHOUT COMPLICATION, WITH LONG-TERM CURRENT USE OF INSULIN: ICD-10-CM

## 2022-02-07 PROCEDURE — 84402 ASSAY OF FREE TESTOSTERONE: CPT

## 2022-02-07 PROCEDURE — 84403 ASSAY OF TOTAL TESTOSTERONE: CPT

## 2022-02-07 PROCEDURE — 36415 COLL VENOUS BLD VENIPUNCTURE: CPT | Performed by: INTERNAL MEDICINE

## 2022-02-07 PROCEDURE — 99397 PER PM REEVAL EST PAT 65+ YR: CPT | Performed by: INTERNAL MEDICINE

## 2022-02-07 PROCEDURE — 82626 DEHYDROEPIANDROSTERONE: CPT | Performed by: INTERNAL MEDICINE

## 2022-02-07 RX ORDER — EZETIMIBE 10 MG/1
10 TABLET ORAL DAILY
Qty: 90 TABLET | Refills: 2 | Status: SHIPPED | OUTPATIENT
Start: 2022-02-07 | End: 2022-02-07 | Stop reason: SDUPTHER

## 2022-02-07 RX ORDER — SPIRONOLACTONE 50 MG/1
50 TABLET, FILM COATED ORAL DAILY
Qty: 90 TABLET | Refills: 1 | Status: SHIPPED | OUTPATIENT
Start: 2022-02-07 | End: 2022-03-08 | Stop reason: SDUPTHER

## 2022-02-07 RX ORDER — EZETIMIBE 10 MG/1
10 TABLET ORAL DAILY
Qty: 90 TABLET | Refills: 2 | Status: SHIPPED | OUTPATIENT
Start: 2022-02-07

## 2022-02-07 RX ORDER — LOSARTAN POTASSIUM 100 MG/1
100 TABLET ORAL DAILY
Qty: 90 TABLET | Refills: 1 | Status: SHIPPED | OUTPATIENT
Start: 2022-02-07 | End: 2022-12-27

## 2022-02-07 NOTE — PROGRESS NOTES
"Chief Complaint  Annual Exam    Subjective    Lupe Morillo is a 71 y.o. female.     Lupe Morillo presents to [facility] for for 3 month follow-up, annual physical, and A1c check.       History of Present Illness    The following portions of the patient's history were reviewed and updated as appropriate: allergies, current medications, past family history, past medical history, past social history, past surgical history and problem list.      1. Diabetes  The patient notes that she has not been checking her blood glucose at home.     2. Hypertension:   She reports that  her blood pressure has been in the 150 mmHg systolic. Today her blood pressure reads 152/80 as well.     3. Pain management.   The patient reports that her pain is a 5 out of 10 on the VAS pain scale. She believes it is because it is cold outside, and anytime the weather changes it affects the arthritis in her knee, otherwise it is aching. She had right knee arthroplasty on 11/22/2021.     Complete Adult Physical      5. Immunizations  The patient has had COVID-19 and booster vaccines.       Diet  She states that her diet has been improving since her surgery. The patient reports that she did eat much, because less filled her up      Exercise  She has continued her physical therapy, which has helped, when she does not do her therapy she \"stiffens up\".      Social History         Preventative Screenings: She had a colonoscopy in 11/19/2018, which demonstrated patient had internal hemorrhoid and diverticulosis, repeat colonoscopy in 5 years from that date of 2018. Denies use of alcohol, drugs, and cigarettes. The patient had mammogram and DEXA scan in 2021. Patient had a pap smear completed in 10/2021.      Immunizations:  The patient has had COVID-19 and booster vaccines.         Review of Systems   All other systems reviewed and are negative.      Objective   Vital Signs:   /80 (BP " "Location: Right arm, Patient Position: Sitting, Cuff Size: Adult)   Pulse 96   Temp 97.5 °F (36.4 °C) (Temporal)   Resp 20   Ht 172.7 cm (68\")   Wt 86.8 kg (191 lb 4 oz)   SpO2 98%   BMI 29.08 kg/m²     Body mass index is 29.08 kg/m².    Physical Exam  Vitals reviewed.   HENT:      Head: Normocephalic and atraumatic.      Nose: Nose normal.      Mouth/Throat:      Mouth: Mucous membranes are moist.   Eyes:      Extraocular Movements: Extraocular movements intact.      Pupils: Pupils are equal, round, and reactive to light.   Cardiovascular:      Rate and Rhythm: Normal rate and regular rhythm.      Pulses: Normal pulses.      Heart sounds: Normal heart sounds. No murmur heard.  No friction rub. No gallop.    Pulmonary:      Effort: Pulmonary effort is normal.      Breath sounds: Normal breath sounds.   Abdominal:      General: Bowel sounds are normal.      Palpations: Abdomen is soft. There is no mass.      Tenderness: There is no abdominal tenderness. There is no rebound.      Hernia: No hernia is present.      Comments: No peritoneal   Musculoskeletal:      Cervical back: Normal range of motion.      Comments: Patient also showed me her foot, which she was complaining of the right toe. Right toe callus formation and possible ingrown toenail on the right toe.   No signs of infection or inflammation.     Skin:     General: Skin is warm.      Capillary Refill: Capillary refill takes less than 2 seconds.   Neurological:      Mental Status: She is alert and oriented to person, place, and time.      Cranial Nerves: Cranial nerves are intact.      Deep Tendon Reflexes: Reflexes are normal and symmetric.               Assessment and Plan  Diagnoses and all orders for this visit:    Adult exam:   1. Diabetes   - The patient will have hbA1c done in 3 months upon follow-up, emphasis on compliance with diet and lifestyle modification.     2. Hypertension  - I am going to add spironolactone 50 mg, 1 tablet by mouth " daily, and continue her losartan 100 mg. We will do a combo pill.   - Patient has been advised on benefits and risks and side effects. She will call the office if anything changes.     3. Right knee arthroplasty, status post 3.5 months  - She will continue to follow-up with orthopedist and continue her physical therapy as directed.     4. Hypercholesterolemia  - Patient is on Zetia 10 mg and will continue this medication along with lifestyle modifications.     5. Anticipatory guidance  - The patient has been advised to complete routine ophthalmology and dental visit.      6. Hyperlipidemia, Hirsutism  - Facial hair is noted on her physical exam today.  - We will obtain a lab evaluation with total and free testosterone and DHEA measurements to rule out any endocrinal endocrine logical sources of her hirsutism.  - I have also started her on spironolactone for its antiandrogenic effects for her facial hair as previously mentioned.     I have reviewed the notes, assessments, and/or procedures performed by Fabby Galan, I concur with her/his documentation of Lupe Morillo.

## 2022-02-10 LAB
TESTOST FREE SERPL-MCNC: 1.1 PG/ML (ref 0–4.2)
TESTOST SERPL-MCNC: <3 NG/DL (ref 3–67)

## 2022-02-10 RX ORDER — PEN NEEDLE, DIABETIC 32GX 5/32"
NEEDLE, DISPOSABLE MISCELLANEOUS
Qty: 100 EACH | Refills: 5 | Status: SHIPPED | OUTPATIENT
Start: 2022-02-10

## 2022-02-12 LAB — DHEA SERPL-MCNC: 75 NG/DL (ref 31–701)

## 2022-03-03 ENCOUNTER — TRANSCRIBE ORDERS (OUTPATIENT)
Dept: ADMINISTRATIVE | Facility: HOSPITAL | Age: 72
End: 2022-03-03

## 2022-03-03 DIAGNOSIS — Z11.59 ENCOUNTER FOR SCREENING FOR VIRAL DISEASE: Primary | ICD-10-CM

## 2022-03-04 ENCOUNTER — NURSE TRIAGE (OUTPATIENT)
Dept: CALL CENTER | Facility: HOSPITAL | Age: 72
End: 2022-03-04

## 2022-03-04 NOTE — TELEPHONE ENCOUNTER
Caller concerned about high blood pressure readings 2-3 days ago with night time headaches. States she saw a specialist yesterday and her BP was elevated in that office and she was told she will need to get her BP under control before she will be able to have a procedure. Attempted warm transfer to [facility], pt's call dropped while on hold to be transferred. Called pt back and left VM message for her to call [facility] back and we will attempt to connect her to [facility].   Pt called back, Warm transfer to [facility].  Reason for Disposition  • Systolic BP  >= 180 OR Diastolic >= 110    Additional Information  • Negative: Difficult to awaken or acting confused (e.g., disoriented, slurred speech)  • Negative: SEVERE difficulty breathing (e.g., struggling for each breath, speaks in single words)  • Negative: [1] Weakness of the face, arm or leg on one side of the body AND [2] new-onset  • Negative: [1] Numbness (i.e., loss of sensation) of the face, arm or leg on one side of the body AND [2] new-onset  • Negative: [1] Chest pain lasts > 5 minutes AND [2] history of heart disease  (i.e., heart attack, bypass surgery, angina, angioplasty, CHF)  • Negative: [1] Chest pain AND [2] took nitrogylcerin AND [3] pain was not relieved  • Negative: Sounds like a life-threatening emergency to the triager  • Negative: Symptom is main concern  (e.g., headache, chest pain)  • Negative: Low blood pressure is main concern  • Negative: [1] Systolic BP  >= 160 OR Diastolic >= 100 AND [2] cardiac or neurologic symptoms (e.g., chest pain, difficulty breathing, unsteady gait, blurred vision)  • Negative: [1] Pregnant 20 or more weeks (or postpartum < 6 weeks) AND [2] new hand or face swelling  • Negative: [1] Pregnant 20 or more weeks AND [2] Systolic BP  >= 140 OR Diastolic >= 90  • Negative: [1] Systolic BP  >= 200 OR Diastolic >= 120  AND [2] having NO cardiac or neurologic symptoms  • Negative:  "[1] Postpartum < 6 weeks AND [2] Systolic BP  >= 140 OR Diastolic >= 90  • Negative: [1] Systolic BP  >= 180 OR Diastolic >= 110 AND [2] missed most recent dose of blood pressure medication    Answer Assessment - Initial Assessment Questions  1. BLOOD PRESSURE: \"What is the blood pressure?\" \"Did you take at least two measurements 5 minutes apart?\"      171/?, 190/? Checked twice   2. ONSET: \"When did you take your blood pressure?\"      2-3 days ago   3. HOW: \"How did you obtain the blood pressure?\" (e.g., visiting nurse, automatic home BP monitor)      Automatic home BP monitor  4. HISTORY: \"Do you have a history of high blood pressure?\"      yes  5. MEDICATIONS: \"Are you taking any medications for blood pressure?\" \"Have you missed any doses recently?\"      Yes, no missed doses  6. OTHER SYMPTOMS: \"Do you have any symptoms?\" (e.g., headache, chest pain, blurred vision, difficulty breathing, weakness)      Headache at night,   7. PREGNANCY: \"Is there any chance you are pregnant?\" \"When was your last menstrual period?\"      no    Protocols used: BLOOD PRESSURE - HIGH-ADULT-AH      "

## 2022-03-07 ENCOUNTER — LAB (OUTPATIENT)
Dept: PREADMISSION TESTING | Facility: HOSPITAL | Age: 72
End: 2022-03-07

## 2022-03-07 DIAGNOSIS — Z11.59 ENCOUNTER FOR SCREENING FOR VIRAL DISEASE: ICD-10-CM

## 2022-03-07 LAB — SARS-COV-2 RNA PNL SPEC NAA+PROBE: NOT DETECTED

## 2022-03-07 PROCEDURE — U0004 COV-19 TEST NON-CDC HGH THRU: HCPCS

## 2022-03-07 PROCEDURE — C9803 HOPD COVID-19 SPEC COLLECT: HCPCS

## 2022-03-08 ENCOUNTER — OFFICE VISIT (OUTPATIENT)
Dept: INTERNAL MEDICINE | Facility: CLINIC | Age: 72
End: 2022-03-08

## 2022-03-08 ENCOUNTER — TELEPHONE (OUTPATIENT)
Dept: INTERNAL MEDICINE | Facility: CLINIC | Age: 72
End: 2022-03-08

## 2022-03-08 VITALS
HEIGHT: 68 IN | RESPIRATION RATE: 22 BRPM | HEART RATE: 89 BPM | OXYGEN SATURATION: 98 % | SYSTOLIC BLOOD PRESSURE: 162 MMHG | TEMPERATURE: 97.3 F | WEIGHT: 194.4 LBS | DIASTOLIC BLOOD PRESSURE: 90 MMHG | BODY MASS INDEX: 29.46 KG/M2

## 2022-03-08 DIAGNOSIS — I10 ESSENTIAL HYPERTENSION: Primary | ICD-10-CM

## 2022-03-08 DIAGNOSIS — M19.90 ARTHRITIS: ICD-10-CM

## 2022-03-08 PROCEDURE — 99214 OFFICE O/P EST MOD 30 MIN: CPT | Performed by: NURSE PRACTITIONER

## 2022-03-08 RX ORDER — NORTRIPTYLINE HYDROCHLORIDE 25 MG/1
25 CAPSULE ORAL NIGHTLY
Status: ON HOLD | COMMUNITY
End: 2022-12-14

## 2022-03-08 RX ORDER — HYDROCODONE BITARTRATE AND ACETAMINOPHEN 5; 325 MG/1; MG/1
1 TABLET ORAL EVERY 6 HOURS PRN
COMMUNITY
End: 2022-12-15 | Stop reason: HOSPADM

## 2022-03-08 RX ORDER — DOCUSATE SODIUM 100 MG/1
100 CAPSULE, LIQUID FILLED ORAL 2 TIMES DAILY
COMMUNITY

## 2022-03-08 RX ORDER — IBUPROFEN 200 MG
200 TABLET ORAL EVERY 6 HOURS PRN
COMMUNITY
End: 2022-12-15 | Stop reason: HOSPADM

## 2022-03-08 RX ORDER — SPIRONOLACTONE 50 MG/1
50 TABLET, FILM COATED ORAL 2 TIMES DAILY
Qty: 90 TABLET | Refills: 1 | Status: SHIPPED | OUTPATIENT
Start: 2022-03-08 | End: 2022-03-25 | Stop reason: SDUPTHER

## 2022-03-08 NOTE — PROGRESS NOTES
Patient Name: Lupe Morillo  : 1950   MRN: [id number]     Chief Complaint:    Chief Complaint   Patient presents with   • Hypertension     Follow up       History of Present Illness: Lupe Morillo is a 71 y.o. female presents to clinic for hypertension.      Hypertension:   She brought in bp monitor and it showed readings for her  blood pressure.  Her systolic blood pressure has been in the 160-190s.  Today her blood pressure read 152/80.  She has had a slight headache. The patient denies  chest pain, dyspnea, edema, syncope, blurred vision or palpitations. They state that they are taking her medication as prescribed. They are not having medication side effects.     Pain management.   The patient reports that her pain is a 6 out of 10 States her pain was worse today due to the weather being so cold; states she had to use the walker initially. She came to clinic with her cane. She did not do PT last week; scheduled to have a manipulation on right knee to help ROM.   Patient states she has left thigh pain and fingers which she thinks it is arthritis.  States it aches.She had right knee arthroplasty on 2021.             Subjective     Review of System: Review of Systems   Constitutional: Negative for fatigue and fever.   HENT: Negative for congestion and rhinorrhea.    Respiratory: Negative for cough, shortness of breath and wheezing.    Cardiovascular: Negative for chest pain and palpitations.   Gastrointestinal: Negative for abdominal pain, diarrhea, nausea and vomiting.   Genitourinary: Negative for dysuria.   Musculoskeletal: Positive for arthralgias and gait problem. Negative for myalgias.   Skin: Negative for rash.   Neurological: Positive for headaches (Intermittent mild).   Hematological: Negative for adenopathy.   Psychiatric/Behavioral: Negative for dysphoric mood.        Medications:     Current Outpatient Medications:   •  albuterol sulfate  (90 Base) MCG/ACT inhaler, Inhale  1 puff Every 6 (Six) Hours As Needed for Wheezing or Shortness of Air., Disp: , Rfl:   •  Ascorbic Acid (VITAMIN C) 100 MG chewable tablet, Chew 1 tablet Daily., Disp: , Rfl:   •  BD Pen Needle Joanne 2nd Gen 32G X 4 MM misc, USE PEN NEEDLES TO INJECT INSULIN ONCE DAILY, Disp: 100 each, Rfl: 5  •  celecoxib (CeleBREX) 200 MG capsule, Take 1 capsule by mouth 2 (Two) Times a Day. May resume when finished with Meloxicam script, Disp: , Rfl:   •  cholecalciferol (Cholecalciferol) 25 MCG (1000 UT) tablet, Take 1,000 Units by mouth Daily., Disp: , Rfl:   •  ezetimibe (ZETIA) 10 MG tablet, Take 1 tablet by mouth Daily., Disp: 90 tablet, Rfl: 2  •  Farxiga 5 MG tablet tablet, TAKE ONE TABLET BY MOUTH DAILY (Patient taking differently: Take 5 mg by mouth Daily.), Disp: 90 tablet, Rfl: 3  •  Levemir FlexTouch 100 UNIT/ML injection, INJECT 30 UNITS UNDER THE SKIN DAILY, Disp: 100 mL, Rfl: 3  •  levocetirizine (XYZAL) 5 MG tablet, TAKE ONE TABLET BY MOUTH DAILY (Patient taking differently: Take 5 mg by mouth Every Morning.), Disp: 90 tablet, Rfl: 1  •  losartan (Cozaar) 100 MG tablet, Take 1 tablet by mouth Daily., Disp: 90 tablet, Rfl: 1  •  MAGNESIUM PO, Take 250 mg by mouth Daily., Disp: , Rfl:   •  metFORMIN (GLUCOPHAGE) 1000 MG tablet, TAKE ONE TABLET BY MOUTH DAILY WITH FOOD (Patient taking differently: Take 1,000 mg by mouth Daily With Breakfast.), Disp: 90 tablet, Rfl: 2  •  Methylcobalamin (B12-ACTIVE) 1 MG chewable tablet, Chew 1 tablet Daily., Disp: , Rfl:   •  mometasone (NASONEX) 50 MCG/ACT nasal spray, into the nostril(s) as directed by provider Daily. Instill one spray in each nostril once daily, Disp: , Rfl:   •  montelukast (SINGULAIR) 10 MG tablet, Take 10 mg by mouth Every Night., Disp: , Rfl:   •  Multiple Vitamins-Minerals (MULTIVITAMIN ADULTS PO), Take 1 tablet by mouth Daily., Disp: , Rfl:   •  MYRBETRIQ 25 MG tablet sustained-release 24 hour, Take 25 mg by mouth Daily., Disp: , Rfl:   •   "Phenylephrine-DM-GG-APAP (MUCINEX FAST-MAX CONGEST COLD PO), Take  by mouth., Disp: , Rfl:   •  polyethylene glycol (MIRALAX) pack packet, Take 17 g by mouth Daily. OTC (Patient taking differently: Take 17 g by mouth Daily As Needed. OTC), Disp: , Rfl:   •  Rybelsus 3 MG tablet, TAKE ONE TABLET BY MOUTH DAILY, Disp: 30 tablet, Rfl: 3  •  sodium chloride 0.65 % nasal spray, 1 spray into the nostril(s) as directed by provider Daily As Needed. 1 spray in each nostril daily as needed, Disp: , Rfl:   •  spironolactone (Aldactone) 50 MG tablet, Take 1 tablet by mouth 2 (Two) Times a Day., Disp: 90 tablet, Rfl: 1  •  SYMBICORT 160-4.5 MCG/ACT inhaler, Inhale 2 puffs 2 (Two) Times a Day., Disp: , Rfl:   •  vitamin B-6 (PYRIDOXINE) 100 MG tablet, Take 100 mg by mouth Daily., Disp: , Rfl:   •  vitamin C (ASCORBIC ACID) 500 MG tablet, Take 500 mg by mouth Daily., Disp: , Rfl:   •  Boswellia-Glucosamine-Vit D (OSTEO BI-FLEX ONE PER DAY PO), Take  by mouth., Disp: , Rfl:   •  docusate sodium (COLACE) 100 MG capsule, Take 100 mg by mouth 2 (Two) Times a Day., Disp: , Rfl:   •  HYDROcodone-acetaminophen (NORCO) 5-325 MG per tablet, Take 1 tablet by mouth Every 6 (Six) Hours As Needed., Disp: , Rfl:   •  ibuprofen (ADVIL,MOTRIN) 200 MG tablet, Take 200 mg by mouth Every 6 (Six) Hours As Needed for Mild Pain ., Disp: , Rfl:   •  nortriptyline (PAMELOR) 25 MG capsule, Take 25 mg by mouth Every Night., Disp: , Rfl:     Allergies:   Allergies   Allergen Reactions   • Statins Other (See Comments)     Leg cramps       Objective     Physical Exam:   Vital Signs:   Vitals:    03/08/22 0958   BP: 162/90   BP Location: Right arm   Patient Position: Sitting   Cuff Size: Adult   Pulse: 89   Resp: 22   Temp: 97.3 °F (36.3 °C)   TempSrc: Infrared   SpO2: 98%   Weight: 88.2 kg (194 lb 6.4 oz)   Height: 172.7 cm (68\")   PainSc:   6     Body mass index is 29.56 kg/m². Patient's Body mass index is 29.56 kg/m². indicating that she is overweight (BMI " 25-29.9). Patient's (Body mass index is 29.56 kg/m².) indicates that they are overweight with health conditions that include hypertension and diabetes mellitus . Weight is unchanged. BMI is is above average; BMI management plan is completed. We discussed portion control. .       Physical Exam  Constitutional:       General: She is not in acute distress.     Appearance: She is not ill-appearing.   HENT:      Head: Normocephalic.   Cardiovascular:      Rate and Rhythm: Normal rate and regular rhythm.      Heart sounds: Normal heart sounds. No murmur heard.  Pulmonary:      Breath sounds: Normal breath sounds.   Abdominal:      General: Bowel sounds are normal.      Tenderness: There is no abdominal tenderness.   Musculoskeletal:         General: No swelling or tenderness. Normal range of motion.   Lymphadenopathy:      Cervical: No cervical adenopathy.   Skin:     General: Skin is warm and dry.   Neurological:      General: No focal deficit present.      Mental Status: She is oriented to person, place, and time.   Psychiatric:         Mood and Affect: Mood normal.         Assessment / Plan      Assessment/Plan:   Diagnoses and all orders for this visit:    1. Essential hypertension (Primary)  -     spironolactone (Aldactone) 50 MG tablet; Take 1 tablet by mouth 2 (Two) Times a Day.  Dispense: 90 tablet; Refill: 1  Patient will try 1.5 tabs daily first if bp remaining elevated she can take 1 take  Tablet  Twice a day. Monitor at home and record. Bring to next appointment. If BP is >150/90 then f/u sooner.     2. Arthritis  Continue current medications and plan of care        Follow Up:   Return for Next scheduled follow up.    DARI Cheema  [facility]

## 2022-03-09 ENCOUNTER — OFFICE VISIT (OUTPATIENT)
Dept: INTERNAL MEDICINE | Facility: CLINIC | Age: 72
End: 2022-03-09

## 2022-03-09 ENCOUNTER — TELEPHONE (OUTPATIENT)
Dept: INTERNAL MEDICINE | Facility: CLINIC | Age: 72
End: 2022-03-09

## 2022-03-09 VITALS
WEIGHT: 195.8 LBS | RESPIRATION RATE: 14 BRPM | BODY MASS INDEX: 29.67 KG/M2 | HEIGHT: 68 IN | SYSTOLIC BLOOD PRESSURE: 172 MMHG | TEMPERATURE: 97.1 F | HEART RATE: 89 BPM | OXYGEN SATURATION: 100 % | DIASTOLIC BLOOD PRESSURE: 100 MMHG

## 2022-03-09 DIAGNOSIS — G89.29 CHRONIC PAIN OF RIGHT KNEE: Primary | ICD-10-CM

## 2022-03-09 DIAGNOSIS — M25.561 CHRONIC PAIN OF RIGHT KNEE: Primary | ICD-10-CM

## 2022-03-09 DIAGNOSIS — I10 ESSENTIAL HYPERTENSION: ICD-10-CM

## 2022-03-09 DIAGNOSIS — J45.20 MILD INTERMITTENT ASTHMA, UNSPECIFIED WHETHER COMPLICATED: ICD-10-CM

## 2022-03-09 PROCEDURE — 99214 OFFICE O/P EST MOD 30 MIN: CPT | Performed by: INTERNAL MEDICINE

## 2022-03-09 RX ORDER — NIFEDIPINE 30 MG/1
30 TABLET, EXTENDED RELEASE ORAL DAILY
Qty: 30 TABLET | Refills: 4 | Status: SHIPPED | OUTPATIENT
Start: 2022-03-09 | End: 2022-03-25

## 2022-03-09 NOTE — TELEPHONE ENCOUNTER
Your plan covers this drug when you meet any of these conditions:  - You have been taking the requested drug for at least 3 months and you had a  reduction in A1c (hemoglobin A1c) since starting this therapy  - You are new to therapy and tried metformin and it did not work for you, or you  cannot use it  - You are new to therapy and require combination therapy and you have an A1c  (hemoglobin A1c) of 7.5 percent or greater  - You have diabetic nephropathy (kidney disease) with albuminuria greater than 300  mg per day and the request is for the drug, Lesliokana          Patient has an appointment today will let her know when she comes in.

## 2022-03-09 NOTE — PROGRESS NOTES
"Chief Complaint  Hypertension (1 month recheck. Pt states her bp was 160/70 this AM)    Subjective    Lupe Morillo is a 71 y.o. female.     Lupe Morillo presents to [facility] for follow-up with blood pressure which has been elevated within the past few days.      History of Present Illness    The following portions of the patient's history were reviewed and updated as appropriate: current medications, past family history, past medical history, past social history, past surgical history and problem list.     Elevated blood pressure.- The patient reports that her blood pressure has been elevated for last 2 weeks. The highest systolic reading has been 190 mm Hg and today it is 177 mm Hg. She states that her blood pressure has been elevated since she had the right knee \"replacement\" surgery. She confirms currently taking losartan 100 mg, by mouth once a day. She confirms that she started taking Aldactone/spironolactone 50 mg, once a day since her last visit. She confirms feeling lightheaded and having many headaches at night when her blood pressure is elevated. She confirms currently having pain in her right knee but denies any other stressful concerns. The patient reports that she has a scheduled knee manipulation to remove the scar tissue on 03/11/2022 at 08:15 AM. She was instructed to discuss her elevated blood pressure today.       Review of Systems   All other systems reviewed and are negative.     A review of systems was performed, and the pertinent positives are noted in the HPI.     Objective   Vital Signs:   /100   Pulse 89   Temp 97.1 °F (36.2 °C) (Infrared)   Resp 14   Ht 172.7 cm (68\")   Wt 88.8 kg (195 lb 12.8 oz)   SpO2 100%   BMI 29.77 kg/m²     Body mass index is 29.77 kg/m².    Physical Exam  Vitals and nursing note reviewed.   Constitutional:       Appearance: Normal appearance. She is normal weight.   HENT:      Head: " Normocephalic and atraumatic.      Right Ear: Tympanic membrane, ear canal and external ear normal.      Left Ear: Tympanic membrane, ear canal and external ear normal.      Nose: Nose normal.      Mouth/Throat:      Mouth: Mucous membranes are moist.   Eyes:      Extraocular Movements: Extraocular movements intact.      Pupils: Pupils are equal, round, and reactive to light.   Cardiovascular:      Rate and Rhythm: Normal rate and regular rhythm.      Pulses: Normal pulses.   Pulmonary:      Effort: Pulmonary effort is normal.      Breath sounds: Normal breath sounds.   Musculoskeletal:      Cervical back: Normal range of motion and neck supple.   Skin:     General: Skin is warm.      Capillary Refill: Capillary refill takes less than 2 seconds.   Neurological:      Mental Status: She is alert.   Psychiatric:         Mood and Affect: Mood normal.         Behavior: Behavior normal.         Thought Content: Thought content normal.         Judgment: Judgment normal.               Assessment and Plan  Diagnoses and all orders for this visit:    Chronic knee pain.        -  Patient was tentatively scheduled for procedure tomorrow with orthopedics for the knee. We are going to hold on this due to patient's blood pressure not being controlled which could be secondary to pain but could also mean change in management.         -  We are going to continue her on the losartan 100 mg, by mouth once a day and will continue on the spironolactone 50 mg, by mouth twice a day.         -  I am going to add Procardia XL 30 mg, 1 tablet by mouth once a day. I discussed precautions and risk factors with this medication with patient and patient will keep a blood pressure log sheet and record readings and submit this to me for review as of right now.         -  Once I review these these blood pressure readings and they are appropriate, they will move forward with orthopedic procedure, until then, this procedure has been put on hold and  patient is not medically cleared.      Follow Up   No follow-ups on file.  Patient was given instructions and counseling regarding her condition or for health maintenance advice. Please see specific information pulled into the AVS if appropriate.     Transcribed from ambient dictation for Michael Blackwell MD by Ronda Rao.  03/10/22   13:25 EST    Patient verbalized consent to the visit recording.  I have personally performed the services described in this document as transcribed by the above individual, and it is both accurate and complete.  Michael Blackwell MD  3/11/2022  21:07 EST

## 2022-03-09 NOTE — TELEPHONE ENCOUNTER
----- Message from Michael Blackwell MD sent at 3/9/2022  5:01 AM EST -----  Regarding: Blood pressure  Please see if patient can come in today for an appointment at 2:30 pm to address her blood pressure

## 2022-03-14 RX ORDER — DAPAGLIFLOZIN 5 MG/1
5 TABLET, FILM COATED ORAL DAILY
Qty: 90 TABLET | Refills: 3 | Status: SHIPPED | OUTPATIENT
Start: 2022-03-14

## 2022-03-15 ENCOUNTER — TELEPHONE (OUTPATIENT)
Dept: INTERNAL MEDICINE | Facility: CLINIC | Age: 72
End: 2022-03-15

## 2022-03-25 ENCOUNTER — OFFICE VISIT (OUTPATIENT)
Dept: INTERNAL MEDICINE | Facility: CLINIC | Age: 72
End: 2022-03-25

## 2022-03-25 VITALS
RESPIRATION RATE: 20 BRPM | HEART RATE: 90 BPM | TEMPERATURE: 97.1 F | DIASTOLIC BLOOD PRESSURE: 80 MMHG | SYSTOLIC BLOOD PRESSURE: 130 MMHG | WEIGHT: 188.38 LBS | OXYGEN SATURATION: 99 % | BODY MASS INDEX: 28.64 KG/M2

## 2022-03-25 DIAGNOSIS — M25.561 CHRONIC PAIN OF RIGHT KNEE: ICD-10-CM

## 2022-03-25 DIAGNOSIS — Z79.4 TYPE 2 DIABETES MELLITUS WITHOUT COMPLICATION, WITH LONG-TERM CURRENT USE OF INSULIN: ICD-10-CM

## 2022-03-25 DIAGNOSIS — G89.29 CHRONIC PAIN OF RIGHT KNEE: ICD-10-CM

## 2022-03-25 DIAGNOSIS — E11.9 TYPE 2 DIABETES MELLITUS WITHOUT COMPLICATION, WITH LONG-TERM CURRENT USE OF INSULIN: ICD-10-CM

## 2022-03-25 DIAGNOSIS — I10 ESSENTIAL HYPERTENSION: Primary | ICD-10-CM

## 2022-03-25 PROCEDURE — 99214 OFFICE O/P EST MOD 30 MIN: CPT | Performed by: INTERNAL MEDICINE

## 2022-03-25 RX ORDER — NIFEDIPINE 60 MG/1
60 TABLET, EXTENDED RELEASE ORAL DAILY
Qty: 90 TABLET | Refills: 2 | Status: SHIPPED | OUTPATIENT
Start: 2022-03-25

## 2022-03-25 RX ORDER — SPIRONOLACTONE 50 MG/1
50 TABLET, FILM COATED ORAL 2 TIMES DAILY
Qty: 180 TABLET | Refills: 2 | Status: SHIPPED | OUTPATIENT
Start: 2022-03-25 | End: 2022-09-13

## 2022-03-25 NOTE — TELEPHONE ENCOUNTER
Approved today  Your PA request has been approved. Additional information will be provided in the approval communication    Patient was in office today and notified that PA was Approved.

## 2022-04-18 ENCOUNTER — TELEPHONE (OUTPATIENT)
Dept: INTERNAL MEDICINE | Facility: CLINIC | Age: 72
End: 2022-04-18

## 2022-04-18 DIAGNOSIS — E11.9 TYPE 2 DIABETES MELLITUS WITHOUT COMPLICATION, WITH LONG-TERM CURRENT USE OF INSULIN: Primary | ICD-10-CM

## 2022-04-18 DIAGNOSIS — Z79.4 TYPE 2 DIABETES MELLITUS WITHOUT COMPLICATION, WITH LONG-TERM CURRENT USE OF INSULIN: Primary | ICD-10-CM

## 2022-04-18 RX ORDER — BLOOD SUGAR DIAGNOSTIC
STRIP MISCELLANEOUS
Qty: 100 EACH | Refills: 5 | Status: SHIPPED | OUTPATIENT
Start: 2022-04-18

## 2022-04-18 RX ORDER — LANCETS 28 GAUGE
EACH MISCELLANEOUS
Qty: 100 EACH | Refills: 5 | Status: SHIPPED | OUTPATIENT
Start: 2022-04-18

## 2022-04-18 NOTE — TELEPHONE ENCOUNTER
Patient came by and dropped off freestyle glucose test strips (50) and 28 gauge lancets (100) for a refill. Please advise

## 2022-04-29 ENCOUNTER — TELEPHONE (OUTPATIENT)
Dept: INTERNAL MEDICINE | Facility: CLINIC | Age: 72
End: 2022-04-29

## 2022-05-04 ENCOUNTER — TRANSCRIBE ORDERS (OUTPATIENT)
Dept: ADMINISTRATIVE | Facility: HOSPITAL | Age: 72
End: 2022-05-04

## 2022-05-04 DIAGNOSIS — Z11.59 ENCOUNTER FOR SCREENING FOR VIRAL DISEASE: Primary | ICD-10-CM

## 2022-05-17 ENCOUNTER — TELEPHONE (OUTPATIENT)
Dept: INTERNAL MEDICINE | Facility: CLINIC | Age: 72
End: 2022-05-17

## 2022-05-17 NOTE — TELEPHONE ENCOUNTER
Caller: Lupe Morillo Alisonsally    Relationship: Self    Best call back number:    [phone]     What medication are you requesting:     ANTIBIOTIC     What are your current symptoms:     COUGHING UP YELLOW MUCUS  SCRATCHY THROAT    How long have you been experiencing symptoms:     THIS MORNING, 5/17    Have you had these symptoms before:    [x] Yes  [] No    Have you been treated for these symptoms before:   [x] Yes  [] No    If a prescription is needed, what is your preferred pharmacy and phone number:      [place], [place]    TELEPHONE CONTACT:    [phone]    Additional notes:    PATIENT ASKED IF IT IS POSSIBLE FOR HER TO TAKE AN ANTIBIOTIC SINCE SHE WILL HAVE KNEE MANIPULATION 5/25/22    PLEASE CONTACT PATIENT WITH RECOMMENDATIONS AND NEXT STEPS    DR BENEDICT

## 2022-05-19 RX ORDER — AZITHROMYCIN 250 MG/1
TABLET, FILM COATED ORAL
Qty: 6 TABLET | Refills: 0 | Status: SHIPPED | OUTPATIENT
Start: 2022-05-19 | End: 2022-11-04

## 2022-05-19 NOTE — TELEPHONE ENCOUNTER
Tell patient that a Z-Jamie has been called in and this should not interfere with her upcoming orthopedics procedure.

## 2022-05-23 ENCOUNTER — LAB (OUTPATIENT)
Dept: PREADMISSION TESTING | Facility: HOSPITAL | Age: 72
End: 2022-05-23

## 2022-05-23 ENCOUNTER — TRANSCRIBE ORDERS (OUTPATIENT)
Dept: LAB | Facility: HOSPITAL | Age: 72
End: 2022-05-23

## 2022-05-23 ENCOUNTER — LAB (OUTPATIENT)
Dept: LAB | Facility: HOSPITAL | Age: 72
End: 2022-05-23

## 2022-05-23 DIAGNOSIS — R73.09 IMPAIRED GLUCOSE TOLERANCE TEST: ICD-10-CM

## 2022-05-23 DIAGNOSIS — Z01.818 OTHER SPECIFIED PRE-OPERATIVE EXAMINATION: ICD-10-CM

## 2022-05-23 DIAGNOSIS — Z11.59 ENCOUNTER FOR SCREENING FOR VIRAL DISEASE: ICD-10-CM

## 2022-05-23 DIAGNOSIS — Z87.898 PERSONAL HISTORY OF OTHER LYMPHATIC AND HEMATOPOIETIC NEOPLASM: ICD-10-CM

## 2022-05-23 DIAGNOSIS — Z87.898 PERSONAL HISTORY OF OTHER LYMPHATIC AND HEMATOPOIETIC NEOPLASM: Primary | ICD-10-CM

## 2022-05-23 LAB
ALBUMIN SERPL-MCNC: 4.8 G/DL (ref 3.5–5.2)
ALBUMIN/GLOB SERPL: 1.8 G/DL
ALP SERPL-CCNC: 106 U/L (ref 39–117)
ALT SERPL W P-5'-P-CCNC: 23 U/L (ref 1–33)
ANION GAP SERPL CALCULATED.3IONS-SCNC: 10 MMOL/L (ref 5–15)
AST SERPL-CCNC: 17 U/L (ref 1–32)
BILIRUB SERPL-MCNC: 0.4 MG/DL (ref 0–1.2)
BUN SERPL-MCNC: 17 MG/DL (ref 8–23)
BUN/CREAT SERPL: 18.9 (ref 7–25)
CALCIUM SPEC-SCNC: 9.7 MG/DL (ref 8.6–10.5)
CHLORIDE SERPL-SCNC: 106 MMOL/L (ref 98–107)
CO2 SERPL-SCNC: 24 MMOL/L (ref 22–29)
CREAT SERPL-MCNC: 0.9 MG/DL (ref 0.57–1)
DEPRECATED RDW RBC AUTO: 49 FL (ref 37–54)
EGFRCR SERPLBLD CKD-EPI 2021: 68.9 ML/MIN/1.73
ERYTHROCYTE [DISTWIDTH] IN BLOOD BY AUTOMATED COUNT: 14.1 % (ref 12.3–15.4)
GLOBULIN UR ELPH-MCNC: 2.6 GM/DL
GLUCOSE SERPL-MCNC: 142 MG/DL (ref 65–99)
HBA1C MFR BLD: 7.3 % (ref 4.8–5.6)
HCT VFR BLD AUTO: 40.7 % (ref 34–46.6)
HGB BLD-MCNC: 12.9 G/DL (ref 12–15.9)
MCH RBC QN AUTO: 30 PG (ref 26.6–33)
MCHC RBC AUTO-ENTMCNC: 31.7 G/DL (ref 31.5–35.7)
MCV RBC AUTO: 94.7 FL (ref 79–97)
PLATELET # BLD AUTO: 301 10*3/MM3 (ref 140–450)
PMV BLD AUTO: 10 FL (ref 6–12)
POTASSIUM SERPL-SCNC: 4.5 MMOL/L (ref 3.5–5.2)
PROT SERPL-MCNC: 7.4 G/DL (ref 6–8.5)
RBC # BLD AUTO: 4.3 10*6/MM3 (ref 3.77–5.28)
SARS-COV-2 RNA PNL SPEC NAA+PROBE: NOT DETECTED
SODIUM SERPL-SCNC: 140 MMOL/L (ref 136–145)
WBC NRBC COR # BLD: 5.49 10*3/MM3 (ref 3.4–10.8)

## 2022-05-23 PROCEDURE — 36415 COLL VENOUS BLD VENIPUNCTURE: CPT

## 2022-05-23 PROCEDURE — 83036 HEMOGLOBIN GLYCOSYLATED A1C: CPT

## 2022-05-23 PROCEDURE — 80053 COMPREHEN METABOLIC PANEL: CPT

## 2022-05-23 PROCEDURE — U0004 COV-19 TEST NON-CDC HGH THRU: HCPCS

## 2022-05-23 PROCEDURE — C9803 HOPD COVID-19 SPEC COLLECT: HCPCS

## 2022-05-23 PROCEDURE — 85027 COMPLETE CBC AUTOMATED: CPT

## 2022-06-27 ENCOUNTER — OFFICE VISIT (OUTPATIENT)
Dept: INTERNAL MEDICINE | Facility: CLINIC | Age: 72
End: 2022-06-27

## 2022-06-27 VITALS
HEART RATE: 80 BPM | SYSTOLIC BLOOD PRESSURE: 124 MMHG | BODY MASS INDEX: 28.89 KG/M2 | WEIGHT: 190 LBS | DIASTOLIC BLOOD PRESSURE: 64 MMHG | OXYGEN SATURATION: 95 % | RESPIRATION RATE: 20 BRPM | TEMPERATURE: 96.9 F

## 2022-06-27 DIAGNOSIS — R09.81 SINUS CONGESTION: Primary | ICD-10-CM

## 2022-06-27 DIAGNOSIS — I10 ESSENTIAL HYPERTENSION: ICD-10-CM

## 2022-06-27 DIAGNOSIS — J40 BRONCHITIS: ICD-10-CM

## 2022-06-27 DIAGNOSIS — G89.29 CHRONIC PAIN OF RIGHT KNEE: ICD-10-CM

## 2022-06-27 DIAGNOSIS — M25.561 CHRONIC PAIN OF RIGHT KNEE: ICD-10-CM

## 2022-06-27 PROCEDURE — 99213 OFFICE O/P EST LOW 20 MIN: CPT | Performed by: INTERNAL MEDICINE

## 2022-07-14 ENCOUNTER — TELEPHONE (OUTPATIENT)
Dept: INTERNAL MEDICINE | Facility: CLINIC | Age: 72
End: 2022-07-14

## 2022-07-14 NOTE — TELEPHONE ENCOUNTER
Caller: MelanieLupe meyer    Relationship: Self    Best call back number: [phone]    What medication are you requesting: SOMETHING FOR SLIGHT STOMACH PAIN    What are your current symptoms: GAS ISSUES AND STOMACH DOES NOT FEEL RIGHT    How long have you been experiencing symptoms: 3 DAYS    Have you had these symptoms before:    [] Yes  [x] No    Have you been treated for these symptoms before:   [] Yes  [x] No    If a prescription is needed, what is your preferred pharmacy and phone number: [facility] [address] [facility] [phone] [facility] [phone]      Additional notes:PATIENT ATE SOME FOOD FROM [employer] AND WOK UP WITH DIAHRREA AND VOMITING

## 2022-07-19 NOTE — TELEPHONE ENCOUNTER
There really is not any prescription medication for stomach cramping.  There is a medication called Bentyl but this is usually to chronic motility issues.    Recommend supportive care, drink plenty of fluids for hydration, Imodium or Kaopectate for diarrhea, advance diet as tolerated with emphasis for hydration, sometimes occasionally eating bananas sometimes helps decrease cramping of the stomach

## 2022-09-12 DIAGNOSIS — I10 ESSENTIAL HYPERTENSION: ICD-10-CM

## 2022-09-13 RX ORDER — SPIRONOLACTONE 50 MG/1
TABLET, FILM COATED ORAL
Qty: 90 TABLET | Refills: 1 | Status: SHIPPED | OUTPATIENT
Start: 2022-09-13 | End: 2023-01-05

## 2022-09-28 ENCOUNTER — TRANSCRIBE ORDERS (OUTPATIENT)
Dept: ADMINISTRATIVE | Facility: HOSPITAL | Age: 72
End: 2022-09-28

## 2022-09-28 DIAGNOSIS — Z12.31 VISIT FOR SCREENING MAMMOGRAM: Primary | ICD-10-CM

## 2022-09-30 ENCOUNTER — OFFICE VISIT (OUTPATIENT)
Dept: INTERNAL MEDICINE | Facility: CLINIC | Age: 72
End: 2022-09-30

## 2022-09-30 VITALS
OXYGEN SATURATION: 98 % | SYSTOLIC BLOOD PRESSURE: 162 MMHG | WEIGHT: 187 LBS | BODY MASS INDEX: 28.34 KG/M2 | TEMPERATURE: 97.7 F | HEIGHT: 68 IN | DIASTOLIC BLOOD PRESSURE: 74 MMHG | RESPIRATION RATE: 20 BRPM | HEART RATE: 102 BPM

## 2022-09-30 DIAGNOSIS — I10 ESSENTIAL HYPERTENSION: Primary | ICD-10-CM

## 2022-09-30 DIAGNOSIS — R25.2 LEG CRAMPING: ICD-10-CM

## 2022-09-30 DIAGNOSIS — Z23 NEED FOR PROPHYLACTIC VACCINATION AGAINST STREPTOCOCCUS PNEUMONIAE (PNEUMOCOCCUS): ICD-10-CM

## 2022-09-30 DIAGNOSIS — G89.29 CHRONIC PAIN OF RIGHT KNEE: ICD-10-CM

## 2022-09-30 DIAGNOSIS — M25.561 CHRONIC PAIN OF RIGHT KNEE: ICD-10-CM

## 2022-09-30 PROCEDURE — 99214 OFFICE O/P EST MOD 30 MIN: CPT | Performed by: INTERNAL MEDICINE

## 2022-09-30 PROCEDURE — 90677 PCV20 VACCINE IM: CPT | Performed by: INTERNAL MEDICINE

## 2022-09-30 PROCEDURE — 80053 COMPREHEN METABOLIC PANEL: CPT | Performed by: INTERNAL MEDICINE

## 2022-09-30 PROCEDURE — 90471 IMMUNIZATION ADMIN: CPT | Performed by: INTERNAL MEDICINE

## 2022-10-01 LAB
ALBUMIN SERPL-MCNC: 4.7 G/DL (ref 3.5–5.2)
ALBUMIN/GLOB SERPL: 1.9 G/DL
ALP SERPL-CCNC: 108 U/L (ref 39–117)
ALT SERPL W P-5'-P-CCNC: 16 U/L (ref 1–33)
ANION GAP SERPL CALCULATED.3IONS-SCNC: 10.8 MMOL/L (ref 5–15)
AST SERPL-CCNC: 15 U/L (ref 1–32)
BILIRUB SERPL-MCNC: 0.5 MG/DL (ref 0–1.2)
BUN SERPL-MCNC: 18 MG/DL (ref 8–23)
BUN/CREAT SERPL: 23.4 (ref 7–25)
CALCIUM SPEC-SCNC: 9.8 MG/DL (ref 8.6–10.5)
CHLORIDE SERPL-SCNC: 107 MMOL/L (ref 98–107)
CO2 SERPL-SCNC: 23.2 MMOL/L (ref 22–29)
CREAT SERPL-MCNC: 0.77 MG/DL (ref 0.57–1)
EGFRCR SERPLBLD CKD-EPI 2021: 82.6 ML/MIN/1.73
GLOBULIN UR ELPH-MCNC: 2.5 GM/DL
GLUCOSE SERPL-MCNC: 170 MG/DL (ref 65–99)
POTASSIUM SERPL-SCNC: 4.1 MMOL/L (ref 3.5–5.2)
PROT SERPL-MCNC: 7.2 G/DL (ref 6–8.5)
SODIUM SERPL-SCNC: 141 MMOL/L (ref 136–145)

## 2022-10-11 ENCOUNTER — TELEPHONE (OUTPATIENT)
Dept: INTERNAL MEDICINE | Facility: CLINIC | Age: 72
End: 2022-10-11

## 2022-10-11 DIAGNOSIS — E11.9 TYPE 2 DIABETES MELLITUS WITHOUT COMPLICATION, WITH LONG-TERM CURRENT USE OF INSULIN: Primary | ICD-10-CM

## 2022-10-11 DIAGNOSIS — Z79.4 TYPE 2 DIABETES MELLITUS WITHOUT COMPLICATION, WITH LONG-TERM CURRENT USE OF INSULIN: Primary | ICD-10-CM

## 2022-10-11 NOTE — TELEPHONE ENCOUNTER
Caller: Lupe Morillo    Relationship: Self    Best call back number: [phone]    What test was performed: A1C    When was the test performed: LAST WEEK    Where was the test performed: OUR OFFICE

## 2022-10-13 NOTE — TELEPHONE ENCOUNTER
The last hemoglobin A1c I see is performed 4 months ago and it was 7.3.    For some reason I do not see it (hemoglobin A1c) charted    I placed another hemoglobin A1c ordered if patient would like to come in as a walk-in but I am not sure what happened to that A1c result last week I do apologize for the inconvenience

## 2022-10-20 ENCOUNTER — LAB (OUTPATIENT)
Dept: INTERNAL MEDICINE | Facility: CLINIC | Age: 72
End: 2022-10-20

## 2022-10-20 DIAGNOSIS — Z79.4 TYPE 2 DIABETES MELLITUS WITHOUT COMPLICATION, WITH LONG-TERM CURRENT USE OF INSULIN: ICD-10-CM

## 2022-10-20 DIAGNOSIS — E11.9 TYPE 2 DIABETES MELLITUS WITHOUT COMPLICATION, WITH LONG-TERM CURRENT USE OF INSULIN: ICD-10-CM

## 2022-10-20 DIAGNOSIS — Z23 FLU VACCINE NEED: Primary | ICD-10-CM

## 2022-10-20 LAB
EXPIRATION DATE: ABNORMAL
HBA1C MFR BLD: 7.1 %
Lab: ABNORMAL

## 2022-10-20 PROCEDURE — 83036 HEMOGLOBIN GLYCOSYLATED A1C: CPT | Performed by: INTERNAL MEDICINE

## 2022-10-20 PROCEDURE — 90471 IMMUNIZATION ADMIN: CPT | Performed by: INTERNAL MEDICINE

## 2022-10-20 PROCEDURE — 90662 IIV NO PRSV INCREASED AG IM: CPT | Performed by: INTERNAL MEDICINE

## 2022-11-03 ENCOUNTER — HOSPITAL ENCOUNTER (OUTPATIENT)
Dept: MAMMOGRAPHY | Facility: HOSPITAL | Age: 72
Discharge: HOME | End: 2022-11-03
Admitting: PHYSICIAN ASSISTANT

## 2022-11-03 DIAGNOSIS — Z12.31 VISIT FOR SCREENING MAMMOGRAM: ICD-10-CM

## 2022-11-03 PROCEDURE — 77063 BREAST TOMOSYNTHESIS BI: CPT | Performed by: RADIOLOGY

## 2022-11-03 PROCEDURE — 77067 SCR MAMMO BI INCL CAD: CPT | Performed by: RADIOLOGY

## 2022-11-03 PROCEDURE — 77063 BREAST TOMOSYNTHESIS BI: CPT

## 2022-11-03 PROCEDURE — 77067 SCR MAMMO BI INCL CAD: CPT

## 2022-11-04 ENCOUNTER — TELEPHONE (OUTPATIENT)
Dept: INTERNAL MEDICINE | Facility: CLINIC | Age: 72
End: 2022-11-04

## 2022-11-04 DIAGNOSIS — R05.2 SUBACUTE COUGH: Primary | ICD-10-CM

## 2022-11-04 NOTE — TELEPHONE ENCOUNTER
Caller: Lupe Morillo    Relationship to patient: Self    Best call back number: [phone]     Date of positive COVID19 test: 11-4-22    COVID19 symptoms: SCRATCHY THROAT, RUNNY NOSE, HEADACHE    Additional information or concerns: PATIENT ASKED IF SHE CAN TAKE PAXLOVID AND IF SO SHE ASKED FOR THIS TO BE CALLED IN.     What is the patients preferred pharmacy:[id number] [address] [phone] [facility] [phone]

## 2022-11-05 RX ORDER — AMOXICILLIN AND CLAVULANATE POTASSIUM 500; 125 MG/1; MG/1
1 TABLET, FILM COATED ORAL 2 TIMES DAILY
Qty: 16 TABLET | Refills: 0 | Status: SHIPPED | OUTPATIENT
Start: 2022-11-05 | End: 2022-11-30

## 2022-11-05 NOTE — TELEPHONE ENCOUNTER
Spoke to patient and addressed her concerns.  She is already been started on Plaxlovid and doing somewhat better.  Continues to have a productive cough yellow-greenish sputum and is wondering if an antibiotic can be called in to prevent pneumonia    Recommendations: Augmentin for 8-day course

## 2022-11-30 ENCOUNTER — PRE-ADMISSION TESTING (OUTPATIENT)
Dept: PREADMISSION TESTING | Facility: HOSPITAL | Age: 72
End: 2022-11-30

## 2022-11-30 VITALS — HEIGHT: 68 IN | BODY MASS INDEX: 26.81 KG/M2 | WEIGHT: 176.92 LBS

## 2022-11-30 LAB
25(OH)D3 SERPL-MCNC: 99.5 NG/ML (ref 30–100)
ALBUMIN SERPL-MCNC: 4.8 G/DL (ref 3.5–5.2)
ALBUMIN/GLOB SERPL: 1.8 G/DL
ALP SERPL-CCNC: 103 U/L (ref 39–117)
ALT SERPL W P-5'-P-CCNC: 16 U/L (ref 1–33)
ANION GAP SERPL CALCULATED.3IONS-SCNC: 11 MMOL/L (ref 5–15)
APTT PPP: 31.9 SECONDS (ref 22–39)
AST SERPL-CCNC: 14 U/L (ref 1–32)
BASOPHILS # BLD AUTO: 0.04 10*3/MM3 (ref 0–0.2)
BASOPHILS NFR BLD AUTO: 0.7 % (ref 0–1.5)
BILIRUB SERPL-MCNC: 0.6 MG/DL (ref 0–1.2)
BUN SERPL-MCNC: 11 MG/DL (ref 8–23)
BUN/CREAT SERPL: 14.7 (ref 7–25)
CALCIUM SPEC-SCNC: 9.9 MG/DL (ref 8.6–10.5)
CHLORIDE SERPL-SCNC: 105 MMOL/L (ref 98–107)
CO2 SERPL-SCNC: 27 MMOL/L (ref 22–29)
CREAT SERPL-MCNC: 0.75 MG/DL (ref 0.57–1)
CRP SERPL-MCNC: <0.3 MG/DL (ref 0–0.5)
DEPRECATED RDW RBC AUTO: 46.8 FL (ref 37–54)
EGFRCR SERPLBLD CKD-EPI 2021: 85.2 ML/MIN/1.73
EOSINOPHIL # BLD AUTO: 0.1 10*3/MM3 (ref 0–0.4)
EOSINOPHIL NFR BLD AUTO: 1.9 % (ref 0.3–6.2)
ERYTHROCYTE [DISTWIDTH] IN BLOOD BY AUTOMATED COUNT: 13.6 % (ref 12.3–15.4)
ERYTHROCYTE [SEDIMENTATION RATE] IN BLOOD: 28 MM/HR (ref 0–30)
GLOBULIN UR ELPH-MCNC: 2.7 GM/DL
GLUCOSE SERPL-MCNC: 95 MG/DL (ref 65–99)
HBA1C MFR BLD: 6.9 % (ref 4.8–5.6)
HCT VFR BLD AUTO: 43.6 % (ref 34–46.6)
HGB BLD-MCNC: 13.8 G/DL (ref 12–15.9)
IMM GRANULOCYTES # BLD AUTO: 0.01 10*3/MM3 (ref 0–0.05)
IMM GRANULOCYTES NFR BLD AUTO: 0.2 % (ref 0–0.5)
INR PPP: 0.97 (ref 0.84–1.13)
LYMPHOCYTES # BLD AUTO: 2.64 10*3/MM3 (ref 0.7–3.1)
LYMPHOCYTES NFR BLD AUTO: 49.1 % (ref 19.6–45.3)
MCH RBC QN AUTO: 29.6 PG (ref 26.6–33)
MCHC RBC AUTO-ENTMCNC: 31.7 G/DL (ref 31.5–35.7)
MCV RBC AUTO: 93.6 FL (ref 79–97)
MONOCYTES # BLD AUTO: 0.28 10*3/MM3 (ref 0.1–0.9)
MONOCYTES NFR BLD AUTO: 5.2 % (ref 5–12)
NEUTROPHILS NFR BLD AUTO: 2.31 10*3/MM3 (ref 1.7–7)
NEUTROPHILS NFR BLD AUTO: 42.9 % (ref 42.7–76)
NRBC BLD AUTO-RTO: 0 /100 WBC (ref 0–0.2)
PLATELET # BLD AUTO: 286 10*3/MM3 (ref 140–450)
PMV BLD AUTO: 9.6 FL (ref 6–12)
POTASSIUM SERPL-SCNC: 4.1 MMOL/L (ref 3.5–5.2)
PROT SERPL-MCNC: 7.5 G/DL (ref 6–8.5)
PROTHROMBIN TIME: 12.8 SECONDS (ref 11.4–14.4)
QT INTERVAL: 392 MS
QTC INTERVAL: 452 MS
RBC # BLD AUTO: 4.66 10*6/MM3 (ref 3.77–5.28)
SODIUM SERPL-SCNC: 143 MMOL/L (ref 136–145)
WBC NRBC COR # BLD: 5.38 10*3/MM3 (ref 3.4–10.8)

## 2022-11-30 PROCEDURE — 93010 ELECTROCARDIOGRAM REPORT: CPT | Performed by: INTERNAL MEDICINE

## 2022-11-30 PROCEDURE — 83036 HEMOGLOBIN GLYCOSYLATED A1C: CPT

## 2022-11-30 PROCEDURE — 86140 C-REACTIVE PROTEIN: CPT

## 2022-11-30 PROCEDURE — 85025 COMPLETE CBC W/AUTO DIFF WBC: CPT

## 2022-11-30 PROCEDURE — 36415 COLL VENOUS BLD VENIPUNCTURE: CPT

## 2022-11-30 PROCEDURE — 85610 PROTHROMBIN TIME: CPT

## 2022-11-30 PROCEDURE — 87081 CULTURE SCREEN ONLY: CPT

## 2022-11-30 PROCEDURE — 85652 RBC SED RATE AUTOMATED: CPT

## 2022-11-30 PROCEDURE — 85730 THROMBOPLASTIN TIME PARTIAL: CPT

## 2022-11-30 PROCEDURE — 80053 COMPREHEN METABOLIC PANEL: CPT

## 2022-11-30 PROCEDURE — 82306 VITAMIN D 25 HYDROXY: CPT

## 2022-11-30 PROCEDURE — 93005 ELECTROCARDIOGRAM TRACING: CPT

## 2022-11-30 PROCEDURE — G0480 DRUG TEST DEF 1-7 CLASSES: HCPCS

## 2022-11-30 PROCEDURE — 82985 ASSAY OF GLYCATED PROTEIN: CPT

## 2022-11-30 RX ORDER — IPRATROPIUM BROMIDE AND ALBUTEROL SULFATE 2.5; .5 MG/3ML; MG/3ML
SOLUTION RESPIRATORY (INHALATION)
COMMUNITY
Start: 2022-11-21

## 2022-11-30 RX ORDER — AZITHROMYCIN 250 MG/1
TABLET, FILM COATED ORAL
Status: ON HOLD | COMMUNITY
Start: 2022-11-21 | End: 2022-12-14

## 2022-11-30 RX ORDER — BUDESONIDE AND FORMOTEROL FUMARATE DIHYDRATE 160; 4.5 UG/1; UG/1
AEROSOL RESPIRATORY (INHALATION)
COMMUNITY
Start: 2022-11-10

## 2022-12-01 ENCOUNTER — APPOINTMENT (OUTPATIENT)
Dept: PREADMISSION TESTING | Facility: HOSPITAL | Age: 72
End: 2022-12-01

## 2022-12-01 LAB — FRUCTOSAMINE SERPL-SCNC: 287 UMOL/L (ref 0–285)

## 2022-12-02 LAB — MRSA SPEC QL CULT: NORMAL

## 2022-12-09 LAB
COTININE SERPL-MCNC: <1 NG/ML
NICOTINE SERPL-MCNC: <1 NG/ML

## 2022-12-13 ENCOUNTER — ANESTHESIA EVENT (OUTPATIENT)
Dept: PERIOP | Facility: HOSPITAL | Age: 72
End: 2022-12-13

## 2022-12-13 RX ORDER — SODIUM CHLORIDE 9 MG/ML
40 INJECTION, SOLUTION INTRAVENOUS AS NEEDED
Status: CANCELLED | OUTPATIENT
Start: 2022-12-13

## 2022-12-13 RX ORDER — FAMOTIDINE 10 MG/ML
20 INJECTION, SOLUTION INTRAVENOUS ONCE
Status: CANCELLED | OUTPATIENT
Start: 2022-12-13 | End: 2022-12-13

## 2022-12-13 RX ORDER — SODIUM CHLORIDE 0.9 % (FLUSH) 0.9 %
10 SYRINGE (ML) INJECTION EVERY 12 HOURS SCHEDULED
Status: CANCELLED | OUTPATIENT
Start: 2022-12-13

## 2022-12-13 RX ORDER — SODIUM CHLORIDE 0.9 % (FLUSH) 0.9 %
10 SYRINGE (ML) INJECTION AS NEEDED
Status: CANCELLED | OUTPATIENT
Start: 2022-12-13

## 2022-12-14 ENCOUNTER — HOSPITAL ENCOUNTER (INPATIENT)
Facility: HOSPITAL | Age: 72
LOS: 1 days | Discharge: HOME HEALTH | End: 2022-12-15
Attending: ORTHOPAEDIC SURGERY | Admitting: ORTHOPAEDIC SURGERY

## 2022-12-14 ENCOUNTER — ANESTHESIA (OUTPATIENT)
Dept: PERIOP | Facility: HOSPITAL | Age: 72
End: 2022-12-14

## 2022-12-14 ENCOUNTER — ANESTHESIA EVENT CONVERTED (OUTPATIENT)
Dept: ANESTHESIOLOGY | Facility: HOSPITAL | Age: 72
End: 2022-12-14

## 2022-12-14 ENCOUNTER — APPOINTMENT (OUTPATIENT)
Dept: GENERAL RADIOLOGY | Facility: HOSPITAL | Age: 72
End: 2022-12-14

## 2022-12-14 DIAGNOSIS — Z96.651 STATUS POST TOTAL RIGHT KNEE REPLACEMENT: Primary | ICD-10-CM

## 2022-12-14 DIAGNOSIS — M25.561 RIGHT KNEE PAIN: ICD-10-CM

## 2022-12-14 LAB
GLUCOSE BLDC GLUCOMTR-MCNC: 132 MG/DL (ref 70–130)
GLUCOSE BLDC GLUCOMTR-MCNC: 143 MG/DL (ref 70–130)
GLUCOSE BLDC GLUCOMTR-MCNC: 160 MG/DL (ref 70–130)
GLUCOSE BLDC GLUCOMTR-MCNC: 214 MG/DL (ref 70–130)
POTASSIUM SERPL-SCNC: 3.9 MMOL/L (ref 3.5–5.2)

## 2022-12-14 PROCEDURE — C1776 JOINT DEVICE (IMPLANTABLE): HCPCS | Performed by: ORTHOPAEDIC SURGERY

## 2022-12-14 PROCEDURE — 87116 MYCOBACTERIA CULTURE: CPT | Performed by: ORTHOPAEDIC SURGERY

## 2022-12-14 PROCEDURE — 25010000002 CEFAZOLIN IN DEXTROSE 2-4 GM/100ML-% SOLUTION: Performed by: ORTHOPAEDIC SURGERY

## 2022-12-14 PROCEDURE — 87205 SMEAR GRAM STAIN: CPT | Performed by: ORTHOPAEDIC SURGERY

## 2022-12-14 PROCEDURE — 84132 ASSAY OF SERUM POTASSIUM: CPT | Performed by: ANESTHESIOLOGY

## 2022-12-14 PROCEDURE — 94640 AIRWAY INHALATION TREATMENT: CPT

## 2022-12-14 PROCEDURE — 25010000002 PROPOFOL 10 MG/ML EMULSION: Performed by: NURSE ANESTHETIST, CERTIFIED REGISTERED

## 2022-12-14 PROCEDURE — 27486 REVISE/REPLACE KNEE JOINT: CPT | Performed by: PHYSICIAN ASSISTANT

## 2022-12-14 PROCEDURE — 94664 DEMO&/EVAL PT USE INHALER: CPT

## 2022-12-14 PROCEDURE — C1713 ANCHOR/SCREW BN/BN,TIS/BN: HCPCS | Performed by: ORTHOPAEDIC SURGERY

## 2022-12-14 PROCEDURE — 25010000002 ONDANSETRON PER 1 MG: Performed by: NURSE ANESTHETIST, CERTIFIED REGISTERED

## 2022-12-14 PROCEDURE — 25010000002 DEXAMETHASONE PER 1 MG: Performed by: NURSE ANESTHETIST, CERTIFIED REGISTERED

## 2022-12-14 PROCEDURE — 25010000002 VANCOMYCIN 1 G RECONSTITUTED SOLUTION: Performed by: ORTHOPAEDIC SURGERY

## 2022-12-14 PROCEDURE — 63710000001 INSULIN DETEMIR PER 5 UNITS: Performed by: NURSE PRACTITIONER

## 2022-12-14 PROCEDURE — 87075 CULTR BACTERIA EXCEPT BLOOD: CPT | Performed by: ORTHOPAEDIC SURGERY

## 2022-12-14 PROCEDURE — 87102 FUNGUS ISOLATION CULTURE: CPT | Performed by: ORTHOPAEDIC SURGERY

## 2022-12-14 PROCEDURE — 73560 X-RAY EXAM OF KNEE 1 OR 2: CPT

## 2022-12-14 PROCEDURE — 87070 CULTURE OTHR SPECIMN AEROBIC: CPT | Performed by: ORTHOPAEDIC SURGERY

## 2022-12-14 PROCEDURE — 87206 SMEAR FLUORESCENT/ACID STAI: CPT | Performed by: ORTHOPAEDIC SURGERY

## 2022-12-14 PROCEDURE — 25010000002 CLONIDINE PER 1 MG: Performed by: ORTHOPAEDIC SURGERY

## 2022-12-14 PROCEDURE — 25010000002 KETOROLAC TROMETHAMINE PER 15 MG: Performed by: ORTHOPAEDIC SURGERY

## 2022-12-14 PROCEDURE — 82962 GLUCOSE BLOOD TEST: CPT

## 2022-12-14 PROCEDURE — 87176 TISSUE HOMOGENIZATION CULTR: CPT | Performed by: ORTHOPAEDIC SURGERY

## 2022-12-14 PROCEDURE — 0SRT0J9 REPLACEMENT OF RIGHT KNEE JOINT, FEMORAL SURFACE WITH SYNTHETIC SUBSTITUTE, CEMENTED, OPEN APPROACH: ICD-10-PCS | Performed by: ORTHOPAEDIC SURGERY

## 2022-12-14 PROCEDURE — 97162 PT EVAL MOD COMPLEX 30 MIN: CPT

## 2022-12-14 PROCEDURE — 0SPT0JZ REMOVAL OF SYNTHETIC SUBSTITUTE FROM RIGHT KNEE JOINT, FEMORAL SURFACE, OPEN APPROACH: ICD-10-PCS | Performed by: ORTHOPAEDIC SURGERY

## 2022-12-14 DEVICE — LEGION POSTERIOR STABILIZED HIGH                                    FLEX HIGHLY CROSS LINKED                                    POLYETHYLENE SIZE 3-4 9MM
Type: IMPLANTABLE DEVICE | Site: KNEE | Status: FUNCTIONAL
Brand: LEGION

## 2022-12-14 DEVICE — LEGION SCREW-ON DISTAL FEMORAL                                    WEDGE SIZE 5 5MM
Type: IMPLANTABLE DEVICE | Site: KNEE | Status: FUNCTIONAL
Brand: LEGION

## 2022-12-14 DEVICE — DEV CONTRL TISS STRATAFIX SPIRAL PDO BIDIR 1 36X36CM: Type: IMPLANTABLE DEVICE | Site: KNEE | Status: FUNCTIONAL

## 2022-12-14 DEVICE — CMT BONE SIMPLEX/P TMYCIN FDOS 10PK: Type: IMPLANTABLE DEVICE | Site: KNEE | Status: FUNCTIONAL

## 2022-12-14 DEVICE — LEGION CEMENTED STEM 16MM X 120MM STRAIGHT
Type: IMPLANTABLE DEVICE | Site: KNEE | Status: FUNCTIONAL
Brand: LEGION

## 2022-12-14 DEVICE — PLUG BONE RESTR/CMT W/HNDL UNIV 24MM MD: Type: IMPLANTABLE DEVICE | Site: KNEE | Status: FUNCTIONAL

## 2022-12-14 DEVICE — DEV CONTRL TISS STRATAFIX SYMM PDS PLUS VIL CT-1 45CM: Type: IMPLANTABLE DEVICE | Site: KNEE | Status: FUNCTIONAL

## 2022-12-14 DEVICE — LEGION OXIN CONSTRAINED FEMORAL SZ                                    5 RT
Type: IMPLANTABLE DEVICE | Site: KNEE | Status: FUNCTIONAL
Brand: LEGION

## 2022-12-14 RX ORDER — MIDAZOLAM HYDROCHLORIDE 1 MG/ML
0.5 INJECTION INTRAMUSCULAR; INTRAVENOUS
Status: DISCONTINUED | OUTPATIENT
Start: 2022-12-14 | End: 2022-12-14 | Stop reason: HOSPADM

## 2022-12-14 RX ORDER — FAMOTIDINE 20 MG/1
20 TABLET, FILM COATED ORAL ONCE
Status: COMPLETED | OUTPATIENT
Start: 2022-12-14 | End: 2022-12-14

## 2022-12-14 RX ORDER — DIPHENHYDRAMINE HCL 25 MG
25 CAPSULE ORAL EVERY 6 HOURS PRN
Status: DISCONTINUED | OUTPATIENT
Start: 2022-12-14 | End: 2022-12-15 | Stop reason: HOSPADM

## 2022-12-14 RX ORDER — TRAMADOL HYDROCHLORIDE 50 MG/1
50 TABLET ORAL EVERY 8 HOURS PRN
Status: DISCONTINUED | OUTPATIENT
Start: 2022-12-14 | End: 2022-12-15 | Stop reason: HOSPADM

## 2022-12-14 RX ORDER — TRANEXAMIC ACID 10 MG/ML
1000 INJECTION, SOLUTION INTRAVENOUS ONCE
Status: COMPLETED | OUTPATIENT
Start: 2022-12-14 | End: 2022-12-14

## 2022-12-14 RX ORDER — ACETAMINOPHEN 500 MG
1000 TABLET ORAL EVERY 8 HOURS
Status: DISCONTINUED | OUTPATIENT
Start: 2022-12-14 | End: 2022-12-15 | Stop reason: HOSPADM

## 2022-12-14 RX ORDER — SODIUM CHLORIDE, SODIUM LACTATE, POTASSIUM CHLORIDE, CALCIUM CHLORIDE 600; 310; 30; 20 MG/100ML; MG/100ML; MG/100ML; MG/100ML
100 INJECTION, SOLUTION INTRAVENOUS CONTINUOUS
Status: DISCONTINUED | OUTPATIENT
Start: 2022-12-14 | End: 2022-12-15 | Stop reason: HOSPADM

## 2022-12-14 RX ORDER — CEFAZOLIN SODIUM 2 G/100ML
2 INJECTION, SOLUTION INTRAVENOUS ONCE
Status: COMPLETED | OUTPATIENT
Start: 2022-12-14 | End: 2022-12-14

## 2022-12-14 RX ORDER — DIPHENHYDRAMINE HYDROCHLORIDE 50 MG/ML
25 INJECTION INTRAMUSCULAR; INTRAVENOUS EVERY 6 HOURS PRN
Status: DISCONTINUED | OUTPATIENT
Start: 2022-12-14 | End: 2022-12-15 | Stop reason: HOSPADM

## 2022-12-14 RX ORDER — PROPOFOL 10 MG/ML
VIAL (ML) INTRAVENOUS AS NEEDED
Status: DISCONTINUED | OUTPATIENT
Start: 2022-12-14 | End: 2022-12-14 | Stop reason: SURG

## 2022-12-14 RX ORDER — BUPIVACAINE HYDROCHLORIDE 5 MG/ML
INJECTION, SOLUTION PERINEURAL
Status: COMPLETED | OUTPATIENT
Start: 2022-12-14 | End: 2022-12-14

## 2022-12-14 RX ORDER — CYCLOBENZAPRINE HCL 5 MG
5 TABLET ORAL 3 TIMES DAILY PRN
COMMUNITY

## 2022-12-14 RX ORDER — SODIUM CHLORIDE, SODIUM LACTATE, POTASSIUM CHLORIDE, CALCIUM CHLORIDE 600; 310; 30; 20 MG/100ML; MG/100ML; MG/100ML; MG/100ML
9 INJECTION, SOLUTION INTRAVENOUS CONTINUOUS
Status: DISCONTINUED | OUTPATIENT
Start: 2022-12-14 | End: 2022-12-15

## 2022-12-14 RX ORDER — LIDOCAINE HYDROCHLORIDE 10 MG/ML
0.5 INJECTION, SOLUTION EPIDURAL; INFILTRATION; INTRACAUDAL; PERINEURAL ONCE AS NEEDED
Status: COMPLETED | OUTPATIENT
Start: 2022-12-14 | End: 2022-12-14

## 2022-12-14 RX ORDER — KETOROLAC TROMETHAMINE 15 MG/ML
15 INJECTION, SOLUTION INTRAMUSCULAR; INTRAVENOUS EVERY 6 HOURS PRN
Status: DISCONTINUED | OUTPATIENT
Start: 2022-12-14 | End: 2022-12-15 | Stop reason: HOSPADM

## 2022-12-14 RX ORDER — ONDANSETRON 2 MG/ML
INJECTION INTRAMUSCULAR; INTRAVENOUS AS NEEDED
Status: DISCONTINUED | OUTPATIENT
Start: 2022-12-14 | End: 2022-12-14 | Stop reason: SURG

## 2022-12-14 RX ORDER — HYDROMORPHONE HYDROCHLORIDE 1 MG/ML
0.5 INJECTION, SOLUTION INTRAMUSCULAR; INTRAVENOUS; SUBCUTANEOUS
Status: DISCONTINUED | OUTPATIENT
Start: 2022-12-14 | End: 2022-12-14 | Stop reason: HOSPADM

## 2022-12-14 RX ORDER — PHENYLEPHRINE HCL IN 0.9% NACL 1 MG/10 ML
SYRINGE (ML) INTRAVENOUS AS NEEDED
Status: DISCONTINUED | OUTPATIENT
Start: 2022-12-14 | End: 2022-12-14 | Stop reason: SURG

## 2022-12-14 RX ORDER — MELOXICAM 15 MG/1
15 TABLET ORAL ONCE
Status: COMPLETED | OUTPATIENT
Start: 2022-12-14 | End: 2022-12-14

## 2022-12-14 RX ORDER — DEXAMETHASONE SODIUM PHOSPHATE 4 MG/ML
INJECTION, SOLUTION INTRA-ARTICULAR; INTRALESIONAL; INTRAMUSCULAR; INTRAVENOUS; SOFT TISSUE AS NEEDED
Status: DISCONTINUED | OUTPATIENT
Start: 2022-12-14 | End: 2022-12-14 | Stop reason: SURG

## 2022-12-14 RX ORDER — MAGNESIUM HYDROXIDE 1200 MG/15ML
LIQUID ORAL AS NEEDED
Status: DISCONTINUED | OUTPATIENT
Start: 2022-12-14 | End: 2022-12-14 | Stop reason: HOSPADM

## 2022-12-14 RX ORDER — ACETAMINOPHEN 500 MG
1000 TABLET ORAL ONCE
Status: COMPLETED | OUTPATIENT
Start: 2022-12-14 | End: 2022-12-14

## 2022-12-14 RX ORDER — DOCUSATE SODIUM 100 MG/1
100 CAPSULE, LIQUID FILLED ORAL 2 TIMES DAILY
Status: DISCONTINUED | OUTPATIENT
Start: 2022-12-14 | End: 2022-12-15 | Stop reason: HOSPADM

## 2022-12-14 RX ORDER — ONDANSETRON 2 MG/ML
4 INJECTION INTRAMUSCULAR; INTRAVENOUS EVERY 6 HOURS PRN
Status: DISCONTINUED | OUTPATIENT
Start: 2022-12-14 | End: 2022-12-15 | Stop reason: HOSPADM

## 2022-12-14 RX ORDER — VANCOMYCIN HYDROCHLORIDE 1 G/20ML
INJECTION, POWDER, LYOPHILIZED, FOR SOLUTION INTRAVENOUS AS NEEDED
Status: DISCONTINUED | OUTPATIENT
Start: 2022-12-14 | End: 2022-12-14 | Stop reason: HOSPADM

## 2022-12-14 RX ORDER — LABETALOL HYDROCHLORIDE 5 MG/ML
10 INJECTION, SOLUTION INTRAVENOUS EVERY 4 HOURS PRN
Status: DISCONTINUED | OUTPATIENT
Start: 2022-12-14 | End: 2022-12-15 | Stop reason: HOSPADM

## 2022-12-14 RX ORDER — DEXTROSE MONOHYDRATE 25 G/50ML
25 INJECTION, SOLUTION INTRAVENOUS
Status: DISCONTINUED | OUTPATIENT
Start: 2022-12-14 | End: 2022-12-15 | Stop reason: HOSPADM

## 2022-12-14 RX ORDER — BUDESONIDE AND FORMOTEROL FUMARATE DIHYDRATE 160; 4.5 UG/1; UG/1
2 AEROSOL RESPIRATORY (INHALATION)
Status: DISCONTINUED | OUTPATIENT
Start: 2022-12-14 | End: 2022-12-15 | Stop reason: HOSPADM

## 2022-12-14 RX ORDER — CEFAZOLIN SODIUM 2 G/100ML
2 INJECTION, SOLUTION INTRAVENOUS EVERY 8 HOURS
Status: COMPLETED | OUTPATIENT
Start: 2022-12-14 | End: 2022-12-15

## 2022-12-14 RX ORDER — MELOXICAM 7.5 MG/1
15 TABLET ORAL DAILY
Status: DISCONTINUED | OUTPATIENT
Start: 2022-12-15 | End: 2022-12-15 | Stop reason: HOSPADM

## 2022-12-14 RX ORDER — ONDANSETRON 4 MG/1
4 TABLET, FILM COATED ORAL EVERY 6 HOURS PRN
Status: DISCONTINUED | OUTPATIENT
Start: 2022-12-14 | End: 2022-12-15 | Stop reason: HOSPADM

## 2022-12-14 RX ORDER — OXYCODONE HYDROCHLORIDE 5 MG/1
10 TABLET ORAL EVERY 4 HOURS PRN
Status: DISCONTINUED | OUTPATIENT
Start: 2022-12-14 | End: 2022-12-15 | Stop reason: HOSPADM

## 2022-12-14 RX ORDER — NALOXONE HCL 0.4 MG/ML
0.1 VIAL (ML) INJECTION
Status: DISCONTINUED | OUTPATIENT
Start: 2022-12-14 | End: 2022-12-15 | Stop reason: HOSPADM

## 2022-12-14 RX ORDER — HYDROMORPHONE HYDROCHLORIDE 1 MG/ML
0.5 INJECTION, SOLUTION INTRAMUSCULAR; INTRAVENOUS; SUBCUTANEOUS
Status: DISCONTINUED | OUTPATIENT
Start: 2022-12-14 | End: 2022-12-15 | Stop reason: HOSPADM

## 2022-12-14 RX ORDER — ONDANSETRON 2 MG/ML
4 INJECTION INTRAMUSCULAR; INTRAVENOUS EVERY 6 HOURS PRN
Status: DISCONTINUED | OUTPATIENT
Start: 2022-12-14 | End: 2022-12-14 | Stop reason: SDUPTHER

## 2022-12-14 RX ORDER — NICOTINE POLACRILEX 4 MG
15 LOZENGE BUCCAL
Status: DISCONTINUED | OUTPATIENT
Start: 2022-12-14 | End: 2022-12-15 | Stop reason: HOSPADM

## 2022-12-14 RX ORDER — OXYCODONE HYDROCHLORIDE 5 MG/1
5 TABLET ORAL EVERY 4 HOURS PRN
Status: DISCONTINUED | OUTPATIENT
Start: 2022-12-14 | End: 2022-12-15 | Stop reason: HOSPADM

## 2022-12-14 RX ORDER — NIFEDIPINE 60 MG/1
60 TABLET, EXTENDED RELEASE ORAL DAILY
Status: DISCONTINUED | OUTPATIENT
Start: 2022-12-14 | End: 2022-12-15 | Stop reason: HOSPADM

## 2022-12-14 RX ORDER — LIDOCAINE HYDROCHLORIDE 10 MG/ML
INJECTION, SOLUTION EPIDURAL; INFILTRATION; INTRACAUDAL; PERINEURAL AS NEEDED
Status: DISCONTINUED | OUTPATIENT
Start: 2022-12-14 | End: 2022-12-14 | Stop reason: SURG

## 2022-12-14 RX ORDER — ASPIRIN 81 MG/1
81 TABLET ORAL EVERY 12 HOURS SCHEDULED
Status: DISCONTINUED | OUTPATIENT
Start: 2022-12-15 | End: 2022-12-15 | Stop reason: HOSPADM

## 2022-12-14 RX ORDER — FENTANYL CITRATE 50 UG/ML
50 INJECTION, SOLUTION INTRAMUSCULAR; INTRAVENOUS
Status: DISCONTINUED | OUTPATIENT
Start: 2022-12-14 | End: 2022-12-14 | Stop reason: HOSPADM

## 2022-12-14 RX ORDER — ROPIVACAINE HYDROCHLORIDE 2 MG/ML
INJECTION, SOLUTION EPIDURAL; INFILTRATION; PERINEURAL CONTINUOUS
Status: DISCONTINUED | OUTPATIENT
Start: 2022-12-14 | End: 2022-12-15 | Stop reason: HOSPADM

## 2022-12-14 RX ORDER — PREGABALIN 75 MG/1
75 CAPSULE ORAL ONCE
Status: COMPLETED | OUTPATIENT
Start: 2022-12-14 | End: 2022-12-14

## 2022-12-14 RX ORDER — MONTELUKAST SODIUM 10 MG/1
10 TABLET ORAL NIGHTLY
Status: DISCONTINUED | OUTPATIENT
Start: 2022-12-14 | End: 2022-12-15 | Stop reason: HOSPADM

## 2022-12-14 RX ORDER — IPRATROPIUM BROMIDE AND ALBUTEROL SULFATE 2.5; .5 MG/3ML; MG/3ML
1.5 SOLUTION RESPIRATORY (INHALATION) EVERY 4 HOURS PRN
Status: DISCONTINUED | OUTPATIENT
Start: 2022-12-14 | End: 2022-12-15 | Stop reason: HOSPADM

## 2022-12-14 RX ORDER — LOSARTAN POTASSIUM 50 MG/1
100 TABLET ORAL DAILY
Status: DISCONTINUED | OUTPATIENT
Start: 2022-12-14 | End: 2022-12-15 | Stop reason: HOSPADM

## 2022-12-14 RX ADMIN — PROPOFOL 88 MCG/KG/MIN: 10 INJECTION, EMULSION INTRAVENOUS at 10:40

## 2022-12-14 RX ADMIN — CEFAZOLIN SODIUM 2 G: 2 INJECTION, SOLUTION INTRAVENOUS at 10:24

## 2022-12-14 RX ADMIN — MELOXICAM 15 MG: 15 TABLET ORAL at 09:04

## 2022-12-14 RX ADMIN — TRANEXAMIC ACID 1000 MG: 10 INJECTION, SOLUTION INTRAVENOUS at 12:06

## 2022-12-14 RX ADMIN — DOCUSATE SODIUM 100 MG: 100 CAPSULE, LIQUID FILLED ORAL at 20:58

## 2022-12-14 RX ADMIN — ACETAMINOPHEN 1000 MG: 500 TABLET ORAL at 09:04

## 2022-12-14 RX ADMIN — ONDANSETRON 4 MG: 2 INJECTION INTRAMUSCULAR; INTRAVENOUS at 10:46

## 2022-12-14 RX ADMIN — TRAMADOL HYDROCHLORIDE 50 MG: 50 TABLET ORAL at 22:39

## 2022-12-14 RX ADMIN — BUPIVACAINE HYDROCHLORIDE 2.2 ML: 5 INJECTION, SOLUTION PERINEURAL at 10:31

## 2022-12-14 RX ADMIN — SODIUM CHLORIDE, POTASSIUM CHLORIDE, SODIUM LACTATE AND CALCIUM CHLORIDE 9 ML/HR: 600; 310; 30; 20 INJECTION, SOLUTION INTRAVENOUS at 08:50

## 2022-12-14 RX ADMIN — Medication 100 MCG: at 11:06

## 2022-12-14 RX ADMIN — MONTELUKAST 10 MG: 10 TABLET, FILM COATED ORAL at 20:58

## 2022-12-14 RX ADMIN — KETOROLAC TROMETHAMINE 15 MG: 15 INJECTION, SOLUTION INTRAMUSCULAR; INTRAVENOUS at 21:03

## 2022-12-14 RX ADMIN — FAMOTIDINE 20 MG: 20 TABLET, FILM COATED ORAL at 09:04

## 2022-12-14 RX ADMIN — CEFAZOLIN SODIUM 2 G: 2 INJECTION, SOLUTION INTRAVENOUS at 18:14

## 2022-12-14 RX ADMIN — TRANEXAMIC ACID 1000 MG: 10 INJECTION, SOLUTION INTRAVENOUS at 10:35

## 2022-12-14 RX ADMIN — PROPOFOL 50 MG: 10 INJECTION, EMULSION INTRAVENOUS at 10:30

## 2022-12-14 RX ADMIN — LIDOCAINE HYDROCHLORIDE 50 MG: 10 INJECTION, SOLUTION EPIDURAL; INFILTRATION; INTRACAUDAL; PERINEURAL at 10:30

## 2022-12-14 RX ADMIN — PREGABALIN 75 MG: 75 CAPSULE ORAL at 09:04

## 2022-12-14 RX ADMIN — INSULIN DETEMIR 30 UNITS: 100 INJECTION, SOLUTION SUBCUTANEOUS at 20:58

## 2022-12-14 RX ADMIN — DEXAMETHASONE SODIUM PHOSPHATE 8 MG: 4 INJECTION, SOLUTION INTRAMUSCULAR; INTRAVENOUS at 10:39

## 2022-12-14 RX ADMIN — BUDESONIDE AND FORMOTEROL FUMARATE DIHYDRATE 2 PUFF: 160; 4.5 AEROSOL RESPIRATORY (INHALATION) at 19:14

## 2022-12-14 RX ADMIN — PROPOFOL 50 MG: 10 INJECTION, EMULSION INTRAVENOUS at 10:36

## 2022-12-14 RX ADMIN — ACETAMINOPHEN 1000 MG: 500 TABLET ORAL at 18:14

## 2022-12-14 RX ADMIN — LIDOCAINE HYDROCHLORIDE 0.2 ML: 10 INJECTION, SOLUTION EPIDURAL; INFILTRATION; INTRACAUDAL; PERINEURAL at 08:50

## 2022-12-14 RX ADMIN — SODIUM CHLORIDE, POTASSIUM CHLORIDE, SODIUM LACTATE AND CALCIUM CHLORIDE 100 ML/HR: 600; 310; 30; 20 INJECTION, SOLUTION INTRAVENOUS at 18:14

## 2022-12-14 RX ADMIN — SODIUM CHLORIDE, POTASSIUM CHLORIDE, SODIUM LACTATE AND CALCIUM CHLORIDE: 600; 310; 30; 20 INJECTION, SOLUTION INTRAVENOUS at 12:30

## 2022-12-14 NOTE — PLAN OF CARE
Goal Outcome Evaluation:  Plan of Care Reviewed With: patient, spouse        Progress: no change  Outcome Evaluation: Alert, oriented x 4, and pleasant. VSS. Ambulates with assist x 1, walker, and gait belt. Voiding well per bathroom. No c/o pain.

## 2022-12-14 NOTE — CASE MANAGEMENT/SOCIAL WORK
Continued Stay Note  [facility]     Patient Name: Lupe Morillo  MRN: [id number]  Today's Date: 12/14/2022    Admit Date: 12/14/2022    Plan: home with [facility]   Discharge Plan     Row Name 12/14/22 7318       Plan    Plan home with [facility]    Patient/Family in Agreement with Plan yes    Plan Comments Pt lives in [facility] with her . She had been independent with ADLs prior to admit and owns a rolling walker and bedside commode. She is followed by her PCP and has drug coverage. At this time pts plan for discharge is to return home with [facility] which was arranged through her MD. CM to follow for any further discharge needs.    Final Discharge Disposition Code 06 - home with home health care               Discharge Codes    No documentation.                     Shelli Sahu RN

## 2022-12-14 NOTE — ANESTHESIA PROCEDURE NOTES
Spinal Block      Patient reassessed immediately prior to procedure    Patient location during procedure: OR  Start Time: 12/14/2022 10:31 AM  Stop Time: 12/14/2022 10:34 AM  Indication:at surgeon's request  Performed By  CRNA/JEANNIE: Sil Brandon CRNA  Preanesthetic Checklist  Completed: patient identified, IV checked, site marked, risks and benefits discussed, surgical consent, monitors and equipment checked, pre-op evaluation and timeout performed  Spinal Block Prep:  Patient Position:sitting  Sterile Tech:cap, gloves, sterile barriers and mask  Prep:Chloraprep  Patient Monitoring:blood pressure monitoring, continuous pulse oximetry and EKG    Spinal Block Procedure  Approach:midline  Guidance:landmark technique and palpation technique  Location:L4-L5  Needle Type:Sprotte  Needle Gauge:25 G  Placement of Spinal needle event:cerebrospinal fluid aspirated  Paresthesia: no  Fluid Appearance:clear  Medications: bupivacaine (MARCAINE) 0.5 % injection - Injection   2.2 mL - 12/14/2022 10:31:00 AM   Post Assessment  Patient Tolerance:patient tolerated the procedure well with no apparent complications  Complications no  Additional Notes  Procedure:  Pt assisted to sitting position, with legs in position of comfort over side of bed.  Pt. instructed in optimal spine presentation, the spine was prepped/ Draped and the skin at insertion site was anesthetized with 1% Lidocaine 2 ml.  The spinal needle was then advanced until CSF flow was obtained and LA was injected:

## 2022-12-14 NOTE — PLAN OF CARE
Goal Outcome Evaluation:  Plan of Care Reviewed With: patient, spouse        Progress: no change  Outcome Evaluation: PT eval complete. Pt performed bed mobility with SBA. Pt performed STS and amb 300' with FWW and CGAx2. No knee buckling or LOB noted. Activity limited by drowsiness. R knee ROM 13-75. HEP and precautions reviewed with pt. Recommend d/c home with assist and OPPT when medically appropriate.

## 2022-12-14 NOTE — OP NOTE
TOTAL KNEE ARTHROPLASTY REVISION  Procedure Report    Patient Name:  Lupe Morillo  YOB: 1950    Date of Surgery:  12/14/2022     Indications:    Patient is a 72 y.o. female noted for stiffness of her right total knee following right total knee replacement 2001 she had tried Dynasplint manipulation unfortunate continue to progress with significant stiffness difficult with full flexion she will flex about 85 degrees in the office we discussed revision of her right total knee replacement and conversion of to a posterior stabilized design knee the order to allow her to increase her flexion. The patient had been evaluated for infection aspiration of fluid was negative for infection. Patient has failed nonoperative management, treatment options and alternatives are discussed in detail with the patient was indicated for a revision right total knee femoral component. Possible risk and benefits of the procedure including but not limited to infection, DVT, pulmonary embolism, future loosening of the implants, possible injury to tendons, ligaments, nerves and/or vessels, loss of extensor mechanism and periprosthetic fracture have been discussed in detail. Despite the risks involved, the patient elected to proceed and informed consent was obtained and the patient was scheduled for surgery.     Patient Identification:  Patient was seen in the prep, consent was reviewed, operative procedure was identified, surgical site and thigh marked.          Pre-op Diagnosis:   Arthrofibrosis of knee joint, right [[id number]]       Post-Op Diagnosis Codes:     * Arthrofibrosis of knee joint, right [M24.661]    Procedure/CPT® Codes:      Procedure(s):  REVISION RIGHT TOTAL KNEE ARTHROPLASTY - FEMORAL COMPONENT    Application of incisional wound VAC 4 x 20 cm    Staff:  Surgeon(s):  Bishop Romo MD    Anesthesia: Spinal    Estimated Blood Loss: 100ml    Implants:    Implant Name Type Inv. Item Serial No.   Lot No. LRB No. Used Action   CMT BONE SIMPLEX/P TMYCIN FDOS 10PK - [id number] Implant CMT BONE SIMPLEX/P TMYCIN FDOS 10PK  KENJI ONEYDA [id number] Right 2 Implanted   DEV CONTRL TISS STRATAFIX SPIRAL PDO BIDIR 1 [id number] - [id number] Implant DEV CONTRL TISS STRATAFIX SPIRAL PDO BIDIR 1 [id number]  [facility]  DIV OF J AND J  Right 1 Implanted   STEM KN LEGION CMT [id number] - [id number] Implant STEM KN LEGION CMT [id number]  [employer] [id number] Right 1 Implanted   COMP FEM LEGION OXINIUM CNSTR SZ5 RT - [id number] Implant COMP FEM LEGION OXINIUM CNSTR SZ5 RT  SM[employer] AND NEP[employer] [id number] Right 1 Implanted   WEDGE FEM/KN LEGION RK DIST SZ5 5MM - [id number] Implant WEDGE FEM/KN LEGION RK DIST SZ5 5MM  [employer] [id number] Right 1 Implanted   WEDGE FEM/KN LEGION RK DIST SZ5 5MM - [id number] Implant WEDGE FEM/KN LEGION RK DIST SZ5 5MM  SM[employer] [id number] Right 1 Implanted   PLUG BONE RESTR/CMT W/HNDL UNIV 24MM MD - [id number] Implant PLUG BONE RESTR/CMT W/HNDL UNIV 24MM MD  KENJI ONEYDA [id number] Right 1 Implanted   PLUG BONE RESTR/CMT W/HNDL UNIV 30MM LG - [id number] Implant PLUG BONE RESTR/CMT W/HNDL UNIV 30MM LG  KENJI ONEYDA [id number] Right 1 Implanted   INSRT ART/KN LEGION PS HF XLPE SZ3TO4 9MM - [id number] Implant INSRT ART/KN LEGION PS HF XLPE SZ3TO4 9MM  [employer] [id number] Right 1 Implanted   DEV CONTRL TISS STRATAFIX SYMM PDS PLUS CICI CT-1 45CM - [id number] Implant DEV CONTRL TISS STRATAFIX SYMM PDS PLUS CICI CT-1 45CM  [employer]  DIV OF J AND J [facility] Right 1 Implanted       Specimen:          Specimens     ID Source Type Tests Collected By Collected At Frozen?    1 Knee, Right Tissue · FUNGAL CULTURE  · TISSUE / BONE CULTURE  · AFB CULTURE  · ANAEROBIC CULTURE 10 DAY INCUBATION   Bishop Romo MD 12/14/22 1111     Description: synovium 1    This specimen was not marked as sent.    2 Knee, Right Tissue · FUNGAL CULTURE  · TISSUE / BONE CULTURE  · AFB CULTURE  · ANAEROBIC CULTURE 10 DAY  INCUBATION   Bishop Romo MD 12/14/22 1113     Description: posterior capsule     This specimen was not marked as sent.    3 Knee, Right Tissue · FUNGAL CULTURE  · TISSUE / BONE CULTURE  · AFB CULTURE  · ANAEROBIC CULTURE 10 DAY INCUBATION   Bishop Romo MD 12/14/22 1114     Description: fat pad    This specimen was not marked as sent.            Findings: Well fixed femoral tibial components significant scar tissue especially lateral gutter significant osteophyte and some remaining posterior medially both the femur and the tibia likely contributing to her lack of motion    Complications: none    Description of Procedure:   The patient was transferred to [facility] operating room. Preoperative antibiotics Kefzol 2gm as well as 1 g of tranexamic acid were given IV and infused prior to skin incision and to inflation of the tourniquet according to SCIP protocol. Prior to skin incision the patient also received Spinal. Surgical timeout was performed with the entire operative team identifying the correct patient surgical site and planned procedure. A well padded tourniquet was placed to the proximal aspect of the operative thigh. The operative leg was then prepped and draped in the usual sterile fashion the tourniquet after application of Esmarch the tourniquet was inflated to 250 mmHg.  A skin incision was made vertically oriented centering over the patella anteriorly. The skin and subcutaneous tissue were incised and a medial parapatellar approach was developed. The patella was subluxed over the knee and there the knee tissue and synovium looked very benign as expected with local inflammatory markers and negative aspiration. Several samples of synovial tissue from around the femur as well as the tibia as well as the patella were taken and sent for culture.  Using an osteotome the polyethylene insert was removed. The attention was then turned to the femoral component using a combination of  microsagittal saw and osteotomes. A plane between the cement interface and the implant was developed the femoral component loosened and extracted with minimal bone loss to the condyles.       Attention was then turned to the distal femur intramedullary was utilized to the medullary canal followed by conical reamers first serial reaming. Adequate stability was found. The femur was sized to be a 5 cutting blocks were utilized to freshen up both distal and posterior cuts.  We did remove about 5 mm of extra bone off the posterior femur.  We then double trial femur with a 5 mm distal augments  Proximal tibia was then sized trial reduction performed reductions found to be satisfactory with range of motion from 0-120° with a size 9 polyethylene insert.    Attention was then directed at the patella. The patella appeared satisfactory without signs of polyethylene wear or loosening. Decision was made to leave it. Following this we then injected are pain cocktail. We did prep the bone with copious irrigation followed by a peroxide soak to remove any fat marrow it was found to be very dry with no extra fluid. Following this the femoral component was then cemented into position  the cement was pressurized into the femoral canal. The cement was applied to the femoral components prior to impaction. The femoral component was cemented into place. Excess cement was removed once and the components were held without any motion. Once the cement had fully hardened we checked the range of motion was found to be satisfactory from 0-125° with excellent stability throughout the range of motion. Good medial and lateral soft tissue tension and soft tissue balancing. The patella tracked well the trial liner was then replaced with a PS size 4 thickness 9 mm liner. Having been satisfied with this the joint was thoroughly irrigated with 3 L of saline. We did use a Betadine wash as well.  The sponge needle and instrument counts are found to be  correct. The arthrotomy was closed with interrupted 0 Vicryl followed by a running STRATAFIX  #2. 2-0 Vicryl for skin followed by staples and incisional wound VAC 4 x 20 cm with the knee held in flexion.      After finishing the wound closure the wound was cleaned and checked.  Total surface area the wound was less than 50 cm².  There were no sinus tracts or fistulas.  Since no sinus tracts or fistulas were present I then covered the incision with a sponge matching the size and depth of the wound, over the wound and covered with transparent film.  I then placed the film so I provided a tight seal.  I then inserted disposable medical tubing into the dressing through an opening in the transparent film and connected the tubing with a negative pressure pump and activated the pump and checked the seal and the pump was functioning well.  During therapy I will continue to check the pump and the patient for signs of distress or failure.  I will also ensure proper functioning of the equipment with any to be replaced in the canister or soft material accumulated in the foam sponge.  Also instructed patient and caregiver on how to provide continued care for the wound.     Sterile Badage and then placed over the incision wrapped with webroll placed overwrapped with Ace wrap. The patient tolerated the procedure well and is being admitted for postoperative antibiotics according to SCIP protocol 2 more doses in the first 24 hours. The patient will be on aspirin 81 mg twice a day starting postoperative day #1. The patient will also be discharged on 2 weeks of oral antibiotics. Patient will need to be mobilized with physical therapy.  I discussed the satisfactory performance of the procedure with patient's family and discussed with him the postoperative management       Assistant Participation:  Surgeon(s):  Bishop Romo MD    Assistant: Venancio Cordova PA-C assisted with proper preoperative positioning, preoperative templating,  determining availability of proper implants, prepping and draping of patient, manipulation placement of instruments, protection of ligaments and vital soft tissue structures, assistance in maintaining hemostasis and assistance with closure of the wound. Their skills and knowledge of the steps of operation and the desired outcome of each surgical step was crucial, allowing for efficient choreography of surgical procedure, and closure of the wound which lead to reduced surgical time, less blood loss, and less risk of complications for the patient.         Bishop Romo MD     Date: 12/14/2022  Time: 13:08 EST

## 2022-12-14 NOTE — ANESTHESIA POSTPROCEDURE EVALUATION
Patient: Lupe Donato Meaux    Procedure Summary     Date: 12/14/22 Room / Location:  [facility] /  [facility]    Anesthesia Start: 1024 Anesthesia Stop: 1302    Procedure: REVISION RIGHT TOTAL KNEE ARTHROPLASTY - FEMORAL COMPONENT (Right: Knee) Diagnosis:     Surgeons: Bishop Romo MD Provider: Fabby Angeles DO    Anesthesia Type: spinal ASA Status: 3          Anesthesia Type: spinal    Vitals  Vitals Value Taken Time   /54 12/14/22 1302   Temp 97.4 °F (36.3 °C) 12/14/22 1302   Pulse 85 12/14/22 1302   Resp 12 12/14/22 1302   SpO2 100 % 12/14/22 1302           Post Anesthesia Care and Evaluation    Patient location during evaluation: PACU  Patient participation: complete - patient participated  Level of consciousness: awake and alert  Pain score: 0  Pain management: adequate    Airway patency: patent  Anesthetic complications: No anesthetic complications  PONV Status: none  Cardiovascular status: hemodynamically stable and acceptable  Respiratory status: nonlabored ventilation, acceptable and nasal cannula  Hydration status: acceptable

## 2022-12-14 NOTE — THERAPY EVALUATION
Patient Name: Lupe Morillo  : 1950    MRN: [id number]                              Today's Date: 2022       Admit Date: 2022    Visit Dx:     ICD-10-CM ICD-9-CM   1. Right knee pain  M25.561 719.46     Patient Active Problem List   Diagnosis   • Sleep apnea   • Hypertension   • Heel spur   • Low back pain   • Allergic rhinitis   • Type 2 diabetes mellitus (HCC)   • Muscle cramping   • S/P lumbar fusion   • Acute blood loss anemia, mild, asymptomatic   • Acute postoperative pain   • Acute bronchitis   • Arthritis   • Atypical chest pain   • Diarrhea   • Gastroesophageal reflux disease with esophagitis   • Hirsutism   • Peripheral neuropathy   • Perennial allergic rhinitis   • Hyperlipidemia   • Asthma   • Arthritis of right knee   • S/P total knee arthroplasty, right   • Right knee pain   • Status post revision total right knee replacement     Past Medical History:   Diagnosis Date   • Arthritis    • Asthma    • Back pain    • Diabetes mellitus (HCC)     type 2 dm, 10 years, check blood sugar once A DAY, LAST A1C 6.8%   • GERD (gastroesophageal reflux disease)    • History of chronic bronchitis    • History of diverticulitis    • History of hypercholesterolemia    • Hyperlipidemia    • Hypertension    • PONV (postoperative nausea and vomiting)     with tubal ligation   • Sleep apnea with use of continuous positive airway pressure (CPAP)     USE CPAP   • Urinary incontinence      Past Surgical History:   Procedure Laterality Date   • CATARACT EXTRACTION Bilateral    • COLON RESECTION      4-5 years ago   • COLONOSCOPY      2018   • HYSTERECTOMY      AGE ~ 46   • KNEE ARTHROSCOPY Right    • LUMBAR DISCECTOMY FUSION INSTRUMENTATION N/A 2019    Procedure: LUMBAR LAMINECTOMY POSTERIOR WITH FUSION INSTRUMENTATION L4-5;  Surgeon: Naveed Sims MD;  Location: [facility];  Service: Orthopedic Spine   • OOPHORECTOMY Bilateral     AGE ~ 46   • TOTAL KNEE ARTHROPLASTY Right 2021     Procedure: TOTAL KNEE ARTHROPLASTY RIGHT;  Surgeon: Ariel Leyva MD;  Location: [facility] license of [facility];  Service: Orthopedics;  Laterality: Right;   • TUBAL ABDOMINAL LIGATION        General Information     Row Name 12/14/22 1824          Physical Therapy Time and Intention    Document Type evaluation  -     Mode of Treatment physical therapy  -     Row Name 12/14/22 1824          General Information    Patient Profile Reviewed yes  -     Prior Level of Function all household mobility;community mobility;gait;transfer;min assist:;bed mobility;ADL's  -     Existing Precautions/Restrictions fall;other (see comments)  wound vac, adductor canal nerve cath  -     Barriers to Rehab medically complex  -     Row Name 12/14/22 1824          Living Environment    People in Home spouse  -     Row Name 12/14/22 1824          Home Main Entrance    Number of Stairs, Main Entrance four  -     Row Name 12/14/22 1824          Stairs Within Home, Primary    Number of Stairs, Within Home, Primary none  -     Row Name 12/14/22 1824          Cognition    Orientation Status (Cognition) oriented x 3  -     Row Name 12/14/22 1824          Safety Issues, Functional Mobility    Safety Issues Affecting Function (Mobility) insight into deficits/self-awareness;awareness of need for assistance;safety precaution awareness  -     Impairments Affecting Function (Mobility) balance;endurance/activity tolerance;pain;strength;sensation/sensory awareness;range of motion (ROM)  -           User Key  (r) = Recorded By, (t) = Taken By, (c) = Cosigned By    Initials Name Provider Type     Danna Denson PT Physical Therapist               Mobility     Row Name 12/14/22 1825          Bed Mobility    Bed Mobility supine-sit  -     Supine-Sit [place] (Bed Mobility) standby assist  -     Comment, (Bed Mobility) VC for sequencing. Increased time and effort  -     Row Name 12/14/22 1825          Transfers    Comment, (Transfers) VC for  hand placement  -     Row Name 12/14/22 1825          Sit-Stand Transfer    Sit-Stand [place] (Transfers) contact guard;2 person assist  -     Assistive Device (Sit-Stand Transfers) walker, front-wheeled  -     Row Name 12/14/22 1825          Gait/Stairs (Locomotion)    [place] Level (Gait) contact guard;2 person assist  -     Assistive Device (Gait) walker, front-wheeled  -HP     Ambulated day of surgery or within 4 hours of PACU discharge yes  -     Distance in Feet (Gait) 300  -     Deviations/Abnormal Patterns (Gait) bilateral deviations;base of support, wide;weight shifting decreased;stride length decreased;gait speed decreased  -     Bilateral Gait Deviations forward flexed posture;heel strike decreased  -     Comment, (Gait/Stairs) Pt amb 300' with FWW and CGAx2. Pt demonstrated step-through gait pattern with forward flexed posture. No knee buckling or LOB noted. Activity limited by drowsiness.  -[facility] Name 12/14/22 1825          Mobility    Extremity Weight-bearing Status right lower extremity  -     Right Lower Extremity (Weight-bearing Status) weight-bearing as tolerated (WBAT)  -           User Key  (r) = Recorded By, (t) = Taken By, (c) = Cosigned By    Initials Name Provider Type     Danna Denson PT Physical Therapist               Obj/Interventions     [facility] Name 12/14/22 1826          Range of Motion Comprehensive    General Range of Motion lower extremity range of motion deficits identified  -     Comment, General Range of Motion R knee ROM 13-75  -[facility] Name 12/14/22 1826          Strength Comprehensive (MMT)    General Manual Muscle Testing (MMT) Assessment lower extremity strength deficits identified  -     Comment, General Manual Muscle Testing (MMT) Assessment Pt able to perform B active ankle DF and SLR  -     Row Name 12/14/22 1826          Balance    Balance Assessment sitting static balance;sitting dynamic balance;sit to stand dynamic  balance;standing static balance;standing dynamic balance  -HP     Static Sitting Balance standby assist  -HP     Dynamic Sitting Balance standby assist  -HP     Position, Sitting Balance sitting edge of bed  -HP     Static Standing Balance contact guard  -HP     Dynamic Standing Balance contact guard  -HP     Position/Device Used, Standing Balance supported;walker, rolling  -HP     Balance Interventions sitting;standing;sit to stand;occupation based/functional task  -     Row Name 12/14/22 1826          Sensory Assessment (Somatosensory)    Sensory Assessment (Somatosensory) LE sensation intact  -           User Key  (r) = Recorded By, (t) = Taken By, (c) = Cosigned By    Initials Name Provider Type     Danna Denson, PT Physical Therapist               Goals/Plan     Row Name 12/14/22 1828          Bed Mobility Goal 1 (PT)    Activity/Assistive Device (Bed Mobility Goal 1, PT) sit to supine/supine to sit  -HP     [place] Level/Cues Needed (Bed Mobility Goal 1, PT) modified independence  -HP     Time Frame (Bed Mobility Goal 1, PT) long term goal (LTG);3 days  -HP     Progress/Outcomes (Bed Mobility Goal 1, PT) goal ongoing  [facility]     Row Name 12/14/22 1828          Transfer Goal 1 (PT)    Activity/Assistive Device (Transfer Goal 1, PT) sit-to-stand/stand-to-sit  -HP     [place] Level/Cues Needed (Transfer Goal 1, PT) modified independence  -HP     Time Frame (Transfer Goal 1, PT) long term goal (LTG);3 days  -HP     Progress/Outcome (Transfer Goal 1, PT) goal ongoing  [facility]     Row Name 12/14/22 1828          Gait Training Goal 1 (PT)    Activity/Assistive Device (Gait Training Goal 1, PT) gait (walking locomotion)  -HP     [place] Level (Gait Training Goal 1, PT) modified independence  -HP     Distance (Gait Training Goal 1, PT) 500  -HP     Time Frame (Gait Training Goal 1, PT) long term goal (LTG);3 days  -HP     Progress/Outcome (Gait Training Goal 1, PT) goal ongoing  [facility]     Row Name  12/14/22 1828          ROM Goal 1 (PT)    ROM Goal 1 (PT) R knee ROM 0-90  -HP     Time Frame (ROM Goal 1, PT) long-term goal (LTG);3 days  -HP     Progress/Outcome (ROM Goal 1, PT) goal ongoing  -     Row Name 12/14/22 1828          Stairs Goal 1 (PT)    Activity/Assistive Device (Stairs Goal 1, PT) ascending stairs;descending stairs  -HP     [place] Level/Cues Needed (Stairs Goal 1, PT) contact guard required  -HP     Number of Stairs (Stairs Goal 1, PT) 4  -HP     Time Frame (Stairs Goal 1, PT) long term goal (LTG);3 days  -HP     Progress/Outcome (Stairs Goal 1, PT) goal ongoing  -     Row Name 12/14/22 1828          Therapy Assessment/Plan (PT)    Planned Therapy Interventions (PT) balance training;bed mobility training;gait training;home exercise program;patient/family education;transfer training;stretching;strengthening;stair training;ROM (range of motion)  -           User Key  (r) = Recorded By, (t) = Taken By, (c) = Cosigned By    Initials Name Provider Type     Danna Denson, PT Physical Therapist               Clinical Impression     Row Name 12/14/22 1827          Pain    Pretreatment Pain Rating 2/10  -HP     Posttreatment Pain Rating 2/10  -HP     Pain Location - Side/Orientation Right  -     Pain Location generalized  -HP     Pain Location - knee  -HP     Pain Intervention(s) Repositioned;Cold applied;Ambulation/increased activity;Elevated  -     Row Name 12/14/22 1827          Plan of Care Review    Plan of Care Reviewed With patient;spouse  -HP     Progress no change  -HP     Outcome Evaluation PT eval complete. Pt performed bed mobility with SBA. Pt performed STS and amb 300' with FWW and CGAx2. No knee buckling or LOB noted. Activity limited by drowsiness. R knee ROM 13-75. HEP and precautions reviewed with pt. Recommend d/c home with assist and OPPT when medically appropriate.  -     Row Name 12/14/22 1827          Therapy Assessment/Plan (PT)    Rehab Potential (PT) good, to  achieve stated therapy goals  -     Criteria for Skilled Interventions Met (PT) yes;meets criteria;skilled treatment is necessary  -     Therapy Frequency (PT) 2 times/day  -     Row Name 12/14/22 1827          Vital Signs    Pre Systolic BP Rehab --  VSS  -HP     Pre Patient Position Supine  -HP     Intra Patient Position Standing  -HP     Post Patient Position Sitting  -HP     Row Name 12/14/22 1827          Positioning and Restraints    Pre-Treatment Position in bed  -HP     Post Treatment Position chair  -HP     In Chair notified nsg;reclined;sitting;call light within reach;encouraged to call for assist;exit alarm on;with family/caregiver;legs elevated;compression device  -           User Key  (r) = Recorded By, (t) = Taken By, (c) = Cosigned By    Initials Name Provider Type     Danna Denson, PT Physical Therapist               Outcome Measures     Row Name 12/14/22 1829 12/14/22 1654       How much help from another person do you currently need...    Turning from your back to your side while in flat bed without using bedrails? 4  -HP 2  -AC    Moving from lying on back to sitting on the side of a flat bed without bedrails? 4  -HP 2  -AC    Moving to and from a bed to a chair (including a wheelchair)? 3  -HP 2  -AC    Standing up from a chair using your arms (e.g., wheelchair, bedside chair)? 3  -HP 2  -AC    Climbing 3-5 steps with a railing? 3  -HP 2  -AC    To walk in hospital room? 3  -HP 2  -AC    AM-PAC 6 Clicks Score (PT) 20  - 12  -AC    Highest level of mobility 6 --> Walked 10 steps or more  - 4 --> Transferred to chair/commode  -    Row Name 12/14/22 1829          PADD    Diagnosis 1  -HP     Gender 1  -HP     Age Group 2  -HP     Gait Distance 1  -HP     Assist Level 1  -HP     Home Support 3  -HP     PADD Score 8  -HP     Patient Preference home with outpatient rehab  -     Prediction by PADD Score directly home (with home health or out-patient rehab)  -     Row Name 12/14/22  1829          Functional Assessment    Outcome Measure Options AM-PAC 6 Clicks Basic Mobility (PT);PADD  -HP           User Key  (r) = Recorded By, (t) = Taken By, (c) = Cosigned By    Initials Name Provider Type    AC Larissa Whittaker, RN Registered Nurse     Danna Denson, DARIELA Physical Therapist                             Physical Therapy Education     Title: PT OT SLP Therapies (In Progress)     Topic: Physical Therapy (Done)     Point: Mobility training (Done)     Learning Progress Summary           Patient Acceptance, E,D, VU,DU by  at 12/14/2022 1829                   Point: Home exercise program (Done)     Learning Progress Summary           Patient Acceptance, E,D, VU,DU by  at 12/14/2022 1829                   Point: Body mechanics (Done)     Learning Progress Summary           Patient Acceptance, E,D, VU,DU by  at 12/14/2022 1829                   Point: Precautions (Done)     Learning Progress Summary           Patient Acceptance, E,D, VU,DU by  at 12/14/2022 1829                               User Key     Initials Effective Dates Name Provider Type Discipline     06/01/21 -  Danna Denson, DARIELA Physical Therapist PT              PT Recommendation and Plan  Planned Therapy Interventions (PT): balance training, bed mobility training, gait training, home exercise program, patient/family education, transfer training, stretching, strengthening, stair training, ROM (range of motion)  Plan of Care Reviewed With: patient, spouse  Progress: no change  Outcome Evaluation: PT eval complete. Pt performed bed mobility with SBA. Pt performed STS and amb 300' with FWW and CGAx2. No knee buckling or LOB noted. Activity limited by drowsiness. R knee ROM 13-75. HEP and precautions reviewed with pt. Recommend d/c home with assist and OPPT when medically appropriate.     Time Calculation:    PT Charges     Row Name 12/14/22 1750             Time Calculation    Start Time 1750  -      PT Received On 12/14/22  -       PT Goal Re-Cert Due Date 12/24/22  -HP         Untimed Charges    PT Eval/Re-eval Minutes 50  -HP         Total Minutes    Untimed Charges Total Minutes 50  -HP       Total Minutes 50  -HP            User Key  (r) = Recorded By, (t) = Taken By, (c) = Cosigned By    Initials Name Provider Type    HP Danna Denson, PT Physical Therapist              Therapy Charges for Today     Code Description Service Date Service Provider Modifiers Qty    59646550495 HC PT EVAL MOD COMPLEXITY 4 12/14/2022 Danna Denson, PT GP 1    55580730347 HC PT THER SUPP EA 15 MIN 12/14/2022 Danna Denson, PT GP 3          PT G-Codes  Outcome Measure Options: AM-PAC 6 Clicks Basic Mobility (PT), PADD  AM-PAC 6 Clicks Score (PT): 20  PT Discharge Summary  Anticipated Discharge Disposition (PT): home with assist, home with outpatient therapy services    Danna Denson, DARIELA  12/14/2022

## 2022-12-14 NOTE — DISCHARGE INSTRUCTIONS
INCISION CARE incisional wound VAC hip and Knee:  You have a sterile incisional wound VAC dressing in place. Only exchange if it becomes saturated (fluid draining out the sides of dressing) and notify the office. The dressing is water resistant. During the first 7 days after surgery, it is ok to shower. DO NOT scrub on or around the dressing. Do not submerge the dressing.  After 7 days, the incisional wound VAC will turn off and lose its suction you can remove your dressing.  You will have been given a fresh dressing to cover the wound with until your follow-up at 2 weeks. you will have staples or sutures in place on your skin. It is OK to shower with the new dressing in place. DO NOT scrub on or around the incision. DO NOT submerge the incision in pools, baths, or hot tubs for 1 month after surgery. Do not touch or pick at the incision. If you have any drainage from the incision after 7 days, cover the incision with sterile gauze and paper tape and alert the office.   Staples will be removed between 10-14 days postoperation.  This may be done by either the home health nurse, rehabilitation nurse, or during your return visit to Dr. Romo's office.  No creams or ointments to the incision for 1 month after surgery. After 1 month, it is recommended to massage the scar with vitamin E cream to help decrease scar formation.  Check incision every day and notify surgeon immediately if any of the following signs or symptoms are noted:  Increase in redness  Increase in swelling around the incision and of the entire extremity  Increase in pain  Drainage oozing from the incision  Pulling apart of the edges of the incision  Increase in overall body temperature (greater than 100.5 degrees)    Anticoagulants: You will be discharged on an anticoagulant. This is a prophylactic medication that helps prevent blood clots during your post-operative period. Most patients will be on *** Aspirin 81 mg Enteric coated every 12 hours orally  for 30 days. Some patients, due to increased risk factors, will need to be on a stronger anticoagulant. Dr. Romo will discuss this need with you.   While taking the anticoagulant, you should avoid taking any additional aspirin, and limit ibuprofen (Advil or Motrin), Aleve (Naprosyn) or other non-steroidal anti-inflammatory medications.   Notify your surgeon immediately if any mehdi bleeding is noted in the urine, stool, vomit, or from the nose or the incision. Blood in the stool will often appear as black rather than red. Blood in urine may appear as pink. Blood in vomit may appear as brown/black like coffee grounds.  You will need to apply pressure for longer periods of time to any cuts or abrasions to stop bleeding  Avoid alcohol while taking anticoagulants  Sequential Compression Device: You maybe be discharged home with a compression device that helps promote blood flow and prevent clots in your legs. Wear these at all times for the first two weeks.   Mobilization: The best way to avoid a blood clot is to get up and walk. 10 times a day get up and walk for 5 minutes for the first two weeks. Walking for longer periods of time will increase pain and swelling, making therapy more difficult. If taking any long travel (car or plane) in the first 1-2 months, be sure to get up and walk at least every one hour.     Stool Softeners: You will be at greater risk of constipation after surgery because of being less mobile and taking the pain medications.   Take stool softeners as instructed by your surgeon while on pain medications. Use over the counter Colace 100 mg 1-2 capsules twice daily.   If stools become too loose or too frequent, decreases the dosage or stop the stool softener.  If constipation occurs despite use of stool softeners, you are to continue the stool softeners and add a laxative (Milk of Magnesia 1 ounce daily as needed).  Dulcolax oral tabs or suppository or a fleets enema can also be utilized for  constipation and can be obtained over the counter.   If above interventions are unsuccessful in inducing bowel movements, please contact your family physician's office/surgeon's office.  Drink plenty of fluids and eat fruits and vegetables during your recovery time    Pain Medications utilized after surgery are narcotics and the law requires that the following information be given to all patients that are prescribed narcotics:  CLASSIFICATION: Pain medications are called Opioids and are narcotics  LEGALITIES: It is illegal to share narcotics with others and to drive within 24 hours of taking narcotics  POTENTIAL SIDE EFFECTS: Potential side effects of opioids include: nausea, vomiting, itching, dizziness, drowsiness, dry mouth, constipation, and difficulty urinating.  POTENTIAL ADVERSE EFFECTS:   Opioid tolerance can develop with use of pain medications and this simply means that it requires more and more of the medication to control pain; however, this is seen more in patients that use Opioids for longer periods of time.  Opioid dependence can develop with use of Opioids and this simply means that to stop the medication can cause withdrawal symptoms; however, this is seen with patients that use Opioids for longer periods of time.  Opioid addiction can develop with use of Opioids and the incidence of this is very unlikely in patients who take the medications as ordered and stop the medications as instructed.  Opioid overdose can be dangerous, but is unlikely when the medication is taken as ordered and stopped when ordered. It is important not to mix opioids with alcohol or with any type of sedative, such as Benadryl, as this can lead to over sedation and respiratory difficulty.  DOSAGE:   Pain medications may be needed consistently for the first week to decrease pain and promote adequate pain relief and participation in physical therapy.  After the initial surgical pain begins to resolve, you may begin to decrease  the pain medication and only take it as needed. By the end of 6 weeks, you should be off of pain medications.  You can decrease your pain medication consumption by slowly spacing out the time in between the medication, and using 650mg Tylenol when the pain is not as severe. Do not exceed 3500mg of Tylenol in 24 hours.   Refills will not be given by the office during evening hours, on weekends, or after 6 weeks post-op.  To seek refills on pain medications during the initial 6 week post-operative period, you must call the office 48 hours in advance to request the refill. The office will then notify you when to  the prescription. DO NOT wait until you are out of the medication to request a refill.

## 2022-12-14 NOTE — H&P
Patient Care Team:      Chief complaint: Right knee pain    Subjective:  Patient is a 72 y.o.female presents with a previous history of right total knee arthroplasty 11/22/21. She did not progress well with PT post-op right TKA.  She continues to have right knee difficulty and pain.  She denies any recent changes in her general health.    Review of Systems:  General ROS: negative  Cardiovascular ROS: no chest pain or dyspnea on exertion  Respiratory ROS: no cough, shortness of breath, or wheezing      Allergies:   Allergies   Allergen Reactions   • Statins Other (See Comments)     Leg cramps          Latex: no  Contrast Dye: no    Home Meds    Medications Prior to Admission   Medication Sig Dispense Refill Last Dose   • Ascorbic Acid (VITAMIN C) 100 MG chewable tablet Chew 1 tablet Daily.      • azithromycin (ZITHROMAX) 250 MG tablet       • BD Pen Needle Joanne 2nd Gen 32G X 4 MM misc USE PEN NEEDLES TO INJECT INSULIN ONCE DAILY 100 each 5    • Boswellia-Glucosamine-Vit D (OSTEO BI-FLEX ONE PER DAY PO) Take  by mouth.      • celecoxib (CeleBREX) 200 MG capsule Take 1 capsule by mouth 2 (Two) Times a Day. May resume when finished with Meloxicam script      • cholecalciferol (Cholecalciferol) 25 MCG (1000 UT) tablet Take 1,000 Units by mouth Daily.      • dapagliflozin (Farxiga) 5 MG tablet tablet Take 1 tablet by mouth Daily. 90 tablet 3    • Diclofenac Sodium (VOLTAREN) 1 % gel gel Apply 4 g topically to the appropriate area as directed 4 (Four) Times a Day As Needed.      • docusate sodium (COLACE) 100 MG capsule Take 100 mg by mouth 2 (Two) Times a Day.      • ezetimibe (ZETIA) 10 MG tablet Take 1 tablet by mouth Daily. 90 tablet 2    • fluticasone (FLONASE) 50 MCG/ACT nasal spray 2 sprays into the nostril(s) as directed by provider Daily for 14 days. 9.9 mL 0    • glucose blood (FREESTYLE TEST STRIPS) test strip Check sugars 3 times a day. Dx code E11.9 100 each 5    • HYDROcodone-acetaminophen (NORCO) 5-325 MG  per tablet Take 1 tablet by mouth Every 6 (Six) Hours As Needed.      • ibuprofen (ADVIL,MOTRIN) 200 MG tablet Take 200 mg by mouth Every 6 (Six) Hours As Needed for Mild Pain .      • ipratropium-albuterol (DUO-NEB) 0.5-2.5 mg/3 ml nebulizer       • Lancets (freestyle) lancets Use as instructed 100 each 5    • Levemir FlexTouch 100 UNIT/ML injection INJECT 30 UNITS UNDER THE SKIN DAILY 100 mL 3    • levocetirizine (XYZAL) 5 MG tablet TAKE ONE TABLET BY MOUTH DAILY (Patient taking differently: Take 5 mg by mouth Every Morning.) 90 tablet 1    • losartan (Cozaar) 100 MG tablet Take 1 tablet by mouth Daily. 90 tablet 1    • MAGNESIUM PO Take 250 mg by mouth Daily.      • metFORMIN (GLUCOPHAGE) 1000 MG tablet TAKE ONE TABLET BY MOUTH DAILY WITH FOOD 90 tablet 2    • Methylcobalamin (B12-ACTIVE) 1 MG chewable tablet Chew 1 tablet Daily.      • montelukast (SINGULAIR) 10 MG tablet Take 10 mg by mouth Every Night.      • Multiple Vitamins-Minerals (MULTIVITAMIN ADULTS PO) Take 1 tablet by mouth Daily.      • MYRBETRIQ 25 MG tablet sustained-release 24 hour Take 25 mg by mouth Daily.      • NIFEdipine XL (PROCARDIA XL) 30 MG 24 hr tablet       • NIFEdipine XL (PROCARDIA XL) 60 MG 24 hr tablet Take 1 tablet by mouth Daily. 90 tablet 2    • nortriptyline (PAMELOR) 25 MG capsule Take 25 mg by mouth Every Night.      • polyethylene glycol (MIRALAX) pack packet Take 17 g by mouth Daily. OTC (Patient taking differently: Take 17 g by mouth Daily As Needed. OTC)      • Rybelsus 3 MG tablet TAKE ONE TABLET BY MOUTH DAILY 30 tablet 3    • sodium chloride 0.65 % nasal spray 1 spray into the nostril(s) as directed by provider Daily As Needed. 1 spray in each nostril daily as needed      • spironolactone (ALDACTONE) 50 MG tablet TAKE ONE TABLET BY MOUTH DAILY 90 tablet 1    • Symbicort 160-4.5 MCG/ACT inhaler       • vitamin B-6 (PYRIDOXINE) 100 MG tablet Take 100 mg by mouth Daily.      • vitamin C (ASCORBIC ACID) 500 MG tablet Take  500 mg by mouth Daily.        PMH:   Past Medical History:   Diagnosis Date   • Acute bronchitis    • Arthritis    • Asthma    • Back pain    • Diabetes mellitus (HCC)     type 2 dm, 10 years, check blood sugar once A DAY, LAST A1C 6.8%   • GERD (gastroesophageal reflux disease)    • History of chronic bronchitis    • History of diverticulitis    • History of hypercholesterolemia    • Hyperlipidemia    • Hypertension    • PONV (postoperative nausea and vomiting)     with tubal ligation   • Sleep apnea with use of continuous positive airway pressure (CPAP)     USE CPAP   • Urinary incontinence      PSH:    Past Surgical History:   Procedure Laterality Date   • CATARACT EXTRACTION Bilateral    • COLON RESECTION      4-5 years ago   • COLONOSCOPY      nov 2018   • HYSTERECTOMY      AGE ~ 46   • KNEE ARTHROSCOPY Right    • LUMBAR DISCECTOMY FUSION INSTRUMENTATION N/A 1/30/2019    Procedure: LUMBAR LAMINECTOMY POSTERIOR WITH FUSION INSTRUMENTATION L4-5;  Surgeon: Naveed Sims MD;  Location: On license of [facility];  Service: Orthopedic Spine   • OOPHORECTOMY Bilateral     AGE ~ 46   • TOTAL KNEE ARTHROPLASTY Right 11/22/2021    Procedure: TOTAL KNEE ARTHROPLASTY RIGHT;  Surgeon: Ariel Leyva MD;  Location: [facility] OR;  Service: Orthopedics;  Laterality: Right;   • TUBAL ABDOMINAL LIGATION       Immunization History: pneumo: yes   Flu: yes  Tetanus: yes  Social History:   Tobacco: no   Alcohol: no      Physical Exam:There were no vitals taken for this visit.      General Appearance:    Alert, cooperative, no distress, appears stated age   Head:    Normocephalic, without obvious abnormality, atraumatic   Lungs:     Clear to auscultation bilaterally, respirations unlabored    Heart: Regular rate and rhythm, S1 and S2 normal, no murmur, rub    or gallop    Abdomen:    Soft without tenderness   Breast Exam:    deferred   Genitalia:    deferred   Extremities:   Extremities normal, atraumatic, no cyanosis or edema   Skin:   Skin color,  texture, turgor normal, no rashes or lesions   Neurologic:   Grossly intact     Results Review: CBC, Chem profile, EKG, PT/INR, CRP on chart     Impression: Previous right total knee arthroplasty, residual pain    Plan: For revision right total knee arthroplasty, femoral component today  SISI Hernandez 12/14/2022 08:40 EST

## 2022-12-14 NOTE — ANESTHESIA PREPROCEDURE EVALUATION
Anesthesia Evaluation     Patient summary reviewed and Nursing notes reviewed   history of anesthetic complications: PONV  NPO Solid Status: > 8 hours  NPO Liquid Status: > 2 hours           Airway   Mallampati: II  TM distance: >3 FB  Neck ROM: full  Possible difficult intubation  Dental - normal exam     Pulmonary - normal exam   (+) asthma,sleep apnea,   (-) not a smoker  Cardiovascular - normal exam  Exercise tolerance: good (4-7 METS)    ECG reviewed    (+) hypertension, hyperlipidemia,   (-) dysrhythmias, angina, CHF, cardiac stents, DVT      Neuro/Psych  (-) seizures, CVA  GI/Hepatic/Renal/Endo    (+)  GERD well controlled,  diabetes mellitus,   (-) liver disease, no renal disease    Musculoskeletal     (+) back pain,   Abdominal    Substance History - negative use     OB/GYN          Other   arthritis,      ROS/Med Hx Other: hgb 13.8 k 4.1   plt 286  1/2019- [id number] view - bougie/burp  Denies bleeding/clotting disorders, nor blood thinners                Anesthesia Plan    ASA 3     spinal     (Risks and benefits of general anesthesia discussed with patient (including MI, CVA, death, recall, aspiration, oropharyngeal/dental damage), questions answered, agreeable to proceed.    Benefits and risks of nerve block/catheter discussed with patient ((including failed block, damage to nerve/nearby structures, intravascular injection)); questions answered, agreeable to proceed.       Spinal/acb/propofol gtt  )  intravenous induction     Anesthetic plan, risks, benefits, and alternatives have been provided, discussed and informed consent has been obtained with: patient.    Use of blood products discussed with patient  Consented to blood products.   Plan discussed with CRNA.        CODE STATUS:

## 2022-12-14 NOTE — H&P
Patient Name: Lupe Morillo  MRN: [id number]  : 1950  DOS: 2022    Attending: Bishop Romo MD    Primary Care Provider: Michael Blackwell MD      Chief complaint: Right knee pain    Subjective   Patient is a pleasant 72 y.o. female presented for scheduled surgery by Dr. Romo.  She underwent revision right total knee arthroplasty under spinal anesthesia.  She tolerated surgery well and was admitted for further medical management    She is known to us from previous admission on 2021 for right knee replacement; which she recovered well.    When seen in PACU she is sedated.  She appears comfortable and in stable condition.    Allergies:  Allergies   Allergen Reactions   • Statins Other (See Comments)     Leg cramps       Meds:  Medications Prior to Admission   Medication Sig Dispense Refill Last Dose   • Ascorbic Acid (VITAMIN C) 100 MG chewable tablet Chew 1 tablet Daily.   2022   • Boswellia-Glucosamine-Vit D (OSTEO BI-FLEX ONE PER DAY PO) Take  by mouth.   2022   • cholecalciferol (Cholecalciferol) 25 MCG (1000 UT) tablet Take 1,000 Units by mouth Daily.   2022   • dapagliflozin (Farxiga) 5 MG tablet tablet Take 1 tablet by mouth Daily. 90 tablet 3 2022   • Diclofenac Sodium (VOLTAREN) 1 % gel gel Apply 4 g topically to the appropriate area as directed 4 (Four) Times a Day As Needed.   2022   • docusate sodium (COLACE) 100 MG capsule Take 100 mg by mouth 2 (Two) Times a Day.   2022   • ezetimibe (ZETIA) 10 MG tablet Take 1 tablet by mouth Daily. 90 tablet 2 2022   • glucose blood (FREESTYLE TEST STRIPS) test strip Check sugars 3 times a day. Dx code E11.9 100 each 5 2022   • Levemir FlexTouch 100 UNIT/ML injection INJECT 30 UNITS UNDER THE SKIN DAILY 100 mL 3 2022   • levocetirizine (XYZAL) 5 MG tablet TAKE ONE TABLET BY MOUTH DAILY (Patient taking differently: Take 5 mg by mouth Every Morning.) 90 tablet 1 2022   • losartan  (Cozaar) 100 MG tablet Take 1 tablet by mouth Daily. 90 tablet 1 12/13/2022   • metFORMIN (GLUCOPHAGE) 1000 MG tablet TAKE ONE TABLET BY MOUTH DAILY WITH FOOD 90 tablet 2 12/13/2022   • Methylcobalamin (B12-ACTIVE) 1 MG chewable tablet Chew 1 tablet Daily.   12/13/2022   • montelukast (SINGULAIR) 10 MG tablet Take 10 mg by mouth Every Night.   12/13/2022   • Multiple Vitamins-Minerals (MULTIVITAMIN ADULTS PO) Take 1 tablet by mouth Daily.   12/13/2022   • NIFEdipine XL (PROCARDIA XL) 60 MG 24 hr tablet Take 1 tablet by mouth Daily. 90 tablet 2 12/13/2022   • Rybelsus 3 MG tablet TAKE ONE TABLET BY MOUTH DAILY 30 tablet 3 12/13/2022   • spironolactone (ALDACTONE) 50 MG tablet TAKE ONE TABLET BY MOUTH DAILY 90 tablet 1 12/13/2022   • Symbicort 160-4.5 MCG/ACT inhaler    12/14/2022   • vitamin B-6 (PYRIDOXINE) 100 MG tablet Take 100 mg by mouth Daily.   12/13/2022   • vitamin C (ASCORBIC ACID) 500 MG tablet Take 500 mg by mouth Daily.   12/13/2022   • BD Pen Needle Joanne 2nd Gen 32G X 4 MM misc USE PEN NEEDLES TO INJECT INSULIN ONCE DAILY 100 each 5    • celecoxib (CeleBREX) 200 MG capsule Take 1 capsule by mouth 2 (Two) Times a Day. May resume when finished with Meloxicam script   12/11/2022   • cyclobenzaprine (FLEXERIL) 5 MG tablet Take 5 mg by mouth 3 (Three) Times a Day As Needed for Muscle Spasms.      • fluticasone (FLONASE) 50 MCG/ACT nasal spray 2 sprays into the nostril(s) as directed by provider Daily for 14 days. 9.9 mL 0    • HYDROcodone-acetaminophen (NORCO) 5-325 MG per tablet Take 1 tablet by mouth Every 6 (Six) Hours As Needed.   More than a month   • ibuprofen (ADVIL,MOTRIN) 200 MG tablet Take 200 mg by mouth Every 6 (Six) Hours As Needed for Mild Pain .   11/30/2022   • ipratropium-albuterol (DUO-NEB) 0.5-2.5 mg/3 ml nebulizer       • Lancets (freestyle) lancets Use as instructed 100 each 5    • MAGNESIUM PO Take 250 mg by mouth Daily.   More than a month   • MYRBETRIQ 25 MG tablet sustained-release  24 hour Take 25 mg by mouth Daily.   More than a month   • sodium chloride 0.65 % nasal spray 1 spray into the nostril(s) as directed by provider Daily As Needed. 1 spray in each nostril daily as needed   More than a month         History:   Past Medical History:   Diagnosis Date   • Arthritis    • Asthma    • Back pain    • Diabetes mellitus (HCC)     type 2 dm, 10 years, check blood sugar once A DAY, LAST A1C 6.8%   • GERD (gastroesophageal reflux disease)    • History of chronic bronchitis    • History of diverticulitis    • History of hypercholesterolemia    • Hyperlipidemia    • Hypertension    • PONV (postoperative nausea and vomiting)     with tubal ligation   • Sleep apnea with use of continuous positive airway pressure (CPAP)     USE CPAP   • Urinary incontinence      Past Surgical History:   Procedure Laterality Date   • CATARACT EXTRACTION Bilateral    • COLON RESECTION      4-5 years ago   • COLONOSCOPY      nov 2018   • HYSTERECTOMY      AGE ~ 46   • KNEE ARTHROSCOPY Right    • LUMBAR DISCECTOMY FUSION INSTRUMENTATION N/A 1/30/2019    Procedure: LUMBAR LAMINECTOMY POSTERIOR WITH FUSION INSTRUMENTATION L4-5;  Surgeon: Naveed Sims MD;  Location:  [facility] OR;  Service: Orthopedic Spine   • OOPHORECTOMY Bilateral     AGE ~ 46   • TOTAL KNEE ARTHROPLASTY Right 11/22/2021    Procedure: TOTAL KNEE ARTHROPLASTY RIGHT;  Surgeon: Ariel Leyva MD;  Location:  [facility];  Service: Orthopedics;  Laterality: Right;   • TUBAL ABDOMINAL LIGATION       Family History   Problem Relation Age of Onset   • Diabetes Mother    • Ovarian cancer Mother         DX AGE LATE 40'S-EARLY 50'S   • Hypertension Mother         Colon Cancer   • Hyperlipidemia Father         High Blood Pressure   • Hypertension Maternal Grandmother    • Diabetes Maternal Grandmother         Diabetic   • Cancer Other    • Breast cancer Maternal Aunt         DX AGE UNKNOWN     Social History     Tobacco Use   • Smoking status: Never   • Smokeless  "tobacco: Never   Vaping Use   • Vaping Use: Never used   Substance Use Topics   • Alcohol use: No   • Drug use: No   .  Works at [employer].    Review of Systems  Review of systems could not be obtained due to   patient sedation status.    Vital Signs  /61   Pulse 68   Temp 97.6 °F (36.4 °C) (Temporal)   Resp 16   Ht 172.7 cm (68\")   Wt 79.8 kg (176 lb)   SpO2 99%   BMI 26.76 kg/m²     Physical Exam:    General Appearance:   Sedated, in no acute distress   Head:    Normocephalic, without obvious abnormality, atraumatic   Ears:    Ears appear intact with no abnormalities noted   Throat:   No oral lesions, no thrush, oral mucosa moist   Neck:   No adenopathy, supple, trachea midline, no thyromegaly    Lungs:     Clear to auscultation,respirations regular, even and unlabored    Heart:    Regular rhythm and normal rate, normal S1 and S2, no murmur, no gallop   Abdomen:     Normal bowel sounds, no masses, no organomegaly, soft non-tender, non-distended, no guarding, no rebound  tenderness   Genitalia:    Deferred   Extremities:  Right knee Ace wrap CDI.  Nerve block present.  Wound VAC present   Pulses:   Pulses palpable and equal bilaterally   Skin:   No bleeding, bruising or rash      I reviewed the patient's new clinical results.     Results from last 7 days   Lab Units 12/14/22  0833   POTASSIUM mmol/L 3.9     Lab Results   Component Value Date    HGBA1C 6.90 (H) 11/30/2022      Latest Reference Range & Units 11/30/22 11:10   Glucose 65 - 99 mg/dL 95   Sodium 136 - 145 mmol/L 143   Potassium 3.5 - 5.2 mmol/L 4.1   CO2 22.0 - 29.0 mmol/L 27.0   Chloride 98 - 107 mmol/L 105   Anion Gap 5.0 - 15.0 mmol/L 11.0   Creatinine 0.57 - 1.00 mg/dL 0.75   BUN 8 - 23 mg/dL 11   BUN/Creatinine Ratio 7.0 - 25.0  14.7   Calcium 8.6 - 10.5 mg/dL 9.9   eGFR >60.0 mL/min/1.73 85.2   Alkaline Phosphatase 39 - 117 U/L 103   Total Protein 6.0 - 8.5 g/dL 7.5   ALT (SGPT) 1 - 33 U/L 16   AST (SGOT) 1 - 32 U/L 14   Total " Bilirubin 0.0 - 1.2 mg/dL 0.6   Albumin 3.50 - 5.20 g/dL 4.80   Globulin gm/dL 2.7   A/G Ratio g/dL 1.8   Fructosamine 0 - 285 umol/L 287 (H)   Hemoglobin A1C 4.80 - 5.60 % 6.90 (H)   C-Reactive Protein 0.00 - 0.50 mg/dL <0.30   25 Hydroxy, Vitamin D 30.0 - 100.0 ng/ml 99.5   Protime 11.4 - 14.4 Seconds 12.8   INR 0.84 - 1.13  0.97   PTT 22.0 - 39.0 seconds 31.9   WBC 3.40 - 10.80 10*3/mm3 5.38   RBC 3.77 - 5.28 10*6/mm3 4.66   Hemoglobin 12.0 - 15.9 g/dL 13.8   Hematocrit 34.0 - 46.6 % 43.6   RDW 12.3 - 15.4 % 13.6   MCV 79.0 - 97.0 fL 93.6   MCH 26.6 - 33.0 pg 29.6   MCHC 31.5 - 35.7 g/dL 31.7   MPV 6.0 - 12.0 fL 9.6   Platelets 140 - 450 10*3/mm3 286   (H): Data is abnormally high    Assessment and Plan:     Status post revision total right knee replacement    Right knee pain    Sleep apnea    Hypertension    Type 2 diabetes mellitus (HCC)    Hyperlipidemia      Plan  1. PT/OT- WBAT RLE  2. Pain control-prns   3. IS-encourage  4. DVT proph- Mechs/ASA  5. Bowel regimen  6. Resume home medications as appropriate  7. Monitor post-op labs  8. DC planning for home    - Hypertension:  Resume home medications as appropriate, formulary substitution when indicated.  Holding parameters.  Prn medications for elevated blood pressure.     -DM type 2  Hgb A1C 6.9  Hold OHA as appropriate  FSBG AC/HS, ( q 6 when NPO), along with correction humalog.  Long acting insulin, mealtime Humalog.    - ZACH:  CPAP at night      Susan Murillo, APRQUE  12/14/22  15:02 EST

## 2022-12-14 NOTE — ANESTHESIA PROCEDURE NOTES
RIGHT ADDUCTOR CANAL CATHETER      Patient reassessed immediately prior to procedure    Reason for block: at surgeon's request and post-op pain management  Preanesthetic Checklist  Completed: patient identified, IV checked, site marked, risks and benefits discussed, surgical consent, monitors and equipment checked, pre-op evaluation and timeout performed  Prep:  Pt Position: supine  Sterile barriers:cap, gloves, mask, sterile barriers and washed/disinfected hands  Prep: ChloraPrep  Patient monitoring: blood pressure monitoring, continuous pulse oximetry and EKG  Procedure  Performed under: spinal  Guidance:ultrasound guided    ULTRASOUND INTERPRETATION.  Using ultrasound guidance a 20 G gauge needle was placed in close proximity to the nerve, at which point, under ultrasound guidance anesthetic was injected in the area of the nerve and spread of the anesthesia was seen on ultrasound in close proximity thereto.  There were no abnormalities seen on ultrasound; a digital image was taken; and the patient tolerated the procedure with no complications. Images:still images obtained, printed/placed on chart    Laterality:right  Block Type:adductor canal block  Injection Technique:catheter  Needle Type:Tuohy and echogenic  Needle Gauge:18 G  Resistance on Injection: none  Catheter Size:20 G (20g)  Cath Depth at skin: 10 cm          Post Assessment  Injection Assessment: negative aspiration for heme, incremental injection and no paresthesia on injection  Patient Tolerance:comfortable throughout block  Complications:no  Additional Notes  CATHETER   A high-frequency linear transducer, with sterile cover, was placed on the anterior mid-thigh (between the anterior superior iliac spine and patella). The transducer was then moved medially to identify the Sartorius muscle (Adal), Vastus Medialis muscle (VMM), Superficial Femoral Artery (SFA) and Vein. The transducer was then moved cephalad or caudad to position the SFA in the middle  "of the [facility]. The insertion site was prepped and draped in sterile fashion. Skin and cutaneous tissue was infiltrated with 2-5 ml of 1% Lidocaine. Using ultrasound-guidance, an 18-gauge Contiplex Ultra 360 Touhy needle was advanced in plane from lateral to medial. Preservative-free normal saline was utilized for hydro-dissection of tissue, advancement of Touhy, and to confirm needle placement below the fascial plane of the Adal where the Nerve to the VMM is located. Local anesthetic (LA) 5 ml deposited here. The Touhy needle continues its path lateral to the SFA at the level of the Saphenous Nerve. The remainder of the LA was deposited at the 10-11 o'clock position of the SFA. This injection created a space between the Adal and the SFA. Aspiration every 5 ml to prevent intravascular injection. Injection was completed with negative aspiration of blood and negative intravascular injection. Injection pressures were normal with minimal resistance. A 20-gauge Contiplex Echo catheter was placed through the needle and advance out the tip of the Touhy 3-5 cm anterior to the SFA. The Touhy needle was then removed, and final catheter position verified at the 12 o'clock position to the SFA. The catheter was secured in the usual fashion with skin glue, benzoin, steri-strips, CHG tegaderm and label noting \"Nerve Block Catheter\". Jerk tape applied at yellow connector and catheter connection.           "

## 2022-12-15 ENCOUNTER — READMISSION MANAGEMENT (OUTPATIENT)
Dept: CALL CENTER | Facility: HOSPITAL | Age: 72
End: 2022-12-15

## 2022-12-15 VITALS
HEIGHT: 68 IN | WEIGHT: 176 LBS | SYSTOLIC BLOOD PRESSURE: 115 MMHG | DIASTOLIC BLOOD PRESSURE: 39 MMHG | RESPIRATION RATE: 16 BRPM | TEMPERATURE: 97.8 F | BODY MASS INDEX: 26.67 KG/M2 | OXYGEN SATURATION: 97 % | HEART RATE: 77 BPM

## 2022-12-15 LAB
ANION GAP SERPL CALCULATED.3IONS-SCNC: 12 MMOL/L (ref 5–15)
BUN SERPL-MCNC: 18 MG/DL (ref 8–23)
BUN/CREAT SERPL: 21.7 (ref 7–25)
CALCIUM SPEC-SCNC: 8.6 MG/DL (ref 8.6–10.5)
CHLORIDE SERPL-SCNC: 100 MMOL/L (ref 98–107)
CO2 SERPL-SCNC: 22 MMOL/L (ref 22–29)
CREAT SERPL-MCNC: 0.83 MG/DL (ref 0.57–1)
DEPRECATED RDW RBC AUTO: 46.9 FL (ref 37–54)
EGFRCR SERPLBLD CKD-EPI 2021: 75.5 ML/MIN/1.73
ERYTHROCYTE [DISTWIDTH] IN BLOOD BY AUTOMATED COUNT: 13.6 % (ref 12.3–15.4)
GLUCOSE BLDC GLUCOMTR-MCNC: 108 MG/DL (ref 70–130)
GLUCOSE BLDC GLUCOMTR-MCNC: 133 MG/DL (ref 70–130)
GLUCOSE SERPL-MCNC: 123 MG/DL (ref 65–99)
HCT VFR BLD AUTO: 35.8 % (ref 34–46.6)
HGB BLD-MCNC: 11.5 G/DL (ref 12–15.9)
MCH RBC QN AUTO: 30 PG (ref 26.6–33)
MCHC RBC AUTO-ENTMCNC: 32.1 G/DL (ref 31.5–35.7)
MCV RBC AUTO: 93.5 FL (ref 79–97)
PLATELET # BLD AUTO: 247 10*3/MM3 (ref 140–450)
PMV BLD AUTO: 9.7 FL (ref 6–12)
POTASSIUM SERPL-SCNC: 4.1 MMOL/L (ref 3.5–5.2)
RBC # BLD AUTO: 3.83 10*6/MM3 (ref 3.77–5.28)
SODIUM SERPL-SCNC: 134 MMOL/L (ref 136–145)
WBC NRBC COR # BLD: 10.31 10*3/MM3 (ref 3.4–10.8)

## 2022-12-15 PROCEDURE — 94761 N-INVAS EAR/PLS OXIMETRY MLT: CPT

## 2022-12-15 PROCEDURE — 94799 UNLISTED PULMONARY SVC/PX: CPT

## 2022-12-15 PROCEDURE — 94664 DEMO&/EVAL PT USE INHALER: CPT

## 2022-12-15 PROCEDURE — 97116 GAIT TRAINING THERAPY: CPT

## 2022-12-15 PROCEDURE — 80048 BASIC METABOLIC PNL TOTAL CA: CPT | Performed by: ORTHOPAEDIC SURGERY

## 2022-12-15 PROCEDURE — 85027 COMPLETE CBC AUTOMATED: CPT | Performed by: NURSE PRACTITIONER

## 2022-12-15 PROCEDURE — 97110 THERAPEUTIC EXERCISES: CPT

## 2022-12-15 PROCEDURE — 82962 GLUCOSE BLOOD TEST: CPT

## 2022-12-15 PROCEDURE — 25010000002 CEFAZOLIN IN DEXTROSE 2-4 GM/100ML-% SOLUTION: Performed by: ORTHOPAEDIC SURGERY

## 2022-12-15 RX ORDER — CEFADROXIL 500 MG/1
500 CAPSULE ORAL 2 TIMES DAILY
Qty: 28 CAPSULE | Refills: 0
Start: 2022-12-15 | End: 2022-12-29

## 2022-12-15 RX ORDER — ASPIRIN 81 MG/1
81 TABLET, DELAYED RELEASE ORAL EVERY 12 HOURS
Qty: 84 TABLET | Refills: 0
Start: 2022-12-15 | End: 2023-01-26

## 2022-12-15 RX ORDER — CELECOXIB 200 MG/1
200 CAPSULE ORAL 2 TIMES DAILY
Start: 2022-12-29

## 2022-12-15 RX ORDER — ACETAMINOPHEN 500 MG
1000 TABLET ORAL EVERY 8 HOURS
Qty: 42 TABLET | Refills: 0
Start: 2022-12-15 | End: 2022-12-22

## 2022-12-15 RX ORDER — MELOXICAM 15 MG/1
15 TABLET ORAL DAILY
Qty: 15 TABLET | Refills: 0
Start: 2022-12-15 | End: 2022-12-30

## 2022-12-15 RX ORDER — ONDANSETRON 4 MG/1
4 TABLET, FILM COATED ORAL EVERY 8 HOURS PRN
Start: 2022-12-15

## 2022-12-15 RX ORDER — TRAMADOL HYDROCHLORIDE 50 MG/1
50 TABLET ORAL EVERY 6 HOURS PRN
Start: 2022-12-15

## 2022-12-15 RX ORDER — HYDROCODONE BITARTRATE AND ACETAMINOPHEN 7.5; 325 MG/1; MG/1
1 TABLET ORAL EVERY 4 HOURS PRN
Qty: 40 TABLET | Refills: 0
Start: 2022-12-15

## 2022-12-15 RX ORDER — ROPIVACAINE HYDROCHLORIDE 2 MG/ML
1 INJECTION, SOLUTION EPIDURAL; INFILTRATION; PERINEURAL CONTINUOUS
Start: 2022-12-15 | End: 2023-03-13

## 2022-12-15 RX ADMIN — ACETAMINOPHEN 1000 MG: 500 TABLET ORAL at 02:24

## 2022-12-15 RX ADMIN — DOCUSATE SODIUM 100 MG: 100 CAPSULE, LIQUID FILLED ORAL at 08:02

## 2022-12-15 RX ADMIN — MELOXICAM 15 MG: 7.5 TABLET ORAL at 08:02

## 2022-12-15 RX ADMIN — BUDESONIDE AND FORMOTEROL FUMARATE DIHYDRATE 2 PUFF: 160; 4.5 AEROSOL RESPIRATORY (INHALATION) at 09:16

## 2022-12-15 RX ADMIN — ACETAMINOPHEN 1000 MG: 500 TABLET ORAL at 10:24

## 2022-12-15 RX ADMIN — NIFEDIPINE 60 MG: 60 TABLET, EXTENDED RELEASE ORAL at 08:02

## 2022-12-15 RX ADMIN — CEFAZOLIN SODIUM 2 G: 2 INJECTION, SOLUTION INTRAVENOUS at 02:24

## 2022-12-15 RX ADMIN — TRAMADOL HYDROCHLORIDE 50 MG: 50 TABLET ORAL at 07:57

## 2022-12-15 RX ADMIN — LOSARTAN POTASSIUM 100 MG: 50 TABLET, FILM COATED ORAL at 08:03

## 2022-12-15 RX ADMIN — ASPIRIN 81 MG: 81 TABLET, COATED ORAL at 08:03

## 2022-12-15 NOTE — CASE MANAGEMENT/SOCIAL WORK
Case Management Discharge Note      Final Note: case mgt f/u. arrangements complete for d/c home today. [facility] has delivered a CPM to room and provided instructions(this was ordered by MD office). I spoke with Elizabeth at [facility] who confirmed referral for home PT. Arrangements made for a rolling walker from [employer] to be delivered to room. No other d/c needs identified   Correction: rolling walker will be delivered from [facility]         Selected Continued Care - Admitted Since 12/14/2022     Destination    No services have been selected for the patient.              Durable Medical Equipment    No services have been selected for the patient.              Dialysis/Infusion    No services have been selected for the patient.              Home Medical Care Coordination complete.    Service Provider Selected Services Address Phone Fax Patient Preferred    [facility] [address]Z, [facility] [id number] [phone] --          Therapy    No services have been selected for the patient.              Community Resources    No services have been selected for the patient.              Community & DME    No services have been selected for the patient.                       Final Discharge Disposition Code: 06 - home with home health care

## 2022-12-15 NOTE — DISCHARGE SUMMARY
Patient Name: Lupe Morillo  MRN: [id number]  : 1950  DOS: 12/15/2022    Attending: Bishop Romo MD    Primary Care Provider: Michael Blackwell MD    Date of Admission:.2022  8:07 AM    Date of Discharge:  12/15/2022    Discharge Diagnosis:     Status post revision total right knee replacement    Sleep apnea    Hypertension    Type 2 diabetes mellitus (HCC)    Hyperlipidemia    Right knee pain      Hospital Course    At admit:  Patient is a pleasant 72 y.o. female presented for scheduled surgery by Dr. Romo.  She underwent revision right total knee arthroplasty under spinal anesthesia.  She tolerated surgery well and was admitted for further medical management     She is known to us from previous admission on 2021 for right knee replacement; which she recovered well.     When seen in PACU she is sedated.  She appears comfortable and in stable condition.     After admit:    Patient was provided pain medications as needed for pain control, along with adductor canal nerve block infusion of Ropivacaine.      Adjustments were made to pain medications to optimize postop pain management. Risks and benefits of opiate medications discussed with patient. [place] report was reviewed.    She was seen by PT and OT and has progressed well over her stay.    Pt used an IS for atelectasis prophylaxis and asa along with mechanicals for DVT prophylaxis.    Home medications were resumed as appropriate, and labs were monitored and remained fairly stable.     With the progress she has made,  is ready for DC home today.      She will have an Infupump ( instructed on it during this admit).    Discussed with patient regarding plan and she shows understanding and agreement.    Patient will have HHPT following discharge.    Per 's note ( Final Note: case mgt f/u. arrangements complete for d/c home today. [facility] CPM to room and provided instructions(this was ordered  by MD office). I spoke with Elizabeth at [facility] who confirmed referral for home PT. Arrangements made for a rolling walker from [email or web address] to be delivered to room. No other d/c needs identified   Correction: rolling walker will be delivered from [facility])           Procedures Performed  Procedure(s):  REVISION RIGHT TOTAL KNEE ARTHROPLASTY - FEMORAL COMPONENT       Pertinent Test Results:    I reviewed the patient's new clinical results.         Invalid input(s): NEUTOPHILPCT,  EOSPCT  Results from last 7 days   Lab Units 12/14/22  0833   POTASSIUM mmol/L 3.9      Latest Reference Range & Units 12/15/22 13:05   Glucose 65 - 99 mg/dL 123 (H)   Sodium 136 - 145 mmol/L 134 (L)   Potassium 3.5 - 5.2 mmol/L 4.1   CO2 22.0 - 29.0 mmol/L 22.0   Chloride 98 - 107 mmol/L 100   Anion Gap 5.0 - 15.0 mmol/L 12.0   Creatinine 0.57 - 1.00 mg/dL 0.83   BUN 8 - 23 mg/dL 18   BUN/Creatinine Ratio 7.0 - 25.0  21.7   Calcium 8.6 - 10.5 mg/dL 8.6   eGFR >60.0 mL/min/1.73 75.5   WBC 3.40 - 10.80 10*3/mm3 10.31   RBC 3.77 - 5.28 10*6/mm3 3.83   Hemoglobin 12.0 - 15.9 g/dL 11.5 (L)   Hematocrit 34.0 - 46.6 % 35.8   RDW 12.3 - 15.4 % 13.6   MCV 79.0 - 97.0 fL 93.5   MCH 26.6 - 33.0 pg 30.0   MCHC 31.5 - 35.7 g/dL 32.1   MPV 6.0 - 12.0 fL 9.7   Platelets 140 - 450 10*3/mm3 247   RDW-SD 37.0 - 54.0 fl 46.9   (H): Data is abnormally high  (L): Data is abnormally low  I reviewed the patient's new imaging including images and reports.       Physical therapy  Plan of Care Reviewed With: patient, spouse  Progress: improving  Outcome Evaluation: Patient demonstrates improving safety and independence w/ functional mobility. She completed transfers w/ SBA, ambulated 300ft w/ RW and SBA, and negotiated steps to simulate home entry w/ CGA. No LOB or instability. Issued and reviewed written TKA HEP and provided educ for car transfers and home safety. Pt. appears safe to D/C home w/ assist and OPPT.                Discharge Assessment:      "  Visit Vitals  /47 (BP Location: Left arm, Patient Position: Lying)   Pulse 79   Temp 97.8 °F (36.6 °C) (Oral)   Resp 16   Ht 172.7 cm (68\")   Wt 79.8 kg (176 lb)   SpO2 98%   BMI 26.76 kg/m²     Temp (24hrs), Av.5 °F (36.4 °C), Min:97.2 °F (36.2 °C), Max:97.8 °F (36.6 °C)      General Appearance:    Alert, cooperative, in no acute distress   Lungs:     Clear to auscultation,respirations regular, even and                   unlabored    Heart:    Regular rhythm and normal rate, normal S1 and S2   Abdomen:     Normal bowel sounds, no masses, no organomegaly, soft        non-tender, non-distended, no guarding, no rebound                 tenderness   Extremities:   CDI dressing surgical knee . ACB cath present, infupump.   Pulses:   Pulses palpable and equal bilaterally   Skin:   No bleeding, bruising or rash   Neurologic:   Cranial nerves 2 - 12 grossly intact, sensation intact, Flexion and dorsiflexion intact bilateral feet.         Discharge Disposition: Home.         Discharge Medications      New Medications      Instructions Start Date   acetaminophen 500 MG tablet  Commonly known as: TYLENOL   1,000 mg, Oral, Every 8 Hours, Take every 8 hours  as needed after 1 week      aspirin 81 MG EC tablet   81 mg, Oral, Every 12 Hours      cefadroxil 500 MG capsule  Commonly known as: DURICEF   500 mg, Oral, 2 Times Daily      HYDROcodone-acetaminophen 7.5-325 MG per tablet  Commonly known as: Norco  Replaces: HYDROcodone-acetaminophen 5-325 MG per tablet   1 tablet, Oral, Every 4 Hours PRN      meloxicam 15 MG tablet  Commonly known as: MOBIC   15 mg, Oral, Daily      ondansetron 4 MG tablet  Commonly known as: Zofran   4 mg, Oral, Every 8 Hours PRN      ropivacaine 0.2 % infusion (INFUSYSTEM)  Commonly known as: NAROPIN   1 mL/hr (2 mg/hr), Peripheral Nerve, Continuous      traMADol 50 MG tablet  Commonly known as: ULTRAM   50 mg, Oral, Every 6 Hours PRN         Changes to Medications      Instructions Start " Date   celecoxib 200 MG capsule  Commonly known as: CeleBREX  What changed: These instructions start on December 29, 2022. If you are unsure what to do until then, ask your doctor or other care provider.   200 mg, Oral, 2 Times Daily, May resume when finished with Meloxicam script   Start Date: December 29, 2022     levocetirizine 5 MG tablet  Commonly known as: XYZAL  What changed: when to take this   TAKE ONE TABLET BY MOUTH DAILY         Continue These Medications      Instructions Start Date   B12-Active 1 MG chewable tablet  Generic drug: Methylcobalamin   1 tablet, Oral, Daily      BD Pen Needle Joanne 2nd Gen 32G X 4 MM misc  Generic drug: Insulin Pen Needle   USE PEN NEEDLES TO INJECT INSULIN ONCE DAILY      cholecalciferol 25 MCG (1000 UT) tablet  Commonly known as: VITAMIN D3   1,000 Units, Oral, Daily      cyclobenzaprine 5 MG tablet  Commonly known as: FLEXERIL   5 mg, Oral, 3 Times Daily PRN      docusate sodium 100 MG capsule  Commonly known as: COLACE   100 mg, Oral, 2 Times Daily      ezetimibe 10 MG tablet  Commonly known as: ZETIA   10 mg, Oral, Daily      Farxiga 5 MG tablet tablet  Generic drug: dapagliflozin   5 mg, Oral, Daily      fluticasone 50 MCG/ACT nasal spray  Commonly known as: FLONASE   2 sprays, Nasal, Daily      freestyle lancets   Use as instructed      FREESTYLE TEST STRIPS test strip  Generic drug: glucose blood   Check sugars 3 times a day. Dx code E11.9      ipratropium-albuterol 0.5-2.5 mg/3 ml nebulizer  Commonly known as: DUO-NEB   No dose, route, or frequency recorded.      Levemir FlexTouch 100 UNIT/ML injection  Generic drug: insulin detemir   INJECT 30 UNITS UNDER THE SKIN DAILY      losartan 100 MG tablet  Commonly known as: Cozaar   100 mg, Oral, Daily      MAGNESIUM PO   250 mg, Oral, Daily      metFORMIN 1000 MG tablet  Commonly known as: GLUCOPHAGE   TAKE ONE TABLET BY MOUTH DAILY WITH FOOD      montelukast 10 MG tablet  Commonly known as: SINGULAIR   10 mg, Oral,  Nightly      multivitamin with minerals tablet tablet   1 tablet, Oral, Daily      Myrbetriq 25 MG tablet sustained-release 24 hour 24 hr tablet  Generic drug: Mirabegron ER   25 mg, Oral, Daily      NIFEdipine XL 60 MG 24 hr tablet  Commonly known as: PROCARDIA XL   60 mg, Oral, Daily      OSTEO BI-FLEX ONE PER DAY PO   Oral      Rybelsus 3 MG tablet  Generic drug: Semaglutide   TAKE ONE TABLET BY MOUTH DAILY      sodium chloride 0.65 % nasal spray   1 spray, Nasal, Daily PRN, 1 spray in each nostril daily as needed       spironolactone 50 MG tablet  Commonly known as: ALDACTONE   TAKE ONE TABLET BY MOUTH DAILY      Symbicort 160-4.5 MCG/ACT inhaler  Generic drug: budesonide-formoterol   No dose, route, or frequency recorded.      vitamin B-6 100 MG tablet  Commonly known as: PYRIDOXINE   100 mg, Oral, Daily      vitamin C 500 MG tablet  Commonly known as: ASCORBIC ACID   500 mg, Oral, Daily      Vitamin C 100 MG chewable tablet   1 tablet, Oral, Daily         Stop These Medications    Diclofenac Sodium 1 % gel gel  Commonly known as: VOLTAREN     HYDROcodone-acetaminophen 5-325 MG per tablet  Commonly known as: NORCO  Replaced by: HYDROcodone-acetaminophen 7.5-325 MG per tablet     ibuprofen 200 MG tablet  Commonly known as: ADVIL,MOTRIN            Discharge Diet: resume prior.    Activity at Discharge:  Weight bearing as tolerated         Future Appointments   Date Time Provider Department Center   1/30/2023  9:15 AM Michael Blackwell MD [facility]     Orthopedic surgery per 's order.     Dragon disclaimer:  Part of this encounter note is an electronic transcription/translation of spoken language to printed text. The electronic translation of spoken language may permit erroneous, or at times, nonsensical words or phrases to be inadvertently transcribed; Although I have reviewed the note for such errors, some may still exist.       Chantal Jade MD  12/15/22  10:35 EST

## 2022-12-15 NOTE — PROGRESS NOTES
Orthopedic Progress Note      Patient: Lupe Morillo  YOB: 1950     Date of Admission: 12/14/2022  8:07 AM Medical Record Number: [id number]     Attending Physician: Chantal Jade MD    Status Post:  Procedure(s):  REVISION RIGHT TOTAL KNEE ARTHROPLASTY - FEMORAL COMPONENT Post Operative Day Number: 1    Subjective : No new orthopaedic complaints     Pain Relief: some relief with present medication.     Systemic Complaints: No Complaints  Vitals:    12/15/22 0500 12/15/22 0723 12/15/22 0916 12/15/22 1232   BP: 107/50 111/47  (!) 115/39   BP Location: Left arm Left arm  Left arm   Patient Position: Lying Lying  Sitting   Pulse: 68 67 79 77   Resp: 14 16 16 16   Temp: 97.2 °F (36.2 °C) 97.8 °F (36.6 °C)  97.8 °F (36.6 °C)   TempSrc: Axillary Oral  Oral   SpO2: 94% 97% 98% 97%   Weight:       Height:           Physical Exam: 72 y.o. female    General Appearance:       Alert, cooperative, in no acute distress                  Extremities:    Dressing Clean, Dry and Intact             No clinical sign of DVT        Able to do good movements of digits    Pulses:   Pulses palpable and equal bilaterally           Diagnostic Tests:         Results from last 7 days   Lab Units 12/14/22  0833   POTASSIUM mmol/L 3.9         No results found for: URICACID  No results found for: CRYSTAL  Microbiology Results (last 10 days)     Procedure Component Value - Date/Time    Tissue / Bone Culture - Tissue, Knee, Right [[id number]] Collected: 12/14/22 1114    Lab Status: Preliminary result Specimen: Tissue from Knee, Right Updated: 12/15/22 0816     Tissue Culture No growth     Gram Stain Few (2+) WBCs seen      No organisms seen    Tissue / Bone Culture - Tissue, Knee, Right [[id number]] Collected: 12/14/22 1113    Lab Status: Preliminary result Specimen: Tissue from Knee, Right Updated: 12/15/22 0816     Tissue Culture No growth     Gram Stain Few (2+) WBCs seen      No organisms seen    Tissue / Bone Culture  - Tissue, Knee, Right [[id number]] Collected: 12/14/22 1111    Lab Status: Preliminary result Specimen: Tissue from Knee, Right Updated: 12/15/22 0816     Tissue Culture No growth     Gram Stain Few (2+) WBCs seen      No organisms seen    AFB Culture - Tissue, Knee, Right [[id number]] Collected: 12/14/22 1111    Lab Status: Preliminary result Specimen: Tissue from Knee, Right Updated: 12/15/22 1150     AFB Stain No acid fast bacilli seen on concentrated smear        XR Knee 1 or 2 View Right    Result Date: 12/14/2022  Interval revision of the right total knee arthroplasty with satisfactory postoperative position and alignment  This report was finalized on 12/14/2022 1:47 PM by Naveed Padilla MD.          Current Medications:  Scheduled Meds:acetaminophen, 1,000 mg, Oral, Q8H  aspirin, 81 mg, Oral, Q12H  budesonide-formoterol, 2 puff, Inhalation, BID - RT  docusate sodium, 100 mg, Oral, BID  insulin detemir, 30 Units, Subcutaneous, Nightly  losartan, 100 mg, Oral, Daily  meloxicam, 15 mg, Oral, Daily  montelukast, 10 mg, Oral, Nightly  NIFEdipine XL, 60 mg, Oral, Daily      Continuous Infusions:lactated ringers, 100 mL/hr, Last Rate: 100 mL/hr (12/14/22 1814)  ropivacaine,       PRN Meds:.•  dextrose  •  dextrose  •  diphenhydrAMINE **OR** diphenhydrAMINE  •  glucagon (human recombinant)  •  HYDROmorphone **AND** naloxone  •  ipratropium-albuterol  •  ketorolac  •  labetalol  •  ondansetron **OR** ondansetron  •  oxyCODONE  •  oxyCODONE  •  sodium chloride  •  traMADol    Assessment: Status post  TX REVISE KNEE JOINT REPLACE,ALL PARTS [[id number]] (REVISION RIGHT TOTAL KNEE ARTHROPLASTY - FEMORAL COMPONENT)    Patient Active Problem List   Diagnosis   • Sleep apnea   • Hypertension   • Heel spur   • Low back pain   • Allergic rhinitis   • Type 2 diabetes mellitus (HCC)   • Muscle cramping   • S/P lumbar fusion   • Acute blood loss anemia, mild, asymptomatic   • Postoperative pain   • Acute bronchitis   • Arthritis   •  Atypical chest pain   • Diarrhea   • Gastroesophageal reflux disease with esophagitis   • Hirsutism   • Peripheral neuropathy   • Perennial allergic rhinitis   • Hyperlipidemia   • Asthma   • Arthritis of right knee   • S/P total knee arthroplasty, right   • Right knee pain   • Status post revision total right knee replacement       PLAN:   Continues current post-op course  Anticoagulation: Aspirin started  Mobilize with PT as tolerated per protocol  2 weeks of abx  Vac for 1 week     Weight Bearing: WBAT  Discharge Plan: OK to plan for discharge in  today to home  from orthopaedic perspective.    Bishop Romo MD    Date: 12/15/2022    Time: 13:22 EST

## 2022-12-15 NOTE — PLAN OF CARE
Goal Outcome Evaluation:              Outcome Evaluation: Patient has met criteria for discharge per MD order. VSS, with no adventitious findings noted throughout day shift assessment. Will continue to monitor and follow through with education and care per MD order.

## 2022-12-15 NOTE — PLAN OF CARE
Goal Outcome Evaluation:      Patient c/o pain PRN pain medication given and effective. Ambulates with walker gait belt and stand by assist. ACE remains CDI to RLE.

## 2022-12-15 NOTE — THERAPY TREATMENT NOTE
Patient Name: Lupe Morillo  : 1950    MRN: [id number]                              Today's Date: 12/15/2022       Admit Date: 2022    Visit Dx:     ICD-10-CM ICD-9-CM   1. Status post revision total right knee replacement  Z96.651 V43.65   2. Right knee pain  M25.561 719.46     Patient Active Problem List   Diagnosis   • Sleep apnea   • Hypertension   • Heel spur   • Low back pain   • Allergic rhinitis   • Type 2 diabetes mellitus (HCC)   • Muscle cramping   • S/P lumbar fusion   • Acute blood loss anemia, mild, asymptomatic   • Postoperative pain   • Acute bronchitis   • Arthritis   • Atypical chest pain   • Diarrhea   • Gastroesophageal reflux disease with esophagitis   • Hirsutism   • Peripheral neuropathy   • Perennial allergic rhinitis   • Hyperlipidemia   • Asthma   • Arthritis of right knee   • S/P total knee arthroplasty, right   • Right knee pain   • Status post revision total right knee replacement     Past Medical History:   Diagnosis Date   • Arthritis    • Asthma    • Back pain    • Diabetes mellitus (HCC)     type 2 dm, 10 years, check blood sugar once A DAY, LAST A1C 6.8%   • GERD (gastroesophageal reflux disease)    • History of chronic bronchitis    • History of diverticulitis    • History of hypercholesterolemia    • Hyperlipidemia    • Hypertension    • PONV (postoperative nausea and vomiting)     with tubal ligation   • Sleep apnea with use of continuous positive airway pressure (CPAP)     USE CPAP   • Urinary incontinence      Past Surgical History:   Procedure Laterality Date   • CATARACT EXTRACTION Bilateral    • COLON RESECTION      4-5 years ago   • COLONOSCOPY      2018   • HYSTERECTOMY      AGE ~ 46   • KNEE ARTHROSCOPY Right    • LUMBAR DISCECTOMY FUSION INSTRUMENTATION N/A 2019    Procedure: LUMBAR LAMINECTOMY POSTERIOR WITH FUSION INSTRUMENTATION L4-5;  Surgeon: Naveed Sims MD;  Location: [facility];  Service: Orthopedic Spine   • OOPHORECTOMY Bilateral      AGE ~ 46   • TOTAL KNEE ARTHROPLASTY Right 11/22/2021    Procedure: TOTAL KNEE ARTHROPLASTY RIGHT;  Surgeon: Ariel Leyva MD;  Location:  [facility];  Service: Orthopedics;  Laterality: Right;   • TOTAL KNEE ARTHROPLASTY REVISION Right 12/14/2022    Procedure: REVISION RIGHT TOTAL KNEE ARTHROPLASTY - FEMORAL COMPONENT;  Surgeon: Bishop Romo MD;  Location:  [facility];  Service: Orthopedics;  Laterality: Right;   • TUBAL ABDOMINAL LIGATION        General Information     Row Name 12/15/22 0839          Physical Therapy Time and Intention    Document Type therapy note (daily note)  -MB     Mode of Treatment physical therapy  -MB     Row Name 12/15/22 0839          General Information    Patient Profile Reviewed yes  -MB     Existing Precautions/Restrictions fall;other (see comments)  wound vac, adductor canal nerve cath  -MB     Barriers to Rehab medically complex  -MB     Row Name 12/15/22 0839          Cognition    Orientation Status (Cognition) oriented x 4  -[facility] Name 12/15/22 0839          Safety Issues, Functional Mobility    Safety Issues Affecting Function (Mobility) safety precaution awareness  -MB     Impairments Affecting Function (Mobility) balance;pain;strength;sensation/sensory awareness;range of motion (ROM)  -MB           User Key  (r) = Recorded By, (t) = Taken By, (c) = Cosigned By    Initials Name Provider Type    MB Galina Tripp, PT Physical Therapist               Mobility     Row Name 12/15/22 1142          Bed Mobility    Supine-Sit [place] (Bed Mobility) modified independence  -MB     Assistive Device (Bed Mobility) bed rails;head of bed elevated  -MB     Comment, (Bed Mobility) Pt. advanced to EOB w/out assist, w/ increased time to complete. Pt. c/o headache and mild dizziness upon sitting. Monitored BP.  -MB     Row Name 12/15/22 1148          Transfers    Comment, (Transfers) Pt. demo safe and appropriate transfer technique.  -MB     Row Name 12/15/22 1146           Sit-Stand Transfer    Sit-Stand [place] (Transfers) standby assist  -MB     Assistive Device (Sit-Stand Transfers) walker, front-wheeled  -MB     Row Name 12/15/22 1148          Gait/Stairs (Locomotion)    [place] Level (Gait) standby assist;verbal cues  -MB     Assistive Device (Gait) walker, front-wheeled  -MB     Distance in Feet (Gait) 300  -MB     Deviations/Abnormal Patterns (Gait) base of support, wide;weight shifting decreased;stride length decreased;gait speed decreased  -MB     Bilateral Gait Deviations forward flexed posture;heel strike decreased  -MB     [place] Level (Stairs) contact guard;verbal cues  -MB     Handrail Location (Stairs) both sides  -MB     Ascending Technique (Stairs) step-to-step  -MB     Descending Technique (Stairs) step-to-step  -MB     Row Name 12/15/22 1148          Mobility    Extremity Weight-bearing Status right lower extremity  -MB     Right Lower Extremity (Weight-bearing Status) weight-bearing as tolerated (WBAT)  -MB           User Key  (r) = Recorded By, (t) = Taken By, (c) = Cosigned By    Initials Name Provider Type    Galina David, PT Physical Therapist               Obj/Interventions     Row Name 12/15/22 1154          Motor Skills    Therapeutic Exercise hip;ankle;knee  Issued written / illustrated TKA HEP.  -MB     Row Name 12/15/22 1154          Hip (Therapeutic Exercise)    Hip (Therapeutic Exercise) strengthening exercise  -MB     Hip Strengthening (Therapeutic Exercise) right;heel slides;10 repetitions  -MB     Row Name 12/15/22 1154          Knee (Therapeutic Exercise)    Knee (Therapeutic Exercise) strengthening exercise;isometric exercises  -MB     Knee Isometrics (Therapeutic Exercise) bilateral;quad sets;10 repetitions  -MB     Knee Strengthening (Therapeutic Exercise) bilateral;SLR (straight leg raise);SAQ (short arc quad);LAQ (long arc quad);heel slides;10 repetitions  -MB     Row Name 12/15/22 1154          Ankle (Therapeutic  Exercise)    Ankle (Therapeutic Exercise) strengthening exercise  -MB     Ankle Strengthening (Therapeutic Exercise) bilateral;dorsiflexion;plantarflexion;10 repetitions  -MB     Row Name 12/15/22 1152          Balance    Static Standing Balance supervision  -MB     Dynamic Standing Balance standby assist  -MB     Position/Device Used, Standing Balance supported;walker, rolling  -MB           User Key  (r) = Recorded By, (t) = Taken By, (c) = Cosigned By    Initials Name Provider Type    Galina David, PT Physical Therapist               Goals/Plan    No documentation.                Clinical Impression     Row Name 12/15/22 1157          Pain    Pretreatment Pain Rating 1/10  -MB     Posttreatment Pain Rating 1/10  -MB     Pain Location - Side/Orientation Right  -MB     Pain Location medial  -MB     Pain Location - knee  -MB     Pain Intervention(s) Ambulation/increased activity;Cold applied;Repositioned  -MB     Row Name 12/15/22 8583          Plan of Care Review    Plan of Care Reviewed With patient;spouse  -MB     Progress improving  -MB     Outcome Evaluation Patient demonstrates improving safety and independence w/ functional mobility. She completed transfers w/ SBA, ambulated 300ft w/ RW and SBA, and negotiated steps to simulate home entry w/ CGA. No LOB or instability. Issued and reviewed written TKA HEP and provided educ for car transfers and home safety. Pt. appears safe to D/C home w/ assist and OPPT.  -MB     Row Name 12/15/22 1156          Vital Signs    Pre Systolic BP Rehab --  VSS. Pt. slightly orthostatic. RN notified and presented to room. RN cleared for ambulation.  -MB     Pre Patient Position Supine  -MB     Intra Patient Position Standing  -MB     Post Patient Position Sitting  -MB     Row Name 12/15/22 6684          Positioning and Restraints    Pre-Treatment Position in bed  -MB     Post Treatment Position chair  -MB     In Chair notified nsg;reclined;call light within  reach;encouraged to call for assist;exit alarm on;with family/caregiver;legs elevated  -MB           User Key  (r) = Recorded By, (t) = Taken By, (c) = Cosigned By    Initials Name Provider Type    Galina David, PT Physical Therapist               Outcome Measures     Row Name 12/15/22 1311          How much help from another person do you currently need...    Turning from your back to your side while in flat bed without using bedrails? 4  -MB     Moving from lying on back to sitting on the side of a flat bed without bedrails? 4  -MB     Moving to and from a bed to a chair (including a wheelchair)? 3  -MB     Standing up from a chair using your arms (e.g., wheelchair, bedside chair)? 3  -MB     Climbing 3-5 steps with a railing? 3  -MB     To walk in hospital room? 3  -MB     AM-PAC 6 Clicks Score (PT) 20  -MB     Highest level of mobility 6 --> Walked 10 steps or more  -MB     Row Name 12/15/22 1311          Functional Assessment    Outcome Measure Options AM-PAC 6 Clicks Basic Mobility (PT)  -MB           User Key  (r) = Recorded By, (t) = Taken By, (c) = Cosigned By    Initials Name Provider Type    Galina David, PT Physical Therapist                             Physical Therapy Education     Title: PT OT SLP Therapies (In Progress)     Topic: Physical Therapy (Done)     Point: Mobility training (Done)     Learning Progress Summary           Patient Acceptance, E,D, VU,DU by  at 12/14/2022 1829                   Point: Home exercise program (Done)     Learning Progress Summary           Patient Acceptance, E,D, VU,DU by  at 12/14/2022 1829                   Point: Body mechanics (Done)     Learning Progress Summary           Patient Acceptance, E,D, VU,DU by  at 12/14/2022 1829                   Point: Precautions (Done)     Learning Progress Summary           Patient Acceptance, E,D, VU,DU by  at 12/14/2022 1829                               User Key     Initials Effective Dates Name  Provider Type Discipline     06/01/21 -  Danna Denson, PT Physical Therapist PT              PT Recommendation and Plan     Plan of Care Reviewed With: patient, spouse  Progress: improving  Outcome Evaluation: Patient demonstrates improving safety and independence w/ functional mobility. She completed transfers w/ SBA, ambulated 300ft w/ RW and SBA, and negotiated steps to simulate home entry w/ CGA. No LOB or instability. Issued and reviewed written TKA HEP and provided educ for car transfers and home safety. Pt. appears safe to D/C home w/ assist and OPPT.     Time Calculation:    PT Charges     Row Name 12/15/22 1311             Time Calculation    Start Time 0839  -MB      PT Received On 12/15/22  -MB      PT Goal Re-Cert Due Date 12/24/22  -MB         Time Calculation- PT    Total Timed Code Minutes- PT 55 minute(s)  -MB         Timed Charges    31110 - PT Therapeutic Exercise Minutes 25  -MB      17032 - Gait Training Minutes  30  -MB         Total Minutes    Timed Charges Total Minutes 55  -MB       Total Minutes 55  -MB            User Key  (r) = Recorded By, (t) = Taken By, (c) = Cosigned By    Initials Name Provider Type    Galina David, PT Physical Therapist              Therapy Charges for Today     Code Description Service Date Service Provider Modifiers Qty    77344910533 HC PT THER PROC EA 15 MIN 12/15/2022 Galina Tripp, PT GP 2    61458427077 HC GAIT TRAINING EA 15 MIN 12/15/2022 Galina Tripp, PT GP 2    11446539003 HC PT THER SUPP EA 15 MIN 12/15/2022 Galina Tripp, PT GP 2          PT G-Codes  Outcome Measure Options: AM-PAC 6 Clicks Basic Mobility (PT)  AM-PAC 6 Clicks Score (PT): 20  PT Discharge Summary  Anticipated Discharge Disposition (PT): home with assist, home with outpatient therapy services    Galina Tripp PT  12/15/2022

## 2022-12-15 NOTE — PROGRESS NOTES
[facility]    Acute pain service Inpatient Progress Note    Patient Name: Lupe Morillo  :  1950  MRN:  [id number]        Acute Pain  Service Inpatient Progress Note:    Analgesia:Excellent  Pain Score:0/10  LOC: alert and awake  Resp Status: room air  Cardiac: VS stable  Side Effects:None  Catheter Site:clean, dressing intact and dry  Cath type: peripheral nerve cath with ON Q  Volume: 1mL,8ml, 8ml InfuSystem Pump.  Catheter Plan:Catheter to remain Insitu and Continue catheter infusion rate unchanged  Comments:

## 2022-12-15 NOTE — PLAN OF CARE
Goal Outcome Evaluation:  Plan of Care Reviewed With: patient, spouse        Progress: improving  Outcome Evaluation: Patient demonstrates improving safety and independence w/ functional mobility. She completed transfers w/ SBA, ambulated 300ft w/ RW and SBA, and negotiated steps to simulate home entry w/ CGA. No LOB or instability. Issued and reviewed written TKA HEP and provided educ for car transfers and home safety. Pt. appears safe to D/C home w/ assist and OPPT.

## 2022-12-16 ENCOUNTER — TRANSITIONAL CARE MANAGEMENT TELEPHONE ENCOUNTER (OUTPATIENT)
Dept: CALL CENTER | Facility: HOSPITAL | Age: 72
End: 2022-12-16

## 2022-12-16 NOTE — OUTREACH NOTE
Call Center TCM Note    Flowsheet Row Responses   [facility] facility patient discharged from? [facility]   Does the patient have one of the following disease processes/diagnoses(primary or secondary)? Total Joint Replacement   Joint surgery performed? Knee   TCM attempt successful? No   Unsuccessful attempts Attempt 2          Bhumi So RN    12/16/2022, 12:59 EST

## 2022-12-16 NOTE — OUTREACH NOTE
Prep Survey    Flowsheet Row Responses   [facility] facility patient discharged from? [facility]   Is LACE score < 7 ? No   Eligibility [facility]   Date of Admission 12/14/22   Date of Discharge 12/15/22   Discharge Disposition Home-Health Care Sv   Discharge diagnosis revision total right knee arthroplasty   Does the patient have one of the following disease processes/diagnoses(primary or secondary)? Total Joint Replacement   Does the patient have Home health ordered? Yes   What is the Home health agency?  [facility]   Is there a DME ordered? Yes   What DME was ordered? rolling walker from [employer], CPM from [employer]   Prep survey completed? Yes          STEVEN SMITH - Registered Nurse

## 2022-12-17 LAB
BACTERIA SPEC AEROBE CULT: NORMAL
GRAM STN SPEC: NORMAL

## 2022-12-19 ENCOUNTER — TRANSITIONAL CARE MANAGEMENT TELEPHONE ENCOUNTER (OUTPATIENT)
Dept: CALL CENTER | Facility: HOSPITAL | Age: 72
End: 2022-12-19

## 2022-12-19 NOTE — OUTREACH NOTE
Call Center TCM Note    Flowsheet Row Responses   [facility] facility patient discharged from? [facility]   Does the patient have one of the following disease processes/diagnoses(primary or secondary)? Total Joint Replacement   Joint surgery performed? Hip   TCM attempt successful? No   Unsuccessful attempts Attempt 3          Bhumi So RN    12/19/2022, 13:43 EST

## 2022-12-24 LAB
BACTERIA SPEC ANAEROBE CULT: NORMAL

## 2022-12-26 DIAGNOSIS — E11.9 TYPE 2 DIABETES MELLITUS WITHOUT COMPLICATION, WITH LONG-TERM CURRENT USE OF INSULIN: ICD-10-CM

## 2022-12-26 DIAGNOSIS — I10 ESSENTIAL HYPERTENSION: ICD-10-CM

## 2022-12-26 DIAGNOSIS — Z79.4 TYPE 2 DIABETES MELLITUS WITHOUT COMPLICATION, WITH LONG-TERM CURRENT USE OF INSULIN: ICD-10-CM

## 2022-12-27 ENCOUNTER — TELEPHONE (OUTPATIENT)
Dept: INTERNAL MEDICINE | Facility: CLINIC | Age: 72
End: 2022-12-27

## 2022-12-27 RX ORDER — LOSARTAN POTASSIUM 100 MG/1
TABLET ORAL
Qty: 90 TABLET | Refills: 1 | Status: SHIPPED | OUTPATIENT
Start: 2022-12-27

## 2022-12-27 RX ORDER — ORAL SEMAGLUTIDE 3 MG/1
TABLET ORAL
Qty: 30 TABLET | Refills: 3 | Status: SHIPPED | OUTPATIENT
Start: 2022-12-27

## 2023-01-04 DIAGNOSIS — I10 ESSENTIAL HYPERTENSION: ICD-10-CM

## 2023-01-05 RX ORDER — SPIRONOLACTONE 50 MG/1
TABLET, FILM COATED ORAL
Qty: 90 TABLET | Refills: 1 | Status: SHIPPED | OUTPATIENT
Start: 2023-01-05

## 2023-01-25 LAB
FUNGUS WND CULT: NORMAL
MYCOBACTERIUM SPEC CULT: NORMAL
NIGHT BLUE STAIN TISS: NORMAL

## 2023-01-30 RX ORDER — INSULIN DETEMIR 100 [IU]/ML
INJECTION, SOLUTION SUBCUTANEOUS
Qty: 15 ML | Refills: 3 | Status: SHIPPED | OUTPATIENT
Start: 2023-01-30 | End: 2023-02-10

## 2023-02-02 ENCOUNTER — TELEPHONE (OUTPATIENT)
Dept: INTERNAL MEDICINE | Facility: CLINIC | Age: 73
End: 2023-02-02

## 2023-02-02 DIAGNOSIS — E11.9 TYPE 2 DIABETES MELLITUS WITHOUT COMPLICATION, WITH LONG-TERM CURRENT USE OF INSULIN: Primary | ICD-10-CM

## 2023-02-02 DIAGNOSIS — Z79.4 TYPE 2 DIABETES MELLITUS WITHOUT COMPLICATION, WITH LONG-TERM CURRENT USE OF INSULIN: Primary | ICD-10-CM

## 2023-02-02 NOTE — TELEPHONE ENCOUNTER
Caller: Ilia Lupe Tanmay    Relationship: Self    Best call back number: [phone]    Requested Prescriptions:   REPLACEMENT INSULIN    Pharmacy where request should be sent:      Additional details provided by patient: PATIENTS PHARMACY WILL NOT GET IN THE NORMAL INSULIN AND SHE IS REQUESTING A SOONER AVAILABLE REPLACEMENT    Does the patient have less than a 3 day supply:  [x] Yes  [] No    Would you like a call back once the refill request has been completed: [] Yes [x] No    If the office needs to give you a call back, can they leave a voicemail: [] Yes [x] No    Philip Phillips, PCT   02/02/23 14:11 EST

## 2023-02-10 RX ORDER — INSULIN GLARGINE 100 [IU]/ML
30 INJECTION, SOLUTION SUBCUTANEOUS NIGHTLY
Qty: 6 ML | Refills: 3 | Status: SHIPPED | OUTPATIENT
Start: 2023-02-10 | End: 2023-02-14

## 2023-02-10 NOTE — TELEPHONE ENCOUNTER
I am assuming that this message means that the Levemir is not covered./Available    I have called in Lantus as an alternative same dosage.  Let me know if is not the issue

## 2023-02-13 ENCOUNTER — TELEPHONE (OUTPATIENT)
Dept: INTERNAL MEDICINE | Facility: CLINIC | Age: 73
End: 2023-02-13
Payer: COMMERCIAL

## 2023-02-13 DIAGNOSIS — B34.9 ACUTE VIRAL SYNDROME: ICD-10-CM

## 2023-02-13 DIAGNOSIS — J30.2 SEASONAL ALLERGIES: ICD-10-CM

## 2023-02-13 DIAGNOSIS — Z79.4 TYPE 2 DIABETES MELLITUS WITHOUT COMPLICATION, WITH LONG-TERM CURRENT USE OF INSULIN: Primary | ICD-10-CM

## 2023-02-13 DIAGNOSIS — R05.1 ACUTE COUGH: ICD-10-CM

## 2023-02-13 DIAGNOSIS — E11.9 TYPE 2 DIABETES MELLITUS WITHOUT COMPLICATION, WITH LONG-TERM CURRENT USE OF INSULIN: Primary | ICD-10-CM

## 2023-02-14 ENCOUNTER — TELEPHONE (OUTPATIENT)
Dept: INTERNAL MEDICINE | Facility: CLINIC | Age: 73
End: 2023-02-14
Payer: COMMERCIAL

## 2023-02-14 RX ORDER — INSULIN DETEMIR 100 [IU]/ML
INJECTION, SOLUTION SUBCUTANEOUS
Qty: 6 ML | Refills: 3 | Status: SHIPPED | OUTPATIENT
Start: 2023-02-14

## 2023-02-16 ENCOUNTER — TELEPHONE (OUTPATIENT)
Dept: INTERNAL MEDICINE | Facility: CLINIC | Age: 73
End: 2023-02-16
Payer: COMMERCIAL

## 2023-02-16 DIAGNOSIS — B34.9 ACUTE VIRAL SYNDROME: ICD-10-CM

## 2023-02-16 DIAGNOSIS — R05.1 ACUTE COUGH: ICD-10-CM

## 2023-02-16 DIAGNOSIS — J30.2 SEASONAL ALLERGIES: ICD-10-CM

## 2023-02-16 RX ORDER — FLUTICASONE PROPIONATE 50 MCG
2 SPRAY, SUSPENSION (ML) NASAL DAILY
Qty: 9.9 ML | Refills: 0 | Status: SHIPPED | OUTPATIENT
Start: 2023-02-16 | End: 2023-02-16 | Stop reason: SDUPTHER

## 2023-02-16 RX ORDER — LEVOCETIRIZINE DIHYDROCHLORIDE 5 MG/1
5 TABLET, FILM COATED ORAL DAILY
Qty: 90 TABLET | Refills: 1 | Status: SHIPPED | OUTPATIENT
Start: 2023-02-16

## 2023-02-17 RX ORDER — FLUTICASONE PROPIONATE 50 MCG
2 SPRAY, SUSPENSION (ML) NASAL DAILY
Qty: 9.9 ML | Refills: 0 | Status: SHIPPED | OUTPATIENT
Start: 2023-02-17 | End: 2023-03-03

## 2023-03-22 ENCOUNTER — OFFICE VISIT (OUTPATIENT)
Dept: INTERNAL MEDICINE | Facility: CLINIC | Age: 73
End: 2023-03-22
Payer: COMMERCIAL

## 2023-03-22 VITALS
RESPIRATION RATE: 20 BRPM | WEIGHT: 169 LBS | BODY MASS INDEX: 25.7 KG/M2 | TEMPERATURE: 97.8 F | SYSTOLIC BLOOD PRESSURE: 126 MMHG | DIASTOLIC BLOOD PRESSURE: 60 MMHG | HEART RATE: 98 BPM

## 2023-03-22 DIAGNOSIS — I10 ESSENTIAL HYPERTENSION: Primary | ICD-10-CM

## 2023-03-22 DIAGNOSIS — E11.9 TYPE 2 DIABETES MELLITUS WITHOUT COMPLICATION, WITH LONG-TERM CURRENT USE OF INSULIN: ICD-10-CM

## 2023-03-22 DIAGNOSIS — M25.561 CHRONIC PAIN OF RIGHT KNEE: ICD-10-CM

## 2023-03-22 DIAGNOSIS — Z79.4 TYPE 2 DIABETES MELLITUS WITHOUT COMPLICATION, WITH LONG-TERM CURRENT USE OF INSULIN: ICD-10-CM

## 2023-03-22 DIAGNOSIS — G89.29 CHRONIC PAIN OF RIGHT KNEE: ICD-10-CM

## 2023-03-22 NOTE — PROGRESS NOTES
Chief Complaint  Hypertension and Diabetes    Subjective    Lupe Morillo is a 72 y.o. female.     Lupe Morillo presents to [facility] for follow-up of hypertension and diabetes.      History of Present Illness    The following portions of the patient's history were reviewed and updated as appropriate: allergies, current medications, past family history, past medical history, past social history, past surgical history and problem list.     Hypertension  The patient's blood pressure looks good today, 03/22/2023.  Diabetes  The patient has not been consistent in checking her blood glucose levels. She notes that she needs to start checking it.  Weight loss  The patient has lost 31 pounds. She is taking Rybelsus and been watching what she eats. She is walking, exercising, and doing physical therapy.    Review of Systems   All other systems reviewed and are negative.      Objective   Vital Signs:   /60 (BP Location: Right arm, Patient Position: Sitting, Cuff Size: Adult)   Pulse 98   Temp 97.8 °F (36.6 °C) (Temporal)   Resp 20   Wt 76.7 kg (169 lb)   BMI 25.70 kg/m²     Body mass index is 25.7 kg/m².  BMI is >= 25 and <30. (Overweight) The following options were offered after discussion;: weight loss educational material (shared in after visit summary) and exercise counseling/recommendations     Physical Exam  Constitutional:       Comments: Patient is alert x3, in no distress.   HENT:      Head: Normocephalic and atraumatic.   Eyes:      Extraocular Movements: Extraocular movements intact.      Pupils: Pupils are equal, round, and reactive to light.   Cardiovascular:      Heart sounds: S1 normal and S2 normal. No murmur heard.    No friction rub. No gallop.   Pulmonary:      Effort: Pulmonary effort is normal.      Breath sounds: No wheezing or rhonchi.               Assessment and Plan  Diagnoses and all orders for this visit:    1.  Hypertension  - I am pleased to report that the patient's blood pressure is doing very well on current medication.  - No other active issues or concerns at this time.    2. Diabetes  - The patient has had improvement with her hemoglobin A1c, down in the 6 percent range.  - The patient also continues the best that she can with a compliant American Diabetes Association diet.    3. Chronic knee pain  - The patient continues to go to rehabilitation, and this has helped with increasing her range of motion and decreasing her overall pain.    4. Follow Up  - I will see her back as scheduled next follow-up visit with the emphasis of continuing on her current management, active lifestyle, and weight management, which she has lost weight intentionally 30 pounds, so this has been very good for her in regard to her blood pressure and diabetes, so I strongly encourage that.    Diagnoses and all orders for this visit:    1. Essential hypertension (Primary)    2. Type 2 diabetes mellitus without complication, with long-term current use of insulin (HCC)    3. Chronic pain of right knee            Follow Up   No follow-ups on file.  Patient was given instructions and counseling regarding her condition or for health maintenance advice. Please see specific information pulled into the AVS if appropriate.       Transcribed from ambient dictation for Michael Blackwell MD by Eugene Cotto.  03/22/23   13:44 EDT    Patient or patient representative verbalized consent to the visit recording.  I have personally performed the services described in this document as transcribed by the above individual, and it is both accurate and complete.

## 2023-04-21 RX ORDER — DAPAGLIFLOZIN 5 MG/1
TABLET, FILM COATED ORAL
Qty: 90 TABLET | Refills: 3 | Status: SHIPPED | OUTPATIENT
Start: 2023-04-21

## 2023-04-29 DIAGNOSIS — E78.01 FAMILIAL HYPERCHOLESTEROLEMIA: ICD-10-CM

## 2023-05-01 DIAGNOSIS — G89.29 CHRONIC PAIN OF RIGHT KNEE: ICD-10-CM

## 2023-05-01 DIAGNOSIS — M25.561 CHRONIC PAIN OF RIGHT KNEE: ICD-10-CM

## 2023-05-01 DIAGNOSIS — E11.9 TYPE 2 DIABETES MELLITUS WITHOUT COMPLICATION, WITH LONG-TERM CURRENT USE OF INSULIN: Primary | ICD-10-CM

## 2023-05-01 DIAGNOSIS — Z79.4 TYPE 2 DIABETES MELLITUS WITHOUT COMPLICATION, WITH LONG-TERM CURRENT USE OF INSULIN: Primary | ICD-10-CM

## 2023-05-01 RX ORDER — PEN NEEDLE, DIABETIC 32GX 5/32"
NEEDLE, DISPOSABLE MISCELLANEOUS
Qty: 100 EACH | Refills: 5 | Status: SHIPPED | OUTPATIENT
Start: 2023-05-01

## 2023-05-01 RX ORDER — EZETIMIBE 10 MG/1
TABLET ORAL
Qty: 90 TABLET | Refills: 2 | Status: SHIPPED | OUTPATIENT
Start: 2023-05-01

## 2023-05-01 RX ORDER — DAPAGLIFLOZIN 5 MG/1
5 TABLET, FILM COATED ORAL DAILY
Qty: 90 TABLET | Refills: 3 | Status: SHIPPED | OUTPATIENT
Start: 2023-05-01

## 2023-05-01 RX ORDER — CELECOXIB 200 MG/1
200 CAPSULE ORAL 2 TIMES DAILY
Qty: 60 CAPSULE | Refills: 4 | Status: SHIPPED | OUTPATIENT
Start: 2023-05-01

## 2023-09-25 ENCOUNTER — OFFICE VISIT (OUTPATIENT)
Dept: INTERNAL MEDICINE | Facility: CLINIC | Age: 73
End: 2023-09-25
Payer: COMMERCIAL

## 2023-09-25 VITALS
TEMPERATURE: 97.3 F | BODY MASS INDEX: 27.9 KG/M2 | WEIGHT: 175.5 LBS | RESPIRATION RATE: 18 BRPM | HEART RATE: 84 BPM | DIASTOLIC BLOOD PRESSURE: 72 MMHG | SYSTOLIC BLOOD PRESSURE: 128 MMHG

## 2023-09-25 DIAGNOSIS — M25.561 CHRONIC PAIN OF RIGHT KNEE: ICD-10-CM

## 2023-09-25 DIAGNOSIS — I10 ESSENTIAL HYPERTENSION: ICD-10-CM

## 2023-09-25 DIAGNOSIS — Z79.4 TYPE 2 DIABETES MELLITUS WITHOUT COMPLICATION, WITH LONG-TERM CURRENT USE OF INSULIN: Primary | ICD-10-CM

## 2023-09-25 DIAGNOSIS — G89.29 CHRONIC PAIN OF RIGHT KNEE: ICD-10-CM

## 2023-09-25 DIAGNOSIS — E11.9 TYPE 2 DIABETES MELLITUS WITHOUT COMPLICATION, WITH LONG-TERM CURRENT USE OF INSULIN: Primary | ICD-10-CM

## 2023-09-25 LAB
EXPIRATION DATE: NORMAL
HBA1C MFR BLD: 6.7 %
Lab: NORMAL

## 2023-09-25 RX ORDER — ORAL SEMAGLUTIDE 3 MG/1
3 TABLET ORAL DAILY
Qty: 90 TABLET | Refills: 2 | Status: SHIPPED | OUTPATIENT
Start: 2023-09-25

## 2023-09-25 RX ORDER — CELECOXIB 200 MG/1
CAPSULE ORAL
Qty: 60 CAPSULE | Refills: 4 | Status: SHIPPED | OUTPATIENT
Start: 2023-09-25

## 2023-09-25 NOTE — PROGRESS NOTES
Chief Complaint  Hypertension (Follow up)    Subjective    Lupe Morillo is a 73 y.o. female.     Lupe Morillo presents to [facility] for hypertension follow-up and any other medical issues or concerns here today plus diabetes mellitus.    1. Diabetes. - The patient's hemoglobin A1c is 6.7 percent. She has lost some weight since her last visit.        The following portions of the patient's history were reviewed and updated as appropriate: allergies, current medications, past family history, past medical history, past social history, past surgical history, and problem list.        Review of Systems    Objective   Vital Signs:   /72 (BP Location: Right arm, Patient Position: Sitting, Cuff Size: Adult)   Pulse 84   Temp 97.3 °F (36.3 °C) (Temporal)   Resp 18   Wt 79.6 kg (175 lb 8 oz)   BMI 27.90 kg/m²     Body mass index is 27.9 kg/m².        Physical Exam     General: Patient is alert x3, non-distressed.  HEENT: head is normocephalic, atraumatic. Pupils equal to light and accommodation. Extraocular muscles intact. Moist membranes. Neck is supple. No cervical lymphadenopathy. No goiter.  Chest is clear to auscultation. No rhonchi or wheeze.  Cardiac exam: S1, S2. No murmurs, rubs, or gallop.     Assessment and Plan  Diagnoses and all orders for this visit:    1. Type 2 diabetes mellitus.  - Patient's hemoglobin A1c is 6.9 percent, so we will continue on current medical management.    Patient will follow up in 6 months.        Follow Up   No follow-ups on file.  Patient was given instructions and counseling regarding her condition or for health maintenance advice. Please see specific information pulled into the AVS if appropriate.      Transcribed from ambient dictation for Michael Blackwell MD by Dmitri Zelaya.  09/25/23   16:38 EDT    Patient or patient representative verbalized consent to the visit recording.  I have personally performed the services  described in this document as transcribed by the above individual, and it is both accurate and complete.

## 2023-09-26 LAB
ALBUMIN/CREATININE RATIO, URINE: <30
EXPIRATION DATE: NORMAL
Lab: NORMAL
POC CREATININE URINE: 50
POC MICROALBUMIN URINE: 10

## 2023-10-16 ENCOUNTER — TRANSCRIBE ORDERS (OUTPATIENT)
Dept: ADMINISTRATIVE | Facility: HOSPITAL | Age: 73
End: 2023-10-16
Payer: COMMERCIAL

## 2023-10-16 DIAGNOSIS — Z12.31 VISIT FOR SCREENING MAMMOGRAM: Primary | ICD-10-CM

## 2023-11-03 DIAGNOSIS — Z79.4 TYPE 2 DIABETES MELLITUS WITHOUT COMPLICATION, WITH LONG-TERM CURRENT USE OF INSULIN: ICD-10-CM

## 2023-11-03 DIAGNOSIS — E11.9 TYPE 2 DIABETES MELLITUS WITHOUT COMPLICATION, WITH LONG-TERM CURRENT USE OF INSULIN: ICD-10-CM

## 2023-11-03 RX ORDER — ORAL SEMAGLUTIDE 3 MG/1
3 TABLET ORAL DAILY
Qty: 90 TABLET | Refills: 2 | Status: SHIPPED | OUTPATIENT
Start: 2023-11-03

## 2023-11-03 RX ORDER — CYCLOBENZAPRINE HCL 5 MG
5 TABLET ORAL 3 TIMES DAILY PRN
Qty: 50 TABLET | Refills: 3 | Status: SHIPPED | OUTPATIENT
Start: 2023-11-03

## 2023-11-30 DIAGNOSIS — I10 ESSENTIAL HYPERTENSION: ICD-10-CM

## 2023-11-30 RX ORDER — LOSARTAN POTASSIUM 100 MG/1
TABLET ORAL
Qty: 90 TABLET | Refills: 1 | Status: SHIPPED | OUTPATIENT
Start: 2023-11-30

## 2023-12-01 ENCOUNTER — HOSPITAL ENCOUNTER (OUTPATIENT)
Dept: MAMMOGRAPHY | Facility: HOSPITAL | Age: 73
Discharge: HOME | End: 2023-12-01
Admitting: PHYSICIAN ASSISTANT
Payer: COMMERCIAL

## 2023-12-01 DIAGNOSIS — Z12.31 VISIT FOR SCREENING MAMMOGRAM: ICD-10-CM

## 2023-12-01 PROCEDURE — 77063 BREAST TOMOSYNTHESIS BI: CPT

## 2023-12-01 PROCEDURE — 77067 SCR MAMMO BI INCL CAD: CPT

## 2023-12-06 DIAGNOSIS — E11.9 TYPE 2 DIABETES MELLITUS WITHOUT COMPLICATION, WITH LONG-TERM CURRENT USE OF INSULIN: ICD-10-CM

## 2023-12-06 DIAGNOSIS — Z79.4 TYPE 2 DIABETES MELLITUS WITHOUT COMPLICATION, WITH LONG-TERM CURRENT USE OF INSULIN: ICD-10-CM

## 2023-12-08 RX ORDER — INSULIN DETEMIR 100 [IU]/ML
INJECTION, SOLUTION SUBCUTANEOUS
Qty: 15 ML | Refills: 6 | Status: SHIPPED | OUTPATIENT
Start: 2023-12-08

## 2024-02-02 ENCOUNTER — TRANSCRIBE ORDERS (OUTPATIENT)
Dept: ADMINISTRATIVE | Facility: HOSPITAL | Age: 74
End: 2024-02-02
Payer: COMMERCIAL

## 2024-02-02 DIAGNOSIS — Z78.0 ASYMPTOMATIC MENOPAUSAL STATE: Primary | ICD-10-CM

## 2024-03-03 DIAGNOSIS — G89.29 CHRONIC PAIN OF RIGHT KNEE: ICD-10-CM

## 2024-03-03 DIAGNOSIS — M25.561 CHRONIC PAIN OF RIGHT KNEE: ICD-10-CM

## 2024-03-04 RX ORDER — CELECOXIB 200 MG/1
CAPSULE ORAL
Qty: 60 CAPSULE | Refills: 4 | Status: SHIPPED | OUTPATIENT
Start: 2024-03-04

## 2024-03-12 ENCOUNTER — HOSPITAL ENCOUNTER (OUTPATIENT)
Dept: BONE DENSITY | Facility: HOSPITAL | Age: 74
Discharge: HOME | End: 2024-03-12
Admitting: PHYSICIAN ASSISTANT
Payer: COMMERCIAL

## 2024-03-12 DIAGNOSIS — Z78.0 ASYMPTOMATIC MENOPAUSAL STATE: ICD-10-CM

## 2024-03-12 PROCEDURE — 77080 DXA BONE DENSITY AXIAL: CPT

## 2024-03-22 ENCOUNTER — OFFICE VISIT (OUTPATIENT)
Dept: INTERNAL MEDICINE | Facility: CLINIC | Age: 74
End: 2024-03-22
Payer: COMMERCIAL

## 2024-03-22 VITALS
DIASTOLIC BLOOD PRESSURE: 72 MMHG | WEIGHT: 171 LBS | SYSTOLIC BLOOD PRESSURE: 126 MMHG | TEMPERATURE: 97.5 F | BODY MASS INDEX: 27.19 KG/M2 | HEART RATE: 104 BPM | RESPIRATION RATE: 18 BRPM

## 2024-03-22 DIAGNOSIS — J30.2 SEASONAL ALLERGIES: ICD-10-CM

## 2024-03-22 DIAGNOSIS — E11.9 TYPE 2 DIABETES MELLITUS WITHOUT COMPLICATION, WITH LONG-TERM CURRENT USE OF INSULIN: Primary | ICD-10-CM

## 2024-03-22 DIAGNOSIS — R39.11 URINARY HESITANCY: ICD-10-CM

## 2024-03-22 DIAGNOSIS — Z79.4 TYPE 2 DIABETES MELLITUS WITHOUT COMPLICATION, WITH LONG-TERM CURRENT USE OF INSULIN: Primary | ICD-10-CM

## 2024-03-22 DIAGNOSIS — J30.89 PERENNIAL ALLERGIC RHINITIS: ICD-10-CM

## 2024-03-22 LAB
EXPIRATION DATE: ABNORMAL
HBA1C MFR BLD: 6.5 % (ref 4.5–5.7)
Lab: ABNORMAL

## 2024-03-22 RX ORDER — MONTELUKAST SODIUM 10 MG/1
10 TABLET ORAL NIGHTLY
Qty: 30 TABLET | Refills: 3 | Status: SHIPPED | OUTPATIENT
Start: 2024-03-22

## 2024-03-22 NOTE — PROGRESS NOTES
Chief Complaint  Diabetes (Follow up)    Subjective    Lupe Morillo is a 73 y.o. female.     Lupe Morillo presents to [facility] for follow up on diabetes mellitus.         History of Present Illness    1. Decreased urine flow  For the past 6 months to 1 year her urine flow has decreased. When she sits to urinate, it takes time to trickle down and then there is forceful flow. Currently she is taking oxybutynin for bladder control. She wants to be referred to a urologist.    2. Allergies  She wants a prescription for Singulair which she takes at night and levocetirizine, she takes it during the day. at night.    3. Diabetes mellitus  She is curious about her hemoglobin A1c. She is losing weight. Her insurance is not paying for the insulin she was prescribed.    The following portions of the patient's history were reviewed and updated as appropriate: allergies, current medications, past family history, past medical history, past social history, past surgical history, and problem list.    A review of systems was performed, and pertinent findings are noted in the HPI.    Review of Systems    Objective   Vital Signs:   /72 (BP Location: Right arm, Patient Position: Sitting, Cuff Size: Adult)   Pulse 104   Temp 97.5 °F (36.4 °C) (Temporal)   Resp 18   Wt 77.6 kg (171 lb)   BMI 27.19 kg/m²     Body mass index is 27.19 kg/m².        Physical Exam  Constitutional:       General: She is not in acute distress.  HENT:      Head: Normocephalic and atraumatic.      Mouth/Throat:      Mouth: Mucous membranes are moist.   Eyes:      Extraocular Movements: Extraocular movements intact.      Pupils: Pupils are equal, round, and reactive to light.   Neck:      Thyroid: No thyromegaly.   Cardiovascular:      Pulses: Normal pulses.      Heart sounds: S1 normal and S2 normal. No murmur heard.     No friction rub. No gallop.      Comments: Peripheral vascular +2,  pulses, warm, extremities.  Pulmonary:      Breath sounds: Normal breath sounds. No wheezing or rhonchi.   Abdominal:      General: Bowel sounds are normal.      Palpations: Abdomen is soft. There is no mass.      Tenderness: There is no abdominal tenderness.   Musculoskeletal:      Cervical back: Neck supple.   Lymphadenopathy:      Cervical: No cervical adenopathy.   Neurological:      Mental Status: She is alert and oriented to person, place, and time.               Assessment and Plan  Diagnoses and all orders for this visit:    Spent approximately 30 minutes face-to-face with patient    1. Diabetes mellitus  Pleased to report that the patient is doing well on her current diabetes medication and so no other active issues or concerns at this time. We will check her hemoglobin A1c here today    2. Urinary hesitancy   Regarding urinary hesitancy, I am going to get her referred to urology for further evaluation and recommendations for treatment of that.    Otherwise, everything else looks good today. I will see the patient back in 3 to 4 months.     Diagnoses and all orders for this visit:    1. Type 2 diabetes mellitus without complication, with long-term current use of insulin (Primary)  -     POC Glycosylated Hemoglobin (Hb A1C)  -     montelukast (SINGULAIR) 10 MG tablet; Take 1 tablet by mouth Every Night.  Dispense: 30 tablet; Refill: 3    2. Urinary hesitancy  -     Ambulatory Referral to Urology    3. Perennial allergic rhinitis    4. Seasonal allergies          Follow Up   No follow-ups on file.  Patient was given instructions and counseling regarding her condition or for health maintenance advice. Please see specific information pulled into the AVS if appropriate.     Transcribed from ambient dictation for Michael Blackwell MD by Madeline Barakat.  03/22/24   16:23 EDT    Patient or patient representative verbalized consent to the visit recording.  I have personally performed the services described in this  document as transcribed by the above individual, and it is both accurate and complete.

## 2024-03-23 DIAGNOSIS — I10 ESSENTIAL HYPERTENSION: ICD-10-CM

## 2024-03-25 ENCOUNTER — TELEPHONE (OUTPATIENT)
Dept: INTERNAL MEDICINE | Facility: CLINIC | Age: 74
End: 2024-03-25
Payer: COMMERCIAL

## 2024-03-25 RX ORDER — SPIRONOLACTONE 50 MG/1
TABLET, FILM COATED ORAL
Qty: 30 TABLET | Refills: 1 | Status: SHIPPED | OUTPATIENT
Start: 2024-03-25

## 2024-03-25 NOTE — TELEPHONE ENCOUNTER
LOV for chronic condition 03/22/24  NOV  06/26/24  
Patient has been notified of results and recommendations. Patient verb good understanding.   
Please tell patient that she will need to come in and have her labs updated it has been almost 2 years since blood work for liver functions done  
see HPI

## 2024-03-25 NOTE — TELEPHONE ENCOUNTER
Patient is requesting something be called in for a sinus infection. She has been taking mucinex but its not helping with her symptoms. Patient has cough and green nasal discharge. No fever. No bodyaches. No N/V/D. Patient would like it sent to [facility] in [place].

## 2024-03-27 RX ORDER — AMOXICILLIN AND CLAVULANATE POTASSIUM 500; 125 MG/1; MG/1
1 TABLET, FILM COATED ORAL 2 TIMES DAILY
Qty: 16 TABLET | Refills: 0 | Status: SHIPPED | OUTPATIENT
Start: 2024-03-27

## 2024-03-28 ENCOUNTER — LAB (OUTPATIENT)
Dept: INTERNAL MEDICINE | Facility: CLINIC | Age: 74
End: 2024-03-28
Payer: COMMERCIAL

## 2024-03-28 DIAGNOSIS — Z79.4 TYPE 2 DIABETES MELLITUS WITHOUT COMPLICATION, WITH LONG-TERM CURRENT USE OF INSULIN: Primary | ICD-10-CM

## 2024-03-28 DIAGNOSIS — E11.9 TYPE 2 DIABETES MELLITUS WITHOUT COMPLICATION, WITH LONG-TERM CURRENT USE OF INSULIN: Primary | ICD-10-CM

## 2024-03-28 DIAGNOSIS — Z79.4 TYPE 2 DIABETES MELLITUS WITHOUT COMPLICATION, WITH LONG-TERM CURRENT USE OF INSULIN: ICD-10-CM

## 2024-03-28 DIAGNOSIS — E11.9 TYPE 2 DIABETES MELLITUS WITHOUT COMPLICATION, WITH LONG-TERM CURRENT USE OF INSULIN: ICD-10-CM

## 2024-03-28 LAB
ALBUMIN SERPL-MCNC: 4.5 G/DL (ref 3.5–5.2)
ALBUMIN/CREATININE RATIO, URINE: NORMAL
ALBUMIN/GLOB SERPL: 1.6 G/DL
ALP SERPL-CCNC: 113 U/L (ref 39–117)
ALT SERPL W P-5'-P-CCNC: 21 U/L (ref 1–33)
ANION GAP SERPL CALCULATED.3IONS-SCNC: 13.6 MMOL/L (ref 5–15)
AST SERPL-CCNC: 14 U/L (ref 1–32)
BILIRUB SERPL-MCNC: 0.5 MG/DL (ref 0–1.2)
BUN SERPL-MCNC: 15 MG/DL (ref 8–23)
BUN/CREAT SERPL: 19.5 (ref 7–25)
CALCIUM SPEC-SCNC: 10 MG/DL (ref 8.6–10.5)
CHLORIDE SERPL-SCNC: 105 MMOL/L (ref 98–107)
CHOLEST SERPL-MCNC: 176 MG/DL (ref 0–200)
CO2 SERPL-SCNC: 21.4 MMOL/L (ref 22–29)
CREAT SERPL-MCNC: 0.77 MG/DL (ref 0.57–1)
DEPRECATED RDW RBC AUTO: 42.7 FL (ref 37–54)
EGFRCR SERPLBLD CKD-EPI 2021: 82.1 ML/MIN/1.73
ERYTHROCYTE [DISTWIDTH] IN BLOOD BY AUTOMATED COUNT: 13.1 % (ref 12.3–15.4)
EXPIRATION DATE: NORMAL
GLOBULIN UR ELPH-MCNC: 2.9 GM/DL
GLUCOSE SERPL-MCNC: 120 MG/DL (ref 65–99)
HCT VFR BLD AUTO: 41.5 % (ref 34–46.6)
HDLC SERPL-MCNC: 51 MG/DL (ref 40–60)
HGB BLD-MCNC: 13.9 G/DL (ref 12–15.9)
LDLC SERPL CALC-MCNC: 107 MG/DL (ref 0–100)
LDLC/HDLC SERPL: 2.05 {RATIO}
Lab: NORMAL
MCH RBC QN AUTO: 29.8 PG (ref 26.6–33)
MCHC RBC AUTO-ENTMCNC: 33.5 G/DL (ref 31.5–35.7)
MCV RBC AUTO: 88.9 FL (ref 79–97)
PLATELET # BLD AUTO: 310 10*3/MM3 (ref 140–450)
PMV BLD AUTO: 10.3 FL (ref 6–12)
POC CREATININE URINE: 50
POC MICROALBUMIN URINE: 10
POTASSIUM SERPL-SCNC: 4.6 MMOL/L (ref 3.5–5.2)
PROT SERPL-MCNC: 7.4 G/DL (ref 6–8.5)
RBC # BLD AUTO: 4.67 10*6/MM3 (ref 3.77–5.28)
SODIUM SERPL-SCNC: 140 MMOL/L (ref 136–145)
T4 FREE SERPL-MCNC: 1.33 NG/DL (ref 0.93–1.7)
TRIGL SERPL-MCNC: 101 MG/DL (ref 0–150)
TSH SERPL DL<=0.05 MIU/L-ACNC: 1.2 UIU/ML (ref 0.27–4.2)
VLDLC SERPL-MCNC: 18 MG/DL (ref 5–40)
WBC NRBC COR # BLD AUTO: 5.11 10*3/MM3 (ref 3.4–10.8)

## 2024-03-28 PROCEDURE — 84439 ASSAY OF FREE THYROXINE: CPT | Performed by: INTERNAL MEDICINE

## 2024-03-28 PROCEDURE — 80061 LIPID PANEL: CPT | Performed by: INTERNAL MEDICINE

## 2024-03-28 PROCEDURE — 80050 GENERAL HEALTH PANEL: CPT | Performed by: INTERNAL MEDICINE

## 2024-04-09 DIAGNOSIS — E78.01 FAMILIAL HYPERCHOLESTEROLEMIA: ICD-10-CM

## 2024-04-09 RX ORDER — EZETIMIBE 10 MG/1
TABLET ORAL
Qty: 90 TABLET | Refills: 2 | Status: SHIPPED | OUTPATIENT
Start: 2024-04-09

## 2024-05-06 ENCOUNTER — OFFICE VISIT (OUTPATIENT)
Dept: UROLOGY | Facility: CLINIC | Age: 74
End: 2024-05-06
Payer: COMMERCIAL

## 2024-05-06 VITALS — WEIGHT: 170 LBS | HEIGHT: 66 IN | BODY MASS INDEX: 27.32 KG/M2

## 2024-05-06 DIAGNOSIS — N32.81 OAB (OVERACTIVE BLADDER): ICD-10-CM

## 2024-05-06 DIAGNOSIS — R39.198 SLOW URINARY STREAM: ICD-10-CM

## 2024-05-06 DIAGNOSIS — N39.41 URGE INCONTINENCE: Primary | ICD-10-CM

## 2024-05-06 LAB
BILIRUB BLD-MCNC: NEGATIVE MG/DL
CLARITY, POC: CLEAR
COLOR UR: YELLOW
EXPIRATION DATE: ABNORMAL
GLUCOSE UR STRIP-MCNC: ABNORMAL MG/DL
KETONES UR QL: NEGATIVE
LEUKOCYTE EST, POC: NEGATIVE
Lab: ABNORMAL
NITRITE UR-MCNC: NEGATIVE MG/ML
PH UR: 6 [PH] (ref 5–8)
PROT UR STRIP-MCNC: NEGATIVE MG/DL
RBC # UR STRIP: NEGATIVE /UL
SP GR UR: 1.02 (ref 1–1.03)
UROBILINOGEN UR QL: NORMAL

## 2024-05-06 PROCEDURE — 99213 OFFICE O/P EST LOW 20 MIN: CPT | Performed by: NURSE PRACTITIONER

## 2024-05-06 PROCEDURE — 51798 US URINE CAPACITY MEASURE: CPT | Performed by: NURSE PRACTITIONER

## 2024-05-06 PROCEDURE — 81003 URINALYSIS AUTO W/O SCOPE: CPT | Performed by: NURSE PRACTITIONER

## 2024-05-06 RX ORDER — OXYBUTYNIN CHLORIDE 5 MG/1
TABLET, EXTENDED RELEASE ORAL
COMMUNITY
Start: 2024-05-02 | End: 2024-05-06

## 2024-05-07 ENCOUNTER — TELEPHONE (OUTPATIENT)
Dept: UROLOGY | Facility: CLINIC | Age: 74
End: 2024-05-07

## 2024-05-07 NOTE — TELEPHONE ENCOUNTER
Hub staff attempted to follow warm transfer process and was unsuccessful     Caller: Lupe Morillo    Relationship to patient: Self    Best call back number: [phone]    Patient is needing: RECEIVED A CALL FROM PT. SHE STATED THAT [facility] NEEDS MORE INFORMATION REGARDING MYRBETRIQ MEDICATION. SHE IS NOT SURE IF A PRIOR AUTHORIZATION IS NEEDED OR  WHAT MORE INFORMATION IS NEEDED IN ORDER TO FILL THE MEDICATION.

## 2024-05-10 ENCOUNTER — PATIENT ROUNDING (BHMG ONLY) (OUTPATIENT)
Dept: UROLOGY | Facility: CLINIC | Age: 74
End: 2024-05-10
Payer: COMMERCIAL

## 2024-05-22 DIAGNOSIS — I10 ESSENTIAL HYPERTENSION: ICD-10-CM

## 2024-05-22 RX ORDER — SPIRONOLACTONE 50 MG/1
50 TABLET, FILM COATED ORAL DAILY
Qty: 30 TABLET | Refills: 1 | Status: SHIPPED | OUTPATIENT
Start: 2024-05-22

## 2024-06-07 ENCOUNTER — TELEPHONE (OUTPATIENT)
Dept: INTERNAL MEDICINE | Facility: CLINIC | Age: 74
End: 2024-06-07
Payer: COMMERCIAL

## 2024-06-07 NOTE — TELEPHONE ENCOUNTER
Caller: Lupe Morillo    Relationship: Self    Best call back number: [phone]     Requested Prescriptions:   -TRESIBA (INSULIN)    Pharmacy where request should be sent: [facility] [id number] - [place] [id number] [facility] - [phone]  - [phone] FX     Last office visit with prescribing clinician: 3/22/2024   Last telemedicine visit with prescribing clinician: Visit date not found   Next office visit with prescribing clinician: 6/26/2024     Additional details provided by patient: PATIENT'S INSURANCE NOW REQUIRES HER TO SWITCH FROM LEVEMIR TO TRESIBA.    PATIENT IS OKAY ON CURRENT INSULIN SUPPLY.    PLEASE ADVISE IF THERE ARE ANY QUESTIONS/CONCERNS.    Does the patient have less than a 3 day supply:  [] Yes  [x] No    Would you like a call back once the refill request has been completed: [] Yes [x] No    If the office needs to give you a call back, can they leave a voicemail: [] Yes [x] No    Cassi Monsivais Rep   06/07/24 15:42 EDT

## 2024-06-12 DIAGNOSIS — E11.9 TYPE 2 DIABETES MELLITUS WITHOUT COMPLICATION, WITH LONG-TERM CURRENT USE OF INSULIN: Primary | ICD-10-CM

## 2024-06-12 DIAGNOSIS — Z79.4 TYPE 2 DIABETES MELLITUS WITHOUT COMPLICATION, WITH LONG-TERM CURRENT USE OF INSULIN: Primary | ICD-10-CM

## 2024-06-12 RX ORDER — INSULIN DEGLUDEC 200 U/ML
30 INJECTION, SOLUTION SUBCUTANEOUS NIGHTLY
Qty: 9 ML | Refills: 2 | Status: SHIPPED | OUTPATIENT
Start: 2024-06-12

## 2024-06-21 DIAGNOSIS — Z79.4 TYPE 2 DIABETES MELLITUS WITHOUT COMPLICATION, WITH LONG-TERM CURRENT USE OF INSULIN: ICD-10-CM

## 2024-06-21 DIAGNOSIS — I10 ESSENTIAL HYPERTENSION: ICD-10-CM

## 2024-06-21 DIAGNOSIS — E11.9 TYPE 2 DIABETES MELLITUS WITHOUT COMPLICATION, WITH LONG-TERM CURRENT USE OF INSULIN: ICD-10-CM

## 2024-06-21 RX ORDER — MONTELUKAST SODIUM 10 MG/1
10 TABLET ORAL NIGHTLY
Qty: 90 TABLET | Refills: 1 | Status: SHIPPED | OUTPATIENT
Start: 2024-06-21

## 2024-06-26 ENCOUNTER — OFFICE VISIT (OUTPATIENT)
Dept: INTERNAL MEDICINE | Facility: CLINIC | Age: 74
End: 2024-06-26
Payer: COMMERCIAL

## 2024-06-26 VITALS
TEMPERATURE: 97 F | SYSTOLIC BLOOD PRESSURE: 136 MMHG | DIASTOLIC BLOOD PRESSURE: 68 MMHG | HEART RATE: 92 BPM | BODY MASS INDEX: 27.98 KG/M2 | RESPIRATION RATE: 18 BRPM | WEIGHT: 174.13 LBS | HEIGHT: 66 IN

## 2024-06-26 DIAGNOSIS — R39.11 URINARY HESITANCY: ICD-10-CM

## 2024-06-26 DIAGNOSIS — Z00.00 WELL ADULT EXAM: Primary | ICD-10-CM

## 2024-06-26 DIAGNOSIS — E11.9 TYPE 2 DIABETES MELLITUS WITHOUT COMPLICATION, WITH LONG-TERM CURRENT USE OF INSULIN: ICD-10-CM

## 2024-06-26 DIAGNOSIS — I10 ESSENTIAL HYPERTENSION: ICD-10-CM

## 2024-06-26 DIAGNOSIS — Z79.4 TYPE 2 DIABETES MELLITUS WITHOUT COMPLICATION, WITH LONG-TERM CURRENT USE OF INSULIN: ICD-10-CM

## 2024-06-26 PROCEDURE — 99397 PER PM REEVAL EST PAT 65+ YR: CPT | Performed by: INTERNAL MEDICINE

## 2024-06-26 NOTE — PROGRESS NOTES
"Complete Adult Physical  History of Present Illness  The patient presents for evaluation of multiple medical concerns.    The patient sought medical attention for low back pain, where she was informed that her condition was satisfactory from a surgical procedure performed approximately 3 to 4 years ago. She acknowledges the presence of arthritis and is scheduled to consult with a pain management specialist, Dr. Zuleta, for a cortisone injection. This week, she has been in severe pain and has missed work. Despite taking tramadol, she continues to experience headaches. She has previously taken tramadol without any adverse effects.     Patient is still dealing with issues with the urinary incontinence-    Diet: regular       Exercise: minimal to none   Physical Exam  Patient is alert x3, non-distressed.  Head is normocephalic and atraumatic. Pupils are equal to light and accommodation. Extraocular muscles are intact. Moist membranes.  Neck is supple. No cervical lymphadenopathy. No goiter.  Lungs are clear to auscultation. No rhonchi or wheeze.  Heart examination reveals S1, S2. No murmurs, rubs, or gallops.  Gastrointestinal examination reveals positive bowel sounds, soft, no mass, no tenderness.  Peripheral vascular examination reveals +2 pulses, warm extremity, good perfusion. Musculoskeletal examination reveals plus 5 out of 5 both upper and lower distribution.  Cranial nerves intact, +2 DTRs.       Vital Signs:   /68 (BP Location: Right arm, Patient Position: Sitting, Cuff Size: Adult)   Pulse 92   Temp 97 °F (36.1 °C) (Temporal)   Resp 18   Ht 168.4 cm (66.3\")   Wt 79 kg (174 lb 2 oz)   BMI 27.85 kg/m²       Body mass index is 27.85 kg/m².      Social History No smoking      Preventative Screenings  Colonoscopy-done last year     Mammogram- done last year     Pap smear due in January   Results  Laboratory Studies  Hemoglobin A1c was 6.5 in March.         Immunizations:up to date     Assessment & " Plan  1. Hypertension.  The patient's blood pressure is well managed. The current blood pressure medication will be maintained.    2. Diabetes.  Her diabetes has been chronic and controlled, with her last hemoglobin A1c level being 6.5 in 03/2023, which has consistently been below 7 over the past year. A follow-up appointment is scheduled for 3 months from now to monitor her A1c levels.    3. Urinary incontinence.  She has been under the care of a urologist and is currently exploring the possibility of surgical intervention due to her lack of improvement or lack of therapeutic goals. However, Myrbetriq has been effective in managing her urinary incontinence and overall quality of life, and she prefers to continue this treatment. However, her insurance has denied approval for this treatment, thus prior authorization will be considered. It is strongly recommended that she have this medication authorized. In the interim, she will inform me of the other medications that she has tried and failed. Two surgical procedures were discussed with her, and she was unable to recall the names, but she was provided with pamphlets at home. She can email me back the procedures that were discussed.    Follow-up  A follow-up appointment is scheduled for 3 months from now.     Diagnoses and all orders for this visit:    1. Well adult exam (Primary)    2. Essential hypertension    3. Type 2 diabetes mellitus without complication, with long-term current use of insulin    4. Urinary hesitancy          Patient or patient representative verbalized consent for the use of Ambient Listening during the visit with  Michael Blackwell MD for chart documentation. 6/26/2024  10:56 EDT

## 2024-07-03 ENCOUNTER — TELEPHONE (OUTPATIENT)
Dept: INTERNAL MEDICINE | Facility: CLINIC | Age: 74
End: 2024-07-03
Payer: COMMERCIAL

## 2024-07-03 ENCOUNTER — TELEPHONE (OUTPATIENT)
Dept: UROLOGY | Facility: CLINIC | Age: 74
End: 2024-07-03

## 2024-07-03 NOTE — TELEPHONE ENCOUNTER
Caller: Lupe Morillo    Relationship to patient: Self    Best call back number: [phone]    Chief complaint: WAS IN A MEETING    Type of visit: 8 WK F/U    Requested date: 07.16.2024     If rescheduling, when is the original appointment: 07.03.2024

## 2024-07-20 DIAGNOSIS — E11.9 TYPE 2 DIABETES MELLITUS WITHOUT COMPLICATION, WITH LONG-TERM CURRENT USE OF INSULIN: ICD-10-CM

## 2024-07-20 DIAGNOSIS — Z79.4 TYPE 2 DIABETES MELLITUS WITHOUT COMPLICATION, WITH LONG-TERM CURRENT USE OF INSULIN: ICD-10-CM

## 2024-07-22 RX ORDER — DAPAGLIFLOZIN 5 MG/1
5 TABLET, FILM COATED ORAL DAILY
Qty: 90 TABLET | Refills: 3 | Status: SHIPPED | OUTPATIENT
Start: 2024-07-22

## 2024-07-24 DIAGNOSIS — I10 ESSENTIAL HYPERTENSION: ICD-10-CM

## 2024-07-24 RX ORDER — SPIRONOLACTONE 50 MG/1
50 TABLET, FILM COATED ORAL DAILY
Qty: 90 TABLET | Refills: 2 | Status: SHIPPED | OUTPATIENT
Start: 2024-07-24

## 2024-08-01 RX ORDER — CYCLOBENZAPRINE HCL 5 MG
5 TABLET ORAL 3 TIMES DAILY PRN
Qty: 50 TABLET | Refills: 3 | Status: SHIPPED | OUTPATIENT
Start: 2024-08-01

## 2024-08-05 DIAGNOSIS — G89.29 CHRONIC PAIN OF RIGHT KNEE: ICD-10-CM

## 2024-08-05 DIAGNOSIS — M25.561 CHRONIC PAIN OF RIGHT KNEE: ICD-10-CM

## 2024-08-05 RX ORDER — CELECOXIB 200 MG/1
CAPSULE ORAL
Qty: 60 CAPSULE | Refills: 4 | Status: SHIPPED | OUTPATIENT
Start: 2024-08-05 | End: 2024-08-05 | Stop reason: SDUPTHER

## 2024-08-05 RX ORDER — CELECOXIB 200 MG/1
200 CAPSULE ORAL 2 TIMES DAILY
Qty: 60 CAPSULE | Refills: 4 | Status: SHIPPED | OUTPATIENT
Start: 2024-08-05

## 2024-08-06 ENCOUNTER — OFFICE VISIT (OUTPATIENT)
Dept: UROLOGY | Facility: CLINIC | Age: 74
End: 2024-08-06
Payer: COMMERCIAL

## 2024-08-06 VITALS
BODY MASS INDEX: 27.87 KG/M2 | HEIGHT: 66 IN | WEIGHT: 173.4 LBS | SYSTOLIC BLOOD PRESSURE: 120 MMHG | HEART RATE: 95 BPM | DIASTOLIC BLOOD PRESSURE: 71 MMHG | OXYGEN SATURATION: 98 %

## 2024-08-06 DIAGNOSIS — N32.81 OAB (OVERACTIVE BLADDER): ICD-10-CM

## 2024-08-06 DIAGNOSIS — N39.41 URGE INCONTINENCE: Primary | ICD-10-CM

## 2024-08-06 RX ORDER — MIRABEGRON 50 MG/1
50 TABLET, EXTENDED RELEASE ORAL DAILY
Qty: 60 TABLET | Refills: 1 | Status: SHIPPED | OUTPATIENT
Start: 2024-08-06

## 2024-08-06 RX ORDER — MIRABEGRON 50 MG/1
50 TABLET, EXTENDED RELEASE ORAL DAILY
Qty: 28 TABLET | Refills: 0 | COMMUNITY
Start: 2024-08-06

## 2024-08-06 RX ORDER — METHOCARBAMOL 750 MG/1
750 TABLET, FILM COATED ORAL DAILY
COMMUNITY
Start: 2024-07-30

## 2024-08-06 NOTE — PROGRESS NOTES
Follow Up Office Visit      Patient Name: Lupe Morillo  : 1950   MRN: [id number]     Chief Complaint:    Chief Complaint   Patient presents with    Urge incontinence       Referring Provider: No ref. provider found    History of Present Illness: Lupe Morillo is a 73 y.o. female who presents today for follow up of overactive bladder. She has previously failed Vesicare and Oxybutynin. She has been taking Myrbetriq 50mg samples once daily. She reports improvement in urinary frequency with Myrbetriq. She reports nocturia x 1-2. She reports improvement in urge incontinence with Myrbetriq.   Subjective      Review of System: Review of Systems   Genitourinary:  Positive for frequency and urgency. Negative for dysuria.   All other systems reviewed and are negative.     I have reviewed the ROS documented by my clinical staff, I have updated appropriately and I agree. DARI Delaney    I have reviewed and the following portions of the patient's history were updated as appropriate: past family history, past medical history, past social history, past surgical history and problem list.    Medications:     Current Outpatient Medications:     Ascorbic Acid (VITAMIN C) 100 MG chewable tablet, Chew 1 tablet Daily., Disp: , Rfl:     BD Pen Needle Joanne 2nd Gen 32G X 4 MM misc, USE DAILY TO INJECT INSULIN, Disp: 100 each, Rfl: 5    Boswellia-Glucosamine-Vit D (OSTEO BI-FLEX ONE PER DAY PO), Take  by mouth., Disp: , Rfl:     celecoxib (CeleBREX) 200 MG capsule, Take 1 capsule by mouth 2 (Two) Times a Day., Disp: 60 capsule, Rfl: 4    cholecalciferol (Cholecalciferol) 25 MCG (1000 UT) tablet, Take 1 tablet by mouth Daily., Disp: , Rfl:     cyclobenzaprine (FLEXERIL) 5 MG tablet, TAKE ONE TABLET BY MOUTH THREE TIMES A DAY AS NEEDED FOR MUSCLE SPASMS, Disp: 50 tablet, Rfl: 3    dapagliflozin (FARXIGA) 5 MG tablet tablet, TAKE ONE TABLET BY MOUTH DAILY, Disp: 90 tablet, Rfl: 3    docusate sodium (COLACE) 100 MG  capsule, Take 1 capsule by mouth 2 (Two) Times a Day., Disp: , Rfl:     ezetimibe (ZETIA) 10 MG tablet, TAKE ONE TABLET BY MOUTH DAILY, Disp: 90 tablet, Rfl: 2    glucose blood (FREESTYLE TEST STRIPS) test strip, Check sugars 3 times a day. Dx code E11.9, Disp: 100 each, Rfl: 5    Insulin Degludec (Tresiba FlexTouch) 200 UNIT/ML solution pen-injector pen injection, Inject 30 Units under the skin into the appropriate area as directed Every Night., Disp: 9 mL, Rfl: 2    ipratropium-albuterol (DUO-NEB) 0.5-2.5 mg/3 ml nebulizer, , Disp: , Rfl:     Lancets (freestyle) lancets, Use as instructed, Disp: 100 each, Rfl: 5    levocetirizine (XYZAL) 5 MG tablet, Take 1 tablet by mouth Daily., Disp: 90 tablet, Rfl: 1    losartan (COZAAR) 100 MG tablet, TAKE ONE TABLET BY MOUTH DAILY, Disp: 90 tablet, Rfl: 1    MAGNESIUM PO, Take 250 mg by mouth Daily., Disp: , Rfl:     metFORMIN (GLUCOPHAGE) 1000 MG tablet, TAKE ONE TABLET BY MOUTH DAILY WITH FOOD, Disp: 90 tablet, Rfl: 2    methocarbamol (ROBAXIN) 750 MG tablet, Take 1 tablet by mouth Daily., Disp: , Rfl:     Methylcobalamin (B12-ACTIVE) 1 MG chewable tablet, Chew 1 tablet Daily., Disp: , Rfl:     montelukast (SINGULAIR) 10 MG tablet, TAKE ONE TABLET BY MOUTH ONCE NIGHTLY, Disp: 90 tablet, Rfl: 1    Multiple Vitamins-Minerals (MULTIVITAMIN ADULTS PO), Take 1 tablet by mouth Daily., Disp: , Rfl:     NIFEdipine CC (ADALAT CC) 60 MG 24 hr tablet, TAKE 1 TABLET BY MOUTH DAILY, Disp: 90 tablet, Rfl: 2    ondansetron (Zofran) 4 MG tablet, Take 1 tablet by mouth Every 8 (Eight) Hours As Needed for Nausea or Vomiting., Disp: , Rfl:     Rybelsus 3 MG tablet, TAKE ONE TABLET BY MOUTH DAILY, Disp: 30 tablet, Rfl: 3    Semaglutide (Rybelsus) 3 MG tablet, Take 1 tablet by mouth Daily., Disp: 90 tablet, Rfl: 2    sodium chloride 0.65 % nasal spray, 1 spray into the nostril(s) as directed by provider Daily As Needed. 1 spray in each nostril daily as needed, Disp: , Rfl:     spironolactone  (ALDACTONE) 50 MG tablet, TAKE 1 TABLET BY MOUTH DAILY, Disp: 90 tablet, Rfl: 2    Symbicort 160-4.5 MCG/ACT inhaler, , Disp: , Rfl:     traMADol (ULTRAM) 50 MG tablet, Take 1 tablet by mouth Every 6 (Six) Hours As Needed for Moderate Pain., Disp: , Rfl:     vitamin B-6 (PYRIDOXINE) 100 MG tablet, Take 1 tablet by mouth Daily., Disp: , Rfl:     fluticasone (FLONASE) 50 MCG/ACT nasal spray, 2 sprays into the nostril(s) as directed by provider Daily for 14 days., Disp: 9.9 mL, Rfl: 0    Mirabegron ER (Myrbetriq) 50 MG tablet sustained-release 24 hour 24 hr tablet, Take 50 mg by mouth Daily., Disp: 28 tablet, Rfl: 0    Mirabegron ER (Myrbetriq) 50 MG tablet sustained-release 24 hour 24 hr tablet, Take 50 mg by mouth Daily., Disp: 60 tablet, Rfl: 1    Allergies:   Allergies   Allergen Reactions    Oxycodone Hcl Headache    Statins Other (See Comments)     Leg cramps       Bladder & Bowel Symptom Questionnaire    How often do you usually urinate during the day ?   1 - About every 3-4 hours   2.   How many timed do you urinate at night?   1 - 2 times at night   3.   What is the reason that you usually urinate?   2 - Moderate urge (can delay 10-60 min)   4.   Once you get the urge to go, how long can you     comfortably delay?   2 - 10-30 min   5.   How often do you get a sudden urge that makes you rush to the bathroom?   2 - A few times a month   6.   How often does a sudden urge to urinate result in you leaking urine or wetting pads?   1 - Rarely   7.  In your opinion, how good is your bladder control?   3 - Poor   8.  Do you have accidental bowel leakage?   no   9.  Do you have difficulty fully emptying your bladder?   no   10.  Do you experience accidental leakage when performing some physical activity such as coughing, sneezing, laughing or exercise?   no   11. Have you tried medications to help improve your symptoms?   yes   12. Would you be interested in learning about a long-lasting option that may help you with  "your symptoms?   no                                                                             Total Score   9     0-7 (Mild) 8-16 (Moderate) 17-28 (Severe)       Objective     Physical Exam:   Vital Signs:   Vitals:    08/06/24 1416   BP: 120/71   BP Location: Left arm   Patient Position: Sitting   Cuff Size: Adult   Pulse: 95   SpO2: 98%   Weight: 78.7 kg (173 lb 6.4 oz)   Height: 168.4 cm (66.3\")     Body mass index is 27.73 kg/m².     Physical Exam  Vitals and nursing note reviewed.   Constitutional:       Appearance: Normal appearance.   HENT:      Head: Normocephalic and atraumatic.      Nose: Nose normal.      Mouth/Throat:      Mouth: Mucous membranes are moist.   Eyes:      Pupils: Pupils are equal, round, and reactive to light.   Pulmonary:      Effort: Pulmonary effort is normal.   Abdominal:      General: Abdomen is flat.      Palpations: Abdomen is soft.   Musculoskeletal:         General: Normal range of motion.      Cervical back: Normal range of motion.   Skin:     General: Skin is warm and dry.      Capillary Refill: Capillary refill takes less than 2 seconds.   Neurological:      General: No focal deficit present.      Mental Status: She is alert.   Psychiatric:         Mood and Affect: Mood normal.       Labs:   Brief Urine Lab Results  (Last result in the past 365 days)        Color   Clarity   Blood   Leuk Est   Nitrite   Protein   CREAT   Urine HCG        05/06/24 1130 Yellow   Clear   Negative   Negative   Negative   Negative                        Lab Results   Component Value Date    GLUCOSE 120 (H) 03/28/2024    CALCIUM 10.0 03/28/2024     03/28/2024    K 4.6 03/28/2024    CO2 21.4 (L) 03/28/2024     03/28/2024    BUN 15 03/28/2024    CREATININE 0.77 03/28/2024    EGFRIFAFRI 87 11/23/2021    EGFRIFNONA  08/23/2016      Comment:       [employer] Guidelines - Stage 1 Normal of High GFR 90+, Stage 2 = Mild decrease GFR 60-89, Stage 3 = Moderate decrease GFR 30-59, " Stage 4 = Severe decrease GFR 15-29, and Stage 5 = Kidney failure GFR <15    BCR 19.5 03/28/2024    ANIONGAP 13.6 03/28/2024       Lab Results   Component Value Date    WBC 5.11 03/28/2024    HGB 13.9 03/28/2024    HCT 41.5 03/28/2024    MCV 88.9 03/28/2024     03/28/2024       Images:   No Images in the past 120 days found..    Measures:   Tobacco:   Lupe Morillo  reports that she has never smoked. She has never been exposed to tobacco smoke. She has never used smokeless tobacco.           Urine Incontinence: Patient reports that she is currently experiencing any symptoms of urinary incontinence.      Assessment / Plan      Assessment/Plan:   73 y.o. female who presented today for follow up of overactive bladder and urge incontinence. We will obtain PA for Myrbetriq 50mg once daily. If PA is denies, patient would like to proceed with intravesical botox. She will follow up in 3 months for symptom check.     Diagnoses and all orders for this visit:    1. Urge incontinence (Primary)  -     Mirabegron ER (Myrbetriq) 50 MG tablet sustained-release 24 hour 24 hr tablet; Take 50 mg by mouth Daily.  Dispense: 60 tablet; Refill: 1    2. OAB (overactive bladder)  -     Mirabegron ER (Myrbetriq) 50 MG tablet sustained-release 24 hour 24 hr tablet; Take 50 mg by mouth Daily.  Dispense: 28 tablet; Refill: 0  -     Mirabegron ER (Myrbetriq) 50 MG tablet sustained-release 24 hour 24 hr tablet; Take 50 mg by mouth Daily.  Dispense: 60 tablet; Refill: 1       Follow Up:   Return in about 3 months (around 11/6/2024).    I spent approximately 25 minutes providing clinical care for this patient; including review of patient's chart and provider documentation, face to face time spent with patient in examination room (obtaining history, performing physical exam, discussing diagnosis and management options), placing orders, and completing patient documentation.     DARI Delaney  [facility] [place] [facility]

## 2024-08-08 NOTE — OUTREACH NOTE
Call Center TCM Note    Flowsheet Row Responses   [facility] facility patient discharged from? [facility]   Does the patient have one of the following disease processes/diagnoses(primary or secondary)? Total Joint Replacement   Joint surgery performed? Knee   TCM attempt successful? No   Unsuccessful attempts Attempt 1          Bhumi So RN    12/16/2022, 11:59 EST       Saw patient as add on visit because of urgency symptoms  PVR low at 21  Will check UA, UCX, start antibiotic and azo and stool softener  Plan to see him for cystoscopy if things don't improve    Radha Gómez MD

## 2024-08-17 PROBLEM — M48.062 LUMBAR STENOSIS WITH NEUROGENIC CLAUDICATION: Status: ACTIVE | Noted: 2024-08-17

## 2024-08-17 PROBLEM — M47.816 SPONDYLOSIS OF LUMBAR REGION WITHOUT MYELOPATHY OR RADICULOPATHY: Status: ACTIVE | Noted: 2024-08-17

## 2024-08-17 PROBLEM — M51.379 DEGENERATION OF LUMBAR OR LUMBOSACRAL INTERVERTEBRAL DISC: Status: ACTIVE | Noted: 2024-08-17

## 2024-08-17 PROBLEM — M96.1 LUMBAR POSTLAMINECTOMY SYNDROME: Status: ACTIVE | Noted: 2024-08-17

## 2024-08-17 PROBLEM — G47.33 OSA ON CPAP: Status: ACTIVE | Noted: 2024-08-17

## 2024-08-17 PROBLEM — M51.37 DEGENERATION OF LUMBAR OR LUMBOSACRAL INTERVERTEBRAL DISC: Status: ACTIVE | Noted: 2024-08-17

## 2024-08-17 PROBLEM — E11.9 DIABETES MELLITUS TYPE 2 IN NONOBESE: Status: ACTIVE | Noted: 2024-08-17

## 2024-08-17 PROBLEM — R26.9 GAIT DISTURBANCE: Status: ACTIVE | Noted: 2024-08-17

## 2024-08-17 PROBLEM — R53.81 PHYSICAL DECONDITIONING: Status: ACTIVE | Noted: 2024-08-17

## 2024-08-17 NOTE — PROGRESS NOTES
"Chief Complaint: \"Lower back pain\"         History of Present Illness:   Patient: Ms. Lupe Morillo, 73 y.o. female   Referring Physician: Dr. Naveed Sims   Reason for Referral: Consultation for chronic intractable lower back pain.   Pain History: Patient reports a 10-year history of chronic intractable lower back pain, which began without incident. Lupe Morillo underwent on 01/30/2019 L4-L5 lumbar discectomy fusion instrumentation. She did well after surgery.  Lupe Morillo reports intermittent lower back pain after surgery that has increased over the several months. She denies radicular symptoms or significant claudication. MRI of the lumbar spine w/o contrast on 07/05/2024 revealed adjcement level disease at L3-L4: Disc bulge, facet hypertrophjy, ligamentum flavum hypertrophy (7 mm). Right paracentral disc extrusion. Mild to moderate canal, lateral recess and NF stenosis. At L4-L5: Previous fusion. Grade 1 anterolisthesis. Disc osteophyte complex. Mild canal and NF stenosis. At L5-S1: Annular fissure, Modic 1 endplate changes, schmorl's nodes, facet arthropathy. No significant canal stenosis, Mild NF stenosis. Dexa Scan on 03/15/2024: Bone mineral density results are within normal limits. Lupe Morillo underwent orthopedic spine surgical consultation with Dr. Naveed Sims on 07/08/2024, and was found to be a potential candiate for extension of lumbar fusion. Lupe Morillo presents with significant comorbidities including asthma, GERD, HLP, HTN, ZACH on CPAP, Type 2 diabetes mellitus, peripheral neuropathy. Lupe Morillo has failed to obtain pain relief with conservative measures for more than 4 months including oral analgesics, opioids, topical analgesics, ice, heat, TENs, physical therapy (last visit within the past 12 months), physical therapist directed home exercise program HEP (ongoing), independent exercise program (ongoing), to name a few. Pain has progressed in " intensity over the past months.  Pain Description: Constant lower back pain with intermittent exacerbation, described as aching, dull, and sharp sensation.   Radiation of Pain: The pain does not radiate  Pain intensity today: 0/10   Average pain intensity last week: 5/10  Pain intensity ranges from: 0/10 to 8/10  Aggravating factors: Pain increases with early AM after getting out of bed, protracted sitting, standing, walking. Patient describes neurogenic claudication. Patient does not use a cane or walker   Alleviating factors: Pain decreases with sitting on a recliner, lying down  Associated Symptoms:   Patient denies pain, numbness, or weakness in the lower extremities.   Patient denies any new bladder or bowel problems.   Patient reports difficulties with her balance  but denies recent falls.   Pain interferes with general activities and affects patient's quality of life  Pain interferes with sleep: falling asleep   Muscle spasms: Legs  Stiffness: No    Review of previous therapies and additional medical records:  Lupe Morillo has already failed the following measures, including:   Conservative Measures: Oral analgesics, opioids, topical analgesics, ice, heat, TENs, physical therapy (last visit within the past 12 months), physical therapist directed home exercise program HEP (ongoing), independent exercise program (ongoing)  Interventional Measures: None  Surgical Measures: No history of previous cervical spine surgery. No history of previous hip surgery   01/30/2019: L4-L5 lumbar discectomy fusion instrumentation by Dr. Sims  11/22/2021: Right Total knee arthroplasty   12/14/2022: Revision Right Total Knee Arthroplasty   Lupe Morillo underwent orthopedic spine surgical consultation with Dr. Naveed Sims on 07/08/2024, and was found to be a potential candiate for extension of lumbar fusion.  Lupe Morillo presents with significant comorbidities including asthma, GERD, HLP, HTN, ZACH on CPAP,  Type 2 diabetes mellitus, peripheral neuropathy  In terms of current analgesics, Lupe Morillo takes: celecoxib, cyclobenzaprine, methocarbamol, tramadol, AsperCream Patches  I have reviewed Caesar Report consistent with medication reconciliation.  SOAPP/ORT: Low Risk     PHQ-2 Depression Screening  Little interest or pleasure in doing things? 0-->not at all   Feeling down, depressed, or hopeless? 0-->not at all   PHQ-2 Total Score 0     Pain Self-Efficacy Questionnaire (PSEQ)  ITEM 08-20 2024        I can enjoy things despite the pain. 6        I can do most of the household chores (tidying up, washing dishes, etc), despite the pain. 6        I can socialize with my friends or family members as often as I used to do, despite the pain. 6        I can cope with my pain in most situations. 6        I can do some form of work, despite the pain (includes housework, paid, and unpaid work). 6        I can still do many of the things I enjoy doing, such as hobbies or leisure activity despite pain. 6        I can cope with my pain without medications. 6        I can accomplish most of my goals in life despite the pain. 6        I can live in a normal lifestyle, despite the pain. 6        I can gradually become more active, despite the pain. 6        TOTAL SCORE 60/60            Global Pain Scale 08-20 2024          Pain 18          Feelings 0          Clinical outcomes 8          Activities 0          GPS Total: 26            The [place] Back Pain Disability Scale   DATE 08-20 2024          Sleep through the night 2          Turn over in bed 2          Get out of bed 5          Make your bed 3          Put on socks (pantyhose) 0          Ride in a car 0          Sit in a chair for several hours 1          Stand up for 20-30 minutes 3          Climb one flight of stairs 0          Walk a few blocks (200-300 yards)  3          Walk several miles 5          Run one block (about 50 yards) 5          Take food out of the  refrigerator 0          Reach up to high shelves 0          Move a chair 0          Pull or push heavy doors 0          Bend over to clean the bathtub 2          Throw a ball 0          Carry two bags of groceries 1          Lift and carry a heavy suitcase 3          Total score 35            [place] Claudication Score  All questions are in reference to an average for the past month 08-20 2024          PAIN FREQUENCY:   How often have you experienced pain in your back or buttock or pain that goes down your back, buttock, and/or legs?  Not at all  Less than once a week  At least once a week  Every day for at least a few minutes  Every day for most of the day  Every minute of the day 4          PAIN SEVERITY:   How would you describe the worst pain you have had in your back, buttock, and/or legs?  0. None  1. Mild  2. Moderate  3. Severe  4. Very severe  5. Intolerable 3          BACK PAIN SEVERITY:   How would you describe the pain or discomfort in your back and/or buttocks?  0. None  1. Mild  2. Moderate  3. Severe  4. Very severe  5. Intolerable 3          LEG PAIN SEVERITY:   How would you describe the pain or discomfort in your legs or feet?  None  Mild  Moderate  Severe  Very severe  Intolerable 1          NERVE SYMPTOM SEVERITY:   How would you describe the numbness or tingling in your legs or feet?  None  Mild  Moderate  Severe  Very severe  Intolerable 1          LEG WEAKNESS:   How would you describe the strength in your legs, ankles, or feet?  None  Mild  Moderate  Severe  Very severe  Intolerable 1          BALANCE:   Which statement best describes your steadiness when standing or walking?  0. I have had no problems with my balance.  1. I sometimes feel off-balance but I am able to walk without any aid.  2. I often feel off-balance but I am able to walk with an aid.  3. I am unable to walk without an aid.  4. I have difficulty walking despite using an aid.  5. I cannot stand up. 1          WALKING  DISTANCE:   When you went for a walk, how far were you able to walk before your back or leg started giving you trouble?  0. No limits or more than 2 miles   1. More than 1/4 mile but less than 2 miles  2. More than 100 yards but less than 1/4 mile  3. More than 50 feet but less than 100 yards  4. Less than 50 feet  5. Not at all 3          WALKING ABILITY:   Which statement best describes your walking ability?  0. There is no limit to my walking ability.  1. I can walk far enough to do everything I want to do.  2. I am able to walk comfortably from my home to the shops or my transport.  3. I am able to walk comfortably around the house.  4. I am able to walk only from the bedroom to the bathroom or kitchen.  5. I am not able to walk at all. 3          WALKING SPEED:   Which statement best describes your walking?  0. I am able to walk at a normal speed.  1. I walk slowly but standing upright.  2. I walk slowly and bent forward.  3. I have to stop and stand still when I walk.  4. I have to stop and sit down when I walk.  5. I cannot walk at all. 4          Total Score: Total Score _____% = (___) x 100                                                              50  24            Review of Diagnostic Studies:  I have independently reviewed and interpreted the images with the patient and used the images and a tridimensional spine model to explain findings. I have also reviewed the reports.  MRI of the lumbar spine w/o contrast on 07/05/2024. Sacral Tarlov cysts  T12-L1, L1-L2, L2-L3: Disc bulges, facet arthropathy. No significant canal or NF stenosis  L3-L4: Disc bulge, facet hypertrophjy, ligamentum flavum hypertrophy (7 mm). Right paracentral disc extrusion. Mild to moderate canal, lateral recess and NF stenosis  L4-L5: Grade 1 anterolisthesis. Previous fusion. Disc osteophyte complex. Mild canal and NF stenosis  L5-S1: Annular fissure, Modic 1 endplate changes, schmorl's nodes, facet arthropathy. No significant canal  stenosis, Mild NF stenosis  Dexa Scan on 03/15/2024: Bone mineral density results are within normal limits.    Review of Systems   All other systems reviewed and are negative.      Patient Active Problem List   Diagnosis    Sleep apnea    Hypertension    Heel spur    Low back pain    Allergic rhinitis    Type 2 diabetes mellitus    Muscle cramping    S/P lumbar fusion    Acute blood loss anemia, mild, asymptomatic    Postoperative pain    Acute bronchitis    Arthritis    Atypical chest pain    Diarrhea    Gastroesophageal reflux disease with esophagitis    Hirsutism    Peripheral neuropathy    Perennial allergic rhinitis    Hyperlipidemia    Asthma    Arthritis of right knee    S/P total knee arthroplasty, right    Right knee pain    Status post revision total right knee replacement    Spondylosis of lumbar region without myelopathy or radiculopathy    Lumbar postlaminectomy syndrome    Lumbar stenosis with neurogenic claudication    Degeneration of lumbar or lumbosacral intervertebral disc    Diabetes mellitus type 2 in nonobese    Physical deconditioning    Gait disturbance    ZACH on CPAP    Ligamentum flavum hypertrophy       Past Medical History:   Diagnosis Date    Arthritis     Asthma     Back pain     Diabetes mellitus     type 2 dm, 10 years, check blood sugar once A DAY, LAST A1C 6.8%    GERD (gastroesophageal reflux disease)     History of chronic bronchitis     History of diverticulitis     History of hypercholesterolemia     Hyperlipidemia     Hypertension     PONV (postoperative nausea and vomiting)     with tubal ligation    Sleep apnea with use of continuous positive airway pressure (CPAP)     USE CPAP    Urinary incontinence          Past Surgical History:   Procedure Laterality Date    CATARACT EXTRACTION Bilateral     COLON RESECTION      4-5 years ago    COLONOSCOPY      nov 2018    HYSTERECTOMY      AGE ~ 46    JOINT REPLACEMENT  12/14/2022    Right Knee Revision    KNEE ARTHROSCOPY Right      LUMBAR DISCECTOMY FUSION INSTRUMENTATION N/A 01/30/2019    Procedure: LUMBAR LAMINECTOMY POSTERIOR WITH FUSION INSTRUMENTATION L4-5;  Surgeon: Naveed Sims MD;  Location:  [facility];  Service: Orthopedic Spine    OOPHORECTOMY Bilateral     AGE ~ 46    TOTAL KNEE ARTHROPLASTY Right 11/22/2021    Procedure: TOTAL KNEE ARTHROPLASTY RIGHT;  Surgeon: Ariel Leyva MD;  Location:  [facility];  Service: Orthopedics;  Laterality: Right;    TOTAL KNEE ARTHROPLASTY REVISION Right 12/14/2022    Procedure: REVISION RIGHT TOTAL KNEE ARTHROPLASTY - FEMORAL COMPONENT;  Surgeon: Bishop Romo MD;  Location:  [facility];  Service: Orthopedics;  Laterality: Right;    TUBAL ABDOMINAL LIGATION           Family History   Problem Relation Age of Onset    Diabetes Mother         Colon Cancer    Ovarian cancer Mother         DX AGE LATE 40'S-EARLY 50'S    Hypertension Mother         Colon Cancer    Cancer Mother     Hyperlipidemia Father         High Blood Pressure    Hypertension Maternal Grandmother     Diabetes Maternal Grandmother         Diabetic    Cancer Other     Breast cancer Maternal Aunt         DX AGE UNKNOWN         Social History     Socioeconomic History    Marital status:    Tobacco Use    Smoking status: Never     Passive exposure: Never    Smokeless tobacco: Never   Vaping Use    Vaping status: Never Used   Substance and Sexual Activity    Alcohol use: No    Drug use: No    Sexual activity: Not Currently           Current Outpatient Medications:     Ascorbic Acid (VITAMIN C) 100 MG chewable tablet, Chew 1 tablet Daily., Disp: , Rfl:     BD Pen Needle Joanne 2nd Gen 32G X 4 MM misc, USE DAILY TO INJECT INSULIN, Disp: 100 each, Rfl: 5    Boswellia-Glucosamine-Vit D (OSTEO BI-FLEX ONE PER DAY PO), Take  by mouth., Disp: , Rfl:     celecoxib (CeleBREX) 200 MG capsule, Take 1 capsule by mouth 2 (Two) Times a Day., Disp: 60 capsule, Rfl: 4    cholecalciferol (Cholecalciferol) 25 MCG (1000 UT) tablet, Take 1 tablet by  mouth Daily., Disp: , Rfl:     cyclobenzaprine (FLEXERIL) 5 MG tablet, TAKE ONE TABLET BY MOUTH THREE TIMES A DAY AS NEEDED FOR MUSCLE SPASMS, Disp: 50 tablet, Rfl: 3    dapagliflozin (FARXIGA) 5 MG tablet tablet, TAKE ONE TABLET BY MOUTH DAILY, Disp: 90 tablet, Rfl: 3    docusate sodium (COLACE) 100 MG capsule, Take 1 capsule by mouth 2 (Two) Times a Day., Disp: , Rfl:     ezetimibe (ZETIA) 10 MG tablet, TAKE ONE TABLET BY MOUTH DAILY, Disp: 90 tablet, Rfl: 2    glucose blood (FREESTYLE TEST STRIPS) test strip, Check sugars 3 times a day. Dx code E11.9, Disp: 100 each, Rfl: 5    Insulin Degludec (Tresiba FlexTouch) 200 UNIT/ML solution pen-injector pen injection, Inject 30 Units under the skin into the appropriate area as directed Every Night., Disp: 9 mL, Rfl: 2    ipratropium-albuterol (DUO-NEB) 0.5-2.5 mg/3 ml nebulizer, , Disp: , Rfl:     Lancets (freestyle) lancets, Use as instructed, Disp: 100 each, Rfl: 5    levocetirizine (XYZAL) 5 MG tablet, Take 1 tablet by mouth Daily., Disp: 90 tablet, Rfl: 1    losartan (COZAAR) 100 MG tablet, TAKE ONE TABLET BY MOUTH DAILY, Disp: 90 tablet, Rfl: 1    MAGNESIUM PO, Take 250 mg by mouth Daily., Disp: , Rfl:     metFORMIN (GLUCOPHAGE) 1000 MG tablet, TAKE ONE TABLET BY MOUTH DAILY WITH FOOD, Disp: 90 tablet, Rfl: 2    methocarbamol (ROBAXIN) 750 MG tablet, Take 1 tablet by mouth Daily., Disp: , Rfl:     Methylcobalamin (B12-ACTIVE) 1 MG chewable tablet, Chew 1 tablet Daily., Disp: , Rfl:     Mirabegron ER (Myrbetriq) 50 MG tablet sustained-release 24 hour 24 hr tablet, Take 50 mg by mouth Daily., Disp: 28 tablet, Rfl: 0    Mirabegron ER (Myrbetriq) 50 MG tablet sustained-release 24 hour 24 hr tablet, Take 50 mg by mouth Daily., Disp: 60 tablet, Rfl: 1    montelukast (SINGULAIR) 10 MG tablet, TAKE ONE TABLET BY MOUTH ONCE NIGHTLY, Disp: 90 tablet, Rfl: 1    Multiple Vitamins-Minerals (MULTIVITAMIN ADULTS PO), Take 1 tablet by mouth Daily., Disp: , Rfl:     NIFEdipine CC  (ADALAT CC) 60 MG 24 hr tablet, TAKE 1 TABLET BY MOUTH DAILY, Disp: 90 tablet, Rfl: 2    ondansetron (Zofran) 4 MG tablet, Take 1 tablet by mouth Every 8 (Eight) Hours As Needed for Nausea or Vomiting., Disp: , Rfl:     Rybelsus 3 MG tablet, TAKE ONE TABLET BY MOUTH DAILY, Disp: 30 tablet, Rfl: 3    Semaglutide (Rybelsus) 3 MG tablet, Take 1 tablet by mouth Daily., Disp: 90 tablet, Rfl: 2    sodium chloride 0.65 % nasal spray, 1 spray into the nostril(s) as directed by provider Daily As Needed. 1 spray in each nostril daily as needed, Disp: , Rfl:     spironolactone (ALDACTONE) 50 MG tablet, TAKE 1 TABLET BY MOUTH DAILY, Disp: 90 tablet, Rfl: 2    Symbicort 160-4.5 MCG/ACT inhaler, , Disp: , Rfl:     traMADol (ULTRAM) 50 MG tablet, Take 1 tablet by mouth Every 6 (Six) Hours As Needed for Moderate Pain., Disp: , Rfl:     vitamin B-6 (PYRIDOXINE) 100 MG tablet, Take 1 tablet by mouth Daily., Disp: , Rfl:     fluticasone (FLONASE) 50 MCG/ACT nasal spray, 2 sprays into the nostril(s) as directed by provider Daily for 14 days., Disp: 9.9 mL, Rfl: 0      Allergies   Allergen Reactions    Oxycodone Hcl Headache    Statins Other (See Comments)     Leg cramps         Pulse 87   Wt 77.4 kg (170 lb 9.6 oz)   SpO2 98%   BMI 27.29 kg/m²       Physical Exam:  Constitutional: Patient appears well-developed, well-nourished, well-hydrated, appears younger than stated age  HEENT: Head: Normocephalic and atraumatic  Eyes: Conjunctivae and lids are normal  Pupils: Equal, round, reactive to light  Peripheral vascular exam: Posterior tibialis: right 2+ and left 2+. Dorsalis pedis: right 2+ and left 2+. No edema.   Musculoskeletal   Gait and station: Gait evaluation demonstrated some shuffling. Patient had some difficulties walking on heels, toes  Lumbar Spine: Passive and active range of motion are limited secondary to pain. Extension, rotation of the lumbar spine increased and reproduced pain. Lumbar facet joint loading maneuvers are  positive.  Sacroiliac Joints: Juan Daniel's test; Gaenslen's test; thigh thrust test; SI compression test; posterior shear test; SI distraction test; pelvic rock test; Yeoman's test: Negative   Piriformis maneuvers: Negative   Right Hip Joint: The range of motion of the hip joint is limited to flexion and internal rotation but without pain   Left Hip Joint: The range of motion of the hip joint is limited to flexion and internal rotation but without pain   Palpation of the bilateral psoas tendons and iliopsoas bursas: Unrevealing   Palpation of the bilateral greater trochanters: Unrevealing   Examination of the Iliotibial band: Unrevealing   Neurological:   Patient is alert and oriented to person, place, and time.   Speech: Normal.   Cortical function: Normal mental status.   Reflex Scores:  Right patellar: 0+  Left patellar: 0+  Right Achilles: 0+  Left Achilles: 0+  Motor strength: 5/5  Motor Tone: Normal  Involuntary movements: None.   Superficial/Primitive Reflexes: Primitive reflexes were absent.   Right Cuellar: Absent  Left Cuellar: Absent  Right ankle clonus: Absent  Left ankle clonus: Absent   Babinsky: Absent  Long tract signs: Negative. Straight leg raising test: Negative. Femoral stretch sign: Negative.   Sensory exam: Intact to light touch, intact pain and temperature sensation, intact vibration sensation and normal proprioception  Coordination:  Finger to nose: Normal. Balance: Normal Romberg's sign: Negative   Skin and subcutaneous tissue: Skin is warm and intact. No rash noted. No cyanosis.   Psychiatric: Judgment and insight: Normal. Recent and remote memory: Intact. Mood and affect: Normal.     ASSESSMENT:   1. Lumbar stenosis with neurogenic claudication    2. Ligamentum flavum hypertrophy    3. Lumbar postlaminectomy syndrome    4. S/P lumbar fusion    5. Spondylosis of lumbar region without myelopathy or radiculopathy    6. Degeneration of lumbar or lumbosacral intervertebral disc    7. Peripheral  polyneuropathy    8. Status post revision total right knee replacement    9. Diabetes mellitus type 2 in nonobese    10. ZACH on CPAP    11. Physical deconditioning    12. Gait disturbance        PLAN/MEDICAL DECISION MAKING:  Ms. Lupe Morillo, 73 y.o. female presents with a 10-year history of chronic lower back pain, which began without incident. Lupe Morillo underwent L4-L5 lumbar discectomy with fusion instrumentation on 01/30/2019. She did exceedingly well after her surgery.  Lupe Morillo reports that she suffered intermittent lower back pain after surgery and that her pain has increased over the past several months. She denies radicular symptoms or significant claudication. However, I administered a walking ability test and she developed neurogenic claudication after walking 150 yards along with LT>RT radicular symptoms. MRI of the lumbar spine w/o contrast on 07/05/2024 revealed adjcement level disease at L3-L4: Disc bulge, facet hypertrophjy, ligamentum flavum hypertrophy (7 mm). Right paracentral disc extrusion. Mild to moderate canal, lateral recess and NF stenosis. At L4-L5: Previous fusion. Grade 1 anterolisthesis. Disc osteophyte complex. Mild canal and NF stenosis. At L5-S1: Annular fissure, Modic 1 endplate changes, schmorl's nodes, facet arthropathy. No significant canal stenosis, Mild NF stenosis. Dexa Scan on 03/15/2024: Bone mineral density results are within normal limits. Lupe Morillo underwent orthopedic spine surgical consultation with Dr. Naveed Sims on 07/08/2024, and was found to be a potential candiate for extension of lumbar fusion. Lupe Morillo presents with significant comorbidities including asthma, GERD, HLP, HTN, ZACH on CPAP, Type 2 diabetes mellitus, peripheral neuropathy. Lupe Morillo has failed to obtain pain relief with conservative measures for more than 4 months including oral analgesics, opioids, topical analgesics, ice, heat, TENs, physical  therapy (last visit within the past 12 months), physical therapist directed home exercise program HEP (ongoing), independent exercise program (ongoing), to name a few. Pain has progressed in intensity over the past months. A comprehensive evaluation including history and physical exam along with pertinent physiologic and functional assessment was performed. Patient presents with intractable pain due to the diagnoses listed above. Patient has failed to respond to conservative modalities, as referenced under HPI. I have documented the impact of patient's moderate-to-severe pain contributing to significant impairment in daily activities, ADLs, and a negative impact on the patient's quality of life, as reflected on Global Pain Scale 26/100; The [place] Back Pain Disability Scale 35/100; [place] Claudication Score 24/50; [place] Gait & Balance Assessment Tool  (low risk for falls). I have reviewed pertinent supporting diagnostic studies of patient's chronic pain condition as well as all available pertinent medical records to patient's chronic pain condition including previous therapies, as referenced above.  PHQ-2 Depression Screening 0; Pain Self-Efficacy Questionnaire (PSEQ) 60/60. I had a lengthy conversation with Ms. Lupe Morillo regarding her chronic pain condition and potential therapeutic options including risks, benefits, alternative therapies, to name a few. We have discussed using a stepwise approach starting with the least intense level of care as determined by the extent required to diagnose and or treat a patient's condition. The proposed treatments are consistent with the patient's medical condition and known to be safe and effective by current guidelines and the standard of care. The duration and frequency proposed are considered appropriate for the service in accordance with accepted standards of medical practice for the diagnosis and treatment of the patient's condition and intended to improve the  patient's level of function. These services will be furnished in a setting appropriate to the patient's medical needs and condition. Therefore, I have proposed the following plan:    1. Interventional pain management measures: Patient does not take blood thinners. Patient will be scheduled for diagnostic and therapeutic bilateral L3-L4 transforaminal epidural steroid injections using the lowest effective dose of steroids, under C-arm fluoroscopic guidance, with the use of contrast dye to confirm appropriate needle placement and spread of contrast dye. We may repeat therapeutic bilateral L3-L4 transforaminal epidural steroid injections depending on patient's outcome and following current guidelines. Epidurals will be limited to a maximum of 4 sessions per spinal region in a rolling twelve (12) month period. Continuation of epidural steroid injections over 12 months would only be considered under the following provisions;  Patient is a high-risk surgical candidate, or the patient does not desire surgery, or recurrence of pain in the same location relieved with ESIs for at least three months and epidural provides at least 50% sustained improvement of pain and/or 50% objective improvement in function (using same scale as baseline)  Pain is severe enough to cause a significant degree of functional disability or vocational disability  The primary care provider will be notified regarding continuation of procedures and repeat steroid use   Other options for treatment of patient's pain would include MILD at L3-L4 as ligamentum flavum hypertrophy is the main component of central canal stenosis, Vertiflex, Minuteman, PNS Sprint, SCS, follow-up with Dr. Sims for decompression and extension of lumbar fusion.   Lupe Morillo also presents with significant axial mechanical lower back pain above and below her fusion. We have also discussed prospects of a first set of diagnostic bilateral lumbar medial branch blocks at L2,  L3, L4, L5; for bilateral lumbar facet joints at L3-L4, and L5-S1 to clarify the origin of chronic refractory mechanical lower back pain. If the patient experiences 80% or more relief along with significant functional improvement and increase in the range of motion of the lumbar spine, then, the patient will be scheduled for a second set of diagnostic bilateral lumbar medial branch blocks, to then, proceed with bilateral lumbar medial branch rhizotomies. If the patient experiences 50% or more pain relief and functional improvement for at least six months, we could repeat the procedure. If more than 2 years elapse since the last RFTC, then, the patient will be required to undergo diagnostic blocks prior to repeating RFTC.     2. Diagnostic studies:   A. Lumbar Spine X-rays, full views including flexion and extension to assess lumbar stability  B. Prior to SCS: MRI of the thoracic spine without contrast to assess capacity and patency of the spinal canal and epidural space prior to spinal cord stimulator trial and implant  C. Prior to MILD: CBC, PT, PTT     3. Pharmacological measures: Reviewed and discussed;   A. Patient takes celecoxib, cyclobenzaprine, methocarbamol, tramadol, AsperCream Patches  B. Trial with Rheumate one tablet once daily  C. Trial with prilocaine 2%, lidocaine 10%, imipramine 3%, capsaicin 0.001%, and mannitol 20%  cream, apply 1 to 2 grams of cream to the affected areas every 4 to 6 hours as needed    4. Long-term rehabilitation efforts:  A. The patient does not have a history of falls. I performed a risk assessment for falls using the Tinetti gait & balance assessment tool (scored low risk for falls).   B. Patient will start a comprehensive physical therapy program at [facility] for Alter-G, water therapy, gait and balance training, neurodynamics, core strengthening, gluteal and abductor strengthening, ultrasound, ASTYM, E-STIM, myofascial release, cupping, dry needling, home  exercise program, 2-3 x per week for 8 weeks   C. Contrast therapy: Apply ice-packs for 15-20 minutes, followed by heating pads for 15-20 minutes to affected area   D. Start a low impact exercise program such as water therapy, swimming, yoga, Pilates  E. Prior to SCS/PNS:  Referral to Dr. Thong Plaza for psychological screening for peripheral nerve stimulation, spinal cord stimulation\  F. Lupe Morillo  reports that she has never smoked. She has never been exposed to tobacco smoke. She has never used smokeless tobacco.     5. The patient has been instructed to contact my office with any questions or difficulties. The patient understands the plan and agrees to proceed accordingly.    The patient has a documented plan of care to address chronic pain. Lupe Morillo reports a pain score of 7/10.  Given her pain assessment as noted, treatment options were discussed and the following options were decided upon as a follow-up plan to address the patient's pain: continuation of current treatment plan for pain, educational materials on pain management, home exercises and therapy, prescription for non-opiod analgesics, referral to Physical Therapy, referral to specialist for assistance in pain treatment guidance, steroid injections, use of non-medical modalities (ice, heat, stretching and/or behavior modifications), and interventional pain management measures .               Pain Management Panel  More data exists         3/28/2024 9/26/2023   Pain Management Panel   Creatinine, Urine 50  50       Details                    FREDIS query complete. FREDIS reviewed by Kurt Quintana MD.     Pain Medications               celecoxib (CeleBREX) 200 MG capsule Take 1 capsule by mouth 2 (Two) Times a Day.    cyclobenzaprine (FLEXERIL) 5 MG tablet TAKE ONE TABLET BY MOUTH THREE TIMES A DAY AS NEEDED FOR MUSCLE SPASMS    methocarbamol (ROBAXIN) 750 MG tablet Take 1 tablet by mouth Daily.    traMADol (ULTRAM) 50 MG  tablet Take 1 tablet by mouth Every 6 (Six) Hours As Needed for Moderate Pain.             No orders of the defined types were placed in this encounter.       Please note that portions of this note were completed with a voice recognition program.   Any copied data in any portion of my note has been reviewed by myself and accurate.     The 21st Century Cures Act makes medical notes like this available to patients in the interest of transparency. This is a medical document intended as peer to peer communication. It is written in medical language and may contain abbreviations or verbiage that are unfamiliar. It may appear blunt or direct. Medical documents are intended to carry relevant information, facts as evident, and the clinical opinion of the practitioner.     Kurt Quintana MD    Patient Care Team:  Michael Blackwell MD as PCP - General  Yanique Hough MD as Consulting Physician (Hand Surgery)     No orders of the defined types were placed in this encounter.        Future Appointments   Date Time Provider Department Center   9/27/2024 10:00 AM Michael Blackwell MD [facility]   11/6/2024 11:00 AM Katarzyna Ventura APRN [facility]

## 2024-08-20 ENCOUNTER — OFFICE VISIT (OUTPATIENT)
Dept: PAIN MEDICINE | Facility: CLINIC | Age: 74
End: 2024-08-20
Payer: COMMERCIAL

## 2024-08-20 VITALS — OXYGEN SATURATION: 98 % | WEIGHT: 170.6 LBS | BODY MASS INDEX: 27.29 KG/M2 | HEART RATE: 87 BPM

## 2024-08-20 DIAGNOSIS — E11.9 DIABETES MELLITUS TYPE 2 IN NONOBESE: ICD-10-CM

## 2024-08-20 DIAGNOSIS — G47.33 OSA ON CPAP: ICD-10-CM

## 2024-08-20 DIAGNOSIS — M48.062 LUMBAR STENOSIS WITH NEUROGENIC CLAUDICATION: ICD-10-CM

## 2024-08-20 DIAGNOSIS — R26.9 GAIT DISTURBANCE: ICD-10-CM

## 2024-08-20 DIAGNOSIS — R53.81 PHYSICAL DECONDITIONING: ICD-10-CM

## 2024-08-20 DIAGNOSIS — G62.9 PERIPHERAL POLYNEUROPATHY: ICD-10-CM

## 2024-08-20 DIAGNOSIS — Z96.651 STATUS POST TOTAL RIGHT KNEE REPLACEMENT: ICD-10-CM

## 2024-08-20 DIAGNOSIS — M51.37 DEGENERATION OF LUMBAR OR LUMBOSACRAL INTERVERTEBRAL DISC: ICD-10-CM

## 2024-08-20 DIAGNOSIS — M24.28 LIGAMENTUM FLAVUM HYPERTROPHY: ICD-10-CM

## 2024-08-20 DIAGNOSIS — M48.062 LUMBAR STENOSIS WITH NEUROGENIC CLAUDICATION: Primary | ICD-10-CM

## 2024-08-20 DIAGNOSIS — M96.1 LUMBAR POSTLAMINECTOMY SYNDROME: ICD-10-CM

## 2024-08-20 DIAGNOSIS — Z98.1 S/P LUMBAR FUSION: ICD-10-CM

## 2024-08-20 DIAGNOSIS — M47.816 SPONDYLOSIS OF LUMBAR REGION WITHOUT MYELOPATHY OR RADICULOPATHY: ICD-10-CM

## 2024-08-20 PROCEDURE — 99204 OFFICE O/P NEW MOD 45 MIN: CPT | Performed by: ANESTHESIOLOGY

## 2024-08-21 ENCOUNTER — DOCUMENTATION (OUTPATIENT)
Dept: PAIN MEDICINE | Facility: CLINIC | Age: 74
End: 2024-08-21

## 2024-08-21 ENCOUNTER — OUTSIDE FACILITY SERVICE (OUTPATIENT)
Dept: PAIN MEDICINE | Facility: CLINIC | Age: 74
End: 2024-08-21
Payer: COMMERCIAL

## 2024-08-21 PROCEDURE — 64483 NJX AA&/STRD TFRM EPI L/S 1: CPT | Performed by: ANESTHESIOLOGY

## 2024-08-21 NOTE — PROGRESS NOTES
[facility]      PROCEDURE DATE: 08/21/2024    PREOPERATIVE DIAGNOSES:  1. Lumbar stenosis with neurogenic claudication   2. Ligamentum flavum hypertrophy   3. Lumbar postlaminectomy syndrome   4. S/P lumbar fusion   5. Spondylosis of lumbar region without myelopathy or radiculopathy   6. Degeneration of lumbar or lumbosacral intervertebral disc   7. Peripheral polyneuropathy   8. Status post revision total right knee replacement   9. Diabetes mellitus type 2 in nonobese   10. ZACH on CPAP   11. Physical deconditioning   12. Gait disturbance     POSTOPERATIVE DIAGNOSES:  1. Lumbar stenosis with neurogenic claudication   2. Ligamentum flavum hypertrophy   3. Lumbar postlaminectomy syndrome   4. S/P lumbar fusion   5. Spondylosis of lumbar region without myelopathy or radiculopathy   6. Degeneration of lumbar or lumbosacral intervertebral disc   7. Peripheral polyneuropathy   8. Status post revision total right knee replacement   9. Diabetes mellitus type 2 in nonobese   10. ZACH on CPAP   11. Physical deconditioning   12. Gait disturbance     PROCEDURE: Diagnostic and therapeutic bilateral L3-L4 transforaminal epidural steroid injections.    ANESTHESIA: Local anesthesia plus IV sedation    COMPLICATIONS: None    MEDICAL NECESSITY: A comprehensive evaluation including history and physical exam and pertinent physiologic and functional assessment was performed. The patient presents with intractable pain due to the diagnoses listed above. The patient has failed to respond to conservative modalities including the impact of patient's moderate-to-severe pain contributing to significant impairment in daily activities, ADLs, and a negative impact on quality of life, as referenced under HPI. Supporting diagnostic studies of patient's chronic pain condition have been reviewed. We have discussed using a stepwise approach starting with the least intense level of service as determined by the extent required to treat a patient's condition.  The treatments proposed are consistent with the patient's medical condition and known to be as safe and effective by current guidelines and the standard of care. See OV note.     PROCEDURE SUMMARY: After explaining all the risks and benefits of the procedure, an informed consent was obtained.  The patient's surgical site was confirmed with the patient and marked in the holding room accordingly. The patient was transferred to the procedure room and placed on the operating room table in the prone position. Time out was completed. Noninvasive monitors were applied. Administration of IV sedation was performed by a designated procedure room nurse, who monitored the patient's level of consciousness and physiological status. Pertinent information was reported on a sedation flow sheet, which is part of the patient's permanent medical records. Start sedation time: 09:42 AM. The patient's vital signs remained within normal limits. The lumbar spine area was prepped and draped in a sterile fashion.  Using C-arm fluoroscopic guidance, the six o'clock area of the bilateral L3 pedicles (targets) were identified.  The skin and tissues overlying the targets were anesthetized with five ml of 1% lidocaine followed by eight ml of 0.25% bupivacaine. Then, 22-gauge styletted needles were advanced into the most posterior and superior portion of the bilateral L3-L4 neural foramina without any difficulties. The patient did not experience paresthesia. AP and lateral X-ray views were obtained confirming appropriate needle placement. Aspiration was negative for blood and/or CSF. One ml of Isovue-200 was injected per side, and contrast dye was seen bathing the bilateral L3 nerve roots with spread into the epidural space. AP and lateral X-rays were obtained and scanned in the patient's chart. Four ml of a mixture of 0.25% bupivacaine with 4 mg of dexamethasone were injected per side. Fluoroscopy was utilized using low-dose of radiation applying  collimation, pulsed mode, and shielding following ALARA recommendations.  Fluoroscopy time: 14 seconds. End sedation time: 09:49 AM. The patient tolerated the procedure well and without incident.  Upon completion of the procedure, the patient was transferred to the recovery room in stable condition. The patient was discharged from the [facility] Center neurologically intact and with appropriate discharge instructions. Pain level before procedure: 7/10. Pain level after procedure: 0/10.    PLAN:    Follow-up with DARI Neely in 10 weeks. See OV note. We may repeat therapeutic bilateral L3-L4 transforaminal epidural steroid injections depending on patient's outcome and following current guidelines. Other options for treatment of patient's pain would include MILD at L3-L4 as ligamentum flavum hypertrophy is the main component of central canal stenosis, Vertiflex, Minuteman, PNS Sprint, SCS, follow-up with Dr. Sims for decompression and extension of lumbar fusion. Lupe Morillo also presents with significant axial mechanical lower back pain above and below her fusion. We have also discussed prospects of a first set of diagnostic bilateral lumbar medial branch blocks at L2, L3, L4, L5; for bilateral lumbar facet joints at L3-L4, and L5-S1 to clarify the origin of chronic refractory mechanical lower back pain.   Diagnostic studies: See OV note.   Pharmacological measures: See OV note.  Long-term rehabilitation efforts: See OV note.  The patient has been instructed to contact my office with any questions or difficulties. The patient understands the plan and agrees to proceed accordingly.      Signatures  Electronically signed by: Kurt Quintana M.D.; AUGUST 21, 2024, 10:12 AM EST (Author)

## 2024-08-28 ENCOUNTER — TELEPHONE (OUTPATIENT)
Dept: PAIN MEDICINE | Facility: CLINIC | Age: 74
End: 2024-08-28
Payer: COMMERCIAL

## 2024-08-28 NOTE — TELEPHONE ENCOUNTER
Caller: SANDRA   Relationship to Patient: SELF  Phone Number: [phone] (home) [phone] (work)  '  Reason for Call: PATIENT STATES THAT SHE WAS DOING WELL FORM THE INJECTION BUT YESTERDAY TINGLING AND LOWER BACK PAIN CAME BACK SHE IS UNSURE IF THIS IS NORMAL

## 2024-08-28 NOTE — TELEPHONE ENCOUNTER
PATIENT ALSO CALLED BACK INQUIRING ABOUT A TOPIC CREAM SHE STATES DR. QUEVEDO WAS PRESCRIBING HER. PLEASE ADVISE.

## 2024-09-03 RX ORDER — AMOXICILLIN AND CLAVULANATE POTASSIUM 500; 125 MG/1; MG/1
1 TABLET, FILM COATED ORAL 2 TIMES DAILY
Qty: 16 TABLET | Refills: 0 | OUTPATIENT
Start: 2024-09-03

## 2024-09-08 DIAGNOSIS — I10 ESSENTIAL HYPERTENSION: ICD-10-CM

## 2024-09-09 RX ORDER — LOSARTAN POTASSIUM 100 MG/1
100 TABLET ORAL DAILY
Qty: 90 TABLET | Refills: 1 | Status: SHIPPED | OUTPATIENT
Start: 2024-09-09

## 2024-09-13 RX ORDER — PEN NEEDLE, DIABETIC 32GX 5/32"
NEEDLE, DISPOSABLE MISCELLANEOUS
Qty: 100 EACH | Refills: 5 | Status: SHIPPED | OUTPATIENT
Start: 2024-09-13

## 2024-09-17 NOTE — TELEPHONE ENCOUNTER
[id number] [address] [facility] [phone] [facility] [phone] FX    Relationship: Pharmacy    Best call back number: [phone]    Requested Prescriptions:   Requested Prescriptions     Pending Prescriptions Disp Refills   • fluticasone (FLONASE) 50 MCG/ACT nasal spray 9.9 mL 0     Si sprays into the nostril(s) as directed by provider Daily for 14 days.      Pharmacy where request should be sent: [facility] [id number] [place] [id number] [facility] [phone] [facility] [phone] FX     Additional details provided by patient: PHARMACY STATES THAT THEY HAD A PRESCRIPTION FOR THIS FROM AN URGENT CARE VISIT, HOWEVER RICKIE IS EXPERIENCING COMPUTER ISSUES AND THE PRESCRIPTION DISAPPEARED ON THEIR COMPUTER.     PHARMACY IS ASKING FOR DR BENEDICT TO SEND A NEW PRESCRIPTION FOR THIS MEDICATION TO BE FILLED. PLEASE ADVISE.     Cassi Monsivais Rep   23 12:00 EST          PRIMARY CARE WEIGHT MANAGEMENT PROGRESS NOTE    CHIEF COMPLAINT:  Ml Cantor is a 23 year old female who is following up for medical weight management.     At time of presentation : BMI 31. 70- 80 lbs weight gain in 12 to 18 months.  FHx of obesity and T2DM in grandfather.     Work up for secondary causes of obesity with prior PCP unrevealing.     Medications:   Transitioned from Saxsenda :3 mg daily to equivalent dose Zepbound 5mg at last appointment. Intolerable GI side effects of nausea and loose stool.     Nutrition:  Was working with nutritionist (who used to work at [facility] : Sophie Mejía) : stopped around 6 months ago.   Focusing on protein. Feels healthy relationship with food.     Behavior:   Continues to work closely with psychiatric team. Mood stable.Has multiple lines of support (family, BF: moved in with, psychiatric team)    Prior h/o bulmia (triggered by comments about weight from a primary care doctor as a child). No longer active, but is aware of OCD triggers.  Assessment at [facility] April 2023 : no active eating disorders noted.     Physical Activity:   Was working out multiple times per week : 1 hour sessions.  HIIT, cardio, strength. Was unable to maintain.  Aware of some loss of strength. Less time to lift weights. Continue to aim for daily movement and some strength training.     WEIGHT HISTORY:   Wt Readings from Last 3 Encounters:   09/17/24 82.1 kg (181 lb)   08/16/24 83.6 kg (184 lb 6.4 oz)   04/25/24 90.3 kg (199 lb 1.6 oz)       PHYSICAL EXAM:  VITAL SIGNS:  There were no vitals taken for this visit.    Weight at start of treatment: 218 lb 8/4/2023  Weight at last appointment : 184 lbs   Weight today: 181 lbs   Weight change since last appointment: -3 lbs  Weight change since start of treatment -37 lbs    General: no acute distress, cooperative with exam.  Psych: mental status normal, mood and affect appropriate.      ASSESSMENT/PLAN:  Ml Cantor is a 23 year old female who is  following up for medical weight management.    Overweight (BMI 25.0-29.9)  H/O: obesity  Plan for management includes the following:    -Nutrition: Excellent baseline nutrition.     -Exercise: Some reduction in frequency of cardio and strength training.    -Medications:reduce dose of Zepbound from 5 to 2.5 mg weekly (GI side effects with higher dose). If side effects continue, try low dose Wegovy : will sent my chart message in 1 week : ok for script based on message without appointment.               -Other: Prior body composition analysis excellent functional skeletal muscle mass.  -     PREVENT ,INDIV,15 MIN  -     tirzepatide-Weight Management (ZEPBOUND) 5 MG/0.5ML prefilled pen; Inject 0.5 mLs (5 mg) subcutaneously every 7 days    H/O bulimia nervosa  Not currently active. Feels has good tools and skills around this now. Continue to monitor.     Obsessive-compulsive disorder, unspecified type  Attention deficit hyperactivity disorder (ADHD), combined type  JAYLON (generalized anxiety disorder)  Moderate episode of recurrent major depressive disorder (H)  Mood currently stable. Follows closely with psychiatric team. Endorses multiple lines of support (family, BF, psychiatric team)    Follow up:  4 weeks    The longitudinal plan of care for the diagnosis(es)/condition(s) as documented were addressed during this visit. Due to the added complexity in care, I will continue to support Ml in the subsequent management and with ongoing continuity of care.

## 2024-09-27 ENCOUNTER — OFFICE VISIT (OUTPATIENT)
Dept: INTERNAL MEDICINE | Facility: CLINIC | Age: 74
End: 2024-09-27
Payer: COMMERCIAL

## 2024-09-27 VITALS
DIASTOLIC BLOOD PRESSURE: 76 MMHG | HEART RATE: 96 BPM | WEIGHT: 173.38 LBS | RESPIRATION RATE: 18 BRPM | BODY MASS INDEX: 27.73 KG/M2 | TEMPERATURE: 97 F | SYSTOLIC BLOOD PRESSURE: 132 MMHG

## 2024-09-27 DIAGNOSIS — M51.379 DEGENERATION OF LUMBAR OR LUMBOSACRAL INTERVERTEBRAL DISC: ICD-10-CM

## 2024-09-27 DIAGNOSIS — Z98.1 S/P LUMBAR FUSION: ICD-10-CM

## 2024-09-27 DIAGNOSIS — E11.9 TYPE 2 DIABETES MELLITUS WITHOUT COMPLICATION, WITH LONG-TERM CURRENT USE OF INSULIN: Primary | ICD-10-CM

## 2024-09-27 DIAGNOSIS — G89.29 CHRONIC MIDLINE LOW BACK PAIN WITHOUT SCIATICA: ICD-10-CM

## 2024-09-27 DIAGNOSIS — M48.062 LUMBAR STENOSIS WITH NEUROGENIC CLAUDICATION: ICD-10-CM

## 2024-09-27 DIAGNOSIS — M54.50 CHRONIC MIDLINE LOW BACK PAIN WITHOUT SCIATICA: ICD-10-CM

## 2024-09-27 DIAGNOSIS — Z79.4 TYPE 2 DIABETES MELLITUS WITHOUT COMPLICATION, WITH LONG-TERM CURRENT USE OF INSULIN: Primary | ICD-10-CM

## 2024-09-27 LAB
EXPIRATION DATE: ABNORMAL
HBA1C MFR BLD: 6.6 % (ref 4.5–5.7)
Lab: ABNORMAL

## 2024-09-27 RX ORDER — MELOXICAM 15 MG/1
15 TABLET ORAL DAILY
COMMUNITY
Start: 2024-09-11

## 2024-10-01 ENCOUNTER — HOSPITAL ENCOUNTER (OUTPATIENT)
Dept: GENERAL RADIOLOGY | Facility: HOSPITAL | Age: 74
Discharge: HOME | End: 2024-10-01
Admitting: ANESTHESIOLOGY
Payer: COMMERCIAL

## 2024-10-01 ENCOUNTER — OFFICE VISIT (OUTPATIENT)
Dept: PAIN MEDICINE | Facility: CLINIC | Age: 74
End: 2024-10-01
Payer: COMMERCIAL

## 2024-10-01 VITALS — HEIGHT: 66 IN | OXYGEN SATURATION: 99 % | WEIGHT: 171.6 LBS | BODY MASS INDEX: 27.58 KG/M2 | HEART RATE: 94 BPM

## 2024-10-01 DIAGNOSIS — G47.33 OSA ON CPAP: ICD-10-CM

## 2024-10-01 DIAGNOSIS — Z96.651 STATUS POST TOTAL RIGHT KNEE REPLACEMENT: ICD-10-CM

## 2024-10-01 DIAGNOSIS — G62.9 PERIPHERAL POLYNEUROPATHY: ICD-10-CM

## 2024-10-01 DIAGNOSIS — M24.28 LIGAMENTUM FLAVUM HYPERTROPHY: ICD-10-CM

## 2024-10-01 DIAGNOSIS — R26.9 GAIT DISTURBANCE: ICD-10-CM

## 2024-10-01 DIAGNOSIS — Z98.1 S/P LUMBAR FUSION: ICD-10-CM

## 2024-10-01 DIAGNOSIS — M96.1 LUMBAR POSTLAMINECTOMY SYNDROME: ICD-10-CM

## 2024-10-01 DIAGNOSIS — E11.9 DIABETES MELLITUS TYPE 2 IN NONOBESE: ICD-10-CM

## 2024-10-01 DIAGNOSIS — M48.062 LUMBAR STENOSIS WITH NEUROGENIC CLAUDICATION: ICD-10-CM

## 2024-10-01 DIAGNOSIS — M48.062 LUMBAR STENOSIS WITH NEUROGENIC CLAUDICATION: Primary | ICD-10-CM

## 2024-10-01 DIAGNOSIS — M47.816 SPONDYLOSIS OF LUMBAR REGION WITHOUT MYELOPATHY OR RADICULOPATHY: ICD-10-CM

## 2024-10-01 DIAGNOSIS — R53.81 PHYSICAL DECONDITIONING: ICD-10-CM

## 2024-10-01 PROCEDURE — 72114 X-RAY EXAM L-S SPINE BENDING: CPT

## 2024-10-01 PROCEDURE — 99214 OFFICE O/P EST MOD 30 MIN: CPT | Performed by: ANESTHESIOLOGY

## 2024-10-01 NOTE — PROGRESS NOTES
"Chief Complaint: \"Pain in my right leg. \"      History of Present Illness: Ms. Lupe Morillo, 74 y.o. female originally referred by Dr. Naveed Sims in consultation for chronic intractable lower back and right lower extremity pain. Lupe Morillo reports a 10-year history of chronic intractable lower back pain. Lupe Morillo was last seen on 08/21/2024, when she underwent diagnostic and therapeutic bilateral L3-L4 transforaminal epidural steroid injections from which she experienced 100% pain relief that lasted for 3 days with 30% ongoing relief in her back. She mostly complains of RT LE pain. On 01/30/2019, Lupe Morillo underwent L4-L5 lumbar discectomy fusion instrumentation with Dr. Sims and did well after surgery until about a year ago.  Lupe Morillo reports constant RLE pain from the knee down into her right foot along with low grade lower back pain. She reports significant neurogenic claudication. MRI of the lumbar spine w/o contrast on 07/05/2024 revealed adjcement level disease at L3-L4: Disc bulge, facet hypertrophjy, ligamentum flavum hypertrophy (7 mm). Right paracentral disc extrusion. Mild to moderate canal, lateral recess and NF stenosis. At L4-L5: Previous fusion. Grade 1 anterolisthesis. Disc osteophyte complex. Mild canal and NF stenosis. At L5-S1: Annular fissure, Modic 1 endplate changes, schmorl's nodes, facet arthropathy. No significant canal stenosis, Mild NF stenosis. Dexa Scan on 03/15/2024: Bone mineral density results are within normal limits. Lupe Morillo underwent orthopedic spine surgical consultation with Dr. Naveed Sims on 07/08/2024, and was found to be a potential candiate for extension of lumbar fusion. Lupe Morillo presents with significant comorbidities including asthma, GERD, HLP, HTN, ZACH on CPAP, Type 2 diabetes mellitus, peripheral neuropathy. Lupe Morillo has failed to obtain pain relief with conservative measures for " more than 4 months including oral analgesics, opioids, topical analgesics, ice, heat, TENs, physical therapy (last visit within the past 12 months), physical therapist directed home exercise program HEP (ongoing), independent exercise program (ongoing), to name a few. Pain has progressed in intensity over the past months.  Pain Description: Constant RT leg and lower back pain with intermittent exacerbation, described as aching, dull, sharp, throbbing, burning and shooting sensation.   Radiation of Pain: The pain radiates into the right shin and the dorsum of the right foot  Pain intensity today: 5/10   Average pain intensity last week: 6/10  Pain intensity ranges from: 5/10 to 9/10  Aggravating factors: Pain increases during the nighttime, standing, walking. Patient describes neurogenic claudication. Patient does not use a cane or walker   Alleviating factors: Pain decreases with sitting on a recliner, lying down  Associated Symptoms:   Patient reports pain (RT leg), but denies numbness or weakness in the lower extremities.   Patient denies any new bladder or bowel problems.   Patient reports difficulties with her balance  but denies recent falls.   Pain interferes with general activities and affects patient's quality of life  Pain interferes with sleep: Falling asleep   Muscle spasms: Legs  Stiffness: No    Review of previous therapies and additional medical records:  Lupe Morillo has already failed the following measures, including:   Conservative Measures: Oral analgesics, opioids, topical analgesics, ice, heat, TENs, physical therapy (last visit within the past 12 months), physical therapist directed home exercise program HEP (ongoing), independent exercise program (ongoing)  Interventional Measures: None  Surgical Measures: No history of previous cervical spine surgery. No history of previous hip surgery   01/30/2019: L4-L5 lumbar discectomy fusion instrumentation by Dr. Sims  11/22/2021: Right Total  knee arthroplasty   12/14/2022: Revision Right Total Knee Arthroplasty   Lupe Morillo underwent orthopedic spine surgical consultation with Dr. Naveed Sims on 07/08/2024, and was found to be a potential candiate for extension of lumbar fusion.  Lupe Morillo presents with significant comorbidities including asthma, GERD, HLP, HTN, ZACH on CPAP, Type 2 diabetes mellitus, peripheral neuropathy  In terms of current analgesics, Lupe Morillo takes: celecoxib, cyclobenzaprine, methocarbamol, tramadol, AsperCream Patches  I have reviewed Caesar Report consistent with medication reconciliation.  SOAPP/ORT: Low Risk     PHQ-2 Depression Screening  Little interest or pleasure in doing things? 0-->not at all   Feeling down, depressed, or hopeless? 0-->not at all   PHQ-2 Total Score 0     Pain Self-Efficacy Questionnaire (PSEQ)  ITEM 08-20  2024 10-01  2024       I can enjoy things despite the pain. 6 5       I can do most of the household chores (tidying up, washing dishes, etc), despite the pain. 6 5       I can socialize with my friends or family members as often as I used to do, despite the pain. 6 5       I can cope with my pain in most situations. 6 5       I can do some form of work, despite the pain (includes housework, paid, and unpaid work). 6 5       I can still do many of the things I enjoy doing, such as hobbies or leisure activity despite pain. 6 5       I can cope with my pain without medications. 6 5       I can accomplish most of my goals in life despite the pain. 6 5       I can live in a normal lifestyle, despite the pain. 6 5       I can gradually become more active, despite the pain. 6 5       TOTAL SCORE 60/60 50/60           Global Pain Scale 08-20  2024 10-01  2024         Pain 18 20         Feelings 0 0         Clinical outcomes 8 10         Activities 0 6         GPS Total: 26 36           The [place] Back Pain Disability Scale   DATE 08-20  2024 10-01  2024         Sleep through the  night 2 3         Turn over in bed 2 2         Get out of bed 5 5         Make your bed 3 3         Put on socks (pantyhose) 0 2         Ride in a car 0 1         Sit in a chair for several hours 1 1         Stand up for 20-30 minutes 3 3         Climb one flight of stairs 0 1         Walk a few blocks (200-300 yards)  3 4         Walk several miles 5 5         Run one block (about 50 yards) 5 5         Take food out of the refrigerator 0 0         Reach up to high shelves 0 0         Move a chair 0 0         Pull or push heavy doors 0 0         Bend over to clean the bathtub 2 2         Throw a ball 0 0         Carry two bags of groceries 1 1         Lift and carry a heavy suitcase 3 4         Total score 35 42           Linneus Claudication Score  All questions are in reference to an average for the past month 08-20  2024 10-01  2024         PAIN FREQUENCY:   How often have you experienced pain in your back or buttock or pain that goes down your back, buttock, and/or legs?  Not at all  Less than once a week  At least once a week  Every day for at least a few minutes  Every day for most of the day  Every minute of the day 4 5         PAIN SEVERITY:   How would you describe the worst pain you have had in your back, buttock, and/or legs?  0. None  1. Mild  2. Moderate  3. Severe  4. Very severe  5. Intolerable 3 4         BACK PAIN SEVERITY:   How would you describe the pain or discomfort in your back and/or buttocks?  0. None  1. Mild  2. Moderate  3. Severe  4. Very severe  5. Intolerable 3 3         LEG PAIN SEVERITY:   How would you describe the pain or discomfort in your legs or feet?  None  Mild  Moderate  Severe  Very severe  Intolerable 1 4         NERVE SYMPTOM SEVERITY:   How would you describe the numbness or tingling in your legs or feet?  None  Mild  Moderate  Severe  Very severe  Intolerable 1 1         LEG WEAKNESS:   How would you describe the strength in your legs, ankles, or  feet?  None  Mild  Moderate  Severe  Very severe  Intolerable 1 1         BALANCE:   Which statement best describes your steadiness when standing or walking?  0. I have had no problems with my balance.  1. I sometimes feel off-balance but I am able to walk without any aid.  2. I often feel off-balance but I am able to walk with an aid.  3. I am unable to walk without an aid.  4. I have difficulty walking despite using an aid.  5. I cannot stand up. 1 1         WALKING DISTANCE:   When you went for a walk, how far were you able to walk before your back or leg started giving you trouble?  0. No limits or more than 2 miles   1. More than 1/4 mile but less than 2 miles  2. More than 100 yards but less than 1/4 mile  3. More than 50 feet but less than 100 yards  4. Less than 50 feet  5. Not at all 3 3         WALKING ABILITY:   Which statement best describes your walking ability?  0. There is no limit to my walking ability.  1. I can walk far enough to do everything I want to do.  2. I am able to walk comfortably from my home to the shops or my transport.  3. I am able to walk comfortably around the house.  4. I am able to walk only from the bedroom to the bathroom or kitchen.  5. I am not able to walk at all. 3 3         WALKING SPEED:   Which statement best describes your walking?  0. I am able to walk at a normal speed.  1. I walk slowly but standing upright.  2. I walk slowly and bent forward.  3. I have to stop and stand still when I walk.  4. I have to stop and sit down when I walk.  5. I cannot walk at all. 4 4         Total Score: Total Score _____% = (___) x 100                                                              50  24 29           Review of Diagnostic Studies:  I have independently reviewed and interpreted the images with the patient and used the images and a tridimensional spine model to explain findings. I have also reviewed the reports.  MRI of the lumbar spine w/o contrast on 07/05/2024. Sacral  Tarlov cysts  T12-L1, L1-L2, L2-L3: Disc bulges, facet arthropathy. No significant canal or NF stenosis  L3-L4: Disc bulge, facet hypertrophjy, ligamentum flavum hypertrophy (7 mm). Right paracentral disc extrusion. Mild to moderate canal, lateral recess and NF stenosis  L4-L5: Grade 1 anterolisthesis. Previous fusion. Disc osteophyte complex. Mild canal and NF stenosis  L5-S1: Annular fissure, Modic 1 endplate changes, schmorl's nodes, facet arthropathy. No significant canal stenosis, Mild NF stenosis  Dexa Scan on 03/15/2024: Bone mineral density results are within normal limits.    Review of Systems   All other systems reviewed and are negative.        Patient Active Problem List   Diagnosis    Sleep apnea    Hypertension    Heel spur    Low back pain    Allergic rhinitis    Type 2 diabetes mellitus    Muscle cramping    S/P lumbar fusion    Acute blood loss anemia, mild, asymptomatic    Postoperative pain    Acute bronchitis    Arthritis    Atypical chest pain    Diarrhea    Gastroesophageal reflux disease with esophagitis    Hirsutism    Peripheral neuropathy    Perennial allergic rhinitis    Hyperlipidemia    Asthma    Arthritis of right knee    S/P total knee arthroplasty, right    Right knee pain    Status post revision total right knee replacement    Spondylosis of lumbar region without myelopathy or radiculopathy    Lumbar postlaminectomy syndrome    Lumbar stenosis with neurogenic claudication    Degeneration of lumbar or lumbosacral intervertebral disc    Diabetes mellitus type 2 in nonobese    Physical deconditioning    Gait disturbance    ZACH on CPAP    Ligamentum flavum hypertrophy       Past Medical History:   Diagnosis Date    Arthritis     Asthma     Back pain     Diabetes mellitus     type 2 dm, 10 years, check blood sugar once A DAY, LAST A1C 6.8%    GERD (gastroesophageal reflux disease)     History of chronic bronchitis     History of diverticulitis     History of hypercholesterolemia      Hyperlipidemia     Hypertension     PONV (postoperative nausea and vomiting)     with tubal ligation    Sleep apnea with use of continuous positive airway pressure (CPAP)     USE CPAP    Urinary incontinence          Past Surgical History:   Procedure Laterality Date    CATARACT EXTRACTION Bilateral     COLON RESECTION      4-5 years ago    COLONOSCOPY      nov 2018    HYSTERECTOMY      AGE ~ 46    JOINT REPLACEMENT  12/14/2022    Right Knee Revision    KNEE ARTHROSCOPY Right     LUMBAR DISCECTOMY FUSION INSTRUMENTATION N/A 01/30/2019    Procedure: LUMBAR LAMINECTOMY POSTERIOR WITH FUSION INSTRUMENTATION L4-5;  Surgeon: Naveed Sims MD;  Location:  [facility];  Service: Orthopedic Spine    OOPHORECTOMY Bilateral     AGE ~ 46    TOTAL KNEE ARTHROPLASTY Right 11/22/2021    Procedure: TOTAL KNEE ARTHROPLASTY RIGHT;  Surgeon: Ariel Leyva MD;  Location:  [facility];  Service: Orthopedics;  Laterality: Right;    TOTAL KNEE ARTHROPLASTY REVISION Right 12/14/2022    Procedure: REVISION RIGHT TOTAL KNEE ARTHROPLASTY - FEMORAL COMPONENT;  Surgeon: Bishop Romo MD;  Location:  [facility];  Service: Orthopedics;  Laterality: Right;    TUBAL ABDOMINAL LIGATION           Family History   Problem Relation Age of Onset    Diabetes Mother         Colon Cancer    Ovarian cancer Mother         DX AGE LATE 40'S-EARLY 50'S    Hypertension Mother         Colon Cancer    Cancer Mother     Hyperlipidemia Father         High Blood Pressure    Hypertension Maternal Grandmother     Diabetes Maternal Grandmother         Diabetic    Cancer Other     Breast cancer Maternal Aunt         DX AGE UNKNOWN         Social History     Socioeconomic History    Marital status:    Tobacco Use    Smoking status: Never     Passive exposure: Never    Smokeless tobacco: Never   Vaping Use    Vaping status: Never Used   Substance and Sexual Activity    Alcohol use: No    Drug use: No    Sexual activity: Not Currently           Current Outpatient  Medications:     Ascorbic Acid (VITAMIN C) 100 MG chewable tablet, Chew 1 tablet Daily., Disp: , Rfl:     BD Pen Needle Joanne 2nd Gen 32G X 4 MM misc, USE DAILY TO INJECT INSULIN, Disp: 100 each, Rfl: 5    Boswellia-Glucosamine-Vit D (OSTEO BI-FLEX ONE PER DAY PO), Take  by mouth., Disp: , Rfl:     celecoxib (CeleBREX) 200 MG capsule, Take 1 capsule by mouth 2 (Two) Times a Day., Disp: 60 capsule, Rfl: 4    cholecalciferol (Cholecalciferol) 25 MCG (1000 UT) tablet, Take 1 tablet by mouth Daily., Disp: , Rfl:     cyclobenzaprine (FLEXERIL) 5 MG tablet, TAKE ONE TABLET BY MOUTH THREE TIMES A DAY AS NEEDED FOR MUSCLE SPASMS, Disp: 50 tablet, Rfl: 3    dapagliflozin (FARXIGA) 5 MG tablet tablet, TAKE ONE TABLET BY MOUTH DAILY, Disp: 90 tablet, Rfl: 3    Dietary Management Product (Rheumate) capsule, Take 1 capsule by mouth Daily., Disp: 90 capsule, Rfl: 0    docusate sodium (COLACE) 100 MG capsule, Take 1 capsule by mouth 2 (Two) Times a Day., Disp: , Rfl:     ezetimibe (ZETIA) 10 MG tablet, TAKE ONE TABLET BY MOUTH DAILY, Disp: 90 tablet, Rfl: 2    Gel Base gel, prilocaine 2% lidocaine 10% imipramine 3% capsaicin 0.001% mannitol 20%, 1 to 2 grams of cream to the affected areas Q4-6PRN, Disp: 240 g, Rfl: 5    glucose blood (FREESTYLE TEST STRIPS) test strip, Check sugars 3 times a day. Dx code E11.9, Disp: 100 each, Rfl: 5    Insulin Degludec (Tresiba FlexTouch) 200 UNIT/ML solution pen-injector pen injection, Inject 30 Units under the skin into the appropriate area as directed Every Night., Disp: 9 mL, Rfl: 2    ipratropium-albuterol (DUO-NEB) 0.5-2.5 mg/3 ml nebulizer, , Disp: , Rfl:     Lancets (freestyle) lancets, Use as instructed, Disp: 100 each, Rfl: 5    levocetirizine (XYZAL) 5 MG tablet, Take 1 tablet by mouth Daily., Disp: 90 tablet, Rfl: 1    losartan (COZAAR) 100 MG tablet, TAKE 1 TABLET BY MOUTH DAILY, Disp: 90 tablet, Rfl: 1    MAGNESIUM PO, Take 250 mg by mouth Daily., Disp: , Rfl:     meloxicam (MOBIC)  15 MG tablet, Take 1 tablet by mouth Daily., Disp: , Rfl:     metFORMIN (GLUCOPHAGE) 1000 MG tablet, TAKE ONE TABLET BY MOUTH DAILY WITH FOOD, Disp: 90 tablet, Rfl: 2    methocarbamol (ROBAXIN) 750 MG tablet, Take 1 tablet by mouth Daily., Disp: , Rfl:     Methylcobalamin (B12-ACTIVE) 1 MG chewable tablet, Chew 1 tablet Daily., Disp: , Rfl:     Mirabegron ER (Myrbetriq) 50 MG tablet sustained-release 24 hour 24 hr tablet, Take 50 mg by mouth Daily., Disp: 28 tablet, Rfl: 0    Mirabegron ER (Myrbetriq) 50 MG tablet sustained-release 24 hour 24 hr tablet, Take 50 mg by mouth Daily., Disp: 60 tablet, Rfl: 1    montelukast (SINGULAIR) 10 MG tablet, TAKE ONE TABLET BY MOUTH ONCE NIGHTLY, Disp: 90 tablet, Rfl: 1    Multiple Vitamins-Minerals (MULTIVITAMIN ADULTS PO), Take 1 tablet by mouth Daily., Disp: , Rfl:     NIFEdipine CC (ADALAT CC) 60 MG 24 hr tablet, TAKE 1 TABLET BY MOUTH DAILY, Disp: 90 tablet, Rfl: 2    ondansetron (Zofran) 4 MG tablet, Take 1 tablet by mouth Every 8 (Eight) Hours As Needed for Nausea or Vomiting., Disp: , Rfl:     Rybelsus 3 MG tablet, TAKE ONE TABLET BY MOUTH DAILY, Disp: 30 tablet, Rfl: 3    Semaglutide (Rybelsus) 3 MG tablet, Take 1 tablet by mouth Daily., Disp: 90 tablet, Rfl: 2    sodium chloride 0.65 % nasal spray, Administer 1 spray into the nostril(s) as directed by provider Daily As Needed. 1 spray in each nostril daily as needed, Disp: , Rfl:     spironolactone (ALDACTONE) 50 MG tablet, TAKE 1 TABLET BY MOUTH DAILY, Disp: 90 tablet, Rfl: 2    Symbicort 160-4.5 MCG/ACT inhaler, , Disp: , Rfl:     traMADol (ULTRAM) 50 MG tablet, Take 1 tablet by mouth Every 6 (Six) Hours As Needed for Moderate Pain., Disp: , Rfl:     vitamin B-6 (PYRIDOXINE) 100 MG tablet, Take 1 tablet by mouth Daily., Disp: , Rfl:     fluticasone (FLONASE) 50 MCG/ACT nasal spray, 2 sprays into the nostril(s) as directed by provider Daily for 14 days., Disp: 9.9 mL, Rfl: 0      Allergies   Allergen Reactions     "Oxycodone Hcl Headache    Statins Other (See Comments)     Leg cramps         Pulse 94   Ht 168.4 cm (66.3\")   Wt 77.8 kg (171 lb 9.6 oz)   SpO2 99%   BMI 27.45 kg/m²       Physical Exam:  Constitutional: Patient appears well-developed, well-nourished, well-hydrated, appears younger than stated age  HEENT: Head: Normocephalic and atraumatic  Eyes: Conjunctivae and lids are normal  Pupils: Equal, round, reactive to light  Peripheral vascular exam: Posterior tibialis: right 2+ and left 2+. Dorsalis pedis: right 2+ and left 2+. No edema.   Musculoskeletal   Gait and station: Gait evaluation demonstrated some shuffling. Patient has difficulties walking on heels, toes with her right foot  Lumbar Spine: Passive and active range of motion are limited secondary to pain. Extension, rotation of the lumbar spine increased and reproduced pain. Lumbar facet joint loading maneuvers are positive.  Sacroiliac Joints: Juan Daniel's test; Gaenslen's test; thigh thrust test; SI compression test; posterior shear test; SI distraction test; pelvic rock test; Yeoman's test: Negative   Piriformis maneuvers: Negative   Right Hip Joint: The range of motion of the hip joint is limited to flexion and internal rotation but without pain   Left Hip Joint: The range of motion of the hip joint is limited to flexion and internal rotation but without pain   Palpation of the bilateral psoas tendons and iliopsoas bursas: Unrevealing   Palpation of the bilateral greater trochanters: Unrevealing   Examination of the Iliotibial band: Unrevealing   Neurological:   Patient is alert and oriented to person, place, and time.   Speech: Normal.   Cortical function: Normal mental status.   Reflex Scores:  Right patellar: 0+  Left patellar: 0+  Right Achilles: 0+  Left Achilles: 0+  Motor strength: 5/5  Motor Tone: Normal  Involuntary movements: None.   Superficial/Primitive Reflexes: Primitive reflexes were absent.   Right Cuellar: Absent  Left Cuellar: " Absent  Right ankle clonus: Absent  Left ankle clonus: Absent   Babinsky: Absent  Long tract signs: Negative. Straight leg raising test: Negative. Femoral stretch sign: Negative.   Sensory exam: Intact to light touch, intact pain and temperature sensation, intact vibration sensation and normal proprioception  Coordination:  Finger to nose: Normal. Balance: Normal Romberg's sign: Negative   Skin and subcutaneous tissue: Skin is warm and intact. No rash noted. No cyanosis.   Psychiatric: Judgment and insight: Normal. Recent and remote memory: Intact. Mood and affect: Normal.     ASSESSMENT:   1. Lumbar stenosis with neurogenic claudication    2. Lumbar postlaminectomy syndrome    3. S/P lumbar fusion    4. Ligamentum flavum hypertrophy    5. Spondylosis of lumbar region without myelopathy or radiculopathy    6. Peripheral polyneuropathy    7. Status post revision total right knee replacement    8. Diabetes mellitus type 2 in nonobese    9. ZACH on CPAP    10. Physical deconditioning    11. Gait disturbance      PLAN/MEDICAL DECISION MAKING:  Ms. Lupe Morillo, 74 y.o. female originally referred by Dr. Naveed Sims in consultation for chronic intractable lower back and right lower extremity pain. Lupe Morillo reports a 10-year history of lower back pain. Lupe Morillo was last seen on 08/21/2024, when she underwent diagnostic and therapeutic bilateral L3-L4 transforaminal epidural steroid injections from which she experienced 100% pain relief that lasted for 3 days with 30% ongoing relief of her lower back pain. She complains of severe right lower extremity pain associated with neurogenic claudication. She has a history of L4-L5 lumbar discectomy fusion instrumentation with Dr. Sims on 01/30/2019, and did well until about a year ago. Lupe Morillo reports constant RLE pain from the knee down into her right foot along with low grade lower back pain. MRI of the lumbar spine w/o contrast on  07/05/2024 revealed adjcement level disease at L3-L4: Disc bulge, facet hypertrophjy, ligamentum flavum hypertrophy (7 mm). Right paracentral disc extrusion. Mild to moderate canal, lateral recess and NF stenosis. At L4-L5: Previous fusion. Grade 1 anterolisthesis. Disc osteophyte complex. Mild canal and NF stenosis. At L5-S1: Annular fissure, Modic 1 endplate changes, schmorl's nodes, facet arthropathy. No significant canal stenosis, Mild NF stenosis. Dexa Scan on 03/15/2024: Bone mineral density results are within normal limits. Lupe Morillo underwent orthopedic spine surgical consultation with Dr. Naveed Sims on 07/08/2024, and was found to be a potential candiate for extension of lumbar fusion. Lupe Morillo presents with significant comorbidities including asthma, GERD, HLP, HTN, ZACH on CPAP, Type 2 diabetes mellitus, peripheral neuropathy. Lupe Morillo has failed to obtain pain relief with conservative measures for more than 4 months including oral analgesics, opioids, topical analgesics, ice, heat, TENs, physical therapy (last visit within the past 12 months), physical therapist directed home exercise program HEP (ongoing), independent exercise program (ongoing), to name a few. Pain has progressed in intensity over the past months. A comprehensive evaluation including history and physical exam along with pertinent physiologic and functional assessment was performed. Patient presents with intractable pain due to the diagnoses listed above. Patient has failed to respond to conservative modalities, as referenced under HPI. I have documented the impact of patient's moderate-to-severe pain contributing to significant impairment in daily activities, ADLs, and a negative impact on the patient's quality of life, as reflected on Global Pain Scale 36/100 (up from 26/100); The [place] Back Pain Disability Scale 42 (up from 35/100); [place] Claudication Score 29/50 (up from 24/50); [place]etti Gait &  Balance Assessment Tool  (low risk for falls). I have reviewed pertinent supporting diagnostic studies of patient's chronic pain condition as well as all available pertinent medical records to patient's chronic pain condition including previous therapies, as referenced above.  PHQ-2 Depression Screening 0; Pain Self-Efficacy Questionnaire (PSEQ) 50/60 (down 60/60). I had a lengthy conversation with Ms. Lupe Morillo regarding her chronic pain condition and potential therapeutic options including risks, benefits, alternative therapies, to name a few. We have discussed using a stepwise approach starting with the least intense level of care as determined by the extent required to diagnose and or treat a patient's condition. The proposed treatments are consistent with the patient's medical condition and known to be safe and effective by current guidelines and the standard of care. The duration and frequency proposed are considered appropriate for the service in accordance with accepted standards of medical practice for the diagnosis and treatment of the patient's condition and intended to improve the patient's level of function. These services will be furnished in a setting appropriate to the patient's medical needs and condition. Therefore, I have proposed the following plan:    1. Interventional pain management measures: Patient does not take blood thinners. The patient will be scheduled for diagnostic and therapeutic right L3-L4 and right L4-L5 transforaminal epidural steroid injections using the lowest effective dose of steroids, under C-arm fluoroscopic guidance, with the use of contrast dye to confirm appropriate needle placement and spread of contrast dye. We may repeat therapeutic right L3-L4 and right L4-L5 transforaminal epidural steroid injections depending on patient's outcome and following current guidelines. Epidurals will be limited to a maximum of 4 sessions per spinal region in a rolling twelve  (12) month period. Continuation of epidural steroid injections over 12 months would only be considered under the following provisions;  Patient is a high-risk surgical candidate, or the patient does not desire surgery, or recurrence of pain in the same location relieved with ESIs for at least three months and epidural provides at least 50% sustained improvement of pain and/or 50% objective improvement in function (using same scale as baseline)  Pain is severe enough to cause a significant degree of functional disability or vocational disability  The primary care provider will be notified regarding continuation of procedures and repeat steroid use   Other options for treatment of patient's pain would include MILD at L3-L4 as ligamentum flavum hypertrophy is the main component of central canal stenosis, Minuteman, Vertiflex, PNS Sprint, SCS, follow-up with Dr. Sims for decompression and extension of lumbar fusion.     2. Diagnostic studies:   A. Lumbar Spine X-rays, full views including flexion and extension to assess lumbar stability  B. Prior to SCS: MRI of the thoracic spine without contrast to assess capacity and patency of the spinal canal and epidural space prior to spinal cord stimulator trial and implant  C. Prior to MILD: CBC, PT, PTT     3. Pharmacological measures: Reviewed and discussed;   A. Patient takes celecoxib, cyclobenzaprine, methocarbamol, tramadol, AsperCream Patches  B. Continue Rheumate one tablet once daily  C. Continue prilocaine 2%, lidocaine 10%, imipramine 3%, capsaicin 0.001%, and mannitol 20%  cream, apply 1 to 2 grams of cream to the affected areas every 4 to 6 hours as needed    4. Long-term rehabilitation efforts:  A. The patient does not have a history of falls. I performed a risk assessment for falls using the Tinetti gait & balance assessment tool (scored low risk for falls).   B. Patient will start a comprehensive physical therapy program at [facility] for  Alter-G, water therapy, gait and balance training, neurodynamics, core strengthening, gluteal and abductor strengthening, ultrasound, ASTYM, E-STIM, myofascial release, cupping, dry needling, home exercise program, 2-3 x per week for 8 weeks   C. Contrast therapy: Apply ice-packs for 15-20 minutes, followed by heating pads for 15-20 minutes to affected area   D. Start a low impact exercise program such as water therapy, swimming, yoga, Pilates  E. Prior to SCS/PNS:  Referral to Dr. Thong Plaza for psychological screening for peripheral nerve stimulation, spinal cord stimulation\  F. Lupe Morillo  reports that she has never smoked. She has never been exposed to tobacco smoke. She has never used smokeless tobacco.     5. The patient has been instructed to contact my office with any questions or difficulties. The patient understands the plan and agrees to proceed accordingly.    The patient has a documented plan of care to address chronic pain. Lupe Morilol reports a pain score of 5/10.  Given her pain assessment as noted, treatment options were discussed and the following options were decided upon as a follow-up plan to address the patient's pain: continuation of current treatment plan for pain, educational materials on pain management, home exercises and therapy, prescription for non-opiod analgesics, referral to Physical Therapy, referral to specialist for assistance in pain treatment guidance, steroid injections, use of non-medical modalities (ice, heat, stretching and/or behavior modifications), and interventional pain management measures .               Pain Management Panel  More data exists         3/28/2024 9/26/2023   Pain Management Panel   Creatinine, Urine 50  50       Details                    FREDIS query complete. FREDIS reviewed by Kurt Quintana MD.     Pain Medications               celecoxib (CeleBREX) 200 MG capsule Take 1 capsule by mouth 2 (Two) Times a Day.    cyclobenzaprine  (FLEXERIL) 5 MG tablet TAKE ONE TABLET BY MOUTH THREE TIMES A DAY AS NEEDED FOR MUSCLE SPASMS    meloxicam (MOBIC) 15 MG tablet Take 1 tablet by mouth Daily.    methocarbamol (ROBAXIN) 750 MG tablet Take 1 tablet by mouth Daily.    traMADol (ULTRAM) 50 MG tablet Take 1 tablet by mouth Every 6 (Six) Hours As Needed for Moderate Pain.             No orders of the defined types were placed in this encounter.       Please note that portions of this note were completed with a voice recognition program.   Any copied data in any portion of my note has been reviewed by myself and accurate.     The 21st Century Cures Act makes medical notes like this available to patients in the interest of transparency. This is a medical document intended as peer to peer communication. It is written in medical language and may contain abbreviations or verbiage that are unfamiliar. It may appear blunt or direct. Medical documents are intended to carry relevant information, facts as evident, and the clinical opinion of the practitioner.     Kurt Quintana MD    Patient Care Team:  Michael Blackwell MD as PCP - [facility]  Yanique Hough MD as Consulting Physician (Hand Surgery)     No orders of the defined types were placed in this encounter.        Future Appointments   Date Time Provider Department Center   11/6/2024 11:00 AM Katarzyna Ventura APRN [facility]   3/28/2025 10:15 AM Michael Blackwell MD [facility]

## 2024-10-03 ENCOUNTER — TELEPHONE (OUTPATIENT)
Dept: PAIN MEDICINE | Facility: CLINIC | Age: 74
End: 2024-10-03
Payer: COMMERCIAL

## 2024-10-03 NOTE — TELEPHONE ENCOUNTER
Caller: Lupe Morillo    Relationship: Self    Best call back number: [phone]    What is the best time to reach you: ANYTIME    Who are you requesting to speak with (clinical staff, provider,  specific staff member): CLINICAL    Do you know the name of the person who called: LÓPEZ    What was the call regarding: POSSIBLY MOVING PROCEDURE UP EARLY AFTER TALKING WITH INSURANCE.    Is it okay if the provider responds through MyChart: NO     Problem: Patient Care Overview  Goal: Plan of Care/Patient Progress Review  Outcome: Improving  A/O, VSS, Ax1, abd steri strips and abd binder in place WDL, hypoactive BS, no flatus. Warm pack, oxy, and toradol for pain. IS 2250.

## 2024-10-09 ENCOUNTER — OUTSIDE FACILITY SERVICE (OUTPATIENT)
Dept: PAIN MEDICINE | Facility: CLINIC | Age: 74
End: 2024-10-09
Payer: COMMERCIAL

## 2024-10-09 ENCOUNTER — DOCUMENTATION (OUTPATIENT)
Dept: PAIN MEDICINE | Facility: CLINIC | Age: 74
End: 2024-10-09

## 2024-10-09 NOTE — PROGRESS NOTES
[facility]     PROCEDURE DATE: 10/09/2024    PREOPERATIVE DIAGNOSES:  1. Lumbar stenosis with neurogenic claudication   2. Lumbar postlaminectomy syndrome   3. S/P lumbar fusion   4. Ligamentum flavum hypertrophy   5. Spondylosis of lumbar region without myelopathy or radiculopathy   6. Peripheral polyneuropathy   7. Status post revision total right knee replacement   8. Diabetes mellitus type 2 in nonobese   9. ZACH on CPAP   10. Physical deconditioning   11. Gait disturbance     POSTOPERATIVE DIAGNOSES:  1. Lumbar stenosis with neurogenic claudication   2. Lumbar postlaminectomy syndrome   3. S/P lumbar fusion   4. Ligamentum flavum hypertrophy   5. Spondylosis of lumbar region without myelopathy or radiculopathy   6. Peripheral polyneuropathy   7. Status post revision total right knee replacement   8. Diabetes mellitus type 2 in nonobese   9. ZACH on CPAP   10. Physical deconditioning   11. Gait disturbance     PROCEDURES PERFORMED:   Diagnostic and therapeutic right L3-L4 transforaminal epidural steroid injection  Diagnostic and therapeutic right L4-L5 transforaminal epidural steroid injection    ANESTHESIA: Local anesthesia plus IV sedation    COMPLICATIONS: None    EBL: 0    MEDICAL NECESSITY: A comprehensive evaluation, including history and physical exam and pertinent physiologic and functional assessment, was performed. The patient presents with intractable pain due to the diagnoses listed above. The patient has failed to respond to conservative modalities including the impact of patient's moderate-to-severe pain contributing to significant impairment in daily activities, ADLs, and a negative impact on quality of life, as referenced under HPI. Supporting diagnostic studies of patient's chronic pain condition have been reviewed. We have discussed using a stepwise approach starting with the shortest or least intense level of treatment, care, or service as determined by the extent required to diagnose and or treat a  patient's condition. The treatments proposed are consistent with the patient's medical condition and are known to be as safe and effective by current guidelines and standard of care. See OV note for additional details.     PROCEDURE SUMMARY: After explaining all the risks and benefits of the procedure, an informed consent was obtained.  The patient's surgical site was confirmed with the patient and marked in the holding room accordingly. The patient was transferred to the procedure room and placed on the operating room table in the prone position. Time out was completed. Noninvasive monitors were applied. Administration of IV sedation was performed by a designated procedure room nurse, who monitored the patient's level of consciousness and physiological status. Pertinent information was reported on a sedation flow sheet, which is part of the patient's permanent medical records. Start sedation time: 09:39 AM. The patient's vital signs remained within normal limits. The lumbar spine area was prepped and draped in a sterile fashion.  Using C-arm fluoroscopic guidance, the six o'clock area of the right L3 and right L4 pedicles (targets) were identified. The skin and tissues overlying the targets were anesthetized with five ml of 1% lidocaine. Then, 22-gauge styletted needles were advanced into the most posterior and superior portion of the right L3-L4 and right L4-L5 neural foramina without any difficulties. The patient did not experience paresthesia. AP and lateral X-ray views were obtained confirming appropriate needle placement. Aspiration was negative for blood and/or CSF. One ml of Omnipaque-240 was injected per lumbar level, and contrast dye was seen bathing the right L3 and right L4 nerve roots with spread into the epidural space. X-rays were obtained and scanned in the patient's chart. Two ml of 0.25% bupivacaine with 4 mg of dexamethasone were injected per lumbar level. Fluoroscopy was utilized using low-dose  of radiation applying collimation, pulsed mode, and shielding following ALARA recommendations.  Fluoroscopy time: 23 seconds. End sedation time: 09:46 AM. The patient tolerated the procedure well and without incident.  Upon completion of the procedure, the patient was transferred to the recovery room in stable condition. The patient was discharged from the [facility] Center neurologically intact and with appropriate discharge instructions. Pain level before procedure: 8/10. Pain level after procedure: 0/10.    PLAN:   Follow-up with DARI Neely in 10 weeks. We may repeat therapeutic right L3-L4 and right L4-L5 transforaminal epidural steroid injections depending on patient's outcome and following current guidelines. Other options for treatment of patient's pain would include MILD at L3-L4 as ligamentum flavum hypertrophy is the main component of central canal stenosis, Minuteman, Vertiflex, PNS Sprint, SCS, follow-up with Dr. Sims for decompression and extension of lumbar fusion.   Diagnostic studies: See OV note.  Pharmacological measures: See OV note.  Long-term rehabilitation efforts: See OV note.  The patient has been instructed to contact my office with any questions or difficulties. The patient understands the plan and agrees to proceed accordingly.      Signatures  Electronically signed by: Kurt Quintana M.D.; OCTOBER 09, 2024, 09:59 AM EST (Author)

## 2024-10-13 ENCOUNTER — HOSPITAL ENCOUNTER (EMERGENCY)
Facility: HOSPITAL | Age: 74
Discharge: HOME | End: 2024-10-13
Attending: EMERGENCY MEDICINE | Admitting: EMERGENCY MEDICINE
Payer: COMMERCIAL

## 2024-10-13 ENCOUNTER — APPOINTMENT (OUTPATIENT)
Dept: CT IMAGING | Facility: HOSPITAL | Age: 74
End: 2024-10-13
Payer: COMMERCIAL

## 2024-10-13 VITALS
DIASTOLIC BLOOD PRESSURE: 66 MMHG | TEMPERATURE: 98.1 F | BODY MASS INDEX: 27.32 KG/M2 | SYSTOLIC BLOOD PRESSURE: 129 MMHG | HEART RATE: 93 BPM | HEIGHT: 66 IN | WEIGHT: 170 LBS | RESPIRATION RATE: 16 BRPM | OXYGEN SATURATION: 95 %

## 2024-10-13 DIAGNOSIS — R11.0 NAUSEA: ICD-10-CM

## 2024-10-13 DIAGNOSIS — R19.7 DIARRHEA, UNSPECIFIED TYPE: ICD-10-CM

## 2024-10-13 DIAGNOSIS — R10.84 GENERALIZED ABDOMINAL PAIN: Primary | ICD-10-CM

## 2024-10-13 DIAGNOSIS — E11.65 HYPERGLYCEMIA DUE TO DIABETES MELLITUS: ICD-10-CM

## 2024-10-13 LAB
ALBUMIN SERPL-MCNC: 4.6 G/DL (ref 3.5–5.2)
ALBUMIN/GLOB SERPL: 1.8 G/DL
ALP SERPL-CCNC: 88 U/L (ref 39–117)
ALT SERPL W P-5'-P-CCNC: 14 U/L (ref 1–33)
ANION GAP SERPL CALCULATED.3IONS-SCNC: 12 MMOL/L (ref 5–15)
AST SERPL-CCNC: 17 U/L (ref 1–32)
BASOPHILS # BLD AUTO: 0.05 10*3/MM3 (ref 0–0.2)
BASOPHILS NFR BLD AUTO: 0.8 % (ref 0–1.5)
BILIRUB SERPL-MCNC: 0.4 MG/DL (ref 0–1.2)
BILIRUB UR QL STRIP: NEGATIVE
BUN SERPL-MCNC: 19 MG/DL (ref 8–23)
BUN/CREAT SERPL: 24.1 (ref 7–25)
CALCIUM SPEC-SCNC: 9.6 MG/DL (ref 8.6–10.5)
CHLORIDE SERPL-SCNC: 106 MMOL/L (ref 98–107)
CLARITY UR: CLEAR
CO2 SERPL-SCNC: 23 MMOL/L (ref 22–29)
COLOR UR: YELLOW
CREAT SERPL-MCNC: 0.79 MG/DL (ref 0.57–1)
D-LACTATE SERPL-SCNC: 2.5 MMOL/L (ref 0.5–2)
DEPRECATED RDW RBC AUTO: 46.2 FL (ref 37–54)
EGFRCR SERPLBLD CKD-EPI 2021: 79.1 ML/MIN/1.73
EOSINOPHIL # BLD AUTO: 0.44 10*3/MM3 (ref 0–0.4)
EOSINOPHIL NFR BLD AUTO: 7.2 % (ref 0.3–6.2)
ERYTHROCYTE [DISTWIDTH] IN BLOOD BY AUTOMATED COUNT: 13.6 % (ref 12.3–15.4)
GLOBULIN UR ELPH-MCNC: 2.6 GM/DL
GLUCOSE SERPL-MCNC: 185 MG/DL (ref 65–99)
GLUCOSE UR STRIP-MCNC: ABNORMAL MG/DL
HCT VFR BLD AUTO: 43.1 % (ref 34–46.6)
HGB BLD-MCNC: 13.6 G/DL (ref 12–15.9)
HGB UR QL STRIP.AUTO: NEGATIVE
HOLD SPECIMEN: NORMAL
IMM GRANULOCYTES # BLD AUTO: 0.01 10*3/MM3 (ref 0–0.05)
IMM GRANULOCYTES NFR BLD AUTO: 0.2 % (ref 0–0.5)
KETONES UR QL STRIP: NEGATIVE
LEUKOCYTE ESTERASE UR QL STRIP.AUTO: NEGATIVE
LIPASE SERPL-CCNC: 37 U/L (ref 13–60)
LYMPHOCYTES # BLD AUTO: 2.19 10*3/MM3 (ref 0.7–3.1)
LYMPHOCYTES NFR BLD AUTO: 35.7 % (ref 19.6–45.3)
MCH RBC QN AUTO: 29.4 PG (ref 26.6–33)
MCHC RBC AUTO-ENTMCNC: 31.6 G/DL (ref 31.5–35.7)
MCV RBC AUTO: 93.3 FL (ref 79–97)
MONOCYTES # BLD AUTO: 0.44 10*3/MM3 (ref 0.1–0.9)
MONOCYTES NFR BLD AUTO: 7.2 % (ref 5–12)
NEUTROPHILS NFR BLD AUTO: 3 10*3/MM3 (ref 1.7–7)
NEUTROPHILS NFR BLD AUTO: 48.9 % (ref 42.7–76)
NITRITE UR QL STRIP: NEGATIVE
NRBC BLD AUTO-RTO: 0 /100 WBC (ref 0–0.2)
PH UR STRIP.AUTO: 5.5 [PH] (ref 5–8)
PLATELET # BLD AUTO: 320 10*3/MM3 (ref 140–450)
PMV BLD AUTO: 9.7 FL (ref 6–12)
POTASSIUM SERPL-SCNC: 4.8 MMOL/L (ref 3.5–5.2)
PROT SERPL-MCNC: 7.2 G/DL (ref 6–8.5)
PROT UR QL STRIP: NEGATIVE
RBC # BLD AUTO: 4.62 10*6/MM3 (ref 3.77–5.28)
SODIUM SERPL-SCNC: 141 MMOL/L (ref 136–145)
SP GR UR STRIP: 1.02 (ref 1–1.03)
UROBILINOGEN UR QL STRIP: ABNORMAL
WBC NRBC COR # BLD AUTO: 6.13 10*3/MM3 (ref 3.4–10.8)
WHOLE BLOOD HOLD COAG: NORMAL
WHOLE BLOOD HOLD SPECIMEN: NORMAL

## 2024-10-13 PROCEDURE — 83690 ASSAY OF LIPASE: CPT | Performed by: EMERGENCY MEDICINE

## 2024-10-13 PROCEDURE — 85025 COMPLETE CBC W/AUTO DIFF WBC: CPT | Performed by: EMERGENCY MEDICINE

## 2024-10-13 PROCEDURE — 25510000001 IOPAMIDOL 61 % SOLUTION: Performed by: EMERGENCY MEDICINE

## 2024-10-13 PROCEDURE — 81003 URINALYSIS AUTO W/O SCOPE: CPT | Performed by: EMERGENCY MEDICINE

## 2024-10-13 PROCEDURE — 74177 CT ABD & PELVIS W/CONTRAST: CPT

## 2024-10-13 PROCEDURE — 83605 ASSAY OF LACTIC ACID: CPT | Performed by: EMERGENCY MEDICINE

## 2024-10-13 PROCEDURE — 99285 EMERGENCY DEPT VISIT HI MDM: CPT

## 2024-10-13 PROCEDURE — 80053 COMPREHEN METABOLIC PANEL: CPT | Performed by: EMERGENCY MEDICINE

## 2024-10-13 RX ORDER — IOPAMIDOL 612 MG/ML
85 INJECTION, SOLUTION INTRAVASCULAR
Status: COMPLETED | OUTPATIENT
Start: 2024-10-13 | End: 2024-10-13

## 2024-10-13 RX ORDER — SODIUM CHLORIDE 9 MG/ML
10 INJECTION, SOLUTION INTRAMUSCULAR; INTRAVENOUS; SUBCUTANEOUS AS NEEDED
Status: DISCONTINUED | OUTPATIENT
Start: 2024-10-13 | End: 2024-10-13 | Stop reason: HOSPADM

## 2024-10-13 RX ORDER — DICYCLOMINE HCL 20 MG
20 TABLET ORAL EVERY 6 HOURS PRN
Qty: 20 TABLET | Refills: 0 | Status: SHIPPED | OUTPATIENT
Start: 2024-10-13 | End: 2024-10-18

## 2024-10-13 RX ORDER — ONDANSETRON 4 MG/1
4 TABLET, ORALLY DISINTEGRATING ORAL EVERY 6 HOURS PRN
Qty: 15 TABLET | Refills: 0 | Status: SHIPPED | OUTPATIENT
Start: 2024-10-13

## 2024-10-13 RX ADMIN — IOPAMIDOL 85 ML: 612 INJECTION, SOLUTION INTRAVENOUS at 11:20
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
## 2024-10-16 ENCOUNTER — TELEPHONE (OUTPATIENT)
Dept: PAIN MEDICINE | Facility: CLINIC | Age: 74
End: 2024-10-16
Payer: COMMERCIAL

## 2024-10-16 NOTE — TELEPHONE ENCOUNTER
FOLLOW UP CALL AFTER PROCEDURE    I spoke with the patient regarding how she is feeling after her procedure with Dr Quintana on 10/06/24. Patient reports that she is doing well.    Lupe Morillo underwent a diagnostic and therapeutic right L3-L4 and right L4-L5 transforaminal epidural steroid injections  on 10/09/2024. Patient reported 100% relief at the time of after procedure exam with Dr Quintana. In addition, patient experienced significant functional improvement (perforing activities without experiencing pain compared with examoination prior to procedure that produced these activities.)    Pain level before procedure: 8/10  Pain level after procedure: 3/10    Today, the patient reports 70% pain relief (pain level 3/10.)    Patient denies side effects or complications.  Patient does not have any questions or concerns at this time.    Advised patient of follow up with DARI Neely on 01/8/24.

## 2024-10-28 ENCOUNTER — TRANSCRIBE ORDERS (OUTPATIENT)
Dept: ADMINISTRATIVE | Facility: HOSPITAL | Age: 74
End: 2024-10-28
Payer: COMMERCIAL

## 2024-10-28 DIAGNOSIS — Z12.31 SCREENING MAMMOGRAM FOR BREAST CANCER: Primary | ICD-10-CM

## 2024-11-06 ENCOUNTER — OFFICE VISIT (OUTPATIENT)
Dept: UROLOGY | Facility: CLINIC | Age: 74
End: 2024-11-06
Payer: COMMERCIAL

## 2024-11-06 VITALS — HEART RATE: 85 BPM | OXYGEN SATURATION: 96 % | DIASTOLIC BLOOD PRESSURE: 75 MMHG | SYSTOLIC BLOOD PRESSURE: 146 MMHG

## 2024-11-06 DIAGNOSIS — N32.81 OAB (OVERACTIVE BLADDER): Primary | ICD-10-CM

## 2024-11-06 NOTE — PROGRESS NOTES
[facility] Female Office Visit      Patient Name: Lupe Morillo  : 1950   MRN: [id number]     Chief Complaint:  Lower Urinary Tract Symptoms.   Chief Complaint   Patient presents with    Urge incontinence    Overactive Bladder        Referring Provider: No ref. provider found    History of Present Illness: Ms. Morillo is a 74 y.o. female with history lower urinary tract symptoms. She reports significant improvement in urinary urgency and urinary frequency with Myrbetriq. She reports improvement in urinary stream. She continues to report nocturia x1-2.  Her insurance is paying for her Myrbetriq. She is pleased with her urinary symptom improvement.   Subjective      Review of System: Review of Systems   Genitourinary:  Negative for frequency and urgency.        Nocturia   All other systems reviewed and are negative.     I have reviewed the ROS documented by my clinical staff, I have updated appropriately and I agree. DARI Delaney    Past Medical History:  Past Medical History:   Diagnosis Date    Arthritis     Asthma     Back pain     Diabetes mellitus     type 2 dm, 10 years, check blood sugar once A DAY, LAST A1C 6.8%    GERD (gastroesophageal reflux disease)     History of chronic bronchitis     History of diverticulitis     History of hypercholesterolemia     Hyperlipidemia     Hypertension     PONV (postoperative nausea and vomiting)     with tubal ligation    Sleep apnea with use of continuous positive airway pressure (CPAP)     USE CPAP    Urinary incontinence        Past Surgical History:  Past Surgical History:   Procedure Laterality Date    CATARACT EXTRACTION Bilateral     COLON RESECTION      4-5 years ago    COLONOSCOPY      2018    HYSTERECTOMY      AGE ~ 46    JOINT REPLACEMENT  2022    Right Knee Revision    KNEE ARTHROSCOPY Right     LUMBAR DISCECTOMY FUSION INSTRUMENTATION N/A 2019    Procedure: LUMBAR LAMINECTOMY POSTERIOR WITH FUSION INSTRUMENTATION L4-5;  Surgeon:  Naveed Sims MD;  Location:  [facility];  Service: Orthopedic Spine    OOPHORECTOMY Bilateral     AGE ~ 46    TOTAL KNEE ARTHROPLASTY Right 11/22/2021    Procedure: TOTAL KNEE ARTHROPLASTY RIGHT;  Surgeon: Ariel Leyva MD;  Location:  [facility];  Service: Orthopedics;  Laterality: Right;    TOTAL KNEE ARTHROPLASTY REVISION Right 12/14/2022    Procedure: REVISION RIGHT TOTAL KNEE ARTHROPLASTY - FEMORAL COMPONENT;  Surgeon: Bishop Romo MD;  Location:  [facility];  Service: Orthopedics;  Laterality: Right;    TUBAL ABDOMINAL LIGATION         Medications:    Current Outpatient Medications:     Ascorbic Acid (VITAMIN C) 100 MG chewable tablet, Chew 1 tablet Daily., Disp: , Rfl:     BD Pen Needle Joanne 2nd Gen 32G X 4 MM misc, USE DAILY TO INJECT INSULIN, Disp: 100 each, Rfl: 5    Boswellia-Glucosamine-Vit D (OSTEO BI-FLEX ONE PER DAY PO), Take  by mouth., Disp: , Rfl:     celecoxib (CeleBREX) 200 MG capsule, Take 1 capsule by mouth 2 (Two) Times a Day., Disp: 60 capsule, Rfl: 4    cholecalciferol (Cholecalciferol) 25 MCG (1000 UT) tablet, Take 1 tablet by mouth Daily., Disp: , Rfl:     cyclobenzaprine (FLEXERIL) 5 MG tablet, TAKE ONE TABLET BY MOUTH THREE TIMES A DAY AS NEEDED FOR MUSCLE SPASMS, Disp: 50 tablet, Rfl: 3    dapagliflozin (FARXIGA) 5 MG tablet tablet, TAKE ONE TABLET BY MOUTH DAILY, Disp: 90 tablet, Rfl: 3    Dietary Management Product (Rheumate) capsule, Take 1 capsule by mouth Daily., Disp: 90 capsule, Rfl: 0    docusate sodium (COLACE) 100 MG capsule, Take 1 capsule by mouth 2 (Two) Times a Day., Disp: , Rfl:     ezetimibe (ZETIA) 10 MG tablet, TAKE ONE TABLET BY MOUTH DAILY, Disp: 90 tablet, Rfl: 2    Gel Base gel, prilocaine 2% lidocaine 10% imipramine 3% capsaicin 0.001% mannitol 20%, 1 to 2 grams of cream to the affected areas Q4-6PRN, Disp: 240 g, Rfl: 5    glucose blood (FREESTYLE TEST STRIPS) test strip, Check sugars 3 times a day. Dx code E11.9, Disp: 100 each, Rfl: 5    Insulin Degludec  (Tresiba FlexTouch) 200 UNIT/ML solution pen-injector pen injection, Inject 30 Units under the skin into the appropriate area as directed Every Night., Disp: 9 mL, Rfl: 2    ipratropium-albuterol (DUO-NEB) 0.5-2.5 mg/3 ml nebulizer, , Disp: , Rfl:     Lancets (freestyle) lancets, Use as instructed, Disp: 100 each, Rfl: 5    levocetirizine (XYZAL) 5 MG tablet, Take 1 tablet by mouth Daily., Disp: 90 tablet, Rfl: 1    losartan (COZAAR) 100 MG tablet, TAKE 1 TABLET BY MOUTH DAILY, Disp: 90 tablet, Rfl: 1    meloxicam (MOBIC) 15 MG tablet, Take 1 tablet by mouth Daily., Disp: , Rfl:     metFORMIN (GLUCOPHAGE) 1000 MG tablet, TAKE ONE TABLET BY MOUTH DAILY WITH FOOD, Disp: 90 tablet, Rfl: 2    methocarbamol (ROBAXIN) 750 MG tablet, Take 1 tablet by mouth Daily., Disp: , Rfl:     Methylcobalamin (B12-ACTIVE) 1 MG chewable tablet, Chew 1 tablet Daily., Disp: , Rfl:     Mirabegron ER (Myrbetriq) 50 MG tablet sustained-release 24 hour 24 hr tablet, Take 50 mg by mouth Daily., Disp: 60 tablet, Rfl: 1    montelukast (SINGULAIR) 10 MG tablet, TAKE ONE TABLET BY MOUTH ONCE NIGHTLY, Disp: 90 tablet, Rfl: 1    Multiple Vitamins-Minerals (MULTIVITAMIN ADULTS PO), Take 1 tablet by mouth Daily., Disp: , Rfl:     NIFEdipine CC (ADALAT CC) 60 MG 24 hr tablet, TAKE 1 TABLET BY MOUTH DAILY, Disp: 90 tablet, Rfl: 2    ondansetron ODT (ZOFRAN-ODT) 4 MG disintegrating tablet, Place 1 tablet on the tongue Every 6 (Six) Hours As Needed for Nausea or Vomiting for up to 15 doses., Disp: 15 tablet, Rfl: 0    Rybelsus 3 MG tablet, TAKE ONE TABLET BY MOUTH DAILY, Disp: 30 tablet, Rfl: 3    Semaglutide (Rybelsus) 3 MG tablet, Take 1 tablet by mouth Daily., Disp: 90 tablet, Rfl: 2    sodium chloride 0.65 % nasal spray, Administer 1 spray into the nostril(s) as directed by provider Daily As Needed. 1 spray in each nostril daily as needed, Disp: , Rfl:     spironolactone (ALDACTONE) 50 MG tablet, TAKE 1 TABLET BY MOUTH DAILY, Disp: 90 tablet, Rfl: 2     Symbicort 160-4.5 MCG/ACT inhaler, , Disp: , Rfl:     traMADol (ULTRAM) 50 MG tablet, Take 1 tablet by mouth Every 6 (Six) Hours As Needed for Moderate Pain., Disp: , Rfl:     vitamin B-6 (PYRIDOXINE) 100 MG tablet, Take 1 tablet by mouth Daily., Disp: , Rfl:     fluticasone (FLONASE) 50 MCG/ACT nasal spray, 2 sprays into the nostril(s) as directed by provider Daily for 14 days., Disp: 9.9 mL, Rfl: 0    Allergies:  Allergies   Allergen Reactions    Oxycodone Hcl Headache    Statins Other (See Comments)     Leg cramps       Social History:  Social History     Socioeconomic History    Marital status:    Tobacco Use    Smoking status: Never     Passive exposure: Never    Smokeless tobacco: Never   Vaping Use    Vaping status: Never Used   Substance and Sexual Activity    Alcohol use: No    Drug use: No    Sexual activity: Not Currently       Family History:  Family History   Problem Relation Age of Onset    Diabetes Mother         Colon Cancer    Ovarian cancer Mother         DX AGE LATE 40'S-EARLY 50'S    Hypertension Mother         Colon Cancer    Cancer Mother     Hyperlipidemia Father         High Blood Pressure    Hypertension Maternal Grandmother     Diabetes Maternal Grandmother         Diabetic    Cancer Other     Breast cancer Maternal Aunt         DX AGE UNKNOWN     Bladder & Bowel Symptom Questionnaire    How often do you usually urinate during the day ?   1 - About every 3-4 hours   2.   How many timed do you urinate at night?   1 - 2 times at night   3.   What is the reason that you usually urinate?   2 - Moderate urge (can delay 10-60 min)   4.   Once you get the urge to go, how long can you     comfortably delay?   2 - 10-30 min   5.   How often do you get a sudden urge that makes you rush to the bathroom?   1 - Rarely   6.   How often does a sudden urge to urinate result in you leaking urine or wetting pads?   1 - Rarely   7.  In your opinion, how good is your bladder control?   2 - Good   8.   Do you have accidental bowel leakage?   no   9.  Do you have difficulty fully emptying your bladder?   no   10.  Do you experience accidental leakage when performing some physical activity such as coughing, sneezing, laughing or exercise?   no   11. Have you tried medications to help improve your symptoms?   yes   12. Would you be interested in learning about a long-lasting option that may help you with your symptoms?   no                                                                             Total Score   10     0-7 (Mild) 8-16 (Moderate) 17-28 (Severe)        Objective     Physical Exam:   Vital Signs:   Vitals:    11/06/24 1057   BP: 146/75   Pulse: 85   SpO2: 96%     There is no height or weight on file to calculate BMI.     Physical Exam  Vitals and nursing note reviewed.   Constitutional:       Appearance: Normal appearance.   HENT:      Head: Normocephalic and atraumatic.      Nose: Nose normal.      Mouth/Throat:      Mouth: Mucous membranes are moist.   Eyes:      Pupils: Pupils are equal, round, and reactive to light.   Pulmonary:      Effort: Pulmonary effort is normal.   Abdominal:      General: Abdomen is flat.      Palpations: Abdomen is soft.   Musculoskeletal:         General: Normal range of motion.      Cervical back: Normal range of motion.   Skin:     General: Skin is warm and dry.      Capillary Refill: Capillary refill takes less than 2 seconds.   Neurological:      General: No focal deficit present.      Mental Status: She is alert.   Psychiatric:         Mood and Affect: Mood normal.       Labs:   Brief Urine Lab Results  (Last result in the past 365 days)        Color   Clarity   Blood   Leuk Est   Nitrite   Protein   CREAT   Urine HCG        10/13/24 1039 Yellow   Clear   Negative   Negative   Negative   Negative                        Lab Results   Component Value Date    GLUCOSE 185 (H) 10/13/2024    CALCIUM 9.6 10/13/2024     10/13/2024    K 4.8 10/13/2024    CO2 23.0  10/13/2024     10/13/2024    BUN 19 10/13/2024    CREATININE 0.79 10/13/2024    EGFRIFAFRI 87 11/23/2021    EGFRIFNONA  08/23/2016      Comment:       [employer] Guidelines - Stage 1 Normal of High GFR 90+, Stage 2 = Mild decrease GFR 60-89, Stage 3 = Moderate decrease GFR 30-59, Stage 4 = Severe decrease GFR 15-29, and Stage 5 = Kidney failure GFR <15    BCR 24.1 10/13/2024    ANIONGAP 12.0 10/13/2024       Lab Results   Component Value Date    WBC 6.13 10/13/2024    HGB 13.6 10/13/2024    HCT 43.1 10/13/2024    MCV 93.3 10/13/2024     10/13/2024       Images:   CT Abdomen Pelvis With Contrast    Result Date: 10/13/2024  Impression: 1.No acute abnormality identified within the abdomen or pelvis. 2.Nonemergent findings as detailed above. Electronically Signed: Carlin Salomon MD  10/13/2024 11:34 AM EDT  Workstation ID: [id number]    XR Spine Lumbar Complete With Flex & Ext    Result Date: 10/3/2024  Impression: Posterior instrumented fusion and interbody fusion at L4-L5. No hardware complication. No dynamic listhesis/instability. Electronically Signed: Gordon Donald MD  10/3/2024 8:23 AM EDT  Workstation ID: [id number]      Measures:   Tobacco:   Lupe Morillo  reports that she has never smoked. She has never been exposed to tobacco smoke. She has never used smokeless tobacco.          Urine Incontinence: Patient reports that she is not currently experiencing any symptoms of urinary incontinence.      Assessment / Plan      Assessment:  Mrs. Morillo is a 74 y.o. female who presented today with lower urinary tract symptoms.     She is pleased with her urinary symptom improvement. She will continue Myrbetriq. F/U in 6 months for symptom check.     Diagnoses and all orders for this visit:    1. OAB (overactive bladder) (Primary)         Follow Up:   Return in about 6 months (around 5/6/2025).    I spent approximately 15 minutes providing clinical care for this patient; including review  of patient's chart and provider documentation, face to face time spent with patient in examination room (obtaining history, performing physical exam, discussing diagnosis and management options), placing orders, and completing patient documentation.     DARI Delaney  [facility] [place]

## 2024-11-26 DIAGNOSIS — M48.062 LUMBAR STENOSIS WITH NEUROGENIC CLAUDICATION: ICD-10-CM

## 2024-11-26 RX ORDER — ME-TETRAHYDROFOLATE/B12/HRB236 1-1-500 MG
1 CAPSULE ORAL DAILY
Qty: 90 CAPSULE | Refills: 3 | Status: SHIPPED | OUTPATIENT
Start: 2024-11-26

## 2024-11-26 NOTE — TELEPHONE ENCOUNTER
D    Caller: Lupe Morillo    Relationship: Self    Best call back number:[phone] (home) [phone] (work)  [phone]      Requested Prescriptions:   Requested Prescriptions     Pending Prescriptions Disp Refills    Dietary Management Product (Rheumate) capsule 90 capsule 0     Sig: Take 1 capsule by mouth Daily.        Pharmacy where request should be sent: [facility] [place] - [id number] [facility] [address] - [phone] [facility] [phone] FX     Last office visit with prescribing clinician: 10/1/2024   Last telemedicine visit with prescribing clinician: Visit date not found   Next office visit with prescribing clinician: Visit date not found     Additional details provided by patient: PATIENT ONLY WANTS A 30 DAY SUPPLY THIS TIME.     Does the patient have less than a 3 day supply:  [x] Yes  [] No    Would you like a call back once the refill request has been completed: [] Yes [x] No    If the office needs to give you a call back, can they leave a voicemail: [x] Yes [] No    Cassi Crystal Rep   11/26/24 14:59 EST

## 2024-12-04 ENCOUNTER — HOSPITAL ENCOUNTER (OUTPATIENT)
Dept: MAMMOGRAPHY | Facility: HOSPITAL | Age: 74
Discharge: HOME | End: 2024-12-04
Admitting: PHYSICIAN ASSISTANT
Payer: COMMERCIAL

## 2024-12-04 DIAGNOSIS — Z12.31 SCREENING MAMMOGRAM FOR BREAST CANCER: ICD-10-CM

## 2024-12-04 PROCEDURE — 77063 BREAST TOMOSYNTHESIS BI: CPT

## 2024-12-04 PROCEDURE — 77067 SCR MAMMO BI INCL CAD: CPT

## 2024-12-21 DIAGNOSIS — E11.9 TYPE 2 DIABETES MELLITUS WITHOUT COMPLICATION, WITH LONG-TERM CURRENT USE OF INSULIN: ICD-10-CM

## 2024-12-21 DIAGNOSIS — Z79.4 TYPE 2 DIABETES MELLITUS WITHOUT COMPLICATION, WITH LONG-TERM CURRENT USE OF INSULIN: ICD-10-CM

## 2024-12-23 RX ORDER — MONTELUKAST SODIUM 10 MG/1
10 TABLET ORAL NIGHTLY
Qty: 90 TABLET | Refills: 1 | Status: SHIPPED | OUTPATIENT
Start: 2024-12-23

## 2025-01-27 ENCOUNTER — TELEPHONE (OUTPATIENT)
Dept: PAIN MEDICINE | Facility: CLINIC | Age: 75
End: 2025-01-27
Payer: COMMERCIAL

## 2025-01-27 NOTE — TELEPHONE ENCOUNTER
Caller: Lupe Morillo    Relationship to patient: Self    Best call back number: [phone]    Chief complaint: 12 Week Follow Up After diagnostic and therapeutic right L3-L4, L4-L5 TFESI     Type of visit: FU    Requested date: ASAP     If rescheduling, when is the original appointment: 1.8.25     Additional notes:PT NEEDS TO R.S AND HUB HAS NO AVAILABILITY

## 2025-02-03 NOTE — TELEPHONE ENCOUNTER
Called patient back to get her scheduled for a follow up visit with DARI Neely. Patient stated she is in pain and would like to be seen sooner.    Today patient reports a pain level of a 6/10. With an average pain intensity last week of 6/10. The pain is in her low back and patient described this pain as aching, that radiates down to her knee in particular. Sitting and getting up makes the pain worse and tylenol, and a heating pad makes the pain better. Patient stated she is currently participating in physical therapy for her knee and was told that the knee pain is coming from her back. Due to the pain patient is unable to sleep at night and stated it is not constant pain but it varies.     I advised patient I will speak with Kristin and give her a call back.

## 2025-02-10 ENCOUNTER — TELEPHONE (OUTPATIENT)
Dept: PAIN MEDICINE | Facility: CLINIC | Age: 75
End: 2025-02-10

## 2025-02-10 NOTE — TELEPHONE ENCOUNTER
Name: Lupe Morillo      Relationship: Self      Best Callback Number: [phone]       HUB PROVIDED THE RELAY MESSAGE FROM THE OFFICE      PATIENT: SCHEDULED PER NOTE    ADDITIONAL INFORMATION:

## 2025-02-10 NOTE — TELEPHONE ENCOUNTER
"Called patient and patient answered and stated   \"I'm in a meeting I will need to call you back\"    If patient calls back  Relay     \"HUB schedule Kristin Lorenzo, APRN next available\"          "

## 2025-02-28 DIAGNOSIS — M25.561 CHRONIC PAIN OF RIGHT KNEE: ICD-10-CM

## 2025-02-28 DIAGNOSIS — G89.29 CHRONIC PAIN OF RIGHT KNEE: ICD-10-CM

## 2025-03-03 RX ORDER — CELECOXIB 200 MG/1
CAPSULE ORAL
Qty: 60 CAPSULE | Refills: 4 | Status: SHIPPED | OUTPATIENT
Start: 2025-03-03

## 2025-03-25 ENCOUNTER — TELEPHONE (OUTPATIENT)
Dept: INTERNAL MEDICINE | Facility: CLINIC | Age: 75
End: 2025-03-25
Payer: COMMERCIAL

## 2025-03-25 NOTE — TELEPHONE ENCOUNTER
Patient is requesting an Accu-check kit so she can test her blood sugar. Patient has to cancel appointment on Friday.

## 2025-03-26 ENCOUNTER — OFFICE VISIT (OUTPATIENT)
Dept: PAIN MEDICINE | Facility: CLINIC | Age: 75
End: 2025-03-26
Payer: COMMERCIAL

## 2025-03-26 VITALS — HEIGHT: 66 IN | WEIGHT: 167.6 LBS | BODY MASS INDEX: 26.93 KG/M2

## 2025-03-26 DIAGNOSIS — R26.9 GAIT DISTURBANCE: ICD-10-CM

## 2025-03-26 DIAGNOSIS — M47.816 SPONDYLOSIS OF LUMBAR REGION WITHOUT MYELOPATHY OR RADICULOPATHY: ICD-10-CM

## 2025-03-26 DIAGNOSIS — Z98.1 S/P LUMBAR FUSION: ICD-10-CM

## 2025-03-26 DIAGNOSIS — M96.1 LUMBAR POSTLAMINECTOMY SYNDROME: ICD-10-CM

## 2025-03-26 DIAGNOSIS — R53.81 PHYSICAL DECONDITIONING: ICD-10-CM

## 2025-03-26 DIAGNOSIS — E11.9 DIABETES MELLITUS TYPE 2 IN NONOBESE: ICD-10-CM

## 2025-03-26 DIAGNOSIS — M24.28 LIGAMENTUM FLAVUM HYPERTROPHY: ICD-10-CM

## 2025-03-26 DIAGNOSIS — G62.9 PERIPHERAL POLYNEUROPATHY: ICD-10-CM

## 2025-03-26 RX ORDER — LEVOFLOXACIN 500 MG/1
TABLET, FILM COATED ORAL
COMMUNITY
Start: 2025-03-24

## 2025-03-26 NOTE — TELEPHONE ENCOUNTER
She said that she doesn't mind poking her finger and she feels the Dexcom will not be covered under her insurance.

## 2025-03-26 NOTE — PROGRESS NOTES
"Chief Complaint: \"Lower back pain. \"      History of Present Illness: Ms. Lupe Morillo, 74 y.o. female originally referred by Dr. Naveed Sims in consultation for chronic intractable lower back and right lower extremity pain.  She reports a 10-year history of chronic intractable lower back pain.  She was seen on 08/21/2024, when she underwent diagnostic and therapeutic bilateral L3-L4 transforaminal epidural steroid injections from which she experienced 100% pain relief that lasted for 3 days. She mostly complains of RT LE pain. On 01/30/2019, Lupe Morillo underwent L4-L5 lumbar discectomy fusion instrumentation with Dr. Sims and did well after surgery until about a year ago.  She reports constant right lower extremity pain from the knee down into her right foot along with low grade lower back pain. She reports significant neurogenic claudication. MRI of the lumbar spine w/o contrast on 07/05/2024 revealed adjcement level disease at L3-L4: Disc bulge, facet hypertrophjy, ligamentum flavum hypertrophy. Right paracentral disc extrusion. Mild to moderate canal, lateral recess and NF stenosis. At L4-L5: Previous fusion. Grade 1 anterolisthesis. Disc osteophyte complex. Mild canal and NF stenosis. At L5-S1: Annular fissure, Modic 1 endplate changes, schmorl's nodes, facet arthropathy. No significant canal stenosis, Mild NF stenosis.  She underwent orthopedic spine surgical consultation with Dr. Naveed Sims on 07/08/2024, and was found to be a potential candiate for extension of lumbar fusion.  She has failed to obtain pain relief with conservative measures for  including oral analgesics, opioids, topical analgesics, ice, heat, TENs, physical therapy, physical therapist directed home exercise program HEP (ongoing), independent exercise program (ongoing), to name a few.  She was last seen on October 9, 2024 when she underwent diagnostic and therapeutic right L3-L4 and right L4-L5 transforaminal epidural " steroid injection, from which she reports experiencing no significant pain relief, although upon further conversation she is no longer experiencing constant pain in her right lower extremity.  Her pain is across her lower back into her hips.   She returns today for postprocedure follow-up and evaluation.  Pain Description: Constant lower back pain with intermittent exacerbation, described as aching, dull, sharp, throbbing, burning and shooting sensation.   Radiation of Pain: The pain radiates into the hips, she no longer experiences pain into the right shin and the dorsum of the right foot  Pain intensity today: 2/10   Average pain intensity last week: 63/10  Pain intensity ranges from: 3/10 to 5/10  Aggravating factors: Pain increases during the nighttime, standing, walking, bending, twisting. Patient describes neurogenic claudication. Patient does not use a cane or walker   Alleviating factors: Pain decreases with sitting on a recliner, lying down  Associated Symptoms:   Patient denies persistent pain numbness or weakness in the lower extremities.   Patient denies any new bladder or bowel problems.   Patient reports difficulties with her balance  but denies recent falls.   Pain interferes with general activities and affects patient's quality of life  Pain interferes with sleep: Falling asleep   Muscle spasms  Stiffness    Review of previous therapies and additional medical records:  Lupe Morillo has already failed the following measures, including:   Conservative Measures: Oral analgesics, opioids, topical analgesics, ice, heat, TENs, physical therapy, physical therapist directed home exercise program HEP (ongoing), independent exercise program (ongoing)  Interventional Measures: 08/21/2024: diagnostic and therapeutic bilateral L3-L4 transforaminal epidural steroid injections  10/09/2024: DxTx right L3-L4 and right L4-L5 transforaminal epidural steroid injection  Surgical Measures: No history of previous  cervical spine surgery. No history of previous hip surgery   01/30/2019: L4-L5 lumbar discectomy fusion instrumentation by Dr. Sims  11/22/2021: Right Total knee arthroplasty   12/14/2022: Revision Right Total Knee Arthroplasty   Lupe Morillo underwent orthopedic spine surgical consultation with Dr. Naveed Sims on 07/08/2024, and was found to be a potential candiate for extension of lumbar fusion.  Lupe Morillo presents with significant comorbidities including asthma, GERD, HLP, HTN, ZACH on CPAP, Type 2 diabetes mellitus, peripheral neuropathy  In terms of current analgesics, Lupe Morillo takes: celecoxib, cyclobenzaprine, methocarbamol, tramadol, AsperCream Patches  I have reviewed Caesar Report consistent with medication reconciliation.  SOAPP/ORT: Low Risk       Global Pain Scale 08-20  2024 10-01  2024 03-26  2025        Pain 18 20 9        Feelings 0 0 0        Clinical outcomes 8 10 1        Activities 0 6 0        GPS Total: 26 36 10          The [place] Back Pain Disability Scale   DATE 08-20  2024 10-01  2024 03-26  2025        Sleep through the night 2 3 2        Turn over in bed 2 2 0        Get out of bed 5 5 0        Make your bed 3 3 0        Put on socks (pantyhose) 0 2 0        Ride in a car 0 1         Sit in a chair for several hours 1 1 2        Stand up for 20-30 minutes 3 3 2        Climb one flight of stairs 0 1 0        Walk a few blocks (200-300 yards)  3 4 0        Walk several miles 5 5 0        Run one block (about 50 yards) 5 5 0        Take food out of the refrigerator 0 0 0        Reach up to high shelves 0 0 0        Move a chair 0 0 0        Pull or push heavy doors 0 0 0        Bend over to clean the bathtub 2 2 1        Throw a ball 0 0 0        Carry two bags of groceries 1 1 1        Lift and carry a heavy suitcase 3 4 0        Total score 35 42 8              Review of New Diagnostic Studies:  Lumbar spine x-rays with flexion-extension views previous  fusion at L4-L5, no evidence of hardware complication.  No evidence of instability.    Review of Diagnostic Studies:   MRI of the lumbar spine w/o contrast on 07/05/2024. Sacral Tarlov cysts  T12-L1, L1-L2, L2-L3: Disc bulges, facet arthropathy. No significant canal or NF stenosis  L3-L4: Disc bulge, facet hypertrophjy, ligamentum flavum hypertrophy (7 mm). Right paracentral disc extrusion. Mild to moderate canal, lateral recess and NF stenosis  L4-L5: Grade 1 anterolisthesis. Previous fusion. Disc osteophyte complex. Mild canal and NF stenosis  L5-S1: Annular fissure, Modic 1 endplate changes, schmorl's nodes, facet arthropathy. No significant canal stenosis, Mild NF stenosis  Dexa Scan on 03/15/2024: Bone mineral density results are within normal limits.    Review of Systems   Musculoskeletal:  Positive for back pain.   All other systems reviewed and are negative.        Patient Active Problem List   Diagnosis    Sleep apnea    Hypertension    Heel spur    Low back pain    Allergic rhinitis    Type 2 diabetes mellitus    Muscle cramping    S/P lumbar fusion    Acute blood loss anemia, mild, asymptomatic    Postoperative pain    Acute bronchitis    Arthritis    Atypical chest pain    Diarrhea    Gastroesophageal reflux disease with esophagitis    Hirsutism    Peripheral neuropathy    Perennial allergic rhinitis    Hyperlipidemia    Asthma    Arthritis of right knee    S/P total knee arthroplasty, right    Right knee pain    Status post revision total right knee replacement    Spondylosis of lumbar region without myelopathy or radiculopathy    Lumbar postlaminectomy syndrome    Lumbar stenosis with neurogenic claudication    Degeneration of lumbar or lumbosacral intervertebral disc    Diabetes mellitus type 2 in nonobese    Physical deconditioning    Gait disturbance    ZACH on CPAP    Ligamentum flavum hypertrophy       Past Medical History:   Diagnosis Date    Arthritis     Asthma     Back pain     Diabetes  mellitus     type 2 dm, 10 years, check blood sugar once A DAY, LAST A1C 6.8%    GERD (gastroesophageal reflux disease)     History of chronic bronchitis     History of diverticulitis     History of hypercholesterolemia     Hyperlipidemia     Hypertension     PONV (postoperative nausea and vomiting)     with tubal ligation    Sleep apnea with use of continuous positive airway pressure (CPAP)     USE CPAP    Urinary incontinence          Past Surgical History:   Procedure Laterality Date    CATARACT EXTRACTION Bilateral     COLON RESECTION      4-5 years ago    COLONOSCOPY      nov 2018    HYSTERECTOMY      AGE ~ 46    JOINT REPLACEMENT  12/14/2022    Right Knee Revision    KNEE ARTHROSCOPY Right     LUMBAR DISCECTOMY FUSION INSTRUMENTATION N/A 01/30/2019    Procedure: LUMBAR LAMINECTOMY POSTERIOR WITH FUSION INSTRUMENTATION L4-5;  Surgeon: Naveed Sims MD;  Location:  [facility];  Service: Orthopedic Spine    OOPHORECTOMY Bilateral     AGE ~ 46    TOTAL KNEE ARTHROPLASTY Right 11/22/2021    Procedure: TOTAL KNEE ARTHROPLASTY RIGHT;  Surgeon: Ariel Leyva MD;  Location:  [facility];  Service: Orthopedics;  Laterality: Right;    TOTAL KNEE ARTHROPLASTY REVISION Right 12/14/2022    Procedure: REVISION RIGHT TOTAL KNEE ARTHROPLASTY - FEMORAL COMPONENT;  Surgeon: Bishop Romo MD;  Location:  [facility];  Service: Orthopedics;  Laterality: Right;    TUBAL ABDOMINAL LIGATION           Family History   Problem Relation Age of Onset    Diabetes Mother         Colon Cancer    Ovarian cancer Mother         DX AGE LATE 40'S-EARLY 50'S    Hypertension Mother         Colon Cancer    Cancer Mother     Hyperlipidemia Father         High Blood Pressure    Hypertension Maternal Grandmother     Diabetes Maternal Grandmother         Diabetic    Cancer Other     Breast cancer Maternal Aunt         DX AGE UNKNOWN         Social History     Socioeconomic History    Marital status:    Tobacco Use    Smoking status: Never      Passive exposure: Never    Smokeless tobacco: Never   Vaping Use    Vaping status: Never Used   Substance and Sexual Activity    Alcohol use: No    Drug use: No    Sexual activity: Not Currently           Current Outpatient Medications:     albuterol sulfate  (90 Base) MCG/ACT inhaler, Inhale 2 puffs Every 6 (Six) Hours As Needed (cough, congestion)., Disp: 8 g, Rfl: 0    Ascorbic Acid (VITAMIN C) 100 MG chewable tablet, Chew 1 tablet Daily., Disp: , Rfl:     BD Pen Needle Joanne 2nd Gen 32G X 4 MM misc, USE DAILY TO INJECT INSULIN, Disp: 100 each, Rfl: 5    benzonatate (TESSALON) 200 MG capsule, Take 1 capsule by mouth 3 (Three) Times a Day As Needed for Cough., Disp: 21 capsule, Rfl: 0    Boswellia-Glucosamine-Vit D (OSTEO BI-FLEX ONE PER DAY PO), Take  by mouth., Disp: , Rfl:     celecoxib (CeleBREX) 200 MG capsule, TAKE 1 CAPSULE BY MOUTH 2 TIMES A DAY *MAY RESUME WHEN FINISHED WITH MELOXICAM*, Disp: 60 capsule, Rfl: 4    cholecalciferol (Cholecalciferol) 25 MCG (1000 UT) tablet, Take 1 tablet by mouth Daily., Disp: , Rfl:     cyclobenzaprine (FLEXERIL) 5 MG tablet, TAKE ONE TABLET BY MOUTH THREE TIMES A DAY AS NEEDED FOR MUSCLE SPASMS, Disp: 50 tablet, Rfl: 3    dapagliflozin (FARXIGA) 5 MG tablet tablet, TAKE ONE TABLET BY MOUTH DAILY, Disp: 90 tablet, Rfl: 3    Dietary Management Product (Rheumate) capsule, Take 1 capsule by mouth Daily., Disp: 90 capsule, Rfl: 3    docusate sodium (COLACE) 100 MG capsule, Take 1 capsule by mouth 2 (Two) Times a Day., Disp: , Rfl:     doxycycline (MONODOX) 100 MG capsule, Take 1 capsule by mouth 2 (Two) Times a Day., Disp: 14 capsule, Rfl: 0    ezetimibe (ZETIA) 10 MG tablet, TAKE ONE TABLET BY MOUTH DAILY, Disp: 90 tablet, Rfl: 2    Gel Base gel, prilocaine 2% lidocaine 10% imipramine 3% capsaicin 0.001% mannitol 20%, 1 to 2 grams of cream to the affected areas Q4-6PRN, Disp: 240 g, Rfl: 5    glucose blood (FREESTYLE TEST STRIPS) test strip, Check sugars 3 times a day.  Dx code E11.9, Disp: 100 each, Rfl: 5    Insulin Degludec (Tresiba FlexTouch) 200 UNIT/ML solution pen-injector pen injection, Inject 30 Units under the skin into the appropriate area as directed Every Night., Disp: 9 mL, Rfl: 2    ipratropium-albuterol (DUO-NEB) 0.5-2.5 mg/3 ml nebulizer, , Disp: , Rfl:     Lancets (freestyle) lancets, Use as instructed, Disp: 100 each, Rfl: 5    levocetirizine (XYZAL) 5 MG tablet, Take 1 tablet by mouth Daily., Disp: 90 tablet, Rfl: 1    levoFLOXacin (LEVAQUIN) 500 MG tablet, , Disp: , Rfl:     losartan (COZAAR) 100 MG tablet, TAKE 1 TABLET BY MOUTH DAILY, Disp: 90 tablet, Rfl: 1    meloxicam (MOBIC) 15 MG tablet, Take 1 tablet by mouth Daily., Disp: , Rfl:     metFORMIN (GLUCOPHAGE) 1000 MG tablet, TAKE ONE TABLET BY MOUTH DAILY WITH FOOD, Disp: 90 tablet, Rfl: 2    methocarbamol (ROBAXIN) 750 MG tablet, Take 1 tablet by mouth Daily., Disp: , Rfl:     Methylcobalamin (B12-ACTIVE) 1 MG chewable tablet, Chew 1 tablet Daily., Disp: , Rfl:     Mirabegron ER (Myrbetriq) 50 MG tablet sustained-release 24 hour 24 hr tablet, Take 50 mg by mouth Daily., Disp: 60 tablet, Rfl: 1    montelukast (SINGULAIR) 10 MG tablet, TAKE ONE TABLET BY MOUTH ONCE NIGHTLY, Disp: 90 tablet, Rfl: 1    Multiple Vitamins-Minerals (MULTIVITAMIN ADULTS PO), Take 1 tablet by mouth Daily., Disp: , Rfl:     NIFEdipine CC (ADALAT CC) 60 MG 24 hr tablet, TAKE 1 TABLET BY MOUTH DAILY, Disp: 90 tablet, Rfl: 2    ondansetron ODT (ZOFRAN-ODT) 4 MG disintegrating tablet, Place 1 tablet on the tongue Every 6 (Six) Hours As Needed for Nausea or Vomiting for up to 15 doses., Disp: 15 tablet, Rfl: 0    Rybelsus 3 MG tablet, TAKE ONE TABLET BY MOUTH DAILY, Disp: 30 tablet, Rfl: 3    Semaglutide (Rybelsus) 3 MG tablet, Take 1 tablet by mouth Daily., Disp: 90 tablet, Rfl: 2    sodium chloride 0.65 % nasal spray, Administer 1 spray into the nostril(s) as directed by provider Daily As Needed. 1 spray in each nostril daily as  "needed, Disp: , Rfl:     spironolactone (ALDACTONE) 50 MG tablet, TAKE 1 TABLET BY MOUTH DAILY, Disp: 90 tablet, Rfl: 2    Symbicort 160-4.5 MCG/ACT inhaler, , Disp: , Rfl:     traMADol (ULTRAM) 50 MG tablet, Take 1 tablet by mouth Every 6 (Six) Hours As Needed for Moderate Pain., Disp: , Rfl:     vitamin B-6 (PYRIDOXINE) 100 MG tablet, Take 1 tablet by mouth Daily., Disp: , Rfl:     fluticasone (FLONASE) 50 MCG/ACT nasal spray, 2 sprays into the nostril(s) as directed by provider Daily for 14 days., Disp: 9.9 mL, Rfl: 0      Allergies   Allergen Reactions    Oxycodone Hcl Headache    Statins Other (See Comments)     Leg cramps         Ht 167.6 cm (65.98\")   Wt 76 kg (167 lb 9.6 oz)   BMI 27.06 kg/m²       Physical Exam:  Constitutional: Patient appears well-developed, well-nourished, well-hydrated, appears younger than stated age  HEENT: Head: Normocephalic and atraumatic  Eyes: Conjunctivae and lids are normal  Pupils: Equal, round, reactive to light  Peripheral vascular exam: Posterior tibialis: right 2+ and left 2+. Dorsalis pedis: right 2+ and left 2+. No edema.   Musculoskeletal   Gait and station: Gait evaluation demonstrated some shuffling.   Lumbar Spine: Passive and active range of motion are limited secondary to pain. Extension, rotation of the lumbar spine increased and reproduced pain. Lumbar facet joint loading maneuvers are positive.  Sacroiliac Joints: Juan Daniel's test; Gaenslen's test; thigh thrust test; SI compression test; posterior shear test; SI distraction test; pelvic rock test; Yeoman's test: Negative   Piriformis maneuvers: Negative   Hip Joints: The range of motion of the hip joints is limited to flexion and internal rotation but without pain   Palpation of the bilateral psoas tendons and iliopsoas bursas: Unrevealing   Palpation of the bilateral greater trochanters: Unrevealing   Examination of the Iliotibial band: Unrevealing   Neurological:   Patient is alert and oriented to person, " place, and time.   Speech: Normal.   Cortical function: Normal mental status.   Reflex Scores:  Right patellar: 0+  Left patellar: 0+  Right Achilles: 0+  Left Achilles: 0+  Motor strength: 5/5  Motor Tone: Normal  Involuntary movements: None.   Superficial/Primitive Reflexes: Primitive reflexes were absent.   Right Cuellar: Absent  Left Cuellar: Absent  Right ankle clonus: Absent  Left ankle clonus: Absent   Babinsky: Absent  Long tract signs: Negative. Straight leg raising test: Negative. Femoral stretch sign: Negative.   Sensory exam: Intact to light touch, intact pain and temperature sensation, intact vibration sensation and normal proprioception  Coordination:  Finger to nose: Normal. Balance: Normal Romberg's sign: Negative   Skin and subcutaneous tissue: Skin is warm and intact. No rash noted. No cyanosis.   Psychiatric: Judgment and insight: Normal. Recent and remote memory: Intact. Mood and affect: Normal.     ASSESSMENT:   1. Spondylosis of lumbar region without myelopathy or radiculopathy    2. Lumbar postlaminectomy syndrome    3. S/P lumbar fusion    4. Ligamentum flavum hypertrophy    5. Peripheral polyneuropathy    6. Diabetes mellitus type 2 in nonobese    7. Gait disturbance    8. Physical deconditioning        PLAN/MEDICAL DECISION MAKING:  Ms. Lupe Morillo, 74 y.o. female originally referred by Dr. Naveed Sims in consultation for chronic intractable lower back and right lower extremity pain.  She reports a 10-year history of chronic intractable lower back pain.  She was seen on 08/21/2024, when she underwent diagnostic and therapeutic bilateral L3-L4 transforaminal epidural steroid injections from which she experienced 100% pain relief that lasted for 3 days. She mostly complains of RT LE pain. On 01/30/2019, Lupe Morillo underwent L4-L5 lumbar discectomy fusion instrumentation with Dr. Sims and did well after surgery until about a year ago.  She reports constant right lower  extremity pain from the knee down into her right foot along with low grade lower back pain. She reports significant neurogenic claudication. MRI of the lumbar spine w/o contrast on 07/05/2024 revealed adjcement level disease at L3-L4: Disc bulge, facet hypertrophjy, ligamentum flavum hypertrophy. Right paracentral disc extrusion. Mild to moderate canal, lateral recess and NF stenosis. At L4-L5: Previous fusion. Grade 1 anterolisthesis. Disc osteophyte complex. Mild canal and NF stenosis. At L5-S1: Annular fissure, Modic 1 endplate changes, schmorl's nodes, facet arthropathy. No significant canal stenosis, Mild NF stenosis.  She underwent orthopedic spine surgical consultation with Dr. Naveed Sims on 07/08/2024, and was found to be a potential candiate for extension of lumbar fusion.  She has failed to obtain pain relief with conservative measures for  including oral analgesics, opioids, topical analgesics, ice, heat, TENs, physical therapy, physical therapist directed home exercise program HEP (ongoing), independent exercise program (ongoing), to name a few. She was last seen on October 9, 2024 when she underwent diagnostic and therapeutic right L3-L4 and right L4-L5 transforaminal epidural steroid injection, from which she reports experiencing no significant pain relief, although upon further conversation she is no longer experiencing constant pain in her right lower extremity.  Her pain is across her lower back into her hips.  I had a lengthy conversation with Ms. Lupe Morillo regarding her chronic pain condition and potential therapeutic options including risks, benefits, alternative therapies, to name a few. We have discussed using a stepwise approach starting with the least intense level of care as determined by the extent required to diagnose and or treat a patient's condition. The proposed treatments are consistent with the patient's medical condition and known to be safe and effective by current  guidelines and the standard of care. The duration and frequency proposed are considered appropriate for the service in accordance with accepted standards of medical practice for the diagnosis and treatment of the patient's condition and intended to improve the patient's level of function. These services will be furnished in a setting appropriate to the patient's medical needs and condition. Therefore, I have proposed the following plan:  1. Interventional pain management measures: Patient will be scheduled for diagnostic bilateral lumbar medial branch blocks at L2, L3, L4, L5; for bilateral lumbar facet joints at L3-L4, L5-S1 to clarify the origin of chronic refractory mechanical lower back pain. If patient experiences more than 80% relief along with significant improvement the range of motion of the lumbar spine, then, patient will be scheduled for a second set of diagnostic bilateral lumbar medial branch blocks, to then, proceed with bilateral lumbar medial branch rhizotomies.  If her lower extremity symptoms return we may repeat right L3-L4 and right L4-L5 transforaminal epidural steroid injections using the lowest effective dose of steroids, under C-arm fluoroscopic guidance, with the use of contrast dye to confirm appropriate needle placement and spread of contrast dye. We may repeat therapeutic right L3-L4 and right L4-L5 transforaminal epidural steroid injections depending on patient's outcome and following current guidelines. Epidurals will be limited to a maximum of 4 sessions per spinal region in a rolling twelve (12) month period. Continuation of epidural steroid injections over 12 months would only be considered under the following provisions;  Patient is a high-risk surgical candidate, or the patient does not desire surgery, or recurrence of pain in the same location relieved with ESIs for at least three months and epidural provides at least 50% sustained improvement of pain and/or 50% objective  improvement in function (using same scale as baseline)  Pain is severe enough to cause a significant degree of functional disability or vocational disability  The primary care provider will be notified regarding continuation of procedures and repeat steroid use   Other options for treatment of patient's pain would include MILD at L3-L4 as ligamentum flavum hypertrophy is the main component of central canal stenosis, Minuteman, Vertiflex, PNS Sprint, SCS, follow-up with Dr. Sims for decompression and extension of lumbar fusion.   2. Diagnostic studies:   A. Prior to SCS: MRI of the thoracic spine without contrast to assess capacity and patency of the spinal canal and epidural space prior to spinal cord stimulator trial and implant  C. Prior to MILD: CBC, PT, PTT   3. Pharmacological measures: Reviewed and discussed;   A. Patient takes celecoxib, cyclobenzaprine, methocarbamol, tramadol, AsperCream Patches  B. Continue Rheumate one tablet once daily  C. Continue prilocaine 2%, lidocaine 10%, imipramine 3%, capsaicin 0.001%, and mannitol 20%  cream, apply 1 to 2 grams of cream to the affected areas every 4 to 6 hours as needed  4. Long-term rehabilitation efforts:  A. The patient does not have a history of falls.    B. Patient will continue a comprehensive physical therapy program for Alter-G, water therapy, gait and balance training, neurodynamics, core strengthening, gluteal and abductor strengthening, ultrasound, ASTYM, E-STIM, myofascial release, cupping, dry needling, home exercise program  C. Contrast therapy: Apply ice-packs for 15-20 minutes, followed by heating pads for 15-20 minutes to affected area   D. Start a low impact exercise program such as water therapy, swimming, yoga, Pilates  E. Prior to SCS/PNS:  Referral to Dr. Thong Plaza for psychological screening for peripheral nerve stimulation, spinal cord stimulation\  F. Lupe Morillo  reports that she has never smoked. She has never been  exposed to tobacco smoke. She has never used smokeless tobacco.   5. The patient has been instructed to contact my office with any questions or difficulties. The patient understands the plan and agrees to proceed accordingly.      Pain Medications              celecoxib (CeleBREX) 200 MG capsule TAKE 1 CAPSULE BY MOUTH 2 TIMES A DAY *MAY RESUME WHEN FINISHED WITH MELOXICAM*    cyclobenzaprine (FLEXERIL) 5 MG tablet TAKE ONE TABLET BY MOUTH THREE TIMES A DAY AS NEEDED FOR MUSCLE SPASMS    meloxicam (MOBIC) 15 MG tablet Take 1 tablet by mouth Daily.    methocarbamol (ROBAXIN) 750 MG tablet Take 1 tablet by mouth Daily.    traMADol (ULTRAM) 50 MG tablet Take 1 tablet by mouth Every 6 (Six) Hours As Needed for Moderate Pain.             No orders of the defined types were placed in this encounter.       Please note that portions of this note were completed with a voice recognition program.   Any copied data in any portion of my note has been reviewed by myself and accurate.     The 21st Century Cures Act makes medical notes like this available to patients in the interest of transparency. This is a medical document intended as peer to peer communication. It is written in medical language and may contain abbreviations or verbiage that are unfamiliar. It may appear blunt or direct. Medical documents are intended to carry relevant information, facts as evident, and the clinical opinion of the practitioner.     DARI Webb    Patient Care Team:  Michael Blackwell MD as PCP - Yanique Salas MD as Consulting Physician (Hand Surgery)     No orders of the defined types were placed in this encounter.        Future Appointments   Date Time Provider Department Center   3/28/2025 10:15 AM Michael Blackwell MD [facility]   5/13/2025  1:00 PM Katarzyna Ventura APRN [facility]

## 2025-03-27 DIAGNOSIS — M47.816 SPONDYLOSIS OF LUMBAR REGION WITHOUT MYELOPATHY OR RADICULOPATHY: Primary | ICD-10-CM

## 2025-03-28 DIAGNOSIS — E11.9 TYPE 2 DIABETES MELLITUS WITHOUT COMPLICATION, WITH LONG-TERM CURRENT USE OF INSULIN: Primary | ICD-10-CM

## 2025-03-28 DIAGNOSIS — Z79.4 TYPE 2 DIABETES MELLITUS WITHOUT COMPLICATION, WITH LONG-TERM CURRENT USE OF INSULIN: Primary | ICD-10-CM

## 2025-03-28 RX ORDER — LANCETS 23 GAUGE
EACH MISCELLANEOUS
Qty: 200 EACH | Refills: 5 | Status: SHIPPED | OUTPATIENT
Start: 2025-03-28

## 2025-03-28 RX ORDER — BLOOD SUGAR DIAGNOSTIC
STRIP MISCELLANEOUS
Qty: 200 EACH | Refills: 5 | Status: SHIPPED | OUTPATIENT
Start: 2025-03-28

## 2025-03-28 RX ORDER — BLOOD-GLUCOSE METER
EACH MISCELLANEOUS
Qty: 1 KIT | Refills: 0 | Status: SHIPPED | OUTPATIENT
Start: 2025-03-28

## 2025-03-29 DIAGNOSIS — I10 ESSENTIAL HYPERTENSION: ICD-10-CM

## 2025-04-14 ENCOUNTER — OFFICE VISIT (OUTPATIENT)
Dept: INTERNAL MEDICINE | Facility: CLINIC | Age: 75
End: 2025-04-14
Payer: COMMERCIAL

## 2025-04-14 VITALS
DIASTOLIC BLOOD PRESSURE: 78 MMHG | WEIGHT: 164.38 LBS | BODY MASS INDEX: 26.54 KG/M2 | RESPIRATION RATE: 18 BRPM | HEART RATE: 92 BPM | SYSTOLIC BLOOD PRESSURE: 144 MMHG | TEMPERATURE: 97.1 F

## 2025-04-14 DIAGNOSIS — G89.29 CHRONIC MIDLINE LOW BACK PAIN WITHOUT SCIATICA: ICD-10-CM

## 2025-04-14 DIAGNOSIS — E11.9 TYPE 2 DIABETES MELLITUS WITHOUT COMPLICATION, WITH LONG-TERM CURRENT USE OF INSULIN: ICD-10-CM

## 2025-04-14 DIAGNOSIS — J30.89 PERENNIAL ALLERGIC RHINITIS: ICD-10-CM

## 2025-04-14 DIAGNOSIS — I10 ESSENTIAL HYPERTENSION: ICD-10-CM

## 2025-04-14 DIAGNOSIS — R05.2 SUBACUTE COUGH: Primary | ICD-10-CM

## 2025-04-14 DIAGNOSIS — M54.50 CHRONIC MIDLINE LOW BACK PAIN WITHOUT SCIATICA: ICD-10-CM

## 2025-04-14 DIAGNOSIS — Z79.4 TYPE 2 DIABETES MELLITUS WITHOUT COMPLICATION, WITH LONG-TERM CURRENT USE OF INSULIN: ICD-10-CM

## 2025-04-14 DIAGNOSIS — K21.9 GASTROESOPHAGEAL REFLUX DISEASE, UNSPECIFIED WHETHER ESOPHAGITIS PRESENT: ICD-10-CM

## 2025-04-14 PROCEDURE — 99214 OFFICE O/P EST MOD 30 MIN: CPT | Performed by: INTERNAL MEDICINE

## 2025-04-14 RX ORDER — HYDROCODONE POLISTIREX AND CHLORPHENIRAMINE POLISTIREX 10; 8 MG/5ML; MG/5ML
5 SUSPENSION, EXTENDED RELEASE ORAL EVERY 12 HOURS PRN
Qty: 150 ML | Refills: 0 | Status: SHIPPED | OUTPATIENT
Start: 2025-04-14

## 2025-04-14 RX ORDER — OMEPRAZOLE 20 MG/1
CAPSULE, DELAYED RELEASE ORAL
Qty: 60 CAPSULE | Refills: 1 | Status: SHIPPED | OUTPATIENT
Start: 2025-04-14

## 2025-04-14 NOTE — PROGRESS NOTES
Chief Complaint  Cough (6 weeks)    Subjective    Lupe Morillo is a 74 y.o. female.     Lupe Morillo presents to [facility] for     History of Present Illness  The patient presents for evaluation of a persistent cough.    She has been experiencing this cough for the past 6 weeks, which is characterized by the production of yellow sputum. Despite two visits to Dr. Figueroa's clinic and undergoing two x-rays, the cough persists. She reports no shortness of breath but admits to some wheezing. The cough is particularly severe at night and is accompanied by constant throat clearing. She has been advised to avoid dairy products and has complied with this recommendation. She has sought treatment at an urgent care center and was previously under the care of two allergists who have since retired. Their treatment regimen included steroids and antibiotics, which were effective in resolving her symptoms. However, she is uncertain why her current symptoms have not improved. Her treatment regimen included Symbicort, DuoNeb, Xyzal, mometasone, and montelukast. She was also prescribed two steroids, one of which was prednisone.    She did not monitor her blood glucose levels today due to time constraints, but her most recent reading was approximately 157.    MEDICATIONS  Symbicort, DuoNeb, Xyzal, mometasone, montelukast, prednisone       The following portions of the patient's history were reviewed and updated as appropriate: allergies, current medications, past family history, past medical history, past social history, past surgical history, and problem list.    Review of Systems   All other systems reviewed and are negative.      Objective   Body mass index is 26.54 kg/m².          Vital Signs:   /78 (BP Location: Right arm, Patient Position: Sitting, Cuff Size: Adult)   Pulse 92   Temp 97.1 °F (36.2 °C) (Temporal)   Resp 18   Wt 74.6 kg (164 lb 6 oz)   BMI  26.54 kg/m²       Physical Exam  The patient is alert and oriented x3, not in distress.  The head is normocephalic and atraumatic. Pupils are equal, reactive to light and accommodation. Extraocular muscles are intact. Nasal turbinates are slightly pale. Oral mucosa is moist with healthy membranes.  The neck is supple with no cervical lymphadenopathy or goiter.  The chest is clear to auscultation with no rhonchi, wheeze, retraction, or signs of respiratory distress.  Heart sounds S1 and S2 are normal. No murmurs, rubs, or gallop detected.       Results  Laboratory Studies  Blood sugar was 157.    Imaging  Chest x-ray on 03/15/2025 was normal. Chest x-ray on 04/05/2025 was also normal.            Assessment and Plan  Diagnoses and all orders for this visit:  Assessment & Plan  1. Subacute chronic cough.  Upon evaluation of her medical history, physical examination, and previous records, the symptoms may indicate potential reflux, particularly given the exacerbation of her cough during the night when in a supine position. A regimen of omeprazole 20 mg will be initiated, to be taken twice daily, once in the morning and once in the evening. She has been advised to limit her last meal and snacks to at least 2 hours before bedtime to assess if this intervention alleviates her symptoms. For the management of her cough, Tussionex 5 mL will be administered orally twice daily as needed or every 12 hours. She is also advised to continue with her inhaler therapy.    2. Diabetes.  This condition is chronic and currently stable. She will continue with her current antidiabetic medication regimen.    3. Hypertension.  This condition is chronic and currently stable. She will continue with her current antihypertensive medication regimen.    4. Chronic low back pain.  She is continuing with injection therapy with the aim of achieving nerve ablation.    Follow-up  The patient will follow up in 2 to 3 weeks to assess the progression of  her cough with close monitoring.         Diagnoses and all orders for this visit:    1. Subacute cough (Primary)  -     Hydrocod Ramon-Chlorphe Ramon ER (TUSSIONEX PENNKINETIC) 10-8 MG/5ML ER suspension; Take 5 mL by mouth Every 12 (Twelve) Hours As Needed for Cough.  Dispense: 150 mL; Refill: 0    2. Type 2 diabetes mellitus without complication, with long-term current use of insulin    3. Essential hypertension    4. Perennial allergic rhinitis    5. Gastroesophageal reflux disease, unspecified whether esophagitis present  -     omeprazole (priLOSEC) 20 MG capsule; Take one tablet po bid  Dispense: 60 capsule; Refill: 1    6. Chronic midline low back pain without sciatica            Follow Up   Return in about 2 months (around 6/27/2025) for Annual physical.  Patient was given instructions and counseling regarding her condition or for health maintenance advice. Please see specific information pulled into the AVS if appropriate.     Patient or patient representative verbalized consent for the use of Ambient Listening during the visit with  Michael Blackwell MD for chart documentation. 4/14/2025  09:33 EDT

## 2025-04-18 ENCOUNTER — APPOINTMENT (OUTPATIENT)
Dept: CT IMAGING | Facility: HOSPITAL | Age: 75
End: 2025-04-18
Payer: COMMERCIAL

## 2025-04-18 ENCOUNTER — HOSPITAL ENCOUNTER (EMERGENCY)
Facility: HOSPITAL | Age: 75
Discharge: HOME | End: 2025-04-18
Attending: EMERGENCY MEDICINE
Payer: COMMERCIAL

## 2025-04-18 ENCOUNTER — APPOINTMENT (OUTPATIENT)
Dept: GENERAL RADIOLOGY | Facility: HOSPITAL | Age: 75
End: 2025-04-18
Payer: COMMERCIAL

## 2025-04-18 VITALS
HEART RATE: 95 BPM | WEIGHT: 162 LBS | SYSTOLIC BLOOD PRESSURE: 158 MMHG | TEMPERATURE: 98.1 F | HEIGHT: 67 IN | OXYGEN SATURATION: 97 % | BODY MASS INDEX: 25.43 KG/M2 | RESPIRATION RATE: 18 BRPM | DIASTOLIC BLOOD PRESSURE: 82 MMHG

## 2025-04-18 DIAGNOSIS — R05.3 CHRONIC COUGH: ICD-10-CM

## 2025-04-18 DIAGNOSIS — R42 DIZZINESS: Primary | ICD-10-CM

## 2025-04-18 LAB
ALBUMIN SERPL-MCNC: 4.6 G/DL (ref 3.5–5.2)
ALBUMIN/GLOB SERPL: 1.8 G/DL
ALP SERPL-CCNC: 89 U/L (ref 39–117)
ALT SERPL W P-5'-P-CCNC: 21 U/L (ref 1–33)
ANION GAP SERPL CALCULATED.3IONS-SCNC: 10 MMOL/L (ref 5–15)
AST SERPL-CCNC: 17 U/L (ref 1–32)
BACTERIA UR QL AUTO: ABNORMAL /HPF
BASOPHILS # BLD AUTO: 0.04 10*3/MM3 (ref 0–0.2)
BASOPHILS NFR BLD AUTO: 0.4 % (ref 0–1.5)
BILIRUB SERPL-MCNC: 0.4 MG/DL (ref 0–1.2)
BILIRUB UR QL STRIP: NEGATIVE
BUN SERPL-MCNC: 15 MG/DL (ref 8–23)
BUN/CREAT SERPL: 28.8 (ref 7–25)
CALCIUM SPEC-SCNC: 9.7 MG/DL (ref 8.6–10.5)
CHLORIDE SERPL-SCNC: 102 MMOL/L (ref 98–107)
CLARITY UR: CLEAR
CO2 SERPL-SCNC: 23 MMOL/L (ref 22–29)
COLOR UR: YELLOW
CREAT SERPL-MCNC: 0.52 MG/DL (ref 0.57–1)
DEPRECATED RDW RBC AUTO: 46.5 FL (ref 37–54)
EGFRCR SERPLBLD CKD-EPI 2021: 98.2 ML/MIN/1.73
EOSINOPHIL # BLD AUTO: 0.34 10*3/MM3 (ref 0–0.4)
EOSINOPHIL NFR BLD AUTO: 3.2 % (ref 0.3–6.2)
ERYTHROCYTE [DISTWIDTH] IN BLOOD BY AUTOMATED COUNT: 13.3 % (ref 12.3–15.4)
GEN 5 1HR TROPONIN T REFLEX: <6 NG/L
GLOBULIN UR ELPH-MCNC: 2.5 GM/DL
GLUCOSE SERPL-MCNC: 114 MG/DL (ref 65–99)
GLUCOSE UR STRIP-MCNC: NEGATIVE MG/DL
HCT VFR BLD AUTO: 43 % (ref 34–46.6)
HGB BLD-MCNC: 13.6 G/DL (ref 12–15.9)
HGB UR QL STRIP.AUTO: NEGATIVE
HOLD SPECIMEN: NORMAL
HYALINE CASTS UR QL AUTO: ABNORMAL /LPF
IMM GRANULOCYTES # BLD AUTO: 0.04 10*3/MM3 (ref 0–0.05)
IMM GRANULOCYTES NFR BLD AUTO: 0.4 % (ref 0–0.5)
KETONES UR QL STRIP: ABNORMAL
LEUKOCYTE ESTERASE UR QL STRIP.AUTO: ABNORMAL
LYMPHOCYTES # BLD AUTO: 1.74 10*3/MM3 (ref 0.7–3.1)
LYMPHOCYTES NFR BLD AUTO: 16.6 % (ref 19.6–45.3)
MAGNESIUM SERPL-MCNC: 2.1 MG/DL (ref 1.6–2.4)
MCH RBC QN AUTO: 29.9 PG (ref 26.6–33)
MCHC RBC AUTO-ENTMCNC: 31.6 G/DL (ref 31.5–35.7)
MCV RBC AUTO: 94.5 FL (ref 79–97)
MONOCYTES # BLD AUTO: 0.5 10*3/MM3 (ref 0.1–0.9)
MONOCYTES NFR BLD AUTO: 4.8 % (ref 5–12)
NEUTROPHILS NFR BLD AUTO: 7.85 10*3/MM3 (ref 1.7–7)
NEUTROPHILS NFR BLD AUTO: 74.6 % (ref 42.7–76)
NITRITE UR QL STRIP: NEGATIVE
NRBC BLD AUTO-RTO: 0 /100 WBC (ref 0–0.2)
PH UR STRIP.AUTO: 6 [PH] (ref 5–8)
PLATELET # BLD AUTO: 303 10*3/MM3 (ref 140–450)
PMV BLD AUTO: 9.4 FL (ref 6–12)
POTASSIUM SERPL-SCNC: 4.4 MMOL/L (ref 3.5–5.2)
PROT SERPL-MCNC: 7.1 G/DL (ref 6–8.5)
PROT UR QL STRIP: NEGATIVE
RBC # BLD AUTO: 4.55 10*6/MM3 (ref 3.77–5.28)
RBC # UR STRIP: ABNORMAL /HPF
REF LAB TEST METHOD: ABNORMAL
SODIUM SERPL-SCNC: 135 MMOL/L (ref 136–145)
SP GR UR STRIP: 1.02 (ref 1–1.03)
SQUAMOUS #/AREA URNS HPF: ABNORMAL /HPF
TROPONIN T NUMERIC DELTA: NORMAL
TROPONIN T SERPL HS-MCNC: <6 NG/L
UROBILINOGEN UR QL STRIP: ABNORMAL
WBC # UR STRIP: ABNORMAL /HPF
WBC NRBC COR # BLD AUTO: 10.51 10*3/MM3 (ref 3.4–10.8)
WHOLE BLOOD HOLD COAG: NORMAL
WHOLE BLOOD HOLD SPECIMEN: NORMAL

## 2025-04-18 PROCEDURE — 85025 COMPLETE CBC W/AUTO DIFF WBC: CPT

## 2025-04-18 PROCEDURE — 93005 ELECTROCARDIOGRAM TRACING: CPT | Performed by: EMERGENCY MEDICINE

## 2025-04-18 PROCEDURE — 81001 URINALYSIS AUTO W/SCOPE: CPT

## 2025-04-18 PROCEDURE — 84484 ASSAY OF TROPONIN QUANT: CPT | Performed by: EMERGENCY MEDICINE

## 2025-04-18 PROCEDURE — 25510000001 IOPAMIDOL PER 1 ML: Performed by: EMERGENCY MEDICINE

## 2025-04-18 PROCEDURE — 84484 ASSAY OF TROPONIN QUANT: CPT

## 2025-04-18 PROCEDURE — 36415 COLL VENOUS BLD VENIPUNCTURE: CPT

## 2025-04-18 PROCEDURE — 71045 X-RAY EXAM CHEST 1 VIEW: CPT

## 2025-04-18 PROCEDURE — 80053 COMPREHEN METABOLIC PANEL: CPT

## 2025-04-18 PROCEDURE — 94640 AIRWAY INHALATION TREATMENT: CPT

## 2025-04-18 PROCEDURE — 71275 CT ANGIOGRAPHY CHEST: CPT

## 2025-04-18 PROCEDURE — 99285 EMERGENCY DEPT VISIT HI MDM: CPT

## 2025-04-18 PROCEDURE — 83735 ASSAY OF MAGNESIUM: CPT

## 2025-04-18 PROCEDURE — 93005 ELECTROCARDIOGRAM TRACING: CPT

## 2025-04-18 RX ORDER — MECLIZINE HYDROCHLORIDE 25 MG/1
25 TABLET ORAL EVERY 6 HOURS PRN
Qty: 20 TABLET | Refills: 0 | Status: SHIPPED | OUTPATIENT
Start: 2025-04-18 | End: 2025-04-23

## 2025-04-18 RX ORDER — IPRATROPIUM BROMIDE AND ALBUTEROL SULFATE 2.5; .5 MG/3ML; MG/3ML
3 SOLUTION RESPIRATORY (INHALATION) ONCE
Status: COMPLETED | OUTPATIENT
Start: 2025-04-18 | End: 2025-04-18

## 2025-04-18 RX ORDER — IOPAMIDOL 755 MG/ML
100 INJECTION, SOLUTION INTRAVASCULAR
Status: COMPLETED | OUTPATIENT
Start: 2025-04-18 | End: 2025-04-18

## 2025-04-18 RX ORDER — BENZONATATE 200 MG/1
200 CAPSULE ORAL 3 TIMES DAILY PRN
Qty: 15 CAPSULE | Refills: 0 | Status: SHIPPED | OUTPATIENT
Start: 2025-04-18 | End: 2025-04-23

## 2025-04-18 RX ORDER — SODIUM CHLORIDE 0.9 % (FLUSH) 0.9 %
10 SYRINGE (ML) INJECTION AS NEEDED
Status: DISCONTINUED | OUTPATIENT
Start: 2025-04-18 | End: 2025-04-18 | Stop reason: HOSPADM

## 2025-04-18 RX ADMIN — IPRATROPIUM BROMIDE AND ALBUTEROL SULFATE 3 ML: 2.5; .5 SOLUTION RESPIRATORY (INHALATION) at 18:36

## 2025-04-18 RX ADMIN — IOPAMIDOL 80 ML: 755 INJECTION, SOLUTION INTRAVENOUS at 19:19

## 2025-04-18 NOTE — Clinical Note
[facility] EMERGENCY DEPARTMENT  [address]  [facility] [address]  Phone: [phone]    Lupe Morillo was seen and treated in our emergency department on 4/18/2025.  She may return to work on 04/22/2025.         Thank you for choosing [facility].    Mikel Skinner MD

## 2025-04-19 LAB
QT INTERVAL: 364 MS
QTC INTERVAL: 440 MS

## 2025-04-19 NOTE — DISCHARGE INSTRUCTIONS
Plenty of fluids.    Follow-up with primary care physician or syncope clinic for outpatient workup.    Take Tessalon Perles accommodation with dextromethorphan for cough.

## 2025-04-19 NOTE — ED PROVIDER NOTES
Subjective   History of Present Illness  74-year-old female who presents for evaluation of dizziness and nausea.  The patient reports that she has had a cough now for roughly 6 weeks.  She has completed antibiotics and steroids during this time without dramatic improvement.  She reports that this morning around 11 AM shortly after standing she began to feel dizzy like things were spinning.  She also reports feeling nauseated.  She record her blood pressure after that and it was elevated into the 160s.  Upon arrival here she states that she feels better.  She actually ambulated to the room for evaluation.  She denies chest pain.  No abdominal pain.  No dysuria or urinary frequency.  No diarrhea or blood in the stool.  She does report that she has been prescribed hydrocodone for cough.  She did take some hydrocodone based cough medication shortly before she had the dizzy/lightheaded spell when she stood up at 11 AM.      Review of Systems   Constitutional:  Negative for chills, fatigue and fever.   HENT:  Negative for congestion, ear pain, postnasal drip, sinus pressure and sore throat.    Eyes:  Negative for pain, redness and visual disturbance.   Respiratory:  Positive for cough. Negative for chest tightness and shortness of breath.    Cardiovascular:  Negative for chest pain, palpitations and leg swelling.   Gastrointestinal:  Positive for nausea. Negative for abdominal pain, anal bleeding, blood in stool, diarrhea and vomiting.   Endocrine: Negative for polydipsia and polyuria.   Genitourinary:  Negative for difficulty urinating, dysuria, frequency and urgency.   Musculoskeletal:  Negative for arthralgias, back pain and neck pain.   Skin:  Negative for pallor and rash.   Allergic/Immunologic: Negative for environmental allergies and immunocompromised state.   Neurological:  Positive for dizziness and light-headedness. Negative for weakness and headaches.   Hematological:  Negative for adenopathy.    Psychiatric/Behavioral:  Negative for confusion, self-injury and suicidal ideas. The patient is not nervous/anxious.    All other systems reviewed and are negative.      Past Medical History:   Diagnosis Date    Arthritis     Asthma     Back pain     Diabetes mellitus     type 2 dm, 10 years, check blood sugar once A DAY, LAST A1C 6.8%    GERD (gastroesophageal reflux disease)     History of chronic bronchitis     History of diverticulitis     History of hypercholesterolemia     Hyperlipidemia     Hypertension     PONV (postoperative nausea and vomiting)     with tubal ligation    Sleep apnea with use of continuous positive airway pressure (CPAP)     USE CPAP    Urinary incontinence        Allergies   Allergen Reactions    Oxycodone Hcl Headache    Statins Other (See Comments)     Leg cramps       Past Surgical History:   Procedure Laterality Date    CATARACT EXTRACTION Bilateral     COLON RESECTION      4-5 years ago    COLONOSCOPY      nov 2018    HYSTERECTOMY      AGE ~ 46    JOINT REPLACEMENT  12/14/2022    Right Knee Revision    KNEE ARTHROSCOPY Right     LUMBAR DISCECTOMY FUSION INSTRUMENTATION N/A 01/30/2019    Procedure: LUMBAR LAMINECTOMY POSTERIOR WITH FUSION INSTRUMENTATION L4-5;  Surgeon: Naveed Sims MD;  Location:  [facility];  Service: Orthopedic Spine    OOPHORECTOMY Bilateral     AGE ~ 46    TOTAL KNEE ARTHROPLASTY Right 11/22/2021    Procedure: TOTAL KNEE ARTHROPLASTY RIGHT;  Surgeon: Ariel Leyva MD;  Location:  [facility];  Service: Orthopedics;  Laterality: Right;    TOTAL KNEE ARTHROPLASTY REVISION Right 12/14/2022    Procedure: REVISION RIGHT TOTAL KNEE ARTHROPLASTY - FEMORAL COMPONENT;  Surgeon: Bishop Romo MD;  Location:  [facility];  Service: Orthopedics;  Laterality: Right;    TUBAL ABDOMINAL LIGATION         Family History   Problem Relation Age of Onset    Diabetes Mother         Colon Cancer    Ovarian cancer Mother         DX AGE LATE 40'S-EARLY 50'S    Hypertension Mother          Colon Cancer    Cancer Mother     Hyperlipidemia Father         High Blood Pressure    Hypertension Maternal Grandmother     Diabetes Maternal Grandmother         Diabetic    Cancer Other     Breast cancer Maternal Aunt         DX AGE UNKNOWN       Social History     Socioeconomic History    Marital status:    Tobacco Use    Smoking status: Never     Passive exposure: Never    Smokeless tobacco: Never   Vaping Use    Vaping status: Never Used   Substance and Sexual Activity    Alcohol use: No    Drug use: No    Sexual activity: Not Currently           Objective   Physical Exam  Vitals and nursing note reviewed.   Constitutional:       General: She is not in acute distress.     Appearance: Normal appearance. She is well-developed. She is not toxic-appearing or diaphoretic.   HENT:      Head: Normocephalic and atraumatic.      Right Ear: External ear normal.      Left Ear: External ear normal.      Nose: Nose normal.   Eyes:      General: Lids are normal.      Pupils: Pupils are equal, round, and reactive to light.   Neck:      Trachea: No tracheal deviation.   Cardiovascular:      Rate and Rhythm: Normal rate and regular rhythm.      Pulses: No decreased pulses.      Heart sounds: Normal heart sounds. No murmur heard.     No friction rub. No gallop.   Pulmonary:      Effort: Pulmonary effort is normal. No respiratory distress.      Breath sounds: Normal breath sounds. No decreased breath sounds, wheezing, rhonchi or rales.   Abdominal:      General: Bowel sounds are normal.      Palpations: Abdomen is soft.      Tenderness: There is no abdominal tenderness. There is no guarding or rebound.   Musculoskeletal:         General: No deformity. Normal range of motion.      Cervical back: Normal range of motion and neck supple.   Lymphadenopathy:      Cervical: No cervical adenopathy.   Skin:     General: Skin is warm and dry.      Findings: No rash.   Neurological:      Mental Status: She is alert and oriented to  person, place, and time.      GCS: GCS eye subscore is 4. GCS verbal subscore is 5. GCS motor subscore is 6.      Cranial Nerves: No cranial nerve deficit.      Sensory: No sensory deficit.      Comments: Normal neurological exam.    GCS 15.   Psychiatric:         Speech: Speech normal.         Behavior: Behavior normal.         Thought Content: Thought content normal.         Judgment: Judgment normal.         Procedures           ED Course                                                         Medical Decision Making  Differential includes infection, pulmonary embolism, pneumonia, pneumothorax, other unspecified etiology.    The patient has a normal neurological exam.    Chest x-ray interpreted by myself shows clear lungs and normal heart size.    CT angiogram interpreted by myself is negative for PE.  Radiologist does report mucoid plugging in the right lower lobe.    Labs show normal white count, normal kidney function electrolytes.  Urine does not show infection.  Normal troponin.    The patient blood pressure improved to 127/70 before left the room.    She will be discharged with meclizine and referred to the heart valve clinic for outpatient evaluation.    Problems Addressed:  Chronic cough: complicated acute illness or injury with systemic symptoms  Dizziness: complicated acute illness or injury with systemic symptoms    Amount and/or Complexity of Data Reviewed  External Data Reviewed: labs, radiology, ECG and notes.  Labs: ordered. Decision-making details documented in ED Course.  Radiology: ordered and independent interpretation performed. Decision-making details documented in ED Course.  ECG/medicine tests: ordered and independent interpretation performed. Decision-making details documented in ED Course.     Details: EKG performed 4/18/2025 at 1412 interpreted by myself shows sinus rhythm, normal QTc, normal QRS, no acute ischemic changes.    Risk  OTC drugs.  Prescription drug management.        Final  diagnoses:   Dizziness   Chronic cough       ED Disposition  ED Disposition       ED Disposition   Discharge    Condition   Stable    Comment   --               [facility] CARDIOLOGY  [address]  [facility] [address] [phone]  Schedule an appointment as soon as possible for a visit       Michael Blackwell MD  [address]  [facility] [address]  [phone]    Schedule an appointment as soon as possible for a visit            Medication List        New Prescriptions      dextromethorphan 15 MG/5ML syrup  Take 10 mL by mouth 4 (Four) Times a Day As Needed for Cough for up to 5 days.     meclizine 25 MG tablet  Commonly known as: ANTIVERT  Take 1 tablet by mouth Every 6 (Six) Hours As Needed for Dizziness for up to 5 days.            Changed      * benzonatate 200 MG capsule  Commonly known as: TESSALON  Take 1 capsule by mouth 3 (Three) Times a Day As Needed for Cough.  What changed: Another medication with the same name was added. Make sure you understand how and when to take each.     * benzonatate 200 MG capsule  Commonly known as: TESSALON  Take 1 capsule by mouth 3 (Three) Times a Day As Needed for Cough for up to 5 days.  What changed: You were already taking a medication with the same name, and this prescription was added. Make sure you understand how and when to take each.           * This list has 2 medication(s) that are the same as other medications prescribed for you. Read the directions carefully, and ask your doctor or other care provider to review them with you.                   Where to Get Your Medications        These medications were sent to [facility] [id number] - [place] [id number] [facility] - [phone]  - [phone]   [address] [place] [address]      Phone: [phone]   benzonatate 200 MG capsule  dextromethorphan 15 MG/5ML syrup  meclizine 25 MG tablet             Mikel Skinner MD  04/18/25 1315

## 2025-04-28 ENCOUNTER — OFFICE VISIT (OUTPATIENT)
Dept: CARDIOLOGY | Facility: HOSPITAL | Age: 75
End: 2025-04-28
Payer: COMMERCIAL

## 2025-04-28 VITALS
HEIGHT: 67 IN | OXYGEN SATURATION: 94 % | WEIGHT: 167 LBS | SYSTOLIC BLOOD PRESSURE: 145 MMHG | HEART RATE: 89 BPM | RESPIRATION RATE: 20 BRPM | DIASTOLIC BLOOD PRESSURE: 67 MMHG | BODY MASS INDEX: 26.21 KG/M2

## 2025-04-28 DIAGNOSIS — R42 DIZZINESS: ICD-10-CM

## 2025-04-28 DIAGNOSIS — I10 PRIMARY HYPERTENSION: Primary | ICD-10-CM

## 2025-04-28 DIAGNOSIS — E78.01 FAMILIAL HYPERCHOLESTEROLEMIA: ICD-10-CM

## 2025-04-28 PROCEDURE — 99203 OFFICE O/P NEW LOW 30 MIN: CPT | Performed by: NURSE PRACTITIONER

## 2025-04-28 NOTE — PROGRESS NOTES
"Chief Complaint  Establish Care and Dizziness    Subjective    History of Present Illness {CC  Problem List  Visit  Diagnosis   Encounters  Notes  Medications  Labs  Result Review Imaging  Media :23}       History of Present Illness   74-year-old female presents the office today at the request of [facility] ED for ongoing evaluation of her dizziness and nausea.She presented to the ER on 4/18/2025 with complaints of dizziness, nausea and cough.  She reports that she has had a cough for roughly 6 weeks.  She has completed antibiotics and steroids without dramatic improvement.  Prior to ED visit she stood up and began to feel dizzy like the room was spinning with associated nausea.  Blood pressures at that time was elevated in the 160s.  Upon presentation to the ER she reports she was feeling much better. Was prescribed meclizine in the ED and notes that has helped her dizziness. Does not drink as much as she should. No recent changes to BP medications. Hx of asthma, followed by allergist.   Objective     Vital Signs:   Vitals:    04/28/25 0913 04/28/25 0914   BP: 134/62 145/67   BP Location: Left arm Left arm   Patient Position: Standing Sitting   Cuff Size: Adult Adult   Pulse: 92 89   Resp:  20   SpO2: 95% 94%   Weight:  75.8 kg (167 lb)   Height:  170.2 cm (67\")     Body mass index is 26.16 kg/m².  Physical Exam  Vitals and nursing note reviewed.   Constitutional:       Appearance: Normal appearance.   HENT:      Head: Normocephalic.   Eyes:      Pupils: Pupils are equal, round, and reactive to light.   Cardiovascular:      Rate and Rhythm: Normal rate and regular rhythm.      Pulses: Normal pulses.      Heart sounds: Normal heart sounds. No murmur heard.  Pulmonary:      Effort: Pulmonary effort is normal.      Breath sounds: Normal breath sounds.   Abdominal:      General: Bowel sounds are normal.      Palpations: Abdomen is soft.   Musculoskeletal:         General: Normal range of motion. "      Cervical back: Normal range of motion.      Right lower leg: No edema.      Left lower leg: No edema.   Skin:     General: Skin is warm and dry.      Capillary Refill: Capillary refill takes less than 2 seconds.   Neurological:      Mental Status: She is alert and oriented to person, place, and time.   Psychiatric:         Mood and Affect: Mood normal.         Thought Content: Thought content normal.              Result Review  Data Reviewed:{ Labs  Result Review  Imaging  Med Tab  Media :23}   ECG 12 Lead Other; DIZZINESS (04/18/2025 14:12)   High Sensitivity Troponin T 1Hr (04/18/2025 17:40)  Urinalysis, Microscopic Only - Urine, Clean Catch (04/18/2025 14:09)  Urinalysis With Microscopic If Indicated (No Culture) - Urine, Clean Catch (04/18/2025 14:09)  Magnesium (04/18/2025 14:03)  High Sensitivity Troponin T (04/18/2025 14:03)  Comprehensive Metabolic Panel (04/18/2025 14:03)  CBC & Differential (04/18/2025 14:03)           Assessment and Plan {CC Problem List  Visit Diagnosis  ROS  Review (Popup)  Health Maintenance  Quality  BestPractice  Medications  SmartSets  SnapShot Encounters  Media :23}   1. Primary hypertension  Stable on losartan, nifedipine, Aldactone  - Adult Transthoracic Echo Complete W/ Cont if Necessary Per Protocol; Future    2. Dizziness  May use as needed meclizine  Encouraged good hydration  - Adult Transthoracic Echo Complete W/ Cont if Necessary Per Protocol; Future    3. Familial hypercholesterolemia  Stable on Zetia          Follow Up {Instructions Charge Capture  Follow-up Communications :23}   Return if symptoms worsen or fail to improve.    Patient was given instructions and counseling regarding her condition or for health maintenance advice. Please see specific information pulled into the AVS if appropriate.  Patient was instructed to call the [facility] with any questions, concerns, or worsening symptoms.

## 2025-05-02 ENCOUNTER — OFFICE VISIT (OUTPATIENT)
Dept: INTERNAL MEDICINE | Facility: CLINIC | Age: 75
End: 2025-05-02
Payer: COMMERCIAL

## 2025-05-02 VITALS
HEART RATE: 96 BPM | RESPIRATION RATE: 18 BRPM | BODY MASS INDEX: 26.33 KG/M2 | WEIGHT: 168.13 LBS | SYSTOLIC BLOOD PRESSURE: 150 MMHG | TEMPERATURE: 97.1 F | DIASTOLIC BLOOD PRESSURE: 70 MMHG

## 2025-05-02 DIAGNOSIS — R05.3 CHRONIC COUGH: Primary | ICD-10-CM

## 2025-05-02 DIAGNOSIS — K21.00 GASTROESOPHAGEAL REFLUX DISEASE WITH ESOPHAGITIS WITHOUT HEMORRHAGE: ICD-10-CM

## 2025-05-02 DIAGNOSIS — I10 ESSENTIAL HYPERTENSION: ICD-10-CM

## 2025-05-02 PROCEDURE — 99214 OFFICE O/P EST MOD 30 MIN: CPT | Performed by: INTERNAL MEDICINE

## 2025-05-02 RX ORDER — MELOXICAM 15 MG/1
15 TABLET ORAL DAILY
COMMUNITY
Start: 2025-05-01

## 2025-05-02 NOTE — PROGRESS NOTES
Chief Complaint  Cough (Follow up)    Subjective    Lupe Morillo is a 74 y.o. female.     Lupe Morillo presents to [facility] for     History of Present Illness  The patient presents for evaluation of a chronic cough, hypertension, vertigo, and acid reflux.    She continues to experience a persistent cough, which has not improved despite discontinuing her prescribed medication. She reports wheezing and expectoration of yellow sputum. She was advised to seek a referral to a pulmonologist. She was given a prescription for cough syrup and advised against taking the other medication. She was also advised to discontinue her current cough medication and was prescribed an alternative.    She was recently admitted to the emergency room due to an episode of elevated blood pressure, which she attributes to the medication. During this visit, she was diagnosed with vertigo following a scan that revealed no abnormalities. She was subsequently referred to a cardiologist for further evaluation, including an echocardiogram. She also reports ankle swelling and a sensation radiating down her leg, which she suspects may be related to her back condition. She is scheduled for a nerve block procedure next month.    Her home blood pressure readings have been inconsistent, ranging from 138 to the 150s, which she believes may be stress-related. She is currently on two antihypertensive medications prescribed by Dr. Murrell.    Her acid reflux symptoms have resolved with the current medication.    MEDICATIONS  Current: omeprazole       The following portions of the patient's history were reviewed and updated as appropriate: allergies, current medications, past family history, past medical history, past social history, past surgical history, and problem list.    Review of Systems   All other systems reviewed and are negative.      Objective   Body mass index is 26.33 kg/m².           Vital Signs:   /70 (BP Location: Right arm, Patient Position: Sitting, Cuff Size: Adult)   Pulse 96   Temp 97.1 °F (36.2 °C) (Temporal)   Resp 18   Wt 76.3 kg (168 lb 2 oz)   BMI 26.33 kg/m²       Physical Exam  The patient is alert x3 and in no distress.  The head is normocephalic and atraumatic. Pupils are equal, light, and accommodation. Extraocular muscles are intact. Membranes are moist.  The chest is clear to auscultation in the anterior fields. There is some decreased air expansion in the right lower fields, which correlates with the CT scan of the chest done in April showing a mucoid segmental area in the right lower lobe. No mass or lymphadenopathy was detected on the chest CT.  The peripheral vascular system shows +2 pulses, warm extremities, and good perfusion.       Results  Imaging  CT scan of the chest showed a mucoid segmental area in the right lower lobe, no mass or lymphadenopathy detected.            Assessment and Plan  Diagnoses and all orders for this visit:  Assessment & Plan  1. Chronic cough.  The chronic cough persists despite previous treatments. A review of the prior CT findings indicates a potential focality on the CT scan. A referral to a pulmonologist will be initiated for further diagnostic evaluation.    2. Hypertension.  Her hypertension is chronic and intermittently controlled, with recent episodes of elevated blood pressure accompanied by focal dizziness. She is currently under significant stress due to caregiving responsibilities and other social obligations. Regular monitoring of her blood pressure is recommended. A blood pressure log sheet has been provided for her to record her readings and submit for review. This will aid in determining if adjustments to her antihypertensive regimen are necessary.    3. Vertigo.  Her vertigo is currently stable but requires further evaluation. An echocardiogram has been recommended by the cardiologist as part of the ongoing  workup. This will be closely monitored.    4. Acid reflux.  Her acid reflux has resolved; however, the cough persists. Continuation of omeprazole treatment is recommended.    Follow-up  The patient will follow up at the next scheduled appointment.         - I continue to manage patients chronic medical issues and concerns       Follow Up   No follow-ups on file.  Patient was given instructions and counseling regarding her condition or for health maintenance advice. Please see specific information pulled into the AVS if appropriate.     Patient or patient representative verbalized consent for the use of Ambient Listening during the visit with  Michael Blackwell MD for chart documentation. 5/2/2025  10:06 EDT

## 2025-05-06 DIAGNOSIS — I10 ESSENTIAL HYPERTENSION: ICD-10-CM

## 2025-05-06 RX ORDER — SPIRONOLACTONE 50 MG/1
50 TABLET, FILM COATED ORAL DAILY
Qty: 90 TABLET | Refills: 2 | Status: SHIPPED | OUTPATIENT
Start: 2025-05-06

## 2025-05-08 ENCOUNTER — TELEPHONE (OUTPATIENT)
Dept: PAIN MEDICINE | Facility: CLINIC | Age: 75
End: 2025-05-08
Payer: COMMERCIAL

## 2025-05-08 NOTE — TELEPHONE ENCOUNTER
Caller: Lupe Morillo / PATIENT     Best call back number: [phone]     PLEASE CALL TO RESCHEDULE 5/21 PROCEDURE w/DR QUEVEDO FOR diagnostic bilateral lumbar medial branch blocks at L2, L3, L4, L5; for bilateral lumbar facet joints at L3-L4, L5-S1     TILL JUNE 16th IF AVAILABLE - PATIENT's DAUGHTER IS COMING IN FROM [place] TO BE WITH PATIENT     PATIENT STATED SHE SPOKE w/THE [facility] TO CANCEL 5/21 PROCEDURE & WAS TOLD TO CALL THE PAIN MGMT OFFICE (PROCEDURE STILL SHOWING IN EPIC)    THANKS

## 2025-05-12 ENCOUNTER — HOSPITAL ENCOUNTER (OUTPATIENT)
Facility: HOSPITAL | Age: 75
Discharge: HOME | End: 2025-05-12
Admitting: NURSE PRACTITIONER
Payer: COMMERCIAL

## 2025-05-12 DIAGNOSIS — R42 DIZZINESS: ICD-10-CM

## 2025-05-12 DIAGNOSIS — I10 PRIMARY HYPERTENSION: ICD-10-CM

## 2025-05-12 LAB
AORTIC DIMENSIONLESS INDEX: 0.47 (DI)
AV MEAN PRESS GRAD SYS DOP V1V2: 4 MMHG
AV VMAX SYS DOP: 142 CM/SEC
BH CV ECHO MEAS - AO MAX PG: 8.1 MMHG
BH CV ECHO MEAS - AO ROOT DIAM: 3 CM
BH CV ECHO MEAS - AO V2 VTI: 29.7 CM
BH CV ECHO MEAS - AVA(I,D): 1.63 CM2
BH CV ECHO MEAS - EDV(CUBED): 103.8 ML
BH CV ECHO MEAS - EDV(MOD-SP2): 38.2 ML
BH CV ECHO MEAS - EDV(MOD-SP4): 68.8 ML
BH CV ECHO MEAS - EF(MOD-SP2): 66 %
BH CV ECHO MEAS - EF(MOD-SP4): 65.4 %
BH CV ECHO MEAS - ESV(CUBED): 22 ML
BH CV ECHO MEAS - ESV(MOD-SP2): 13 ML
BH CV ECHO MEAS - ESV(MOD-SP4): 23.8 ML
BH CV ECHO MEAS - FS: 40.4 %
BH CV ECHO MEAS - IVS/LVPW: 0.89 CM
BH CV ECHO MEAS - IVSD: 0.8 CM
BH CV ECHO MEAS - LA DIMENSION: 2.9 CM
BH CV ECHO MEAS - LAT PEAK E' VEL: 9 CM/SEC
BH CV ECHO MEAS - LV DIASTOLIC VOL/BSA (35-75): 36.6 CM2
BH CV ECHO MEAS - LV MASS(C)D: 132.3 GRAMS
BH CV ECHO MEAS - LV MAX PG: 2.31 MMHG
BH CV ECHO MEAS - LV MEAN PG: 1 MMHG
BH CV ECHO MEAS - LV SYSTOLIC VOL/BSA (12-30): 12.7 CM2
BH CV ECHO MEAS - LV V1 MAX: 76 CM/SEC
BH CV ECHO MEAS - LV V1 VTI: 14 CM
BH CV ECHO MEAS - LVIDD: 4.7 CM
BH CV ECHO MEAS - LVIDS: 2.8 CM
BH CV ECHO MEAS - LVOT AREA: 3.5 CM2
BH CV ECHO MEAS - LVOT DIAM: 2.1 CM
BH CV ECHO MEAS - LVPWD: 0.9 CM
BH CV ECHO MEAS - MED PEAK E' VEL: 9 CM/SEC
BH CV ECHO MEAS - MV A MAX VEL: 108 CM/SEC
BH CV ECHO MEAS - MV DEC SLOPE: 407 CM/SEC2
BH CV ECHO MEAS - MV DEC TIME: 0.21 SEC
BH CV ECHO MEAS - MV E MAX VEL: 84.4 CM/SEC
BH CV ECHO MEAS - MV E/A: 0.78
BH CV ECHO MEAS - MV MAX PG: 4.5 MMHG
BH CV ECHO MEAS - MV MEAN PG: 2 MMHG
BH CV ECHO MEAS - MV V2 VTI: 26.7 CM
BH CV ECHO MEAS - MVA(VTI): 1.82 CM2
BH CV ECHO MEAS - PA ACC TIME: 0.16 SEC
BH CV ECHO MEAS - PA V2 MAX: 93.5 CM/SEC
BH CV ECHO MEAS - RAP SYSTOLE: 3 MMHG
BH CV ECHO MEAS - RVSP: 29 MMHG
BH CV ECHO MEAS - SV(LVOT): 48.5 ML
BH CV ECHO MEAS - SV(MOD-SP2): 25.2 ML
BH CV ECHO MEAS - SV(MOD-SP4): 45 ML
BH CV ECHO MEAS - SVI(LVOT): 25.8 ML/M2
BH CV ECHO MEAS - SVI(MOD-SP2): 13.4 ML/M2
BH CV ECHO MEAS - SVI(MOD-SP4): 24 ML/M2
BH CV ECHO MEAS - TR MAX PG: 26.3 MMHG
BH CV ECHO MEAS - TR MAX VEL: 256 CM/SEC
BH CV ECHO MEASUREMENTS AVERAGE E/E' RATIO: 9.38
BH CV VAS BP RIGHT ARM: NORMAL MMHG
BH CV XLRA - RV BASE: 3.3 CM
BH CV XLRA - RV LENGTH: 7.8 CM
BH CV XLRA - RV MID: 2.3 CM
BH CV XLRA - TDI S': 26.8 CM/SEC
IVRT: 92 MS
LEFT ATRIUM VOLUME INDEX: 16.2 ML/M2
LV EF BIPLANE MOD: 66 %

## 2025-05-12 PROCEDURE — 93306 TTE W/DOPPLER COMPLETE: CPT

## 2025-05-12 PROCEDURE — 25010000002 SULFUR HEXAFLUORIDE MICROSPH 60.7-25 MG RECONSTITUTED SUSPENSION: Performed by: NURSE PRACTITIONER

## 2025-05-12 RX ADMIN — SULFUR HEXAFLUORIDE 2 ML: KIT at 16:35

## 2025-05-13 ENCOUNTER — OFFICE VISIT (OUTPATIENT)
Dept: UROLOGY | Facility: CLINIC | Age: 75
End: 2025-05-13
Payer: COMMERCIAL

## 2025-05-13 VITALS — SYSTOLIC BLOOD PRESSURE: 119 MMHG | OXYGEN SATURATION: 97 % | HEART RATE: 97 BPM | DIASTOLIC BLOOD PRESSURE: 73 MMHG

## 2025-05-13 DIAGNOSIS — N39.41 URGE INCONTINENCE: ICD-10-CM

## 2025-05-13 DIAGNOSIS — N32.81 OAB (OVERACTIVE BLADDER): Primary | ICD-10-CM

## 2025-05-13 PROCEDURE — 99213 OFFICE O/P EST LOW 20 MIN: CPT | Performed by: NURSE PRACTITIONER

## 2025-05-13 NOTE — TELEPHONE ENCOUNTER
S/w patient and rescheduled procedure to 06/04/2025.     [facility] and Laurie notified.

## 2025-05-13 NOTE — PROGRESS NOTES
[facility] Female Office Visit      Patient Name: Lupe Morillo  : 1950   MRN: [id number]     Chief Complaint:  Lower Urinary Tract Symptoms.   Chief Complaint   Patient presents with    Overactive Bladder       Referring Provider: No ref. provider found    History of Present Illness: Ms. Morillo is a 74 y.o. female with history of lower urinary tract symptoms. She has been managed on Myrbetriq 50mg daily for overactive bladder. She denies urinary incontinence. She reports continued urinary urgency and nocturia x1. She is wearing a pad during the day just in case of leakage.     She has been dealing with bronchitis and is scheduled to see pulmonologist.   Subjective      Review of System: Review of Systems   Genitourinary:  Positive for urgency.   All other systems reviewed and are negative.     I have reviewed the ROS documented by my clinical staff, I have updated appropriately and I agree. DARI Brunner    Past Medical History:  Past Medical History:   Diagnosis Date    Arthritis     Asthma     Back pain     Diabetes mellitus     type 2 dm, 10 years, check blood sugar once A DAY, LAST A1C 6.8%    GERD (gastroesophageal reflux disease)     History of chronic bronchitis     History of diverticulitis     History of hypercholesterolemia     Hyperlipidemia     Hypertension     PONV (postoperative nausea and vomiting)     with tubal ligation    Sleep apnea with use of continuous positive airway pressure (CPAP)     USE CPAP    Urinary incontinence        Past Surgical History:  Past Surgical History:   Procedure Laterality Date    CATARACT EXTRACTION Bilateral     COLON RESECTION      4-5 years ago    COLONOSCOPY      2018    HYSTERECTOMY      AGE ~ 46    JOINT REPLACEMENT  2022    Right Knee Revision    KNEE ARTHROSCOPY Right     LUMBAR DISCECTOMY FUSION INSTRUMENTATION N/A 2019    Procedure: LUMBAR LAMINECTOMY POSTERIOR WITH FUSION INSTRUMENTATION L4-5;  Surgeon: Naveed Sims MD;   Location:  [facility];  Service: Orthopedic Spine    OOPHORECTOMY Bilateral     AGE ~ 46    TOTAL KNEE ARTHROPLASTY Right 11/22/2021    Procedure: TOTAL KNEE ARTHROPLASTY RIGHT;  Surgeon: Ariel Leyva MD;  Location:  [facility];  Service: Orthopedics;  Laterality: Right;    TOTAL KNEE ARTHROPLASTY REVISION Right 12/14/2022    Procedure: REVISION RIGHT TOTAL KNEE ARTHROPLASTY - FEMORAL COMPONENT;  Surgeon: Bishop Romo MD;  Location:  [facility];  Service: Orthopedics;  Laterality: Right;    TUBAL ABDOMINAL LIGATION         Medications:    Current Outpatient Medications:     albuterol sulfate  (90 Base) MCG/ACT inhaler, Inhale 2 puffs Every 6 (Six) Hours As Needed (cough, congestion)., Disp: 8 g, Rfl: 0    Ascorbic Acid (VITAMIN C) 100 MG chewable tablet, Chew 1 tablet Daily., Disp: , Rfl:     BD Pen Needle Joanne 2nd Gen 32G X 4 MM misc, USE DAILY TO INJECT INSULIN, Disp: 100 each, Rfl: 5    Blood Glucose Monitoring Suppl (Accu-Chek Lolis Plus) w/Device kit, Check your blood sugar bid, Disp: 1 kit, Rfl: 0    celecoxib (CeleBREX) 200 MG capsule, TAKE 1 CAPSULE BY MOUTH 2 TIMES A DAY *MAY RESUME WHEN FINISHED WITH MELOXICAM*, Disp: 60 capsule, Rfl: 4    cholecalciferol (Cholecalciferol) 25 MCG (1000 UT) tablet, Take 1 tablet by mouth Daily., Disp: , Rfl:     cyclobenzaprine (FLEXERIL) 5 MG tablet, TAKE ONE TABLET BY MOUTH THREE TIMES A DAY AS NEEDED FOR MUSCLE SPASMS, Disp: 50 tablet, Rfl: 3    dapagliflozin (FARXIGA) 5 MG tablet tablet, TAKE ONE TABLET BY MOUTH DAILY, Disp: 90 tablet, Rfl: 3    Dietary Management Product (Rheumate) capsule, Take 1 capsule by mouth Daily., Disp: 90 capsule, Rfl: 3    docusate sodium (COLACE) 100 MG capsule, Take 1 capsule by mouth 2 (Two) Times a Day., Disp: , Rfl:     ezetimibe (ZETIA) 10 MG tablet, TAKE ONE TABLET BY MOUTH DAILY, Disp: 90 tablet, Rfl: 2    glucose blood (Accu-Chek Lolis Plus) test strip, Use as instructed, Disp: 200 each, Rfl: 5    glucose blood (FREESTYLE  TEST STRIPS) test strip, Check sugars 3 times a day. Dx code E11.9, Disp: 100 each, Rfl: 5    Hydrocod Ramon-Chlorphe Ramon ER (TUSSIONEX PENNKINETIC) 10-8 MG/5ML ER suspension, Take 5 mL by mouth Every 12 (Twelve) Hours As Needed for Cough., Disp: 150 mL, Rfl: 0    ipratropium-albuterol (DUO-NEB) 0.5-2.5 mg/3 ml nebulizer, , Disp: , Rfl:     Lancets (accu-chek safe-t pro) lancets, Check sugars bid, Disp: 200 each, Rfl: 5    Lancets (freestyle) lancets, Use as instructed, Disp: 100 each, Rfl: 5    losartan (COZAAR) 100 MG tablet, TAKE 1 TABLET BY MOUTH DAILY, Disp: 90 tablet, Rfl: 1    meloxicam (MOBIC) 15 MG tablet, Take 1 tablet by mouth Daily., Disp: , Rfl:     metFORMIN (GLUCOPHAGE) 1000 MG tablet, TAKE ONE TABLET BY MOUTH DAILY WITH FOOD, Disp: 90 tablet, Rfl: 2    Methylcobalamin (B12-ACTIVE) 1 MG chewable tablet, Chew 1 tablet Daily., Disp: , Rfl:     Mirabegron ER (Myrbetriq) 50 MG tablet sustained-release 24 hour 24 hr tablet, Take 50 mg by mouth Daily., Disp: 60 tablet, Rfl: 1    montelukast (SINGULAIR) 10 MG tablet, TAKE ONE TABLET BY MOUTH ONCE NIGHTLY, Disp: 90 tablet, Rfl: 1    Multiple Vitamins-Minerals (MULTIVITAMIN ADULTS PO), Take 1 tablet by mouth Daily., Disp: , Rfl:     NIFEdipine CC (ADALAT CC) 60 MG 24 hr tablet, TAKE 1 TABLET BY MOUTH DAILY, Disp: 90 tablet, Rfl: 2    omeprazole (priLOSEC) 20 MG capsule, Take one tablet po bid, Disp: 60 capsule, Rfl: 1    Semaglutide (Rybelsus) 3 MG tablet, Take 1 tablet by mouth Daily., Disp: 90 tablet, Rfl: 2    spironolactone (ALDACTONE) 50 MG tablet, TAKE 1 TABLET BY MOUTH DAILY, Disp: 90 tablet, Rfl: 2    Symbicort 160-4.5 MCG/ACT inhaler, , Disp: , Rfl:     vitamin B-6 (PYRIDOXINE) 100 MG tablet, Take 1 tablet by mouth Daily., Disp: , Rfl:     fluticasone (FLONASE) 50 MCG/ACT nasal spray, 2 sprays into the nostril(s) as directed by provider Daily for 14 days., Disp: 9.9 mL, Rfl: 0  No current facility-administered medications for this  visit.    Allergies:  Allergies   Allergen Reactions    Oxycodone Hcl Headache    Statins Other (See Comments)     Leg cramps       Social History:  Social History     Socioeconomic History    Marital status:    Tobacco Use    Smoking status: Never     Passive exposure: Never    Smokeless tobacco: Never   Vaping Use    Vaping status: Never Used   Substance and Sexual Activity    Alcohol use: No    Drug use: No    Sexual activity: Not Currently     Partners: Male       Family History:  Family History   Problem Relation Age of Onset    Diabetes Mother         Colon Cancer    Ovarian cancer Mother         DX AGE LATE 40'S-EARLY 50'S    Hypertension Mother         Colon Cancer    Cancer Mother     Hyperlipidemia Father         High Blood Pressure    Hypertension Maternal Grandmother     Diabetes Maternal Grandmother         Diabetic    Cancer Other     Breast cancer Maternal Aunt         DX AGE UNKNOWN     Bladder & Bowel Symptom Questionnaire    How often do you usually urinate during the day ?   1 - About every 3-4 hours   2.   How many timed do you urinate at night?   0 - 0-1 time at night   3.   What is the reason that you usually urinate?   4 - Desperate urge (must go immediately)   4.   Once you get the urge to go, how long can you     comfortably delay?   2 - 10-30 min   5.   How often do you get a sudden urge that makes you rush to the bathroom?   2 - A few times a month   6.   How often does a sudden urge to urinate result in you leaking urine or wetting pads?   2 - A few times a month   7.  In your opinion, how good is your bladder control?   2 - Good   8.  Do you have accidental bowel leakage?   no   9.  Do you have difficulty fully emptying your bladder?   no   10.  Do you experience accidental leakage when performing some physical activity such as coughing, sneezing, laughing or exercise?   no   11. Have you tried medications to help improve your symptoms?   yes   12. Would you be interested in  learning about a long-lasting option that may help you with your symptoms?   no                                                                             Total Score   13     0-7 (Mild) 8-16 (Moderate) 17-28 (Severe)            Objective     Physical Exam:   Vital Signs:   Vitals:    05/13/25 1302   BP: 119/73   Pulse: 97   SpO2: 97%     There is no height or weight on file to calculate BMI.     Physical Exam  Vitals and nursing note reviewed.   Constitutional:       Appearance: Normal appearance.   HENT:      Head: Normocephalic and atraumatic.      Nose: Nose normal.      Mouth/Throat:      Mouth: Mucous membranes are moist.   Eyes:      Pupils: Pupils are equal, round, and reactive to light.   Pulmonary:      Effort: Pulmonary effort is normal.   Abdominal:      General: Abdomen is flat.      Palpations: Abdomen is soft.   Musculoskeletal:         General: Normal range of motion.      Cervical back: Normal range of motion.   Skin:     General: Skin is warm and dry.      Capillary Refill: Capillary refill takes less than 2 seconds.   Neurological:      General: No focal deficit present.      Mental Status: She is alert.   Psychiatric:         Mood and Affect: Mood normal.         Labs:   Brief Urine Lab Results  (Last result in the past 365 days)        Color   Clarity   Blood   Leuk Est   Nitrite   Protein   CREAT   Urine HCG        04/18/25 1409 Yellow   Clear   Negative   Small (1+)   Negative   Negative                        Lab Results   Component Value Date    GLUCOSE 114 (H) 04/18/2025    CALCIUM 9.7 04/18/2025     (L) 04/18/2025    K 4.4 04/18/2025    CO2 23.0 04/18/2025     04/18/2025    BUN 15 04/18/2025    CREATININE 0.52 (L) 04/18/2025    EGFRIFAFRI 87 11/23/2021    EGFRIFNONA  08/23/2016      Comment:       [employer] Guidelines - Stage 1 Normal of High GFR 90+, Stage 2 = Mild decrease GFR 60-89, Stage 3 = Moderate decrease GFR 30-59, Stage 4 = Severe decrease GFR 15-29,  and Stage 5 = Kidney failure GFR <15    BCR 28.8 (H) 04/18/2025    ANIONGAP 10.0 04/18/2025       Lab Results   Component Value Date    WBC 10.51 04/18/2025    HGB 13.6 04/18/2025    HCT 43.0 04/18/2025    MCV 94.5 04/18/2025     04/18/2025       Images:   CT Angiogram Chest  Result Date: 4/18/2025  Impression: No evidence of pulmonary embolism. No evidence of pneumonia. Mucoid impaction within a segmental right lower lobe airway. Electronically Signed: José Antonio Conley MD  4/18/2025 7:36 PM EDT  Workstation ID: [id number]    XR Chest 1 View  Result Date: 4/18/2025  There is no significant change when compared to the prior study. There is no evidence for acute cardiopulmonary process. Electronically Signed: Dontrell Wan MD  4/18/2025 4:09 PM EDT  Workstation ID: [id number]    XR Chest 2 View  Result Date: 4/5/2025  Impression: No active disease. Electronically Signed: Izaiah Price MD  4/5/2025 11:12 AM EDT  Workstation ID: [id number]    XR Chest 2 View  Result Date: 3/15/2025  Impression: No evidence of pneumonia Electronically Signed: Kishan Cornejo  3/15/2025 1:55 PM EDT  Workstation ID: [id number]      Measures:   Tobacco:   Lupe Morillo  reports that she has never smoked. She has never been exposed to tobacco smoke. She has never used smokeless tobacco.       Urine Incontinence: Patient reports that she is not currently experiencing any symptoms of urinary incontinence.     Assessment / Plan      Assessment:  Mrs. Morillo is a 74 y.o. female who presented today with lower urinary tract symptoms.  She is currently pleased with her urinary symptoms managed on Myrbetriq 50 mg once daily.  She will continue at this time.  We discussed follow-up in 6 months or a year.  Patient prefers 6-month follow-up.  She will notify our office if urinary symptoms worsen in the meantime.    Diagnoses and all orders for this visit:    1. OAB (overactive bladder) (Primary)    2. Urge incontinence           Follow Up:   Return in  about 6 months (around 11/13/2025).    I spent approximately 15 minutes providing clinical care for this patient; including review of patient's chart and provider documentation, face to face time spent with patient in examination room (obtaining history, performing physical exam, discussing diagnosis and management options), placing orders, and completing patient documentation.     DARI Delaney  [facility]

## 2025-05-30 DIAGNOSIS — Z79.4 TYPE 2 DIABETES MELLITUS WITHOUT COMPLICATION, WITH LONG-TERM CURRENT USE OF INSULIN: ICD-10-CM

## 2025-05-30 DIAGNOSIS — E11.9 TYPE 2 DIABETES MELLITUS WITHOUT COMPLICATION, WITH LONG-TERM CURRENT USE OF INSULIN: ICD-10-CM

## 2025-05-30 RX ORDER — ORAL SEMAGLUTIDE 3 MG/1
3 TABLET ORAL DAILY
Qty: 90 TABLET | Refills: 2 | Status: SHIPPED | OUTPATIENT
Start: 2025-05-30

## 2025-05-30 NOTE — TELEPHONE ENCOUNTER
Caller: Lupe Morillo    Relationship: Self    Best call back number: [phone]     Requested Prescriptions:   Requested Prescriptions     Pending Prescriptions Disp Refills    Semaglutide (Rybelsus) 3 MG tablet 90 tablet 2     Sig: Take 1 tablet by mouth Daily.        Pharmacy where request should be sent: [facility] [id number] [address] [phone] [facility] [phone] FX     Last office visit with prescribing clinician: 5/2/2025   Last telemedicine visit with prescribing clinician: Visit date not found   Next office visit with prescribing clinician: 8/4/2025     Additional details provided by patient:     Does the patient have less than a 3 day supply:  [x] Yes  [] No    Would you like a call back once the refill request has been completed: [] Yes [x] No    If the office needs to give you a call back, can they leave a voicemail: [] Yes [x] No    Cassi Ponce Rep   05/30/25 16:14 EDT

## 2025-05-30 NOTE — TELEPHONE ENCOUNTER
Pt notified of message Medication has been called in and we will check patient's hemoglobin A1c on next office visit

## 2025-06-03 DIAGNOSIS — M25.561 CHRONIC PAIN OF RIGHT KNEE: ICD-10-CM

## 2025-06-03 DIAGNOSIS — G89.29 CHRONIC PAIN OF RIGHT KNEE: ICD-10-CM

## 2025-06-03 RX ORDER — CYCLOBENZAPRINE HCL 5 MG
5 TABLET ORAL 3 TIMES DAILY PRN
Qty: 50 TABLET | Refills: 3 | Status: SHIPPED | OUTPATIENT
Start: 2025-06-03

## 2025-06-03 RX ORDER — CYCLOBENZAPRINE HCL 5 MG
5 TABLET ORAL 3 TIMES DAILY PRN
Qty: 50 TABLET | Refills: 3 | Status: SHIPPED | OUTPATIENT
Start: 2025-06-03 | End: 2025-06-03 | Stop reason: SDUPTHER

## 2025-06-04 ENCOUNTER — OUTSIDE FACILITY SERVICE (OUTPATIENT)
Dept: PAIN MEDICINE | Facility: CLINIC | Age: 75
End: 2025-06-04
Payer: COMMERCIAL

## 2025-06-04 ENCOUNTER — DOCUMENTATION (OUTPATIENT)
Dept: PAIN MEDICINE | Facility: CLINIC | Age: 75
End: 2025-06-04

## 2025-06-04 DIAGNOSIS — M47.816 SPONDYLOSIS OF LUMBAR REGION WITHOUT MYELOPATHY OR RADICULOPATHY: Primary | ICD-10-CM

## 2025-06-04 PROCEDURE — 64494 INJ PARAVERT F JNT L/S 2 LEV: CPT | Performed by: ANESTHESIOLOGY

## 2025-06-04 PROCEDURE — 64493 INJ PARAVERT F JNT L/S 1 LEV: CPT | Performed by: ANESTHESIOLOGY

## 2025-06-04 NOTE — PROGRESS NOTES
PROCEDURE DATE: 06/04/2025      PREOPERATIVE DIAGNOSES:  1. Spondylosis of lumbar region without myelopathy or radiculopathy   2. Lumbar postlaminectomy syndrome   3. S/P lumbar fusion   4. Ligamentum flavum hypertrophy   5. Peripheral polyneuropathy   6. Diabetes mellitus type 2 in nonobese   7. Gait disturbance   8. Physical deconditioning      POSTOPERATIVE DIAGNOSES:  1. Spondylosis of lumbar region without myelopathy or radiculopathy   2. Lumbar postlaminectomy syndrome   3. S/P lumbar fusion   4. Ligamentum flavum hypertrophy   5. Peripheral polyneuropathy   6. Diabetes mellitus type 2 in nonobese   7. Gait disturbance   8. Physical deconditioning     PROCEDURE: First set of diagnostic bilateral lumbar medial branch blocks at  at L2, L3, L4, L5; for bilateral lumbar facet joints at L3-L4, L5-S1    ANESTHESIA: Local anesthesia plus IV sedation    COMPLICATIONS: None    EBL: 0    MEDICAL NECESSITY: A comprehensive evaluation including history and physical exam and pertinent physiological and functional assessment was performed. The patient presents with intractable pain due to the diagnoses listed above. The patient has failed to respond to conservative modalities, as referenced under HPI including the impact of patient's moderate-to-severe pain contributing to significant impairment in daily activities, ADLs, and a negative impact on quality of life. Supporting diagnostic studies of patient's chronic pain condition have been reviewed. We have discussed using a stepwise approach starting with the least intense level of treatment as determined by the extent required to treat a patient's condition. The treatments proposed are consistent with the patient's medical condition and are known to be as safe and effective by current guidelines and standard of care. See OV note.    PROCEDURE SUMMARY: After explaining all the risks and benefits of the procedure, an informed consent was obtained.  The patient's surgical site  was confirmed with the patient and marked in the holding room accordingly. The patient was transferred to the procedure room and placed on the operating room table in the prone position. Time out was completed. Non-invasive monitors were applied. Administration of IV sedation was performed by a designated procedure room nurse, who monitored the patient's level of consciousness and physiological status. Pertinent information was reported on   a sedation flow sheet, which is part of the patient's permanent medical records. Start sedation time: 07:44 AM. The patient's vital signs remained within normal limits. The lumbosacral spine area was prepped and draped in a sterile fashion. Using C-arm fluoroscopic guidance, the most superior and medial aspects of the transverse processes of L3, L4, L5, and sacral ala, on both sides (targets) were identified. The skin and subcutaneous tissues overlying the targets at the above-mentioned levels were anesthetized with five ml of 0.25% bupivacaine. Then, 22-gauge styletted needles were advanced to contact the targets at the above-mentioned levels without difficulties. AP, oblique and lateral X-ray views were obtained confirming appropriate needle placement. Aspiration was negative for blood and/or CSF. Then, 0.5 ml of 1% lidocaine in 1% dextrose was injected per lumbar level. Fluoroscopy was utilized using low-dose of radiation applying collimation, pulsed mode, and shielding following ALARA recommendations. Fluoroscopy time: 18 seconds. End sedation time: 07:48 AM. The patient tolerated the procedure well and without incident.  Upon completion of the procedure, the patient was transferred to the recovery room in stable condition. The patient was discharged from the [facility] neurologically intact and with appropriate discharge instructions. The patient reported 100% pain relief of his lower back pain. The range of motion of the lumbar spine improved significantly and lumbar  facet joint loading maneuvers became negative. Pain level before procedure: 10/10. Pain level after procedure: 0/10.    PLAN:  The patient will be scheduled for a second set of diagnostic bilateral lumbar medial branch blocks at L2, L3, L4, L5; for bilateral lumbar facet joints at L3-L4, and L5-S1 to conform the origin of her chronic refractory mechanical lower back pain. If the patient experiences more than 80% relief along with significant improvement the range of motion of the lumbar spine, then, patient will be scheduled for bilateral lumbar medial branch rhizotomies.  Otherwise, we may consider therapeutic right L3-L4 and right L4-L5 transforaminal epidural steroid injections Vs MILD at L3-L4 as ligamentum flavum hypertrophy is the main component of central canal stenosis Vs Minuteman Vs Vertiflex Vs PNS Sprint Vs SCS Vs follow-up with Dr. Sims for decompression and extension of lumbar fusion.   The patient has been instructed to contact my office with any questions or difficulties. The patient understands the plan and agrees to proceed accordingly.      Please note that portions of this note were completed with a voice recognition program.           Signatures  Electronically signed by: Kurt Quintana M.D.; JUNE 04, 2025, 13:32 EST (Author)

## 2025-06-11 ENCOUNTER — DOCUMENTATION (OUTPATIENT)
Dept: PAIN MEDICINE | Facility: CLINIC | Age: 75
End: 2025-06-11

## 2025-06-11 ENCOUNTER — OUTSIDE FACILITY SERVICE (OUTPATIENT)
Dept: PAIN MEDICINE | Facility: CLINIC | Age: 75
End: 2025-06-11
Payer: COMMERCIAL

## 2025-06-11 DIAGNOSIS — M47.816 SPONDYLOSIS OF LUMBAR REGION WITHOUT MYELOPATHY OR RADICULOPATHY: Primary | ICD-10-CM

## 2025-06-11 PROCEDURE — 64494 INJ PARAVERT F JNT L/S 2 LEV: CPT | Performed by: ANESTHESIOLOGY

## 2025-06-11 PROCEDURE — 64493 INJ PARAVERT F JNT L/S 1 LEV: CPT | Performed by: ANESTHESIOLOGY

## 2025-06-11 NOTE — PROGRESS NOTES
PROCEDURE DATE: 06/11/2025      PREOPERATIVE DIAGNOSES:  1. Spondylosis of lumbar region without myelopathy or radiculopathy   2. Lumbar postlaminectomy syndrome   3. S/P lumbar fusion   4. Ligamentum flavum hypertrophy   5. Peripheral polyneuropathy   6. Diabetes mellitus type 2 in nonobese   7. Gait disturbance   8. Physical deconditioning      POSTOPERATIVE DIAGNOSES:  1. Spondylosis of lumbar region without myelopathy or radiculopathy   2. Lumbar postlaminectomy syndrome   3. S/P lumbar fusion   4. Ligamentum flavum hypertrophy   5. Peripheral polyneuropathy   6. Diabetes mellitus type 2 in nonobese   7. Gait disturbance   8. Physical deconditioning     PROCEDURE: Second set of diagnostic bilateral lumbar medial branch blocks at L2, L3, L4, L5; for bilateral lumbar facet joints at L3-L4, L5-S1    ANESTHESIA: Local anesthesia plus IV sedation    COMPLICATIONS: None    EBL: 0    MEDICAL NECESSITY: A comprehensive evaluation including history and physical exam and pertinent physiological and functional assessment was performed. The patient presents with intractable pain due to the diagnoses listed above. The patient has failed to respond to conservative modalities, as referenced under HPI including the impact of patient's moderate-to-severe pain contributing to significant impairment in daily activities, ADLs, and a negative impact on quality of life. Supporting diagnostic studies of patient's chronic pain condition have been reviewed. We have discussed using a stepwise approach starting with the least intense level of treatment as determined by the extent required to treat a patient's condition. The treatments proposed are consistent with the patient's medical condition and are known to be as safe and effective by current guidelines and standard of care. First set of diagnostic bilateral lumbar medial branch blocks at L2, L3, L4, L5; for bilateral lumbar facet joints at L3-L4, L5-S1 = 100% pain relief and  functional improvement that lasted for 24 hours.    PROCEDURE SUMMARY: After explaining all the risks and benefits of the procedure, an informed consent was obtained.  The patient's surgical site was confirmed with the patient and marked in the holding room accordingly. The patient was transferred to the procedure room and placed on the operating room table in the prone position. Time out was completed. Non-invasive monitors were applied. Administration of IV sedation was performed by a designated procedure room nurse, who monitored the patient's level of consciousness and physiological status. Pertinent information was reported on a sedation flow sheet, which is part of the patient's permanent medical records. Start sedation time: 07:45 AM. The patient's vital signs remained within normal limits. The lumbosacral spine area was prepped and draped in a sterile fashion. Using C-arm fluoroscopic guidance, the most superior and medial aspects of the transverse processes of L3, L4, L5, and sacral ala, on both sides (targets) were identified. The skin and subcutaneous tissues overlying the targets at the above-mentioned levels were anesthetized with five ml of 1% lidocaine. Then, 22-gauge styletted needles were advanced to contact the targets at the above-mentioned levels without difficulties. AP, oblique and lateral X-ray views were obtained confirming appropriate needle placement. Aspiration was negative for blood and/or CSF. Then, 0.5 ml of 0.25% bupivacaine in 1% dextrose was injected per lumbar level. Fluoroscopy was utilized using low-dose of radiation applying collimation, pulsed mode, and shielding following ALARA recommendations. Fluoroscopy time: 21 seconds. End sedation time: 07:52 AM. The patient tolerated the procedure well and without incident.  Upon completion of the procedure, the patient was transferred to the recovery room in stable condition. The patient was discharged from the [facility] Center  neurologically intact and with appropriate discharge instructions. The patient reported 100% pain relief of his lower back pain. The range of motion of the lumbar spine improved significantly and lumbar facet joint loading maneuvers became negative. Pain level before procedure: 6/10. Pain level after procedure: 0/10.    PLAN:  The patient will be scheduled for bilateral lumbar medial branch rhizotomies at L2, L3, L4, L5; for bilateral lumbar facet joints at L3-L4, and L5-S1.  Otherwise, we may consider therapeutic right L3-L4 and right L4-L5 transforaminal epidural steroid injections Vs MILD at L3-L4 as ligamentum flavum hypertrophy is the main component of central canal stenosis Vs Minuteman Vs Vertiflex Vs PNS Sprint Vs SCS Vs follow-up with Dr. Sims for decompression and extension of lumbar fusion.   The patient has been instructed to contact my office with any questions or difficulties. The patient understands the plan and agrees to proceed accordingly.      Please note that portions of this note were completed with a voice recognition program.       Signatures  Electronically signed by: Kurt Quintana M.D.; JUNE 11, 2025, 13:32 EST (Author)

## 2025-06-12 ENCOUNTER — TELEPHONE (OUTPATIENT)
Dept: PAIN MEDICINE | Facility: CLINIC | Age: 75
End: 2025-06-12
Payer: COMMERCIAL

## 2025-06-12 NOTE — TELEPHONE ENCOUNTER
FOLLOW-UP CALL AFTER PROCEDURE      I spoke with the patient regarding how they're feeling after their procedure with Dr. Quintana. Patient reports that they are doing well.     Lupe Morillo underwent a Second set of diagnostic bilateral lumbar medial branch blocks at at L2, L3, L4, L5; for bilateral lumbar facet joints at L3-L4, L5-S1 on 6/11/2025 .     Patient reported 100% pain relief at the time of after procedure exam with Dr. Quintana. In addition, patient experienced significant functional improvement (performing activities without experiencing pain in comparison with examination prior to the procedure that reproduced pain with those activities).     Pain level before procedure: 6/10   Pain level after procedure: 0/10    Patient reports that the pain relief lasted for multiple hours. Today, the patient reports 80 % ongoing pain relief pain (pain level 2/10).  Patient does not have any questions or concerns at this time    Disposition:    I provided information regarding date and time of next procedure: 6/18/2025 .    Reminded that the procedure is at the [place] [facility]-out by [address]; [address] [facility], [address]    I advised that patient must have a , and that the  must remain in the premises until after the procedure is completed and the patient is ready to be discharged.    Reminded NPO status: Nothing by mouth (eat or drink) for at least 8 hours prior to the procedure. Patient can take medications with a a sip of water.     Understanding of above information verbalized.

## 2025-06-16 DIAGNOSIS — K21.9 GASTROESOPHAGEAL REFLUX DISEASE, UNSPECIFIED WHETHER ESOPHAGITIS PRESENT: ICD-10-CM

## 2025-06-16 DIAGNOSIS — N32.81 OAB (OVERACTIVE BLADDER): ICD-10-CM

## 2025-06-16 DIAGNOSIS — N39.41 URGE INCONTINENCE: ICD-10-CM

## 2025-06-16 DIAGNOSIS — E11.9 TYPE 2 DIABETES MELLITUS WITHOUT COMPLICATION, WITH LONG-TERM CURRENT USE OF INSULIN: ICD-10-CM

## 2025-06-16 DIAGNOSIS — Z79.4 TYPE 2 DIABETES MELLITUS WITHOUT COMPLICATION, WITH LONG-TERM CURRENT USE OF INSULIN: ICD-10-CM

## 2025-06-16 RX ORDER — OMEPRAZOLE 20 MG/1
20 CAPSULE, DELAYED RELEASE ORAL EVERY 12 HOURS SCHEDULED
Qty: 60 CAPSULE | Refills: 1 | Status: SHIPPED | OUTPATIENT
Start: 2025-06-16

## 2025-06-16 RX ORDER — MONTELUKAST SODIUM 10 MG/1
10 TABLET ORAL NIGHTLY
Qty: 90 TABLET | Refills: 1 | Status: SHIPPED | OUTPATIENT
Start: 2025-06-16

## 2025-06-16 NOTE — TELEPHONE ENCOUNTER
Rx Refill Note  Requested Prescriptions     Pending Prescriptions Disp Refills    Mirabegron ER (MYRBETRIQ) 50 MG tablet sustained-release 24 hour 24 hr tablet [Pharmacy Med Name: MIRABEGRON ER 50 MG TABLET] 90 tablet      Sig: TAKE 1 TABLET BY MOUTH DAILY      Last office visit with prescribing clinician: 5/13/2025   Next office visit with prescribing clinician: 11/13/2025       Danna Patel MA  06/16/25, 07:54 EDT

## 2025-06-17 RX ORDER — MIRABEGRON 50 MG/1
50 TABLET, FILM COATED, EXTENDED RELEASE ORAL DAILY
Qty: 90 TABLET | Refills: 1 | Status: SHIPPED | OUTPATIENT
Start: 2025-06-17

## 2025-06-18 ENCOUNTER — DOCUMENTATION (OUTPATIENT)
Dept: PAIN MEDICINE | Facility: CLINIC | Age: 75
End: 2025-06-18

## 2025-06-18 ENCOUNTER — OUTSIDE FACILITY SERVICE (OUTPATIENT)
Dept: PAIN MEDICINE | Facility: CLINIC | Age: 75
End: 2025-06-18
Payer: COMMERCIAL

## 2025-06-18 PROCEDURE — 64636 DESTROY L/S FACET JNT ADDL: CPT | Performed by: ANESTHESIOLOGY

## 2025-06-18 PROCEDURE — 64635 DESTROY LUMB/SAC FACET JNT: CPT | Performed by: ANESTHESIOLOGY

## 2025-06-18 NOTE — PROGRESS NOTES
PROCEDURE DATE: 06/18/2025      PREOPERATIVE DIAGNOSES:  1. Spondylosis of lumbar region without myelopathy or radiculopathy   2. Lumbar postlaminectomy syndrome   3. S/P lumbar fusion   4. Ligamentum flavum hypertrophy   5. Peripheral polyneuropathy   6. Diabetes mellitus type 2 in nonobese   7. Gait disturbance   8. Physical deconditioning      POSTOPERATIVE DIAGNOSES:  1. Spondylosis of lumbar region without myelopathy or radiculopathy   2. Lumbar postlaminectomy syndrome   3. S/P lumbar fusion   4. Ligamentum flavum hypertrophy   5. Peripheral polyneuropathy   6. Diabetes mellitus type 2 in nonobese   7. Gait disturbance   8. Physical deconditioning     PROCEDURE: Bilateral lumbar medial branch rhizotomies at L2, L3, L4, L5; for bilateral lumbar facet joints at L3-L4, and L5-S1    ANESTHESIA: Local anesthesia plus IV sedation    COMPLICATIONS: None    EBL: 0    MEDICAL NECESSITY: A comprehensive evaluation including history and physical exam and pertinent physiological and functional assessment was performed. The patient presents with intractable pain due to the diagnoses listed above. The patient has failed to respond to conservative modalities, as referenced under HPI including the impact of patient's moderate-to-severe pain contributing to significant impairment in daily activities, ADLs, and a negative impact on quality of life. Supporting diagnostic studies of patient's chronic pain condition have been reviewed. We have discussed using a stepwise approach starting with the least intense level of treatment as determined by the extent required to treat a patient's condition. The treatments proposed are consistent with the patient's medical condition and are known to be as safe and effective by current guidelines and standard of care. Second set of diagnostic bilateral lumbar medial branch blocks at L2, L3, L4, L5; for bilateral lumbar facet joints at L3-L4, L5-S1 = 100% pain relief and functional improvement  that lasted for 12-16 hours.    PROCEDURE SUMMARY: After explaining all the risks and benefits of the procedure, an informed consent was obtained.  The patient's surgical site was confirmed with the patient and marked in the holding room accordingly. The patient was transferred to the procedure room and placed on the operating room table in the prone position. Time out was completed. Non-invasive monitors were applied. Administration of IV sedation was performed by a designated procedure room nurse, who monitored the patient's level of consciousness and physiological status. Pertinent information was reported on   a sedation flow sheet, which is part of the patient's permanent medical records. Start sedation time: 10:18 AM. The patient's vital signs remained within normal limits. The lumbosacral spine area was prepped and draped in a sterile fashion. Using C-arm fluoroscopic guidance, the most superior and medial aspects of the transverse processes of L3, L4, L5, and sacral ala, on both sides (targets) were identified. The skin and subcutaneous tissues overlying the targets at the above-mentioned levels were anesthetized with ten ml of 1% lidocaine followed by fourteen ml of 0.25% bupivacaine with epinephrine 1:200.000. Using 20-gauge Abbott cannulas with 1-cm uninsulated curved tips, the cannulas were advanced to contact the targets at the above-mentioned levels. AP, oblique and lateral x-ray views were obtained and confirmed appropriate cannula placement. X-rays were obtained and scanned in the patient's chart. Aspiration was negative for blood and/or CSF. Sensory thresholds were obtained at 0.25 millivolts, and motor thresholds at 0.5 millivolts. Motor and sensory stimulation were performed up to 3 mV. At no time was there any evidence of radicular stimulation or elicitation of paresthesia in a radicular distribution. Bilateral lumbar medial branch rhizotomies at L2, L3, L4, L5; for bilateral lumbar facet joints  at L3-L4, and L5-S1, at 80 degrees Celsius for 90 seconds per lumbar level. The cannulas were flushed with 1 ml of 0.25% bupivacaine with epinephrine 1:200.000 in 1% dextrose per lumbar level. Fluoroscopy was utilized using low-dose of radiation applying collimation, pulsed mode, and shielding following ALARA recommendations. Fluoroscopy time: 18 seconds. End sedation time: 10:31 am. The patient tolerated the procedure well and without incident.  Upon completion of the procedure, the patient was transferred to the recovery room in stable condition. The patient was discharged from the Surgery Center neurologically intact and with appropriate discharge instructions. Pain level before procedure: 3/10. Pain level after procedure: 0/10.    PLAN:  Follow up with Kristin in 20 weeks. Otherwise, we may consider therapeutic right L3-L4 and right L4-L5 transforaminal epidural steroid injections Vs MILD at L3-L4 as ligamentum flavum hypertrophy is the main component of central canal stenosis Vs Minuteman Vs Vertiflex Vs PNS Sprint Vs SCS Vs follow-up with Dr. Sims for decompression and extension of lumbar fusion.   The patient has been instructed to contact my office with any questions or difficulties. The patient understands the plan and agrees to proceed accordingly.      Please note that portions of this note were completed with a voice recognition program.       Signatures  Electronically signed by: Kurt Quintana M.D.; JUNE 18, 2025, 17:15 EST (Author)

## 2025-06-19 ENCOUNTER — TELEPHONE (OUTPATIENT)
Dept: PAIN MEDICINE | Facility: CLINIC | Age: 75
End: 2025-06-19
Payer: COMMERCIAL

## 2025-06-19 NOTE — TELEPHONE ENCOUNTER
FOLLOW-UP CALL AFTER PROCEDURE    I spoke with the patient regarding how they are feeling after his/her procedure with Dr. Quintana. Patient reports that they are doing well.     Lupe Morillo underwent a bilateral lumbar medial branch rhizotomies at L2, L3, L4, L5; for bilateral lumbar facet joints at L3-L4, and L5-S1  on 6/18/2025 . Patient reported 100% pain relief at the time of after procedure exam with Dr. Quintana. In addition, patient experienced significant functional improvement (performing activities without experiencing pain in comparison with examination prior to the procedure that reproduced pain with those activities).     Pain level before procedure: 2/10   Pain level after procedure: 0/10    Patient reports that the pain relief lasted for multiple hours.   Today, the patient reports 100 % ongoing pain relief pain (pain level 0/10)     Patient denies side effects or complications  Patient does not have any questions or concerns at this time.    Advised patient of next follow up with DARI Neely on 12/18/2025.

## 2025-06-24 ENCOUNTER — OFFICE VISIT (OUTPATIENT)
Dept: PULMONOLOGY | Facility: CLINIC | Age: 75
End: 2025-06-24
Payer: COMMERCIAL

## 2025-06-24 VITALS
HEART RATE: 102 BPM | OXYGEN SATURATION: 97 % | HEIGHT: 67 IN | DIASTOLIC BLOOD PRESSURE: 80 MMHG | WEIGHT: 174 LBS | SYSTOLIC BLOOD PRESSURE: 158 MMHG | BODY MASS INDEX: 27.31 KG/M2

## 2025-06-24 DIAGNOSIS — G47.33 OSA ON CPAP: ICD-10-CM

## 2025-06-24 DIAGNOSIS — K21.9 CHRONIC GERD: ICD-10-CM

## 2025-06-24 DIAGNOSIS — J45.909 IDIOPATHIC ASTHMA: ICD-10-CM

## 2025-06-24 DIAGNOSIS — R05.3 CHRONIC COUGH: Primary | ICD-10-CM

## 2025-06-24 PROCEDURE — 99205 OFFICE O/P NEW HI 60 MIN: CPT | Performed by: INTERNAL MEDICINE

## 2025-06-24 PROCEDURE — 94375 RESPIRATORY FLOW VOLUME LOOP: CPT | Performed by: INTERNAL MEDICINE

## 2025-06-24 PROCEDURE — 94729 DIFFUSING CAPACITY: CPT | Performed by: INTERNAL MEDICINE

## 2025-06-24 PROCEDURE — 94726 PLETHYSMOGRAPHY LUNG VOLUMES: CPT | Performed by: INTERNAL MEDICINE

## 2025-06-24 RX ORDER — LEVOCETIRIZINE DIHYDROCHLORIDE 5 MG/1
TABLET, FILM COATED ORAL
COMMUNITY
Start: 2025-06-16

## 2025-06-24 RX ORDER — SODIUM CHLORIDE FOR INHALATION 3 %
4 VIAL, NEBULIZER (ML) INHALATION 2 TIMES DAILY
Qty: 240 ML | Refills: 11 | Status: SHIPPED | OUTPATIENT
Start: 2025-06-24

## 2025-06-24 NOTE — PROGRESS NOTES
"  PULMONARY  NOTE    Chief Complaint     Chronic cough, non-smoker, history of asthma, GERD, abnormal chest CT    History of Present Illness     74-year-old female referred for persistent respiratory symptoms    She indicates that she has had a persistent cough since January 2025  It came on insidiously  It has waxed and waned  She has been on a couple courses of antibiotics  She has been on Symbicort, albuterol, Mucinex, mometasone nasal spray    She does feel that her cough is \"75% better\"  However, at times she still coughing and producing sputum including yellow sputum  She has had no hemoptysis    She is a non-smoker with no past history of known lung disease    She has a history of reflux and uses a PPI on a regular basis  She does not regularly follow reflux precautions    In April she had a CT scan of the chest as a CT angiogram in the emergency room  She had a \"bad reaction\" to hydrocodone cough suppressant  Those CTA results are as noted below    Patient Active Problem List   Diagnosis    Hypertension    Heel spur    Low back pain    Allergic rhinitis    Type 2 diabetes mellitus    Muscle cramping    S/P lumbar fusion    Acute blood loss anemia, mild, asymptomatic    Postoperative pain    Acute bronchitis    Arthritis    Atypical chest pain    Diarrhea    Hirsutism    Peripheral neuropathy    Perennial allergic rhinitis    Hyperlipidemia    Arthritis of right knee    S/P total knee arthroplasty, right    Right knee pain    Status post revision total right knee replacement    Spondylosis of lumbar region without myelopathy or radiculopathy    Lumbar postlaminectomy syndrome    Lumbar stenosis with neurogenic claudication    Degeneration of lumbar or lumbosacral intervertebral disc    Diabetes mellitus type 2 in nonobese    Physical deconditioning    Gait disturbance    ZACH on CPAP    Ligamentum flavum hypertrophy    Chronic cough    Asthma    Gastroesophageal reflux disease      Allergies   Allergen " Reactions    Oxycodone Hcl Headache    Statins Other (See Comments)     Leg cramps       Current Outpatient Medications:     albuterol sulfate  (90 Base) MCG/ACT inhaler, Inhale 2 puffs Every 6 (Six) Hours As Needed (cough, congestion)., Disp: 8 g, Rfl: 0    Ascorbic Acid (VITAMIN C) 100 MG chewable tablet, Chew 1 tablet Daily., Disp: , Rfl:     BD Pen Needle Joanne 2nd Gen 32G X 4 MM misc, USE DAILY TO INJECT INSULIN, Disp: 100 each, Rfl: 5    Blood Glucose Monitoring Suppl (Accu-Chek Lolis Plus) w/Device kit, Check your blood sugar bid, Disp: 1 kit, Rfl: 0    celecoxib (CeleBREX) 200 MG capsule, TAKE 1 CAPSULE BY MOUTH 2 TIMES A DAY *MAY RESUME WHEN FINISHED WITH MELOXICAM*, Disp: 60 capsule, Rfl: 4    cholecalciferol (Cholecalciferol) 25 MCG (1000 UT) tablet, Take 1 tablet by mouth Daily., Disp: , Rfl:     cyclobenzaprine (FLEXERIL) 5 MG tablet, Take 1 tablet by mouth 3 (Three) Times a Day As Needed for Muscle Spasms., Disp: 50 tablet, Rfl: 3    dapagliflozin (FARXIGA) 5 MG tablet tablet, TAKE ONE TABLET BY MOUTH DAILY, Disp: 90 tablet, Rfl: 3    Dietary Management Product (Rheumate) capsule, Take 1 capsule by mouth Daily., Disp: 90 capsule, Rfl: 3    ezetimibe (ZETIA) 10 MG tablet, TAKE ONE TABLET BY MOUTH DAILY, Disp: 90 tablet, Rfl: 2    glucose blood (Accu-Chek Lolis Plus) test strip, Use as instructed, Disp: 200 each, Rfl: 5    ipratropium-albuterol (DUO-NEB) 0.5-2.5 mg/3 ml nebulizer, , Disp: , Rfl:     Lancets (accu-chek safe-t pro) lancets, Check sugars bid, Disp: 200 each, Rfl: 5    levocetirizine (XYZAL) 5 MG tablet, , Disp: , Rfl:     losartan (COZAAR) 100 MG tablet, TAKE 1 TABLET BY MOUTH DAILY, Disp: 90 tablet, Rfl: 1    metFORMIN (GLUCOPHAGE) 1000 MG tablet, TAKE ONE TABLET BY MOUTH DAILY WITH FOOD, Disp: 90 tablet, Rfl: 2    Methylcobalamin (B12-ACTIVE) 1 MG chewable tablet, Chew 1 tablet Daily., Disp: , Rfl:     Mirabegron ER (MYRBETRIQ) 50 MG tablet sustained-release 24 hour 24 hr tablet,  TAKE 1 TABLET BY MOUTH DAILY, Disp: 90 tablet, Rfl: 1    montelukast (SINGULAIR) 10 MG tablet, TAKE ONE TABLET BY MOUTH ONCE NIGHTLY, Disp: 90 tablet, Rfl: 1    Multiple Vitamins-Minerals (MULTIVITAMIN ADULTS PO), Take 1 tablet by mouth Daily., Disp: , Rfl:     NIFEdipine CC (ADALAT CC) 60 MG 24 hr tablet, TAKE 1 TABLET BY MOUTH DAILY, Disp: 90 tablet, Rfl: 2    omeprazole (priLOSEC) 20 MG capsule, TAKE 1 CAPSULE BY MOUTH 2 TIMES A DAY, Disp: 60 capsule, Rfl: 1    Semaglutide (Rybelsus) 3 MG tablet, Take 1 tablet by mouth Daily., Disp: 90 tablet, Rfl: 2    spironolactone (ALDACTONE) 50 MG tablet, TAKE 1 TABLET BY MOUTH DAILY, Disp: 90 tablet, Rfl: 2    Symbicort 160-4.5 MCG/ACT inhaler, , Disp: , Rfl:     vitamin B-6 (PYRIDOXINE) 100 MG tablet, Take 1 tablet by mouth Daily., Disp: , Rfl:     docusate sodium (COLACE) 100 MG capsule, Take 1 capsule by mouth 2 (Two) Times a Day., Disp: , Rfl:     fluticasone (FLONASE) 50 MCG/ACT nasal spray, 2 sprays into the nostril(s) as directed by provider Daily for 14 days., Disp: 9.9 mL, Rfl: 0    glucose blood (FREESTYLE TEST STRIPS) test strip, Check sugars 3 times a day. Dx code E11.9, Disp: 100 each, Rfl: 5    Hydrocod Ramon-Chlorphe Ramon ER (TUSSIONEX PENNKINETIC) 10-8 MG/5ML ER suspension, Take 5 mL by mouth Every 12 (Twelve) Hours As Needed for Cough., Disp: 150 mL, Rfl: 0    Lancets (freestyle) lancets, Use as instructed, Disp: 100 each, Rfl: 5    meloxicam (MOBIC) 15 MG tablet, Take 1 tablet by mouth Daily., Disp: , Rfl:   MEDICATION LIST AND ALLERGIES REVIEWED.    Family History   Problem Relation Age of Onset    Diabetes Mother         Colon Cancer    Ovarian cancer Mother         DX AGE LATE 40'S-EARLY 50'S    Hypertension Mother         Colon Cancer    Cancer Mother     Hyperlipidemia Father         High Blood Pressure    Hypertension Maternal Grandmother     Diabetes Maternal Grandmother         Diabetic    Cancer Other     Breast cancer Maternal Aunt         DX AGE  "UNKNOWN     Social History     Tobacco Use    Smoking status: Never     Passive exposure: Never    Smokeless tobacco: Never   Vaping Use    Vaping status: Never Used   Substance Use Topics    Alcohol use: No    Drug use: No     Social History     Social History Narrative    None smoker     FAMILY AND SOCIAL HISTORY REVIEWED.    Review of Systems  IF PRESENT REFER TO SCANNED ROS SHEET FROM SAME DATE  OTHERWISE ROS OBTAINED AND NON-CONTRIBUTORY OVER HPI.    /80   Pulse 102   Ht 170.2 cm (67\")   Wt 78.9 kg (174 lb)   SpO2 97%   BMI 27.25 kg/m²   Physical Exam  Vitals and nursing note reviewed.   Constitutional:       General: She is not in acute distress.     Appearance: She is well-developed. She is not diaphoretic.   HENT:      Head: Normocephalic and atraumatic.   Neck:      Thyroid: No thyromegaly.   Cardiovascular:      Rate and Rhythm: Normal rate and regular rhythm.      Heart sounds: Normal heart sounds. No murmur heard.  Pulmonary:      Effort: Pulmonary effort is normal.      Breath sounds: Normal breath sounds. No stridor.   Lymphadenopathy:      Cervical: No cervical adenopathy.      Upper Body:      Right upper body: No supraclavicular or epitrochlear adenopathy.      Left upper body: No supraclavicular or epitrochlear adenopathy.   Skin:     General: Skin is warm and dry.   Neurological:      Mental Status: She is alert.   Psychiatric:         Behavior: Behavior normal.         Results     CT angiogram of the chest reviewed on PACS  No pulmonary emboli  One bronchiectatic segment in the right lower lobe with mucous plugging  No suspicious intrathoracic findings    PFTs reveal no airway obstruction, no restriction, and a normal diffusion capacity    Immunization History   Administered Date(s) Administered    ABRYSVO (RSV, 60+ or pregnant women 32-36 wks) 12/02/2023    COVID-19 (MODERNA) 12YRS+ (SPIKEVAX) 10/12/2023    COVID-19 (MODERNA) 1st,2nd,3rd Dose Monovalent 02/18/2021, 03/18/2021    " COVID-19 (MODERNA) BIVALENT 12+YRS 07/13/2023    COVID-19 (MODERNA) Monovalent Original Booster 01/30/2022, 07/08/2022    COVID-19 (PFIZER) 12YRS+ (COMIRNATY) 08/31/2024    FLUAD TRI 65YR+ 11/15/2019    Fluzone High-Dose 65+YRS 02/22/2017, 11/17/2017, 12/03/2018    Fluzone High-Dose 65+yrs 09/24/2021, 10/20/2022, 10/12/2023    Hepatitis A 07/26/2019    Pneumococcal Conjugate 13-Valent (PCV13) 11/17/2017    Pneumococcal Conjugate 20-Valent (PCV20) 09/30/2022    Pneumococcal Polysaccharide (PPSV23) 06/22/2018    Tdap 10/16/2020     Problem List       ICD-10-CM ICD-9-CM   1. Chronic cough  R05.3 786.2   2. Asthma  J45.909 493.90   3. Gastroesophageal reflux disease  K21.9 530.81   4. ZACH on CPAP  G47.33 327.23       Discussion     We reviewed her test results  PFTs are normal  I reassured her she does not have evidence of chronic obstructive pulmonary disease  Asthma may still be a contributing factor  I recommended that she remain on Symbicort and to swish and spit after use  Also, albuterol on an as-needed basis    She had some mucous plugging on her recent CT scan  She may benefit from improved mucus clearance efforts  I am going to prescribe hypertonic, 3% saline, to use in her nebulizer at least once a day    Chronic reflux may be contributing to her symptoms  She has an abnormal esophagus on her CT scan  It is quite dilated and then narrows down quite a bit before it dilates up again  She may have an esophageal stricture  She has to take a PPI on a regular basis  Does not regular follow reflux precautions    I am going to get an upper GI series  I have recommended reflux precautions including strict n.p.o. 2 hours before going bed at night and sleeping elevated  I gave her a reflux information sheet    Follow-up plan to see her back after her upper GI series    Level of service justified based on 62 minutes spent in patient care on this date of service including, but not limited to: preparing to see the patient,  obtaining and/or reviewing history, performing medically appropriate examination, ordering tests/medicine/procedures, independently interpreting results, documenting clinical information in EHR, and counseling/education of patient/family/caregiver (excluding time spent on other separate services such as performing procedures or test interpretation, if applicable). (Level 4 45-59 minutes; Level 5 60-74 minutes)    Naveed Berry MD  Note electronically signed    CC: Michael Blackwell MD

## 2025-07-06 DIAGNOSIS — E11.9 TYPE 2 DIABETES MELLITUS WITHOUT COMPLICATION, WITH LONG-TERM CURRENT USE OF INSULIN: ICD-10-CM

## 2025-07-06 DIAGNOSIS — I10 ESSENTIAL HYPERTENSION: ICD-10-CM

## 2025-07-06 DIAGNOSIS — Z79.4 TYPE 2 DIABETES MELLITUS WITHOUT COMPLICATION, WITH LONG-TERM CURRENT USE OF INSULIN: ICD-10-CM

## 2025-07-07 RX ORDER — LOSARTAN POTASSIUM 100 MG/1
100 TABLET ORAL DAILY
Qty: 90 TABLET | Refills: 1 | Status: SHIPPED | OUTPATIENT
Start: 2025-07-07

## 2025-07-07 RX ORDER — INSULIN DEGLUDEC 200 U/ML
30 INJECTION, SOLUTION SUBCUTANEOUS NIGHTLY
Qty: 9 ML | Refills: 2 | Status: SHIPPED | OUTPATIENT
Start: 2025-07-07

## 2025-07-08 ENCOUNTER — PRIOR AUTHORIZATION (OUTPATIENT)
Dept: INTERNAL MEDICINE | Facility: CLINIC | Age: 75
End: 2025-07-08
Payer: COMMERCIAL

## 2025-07-11 NOTE — TELEPHONE ENCOUNTER
Information regarding your request  Your PA has been resolved, no additional PA is required. For further inquiries please contact the number on the back of the member prescription card. (Message [id number])

## 2025-07-11 NOTE — TELEPHONE ENCOUNTER
Previous PA status    Message from [employer]  Your PA request has been approved. Additional information will be provided in the approval communication. (Message [id number]). Authorization Expiration Date: July 5, 2025.

## 2025-07-13 DIAGNOSIS — E78.01 FAMILIAL HYPERCHOLESTEROLEMIA: ICD-10-CM

## 2025-07-14 RX ORDER — EZETIMIBE 10 MG/1
10 TABLET ORAL DAILY
Qty: 90 TABLET | Refills: 1 | Status: SHIPPED | OUTPATIENT
Start: 2025-07-14

## 2025-07-31 DIAGNOSIS — M25.561 CHRONIC PAIN OF RIGHT KNEE: ICD-10-CM

## 2025-07-31 DIAGNOSIS — G89.29 CHRONIC PAIN OF RIGHT KNEE: ICD-10-CM

## 2025-07-31 RX ORDER — CELECOXIB 200 MG/1
CAPSULE ORAL
Qty: 60 CAPSULE | Refills: 4 | Status: SHIPPED | OUTPATIENT
Start: 2025-07-31

## 2025-08-06 ENCOUNTER — OFFICE VISIT (OUTPATIENT)
Dept: INTERNAL MEDICINE | Facility: CLINIC | Age: 75
End: 2025-08-06
Payer: COMMERCIAL

## 2025-08-06 VITALS
SYSTOLIC BLOOD PRESSURE: 132 MMHG | BODY MASS INDEX: 27.45 KG/M2 | RESPIRATION RATE: 16 BRPM | HEART RATE: 92 BPM | DIASTOLIC BLOOD PRESSURE: 78 MMHG | WEIGHT: 175.25 LBS | TEMPERATURE: 97.3 F

## 2025-08-06 DIAGNOSIS — Z79.4 TYPE 2 DIABETES MELLITUS WITHOUT COMPLICATION, WITH LONG-TERM CURRENT USE OF INSULIN: ICD-10-CM

## 2025-08-06 DIAGNOSIS — E11.9 TYPE 2 DIABETES MELLITUS WITHOUT COMPLICATION, WITH LONG-TERM CURRENT USE OF INSULIN: ICD-10-CM

## 2025-08-06 DIAGNOSIS — M54.50 CHRONIC MIDLINE LOW BACK PAIN WITHOUT SCIATICA: Primary | ICD-10-CM

## 2025-08-06 DIAGNOSIS — G89.29 CHRONIC MIDLINE LOW BACK PAIN WITHOUT SCIATICA: Primary | ICD-10-CM

## 2025-08-06 LAB
EXPIRATION DATE: ABNORMAL
EXPIRATION DATE: NORMAL
HBA1C MFR BLD: 7 % (ref 4.5–5.7)
Lab: ABNORMAL
Lab: NORMAL
POC ALBUMIN, URINE: 10 MG/L
POC CREATININE, URINE: 50 MG/DL
POC URINE ALB/CREA RATIO: <30

## 2025-08-06 RX ORDER — DAPAGLIFLOZIN 5 MG/1
5 TABLET, FILM COATED ORAL DAILY
Qty: 90 TABLET | Refills: 3 | Status: SHIPPED | OUTPATIENT
Start: 2025-08-06

## 2025-08-06 RX ORDER — DAPAGLIFLOZIN 5 MG/1
5 TABLET, FILM COATED ORAL DAILY
Qty: 90 TABLET | Refills: 3 | OUTPATIENT
Start: 2025-08-06

## 2025-08-14 DIAGNOSIS — K21.9 GASTROESOPHAGEAL REFLUX DISEASE, UNSPECIFIED WHETHER ESOPHAGITIS PRESENT: ICD-10-CM

## 2025-08-14 RX ORDER — OMEPRAZOLE 20 MG/1
20 CAPSULE, DELAYED RELEASE ORAL EVERY 12 HOURS SCHEDULED
Qty: 60 CAPSULE | Refills: 1 | Status: SHIPPED | OUTPATIENT
Start: 2025-08-14

## 2025-08-21 ENCOUNTER — TELEPHONE (OUTPATIENT)
Dept: PAIN MEDICINE | Facility: CLINIC | Age: 75
End: 2025-08-21
Payer: COMMERCIAL

## 2025-08-21 ENCOUNTER — OFFICE VISIT (OUTPATIENT)
Dept: PAIN MEDICINE | Facility: CLINIC | Age: 75
End: 2025-08-21
Payer: COMMERCIAL

## 2025-08-21 VITALS — WEIGHT: 177 LBS | HEIGHT: 67 IN | BODY MASS INDEX: 27.78 KG/M2

## 2025-08-21 DIAGNOSIS — M47.816 SPONDYLOSIS OF LUMBAR REGION WITHOUT MYELOPATHY OR RADICULOPATHY: ICD-10-CM

## 2025-08-21 DIAGNOSIS — G47.33 OSA ON CPAP: ICD-10-CM

## 2025-08-21 DIAGNOSIS — Z96.651 STATUS POST TOTAL RIGHT KNEE REPLACEMENT: ICD-10-CM

## 2025-08-21 DIAGNOSIS — E11.9 DIABETES MELLITUS TYPE 2 IN NONOBESE: ICD-10-CM

## 2025-08-21 DIAGNOSIS — M48.062 LUMBAR STENOSIS WITH NEUROGENIC CLAUDICATION: ICD-10-CM

## 2025-08-21 DIAGNOSIS — S33.5XXA SPRAIN OF LIGAMENTS OF LUMBAR SPINE, INITIAL ENCOUNTER: ICD-10-CM

## 2025-08-21 DIAGNOSIS — R26.9 GAIT DISTURBANCE: ICD-10-CM

## 2025-08-21 DIAGNOSIS — R53.81 PHYSICAL DECONDITIONING: ICD-10-CM

## 2025-08-21 DIAGNOSIS — Z01.818 PREOPERATIVE TESTING: ICD-10-CM

## 2025-08-21 DIAGNOSIS — G62.9 PERIPHERAL POLYNEUROPATHY: ICD-10-CM

## 2025-08-21 DIAGNOSIS — M96.1 LUMBAR POSTLAMINECTOMY SYNDROME: ICD-10-CM

## 2025-08-21 RX ORDER — LIDOCAINE 50 MG/G
1 PATCH TOPICAL EVERY 24 HOURS
Qty: 60 PATCH | Refills: 0 | Status: SHIPPED | OUTPATIENT
Start: 2025-08-21

## 2025-08-21 RX ORDER — TIZANIDINE 2 MG/1
TABLET ORAL
Qty: 45 TABLET | Refills: 0 | Status: SHIPPED | OUTPATIENT
Start: 2025-08-21

## 2025-08-22 ENCOUNTER — OFFICE VISIT (OUTPATIENT)
Dept: PULMONOLOGY | Facility: CLINIC | Age: 75
End: 2025-08-22
Payer: COMMERCIAL

## 2025-08-22 VITALS
WEIGHT: 177.7 LBS | OXYGEN SATURATION: 100 % | HEART RATE: 44 BPM | SYSTOLIC BLOOD PRESSURE: 130 MMHG | BODY MASS INDEX: 27.89 KG/M2 | TEMPERATURE: 96.6 F | DIASTOLIC BLOOD PRESSURE: 78 MMHG | HEIGHT: 67 IN

## 2025-08-22 DIAGNOSIS — R05.3 CHRONIC COUGH: ICD-10-CM

## 2025-08-22 DIAGNOSIS — J45.909 IDIOPATHIC ASTHMA: Primary | ICD-10-CM

## 2025-08-22 DIAGNOSIS — G47.33 OSA ON CPAP: ICD-10-CM

## 2025-08-22 PROCEDURE — 99214 OFFICE O/P EST MOD 30 MIN: CPT | Performed by: NURSE PRACTITIONER

## 2025-08-22 RX ORDER — TRAMADOL HYDROCHLORIDE 50 MG/1
TABLET ORAL
COMMUNITY

## 2025-08-22 RX ORDER — INSULIN DETEMIR 100 [IU]/ML
INJECTION, SOLUTION SUBCUTANEOUS
COMMUNITY

## (undated) DEVICE — 3.0MM PRECISION NEURO (MATCH HEAD)

## (undated) DEVICE — PAD ARMBRD SURG CONVOL 7.5X20X2IN

## (undated) DEVICE — ANTIBACTERIAL UNDYED BRAIDED (POLYGLACTIN 910), SYNTHETIC ABSORBABLE SUTURE: Brand: COATED VICRYL

## (undated) DEVICE — UNDERGLV SURG BIOGEL INDICAT PI SZ8.5 BLU

## (undated) DEVICE — PK SPINE ORTHO 10

## (undated) DEVICE — 3 BONE CEMENT MIXER: Brand: MIXEVAC

## (undated) DEVICE — PATIENT RETURN ELECTRODE, SINGLE-USE, CONTACT QUALITY MONITORING, ADULT, WITH 9FT CORD, FOR PATIENTS WEIGING OVER 33LBS. (15KG): Brand: MEGADYNE

## (undated) DEVICE — CONTAINER,SPECIMEN,OR STERILE,4OZ: Brand: MEDLINE

## (undated) DEVICE — SWABSTK SKINPREP PVPI PRE/SAT 8IN STRL

## (undated) DEVICE — SNAP KOVER: Brand: UNBRANDED

## (undated) DEVICE — GLV SURG DERMASSURE GRN LF PF SZ 6.5

## (undated) DEVICE — SOL ISO/ALC RUB 70PCT 4OZ

## (undated) DEVICE — GLV SURG PREMIERPRO MIC LTX PF SZ8 BRN

## (undated) DEVICE — NDL HYPO ECLPS SFTY 22G 1 1/2IN

## (undated) DEVICE — SUCTION CANISTER, 2500CC, RIGID: Brand: DEROYAL

## (undated) DEVICE — SHEET, DRAPE, SPLIT, STERILE: Brand: MEDLINE

## (undated) DEVICE — TBG PENCL TELESCP MEGADYNE SMOKE EVAC 10FT

## (undated) DEVICE — DRSNG TELFA ILND ADH 4X6IN

## (undated) DEVICE — BLD SAW GIGLI 51CM

## (undated) DEVICE — DISPOSABLE BIPOLAR FORCEPS 7 3/4" (19.7CM) SCOVILLE BAYONET, INSULATED, 1.5MM TIP AND 12 FT. (3.6M) CABLE: Brand: KIRWAN

## (undated) DEVICE — GLV SURG SENSICARE PI ORTHO SZ8.5 LF STRL

## (undated) DEVICE — GLV SURG PREMIERPRO MIC LTX PF SZ7.5 BRN

## (undated) DEVICE — 450 ML BOTTLE OF 0.05% CHLORHEXIDINE GLUCONATE IN 99.95% STERILE WATER FOR IRRIGATION, USP AND APPLICATOR.: Brand: IRRISEPT ANTIMICROBIAL WOUND LAVAGE

## (undated) DEVICE — NEEDLE, QUINCKE 22GX3.5": Brand: MEDLINE INDUSTRIES, INC.

## (undated) DEVICE — TRAP FLD MINIVAC MEGADYNE 100ML

## (undated) DEVICE — DRAPE,U/ SHT,SPLIT,PLAS,STERIL: Brand: MEDLINE

## (undated) DEVICE — 2963 MEDIPORE SOFT CLOTH TAPE 3 IN X 10 YD 12 RLS/CS: Brand: 3M™ MEDIPORE™

## (undated) DEVICE — STRYKER PERFORMANCE SERIES SAGITTAL BLADE: Brand: STRYKER PERFORMANCE SERIES

## (undated) DEVICE — PK KN TOTL 10

## (undated) DEVICE — APPL CHLORAPREP W/TINT 26ML BLU

## (undated) DEVICE — GLV SURG SENSICARE PI MIC PF SZ9 LF STRL

## (undated) DEVICE — GLV SURG SENSICARE PI LF PF 6.5

## (undated) DEVICE — INTENDED USE FOR SURGICAL MARKING ON INTACT SKIN, ALSO PROVIDES A PERMANENT METHOD OF IDENTIFYING OBJECTS IN THE OPERATING ROOM: Brand: WRITESITE® REGULAR TIP SKIN MARKER

## (undated) DEVICE — GOWN,REINF,POLY,ECL,PP SLV,XL: Brand: MEDLINE

## (undated) DEVICE — KT PUMP INFUBLOCK MDL 2100 PMKITSOLIS

## (undated) DEVICE — GOWN,PREVENTION PLUS,XXLARGE,STERILE: Brand: MEDLINE

## (undated) DEVICE — KT DRN EVAC WND PVC PCH WTROC RND 10F400

## (undated) DEVICE — DRP C/ARMOR

## (undated) DEVICE — GLV SURG SENSICARE PI LF PF 7.5

## (undated) DEVICE — BLANKT WARM UPPR/BDY ARM/OUT 57X196CM

## (undated) DEVICE — SOL HYDROGEN PEROX 3PCT 4OZ

## (undated) DEVICE — GLV SURG PREMIERPRO MIC LTX PF SZ7 BRN

## (undated) DEVICE — GLV SURG SIGNATURE TOUCH PF LTX 8 STRL BX/50

## (undated) DEVICE — GLV SURG SENSICARE PI LF PF 7.0

## (undated) DEVICE — 3M™ MEDIPORE™ H SOFT CLOTH SURGICAL TAPE 2862, 2 INCH X 10 YARD (5CM X 9,1M), 12 ROLLS/CASE: Brand: 3M™ MEDIPORE™

## (undated) DEVICE — SUT VIC 2/0 CT2 27IN J269H

## (undated) DEVICE — SPNG GZ WOVN 4X4IN 12PLY 10/BX STRL

## (undated) DEVICE — BNDG ELAS CO-FLEX SLF ADHR 6IN 5YD LF STRL

## (undated) DEVICE — GLV SURG PREMIERPRO MIC LTX PF SZ8.5 BRN

## (undated) DEVICE — JACKSON TABLE POSITIONER KIT: Brand: MEDLINE INDUSTRIES, INC.

## (undated) DEVICE — DRSNG TELFA PAD NONADH STR 1S 3X8IN

## (undated) DEVICE — GLV SURG PREMIERPRO MIC LTX PF SZ6.5 BRN

## (undated) DEVICE — PLUG BONE RESTR/CMT W/HNDL UNIV 30MM LG
Type: IMPLANTABLE DEVICE | Site: KNEE | Status: NON-FUNCTIONAL
Removed: 2022-12-14

## (undated) DEVICE — GLV SURG DERMASSURE GRN LF PF 7.0

## (undated) DEVICE — THE MILL DISPOSABLE - MEDIUM

## (undated) DEVICE — MEDI-VAC YANKAUER SUCTION HANDLE: Brand: CARDINAL HEALTH

## (undated) DEVICE — FEMORAL CANAL TIP, IRRIGATION/SUCTION

## (undated) DEVICE — TRY EPID SFTY 18G 3.5IN 1T7680

## (undated) DEVICE — 3M™ IOBAN™ 2 ANTIMICROBIAL INCISE DRAPE 6650EZ: Brand: IOBAN™ 2

## (undated) DEVICE — ADAPT LUER STUB INTRAMEDIC PE/200 17G

## (undated) DEVICE — DRAPE,REIN 53X77,STERILE: Brand: MEDLINE

## (undated) DEVICE — STERILE PVP: Brand: MEDLINE INDUSTRIES, INC.

## (undated) DEVICE — GAUZE,SPONGE,4"X4",16PLY,XRAY,STRL,LF: Brand: MEDLINE

## (undated) DEVICE — DIFFUSER: Brand: CORE, MAESTRO

## (undated) DEVICE — PRECISION THIN (9.0 X 0.38 X 31.0MM)

## (undated) DEVICE — SPNG GZ STRL 2S 4X4 12PLY

## (undated) DEVICE — 3M™ STERI-STRIP™ REINFORCED ADHESIVE SKIN CLOSURES, R1547, 1/2 IN X 4 IN (12 MM X 100 MM), 6 STRIPS/ENVELOPE: Brand: 3M™ STERI-STRIP™

## (undated) DEVICE — OIL CARTRIDGE: Brand: CORE, MAESTRO

## (undated) DEVICE — MARKER,SKIN,W/RULER,DUAL,STOP: Brand: MEDLINE

## (undated) DEVICE — SYR CONTRL PRESS/LO FIX/M/LL W/THMB/RNG 10ML

## (undated) DEVICE — SYR LL 10ML LF

## (undated) DEVICE — PREMIUM DRY TRAY LF: Brand: MEDLINE INDUSTRIES, INC.

## (undated) DEVICE — BNDG ELAS W/CLIP 6IN 10YD LF STRL

## (undated) DEVICE — SHEET,DRAPE,53X77,STERILE: Brand: MEDLINE

## (undated) DEVICE — PUMP PAIN AUTOFUSER AUTO SELCT NOBOLUS 1TO14ML/HR 550ML DISP

## (undated) DEVICE — GLV SURG SENSICARE W/ALOE PF LF 9 STRL

## (undated) DEVICE — UNDERCAST PADDING: Brand: DEROYAL

## (undated) DEVICE — GOWN,REINF,POLY,ECL,PP SLV,3XL,XLONG: Brand: MEDLINE

## (undated) DEVICE — SCRB SURG BACTOSHIELD CHG 4PCT 4OZ

## (undated) DEVICE — GLV SURG GRN DERMASSURE LF PF 7.5

## (undated) DEVICE — ADHS SKIN MASTISOL CAP 2OZ DISP

## (undated) DEVICE — SYR LUERLOK 50ML

## (undated) DEVICE — CVR HNDL LT SURG ACCSSRY BLU STRL